# Patient Record
Sex: MALE | Race: WHITE | NOT HISPANIC OR LATINO | Employment: FULL TIME | URBAN - METROPOLITAN AREA
[De-identification: names, ages, dates, MRNs, and addresses within clinical notes are randomized per-mention and may not be internally consistent; named-entity substitution may affect disease eponyms.]

---

## 2019-02-20 ENCOUNTER — OFFICE VISIT - HEALTHEAST (OUTPATIENT)
Dept: BEHAVIORAL HEALTH | Facility: CLINIC | Age: 54
End: 2019-02-20

## 2019-02-20 DIAGNOSIS — F41.1 GAD (GENERALIZED ANXIETY DISORDER): ICD-10-CM

## 2019-02-20 DIAGNOSIS — F10.982 ALCOHOL-INDUCED INSOMNIA (H): ICD-10-CM

## 2019-02-20 DIAGNOSIS — F32.2 CURRENT SEVERE EPISODE OF MAJOR DEPRESSIVE DISORDER WITHOUT PSYCHOTIC FEATURES, UNSPECIFIED WHETHER RECURRENT (H): ICD-10-CM

## 2019-02-20 DIAGNOSIS — F10.20 ALCOHOL USE DISORDER, SEVERE, IN CONTROLLED ENVIRONMENT (H): ICD-10-CM

## 2019-02-20 DIAGNOSIS — G25.0 ESSENTIAL TREMOR: ICD-10-CM

## 2019-02-20 LAB
AMPHETAMINES UR QL SCN: NORMAL
BARBITURATES UR QL: NORMAL
BENZODIAZ UR QL: NORMAL
CANNABINOIDS UR QL SCN: NORMAL
COCAINE UR QL: NORMAL
CREAT UR-MCNC: 228.5 MG/DL
METHADONE UR QL SCN: NORMAL
OPIATES UR QL SCN: NORMAL
OXYCODONE UR QL: NORMAL
PCP UR QL SCN: NORMAL

## 2019-02-20 ASSESSMENT — MIFFLIN-ST. JEOR: SCORE: 1643.65

## 2019-02-24 LAB
ETHYL GLUCURONIDE UR CFM-MCNC: <100 NG/ML
ETHYL SULFATE UR CFM-MCNC: <100 NG/ML

## 2019-03-06 ENCOUNTER — OFFICE VISIT - HEALTHEAST (OUTPATIENT)
Dept: BEHAVIORAL HEALTH | Facility: CLINIC | Age: 54
End: 2019-03-06

## 2019-03-06 DIAGNOSIS — F10.20 ALCOHOL USE DISORDER, SEVERE, IN CONTROLLED ENVIRONMENT (H): ICD-10-CM

## 2019-03-06 DIAGNOSIS — F32.2 CURRENT SEVERE EPISODE OF MAJOR DEPRESSIVE DISORDER WITHOUT PSYCHOTIC FEATURES, UNSPECIFIED WHETHER RECURRENT (H): ICD-10-CM

## 2019-03-06 DIAGNOSIS — F41.1 GAD (GENERALIZED ANXIETY DISORDER): ICD-10-CM

## 2019-03-06 LAB
AMPHETAMINES UR QL SCN: NORMAL
BARBITURATES UR QL: NORMAL
BENZODIAZ UR QL: NORMAL
CANNABINOIDS UR QL SCN: NORMAL
COCAINE UR QL: NORMAL
CREAT UR-MCNC: 255.7 MG/DL
METHADONE UR QL SCN: NORMAL
OPIATES UR QL SCN: NORMAL
OXYCODONE UR QL: NORMAL
PCP UR QL SCN: NORMAL

## 2019-03-06 ASSESSMENT — MIFFLIN-ST. JEOR: SCORE: 1661.8

## 2019-03-10 LAB
ETHYL GLUCURONIDE UR CFM-MCNC: <100 NG/ML
ETHYL SULFATE UR CFM-MCNC: <100 NG/ML

## 2019-04-04 ENCOUNTER — AMBULATORY - HEALTHEAST (OUTPATIENT)
Dept: LAB | Facility: CLINIC | Age: 54
End: 2019-04-04

## 2019-04-04 ENCOUNTER — AMBULATORY - HEALTHEAST (OUTPATIENT)
Dept: BEHAVIORAL HEALTH | Facility: CLINIC | Age: 54
End: 2019-04-04

## 2019-04-04 ENCOUNTER — OFFICE VISIT - HEALTHEAST (OUTPATIENT)
Dept: BEHAVIORAL HEALTH | Facility: CLINIC | Age: 54
End: 2019-04-04

## 2019-04-04 DIAGNOSIS — D53.9 MACROCYTIC ANEMIA: ICD-10-CM

## 2019-04-04 DIAGNOSIS — F10.20 ALCOHOL USE DISORDER, SEVERE, DEPENDENCE (H): ICD-10-CM

## 2019-04-04 DIAGNOSIS — R74.01 TRANSAMINITIS: ICD-10-CM

## 2019-04-04 DIAGNOSIS — R53.82 CHRONIC FATIGUE: ICD-10-CM

## 2019-04-04 DIAGNOSIS — D53.9 ANEMIA, MACROCYTIC: ICD-10-CM

## 2019-04-04 DIAGNOSIS — R53.82 CHRONIC FATIGUE, UNSPECIFIED: ICD-10-CM

## 2019-04-04 DIAGNOSIS — F32.2 CURRENT SEVERE EPISODE OF MAJOR DEPRESSIVE DISORDER WITHOUT PSYCHOTIC FEATURES, UNSPECIFIED WHETHER RECURRENT (H): ICD-10-CM

## 2019-04-04 DIAGNOSIS — F41.1 GAD (GENERALIZED ANXIETY DISORDER): ICD-10-CM

## 2019-04-04 LAB
25(OH)D3 SERPL-MCNC: 12.4 NG/ML (ref 30–80)
ALBUMIN SERPL-MCNC: 4 G/DL (ref 3.5–5)
ALP SERPL-CCNC: 82 U/L (ref 45–120)
ALT SERPL W P-5'-P-CCNC: 18 U/L (ref 0–45)
AMPHETAMINES UR QL SCN: NORMAL
ANION GAP SERPL CALCULATED.3IONS-SCNC: 8 MMOL/L (ref 5–18)
AST SERPL W P-5'-P-CCNC: 26 U/L (ref 0–40)
BARBITURATES UR QL: NORMAL
BENZODIAZ UR QL: NORMAL
BILIRUB SERPL-MCNC: 0.4 MG/DL (ref 0–1)
BUN SERPL-MCNC: 11 MG/DL (ref 8–22)
CALCIUM SERPL-MCNC: 9.8 MG/DL (ref 8.5–10.5)
CANNABINOIDS UR QL SCN: NORMAL
CHLORIDE BLD-SCNC: 106 MMOL/L (ref 98–107)
CO2 SERPL-SCNC: 28 MMOL/L (ref 22–31)
COCAINE UR QL: NORMAL
CREAT SERPL-MCNC: 0.95 MG/DL (ref 0.7–1.3)
CREAT UR-MCNC: 131.9 MG/DL
ERYTHROCYTE [DISTWIDTH] IN BLOOD BY AUTOMATED COUNT: 12.4 % (ref 11–14.5)
GFR SERPL CREATININE-BSD FRML MDRD: >60 ML/MIN/1.73M2
GLUCOSE BLD-MCNC: 88 MG/DL (ref 70–125)
HCT VFR BLD AUTO: 43.4 % (ref 40–54)
HGB BLD-MCNC: 14.7 G/DL (ref 14–18)
MCH RBC QN AUTO: 31.5 PG (ref 27–34)
MCHC RBC AUTO-ENTMCNC: 33.9 G/DL (ref 32–36)
MCV RBC AUTO: 93 FL (ref 80–100)
METHADONE UR QL SCN: NORMAL
OPIATES UR QL SCN: NORMAL
OXYCODONE UR QL: NORMAL
PCP UR QL SCN: NORMAL
PLATELET # BLD AUTO: 218 THOU/UL (ref 140–440)
PMV BLD AUTO: 8.9 FL (ref 8.5–12.5)
POTASSIUM BLD-SCNC: 4.7 MMOL/L (ref 3.5–5)
PROT SERPL-MCNC: 7.2 G/DL (ref 6–8)
RBC # BLD AUTO: 4.66 MILL/UL (ref 4.4–6.2)
SODIUM SERPL-SCNC: 142 MMOL/L (ref 136–145)
VIT B12 SERPL-MCNC: 382 PG/ML (ref 213–816)
WBC: 6.8 THOU/UL (ref 4–11)

## 2019-04-04 ASSESSMENT — MIFFLIN-ST. JEOR: SCORE: 1711.69

## 2019-04-06 LAB
ETHYL GLUCURONIDE UR CFM-MCNC: <100 NG/ML
ETHYL SULFATE UR CFM-MCNC: <100 NG/ML

## 2019-04-17 ENCOUNTER — OFFICE VISIT - HEALTHEAST (OUTPATIENT)
Dept: BEHAVIORAL HEALTH | Facility: CLINIC | Age: 54
End: 2019-04-17

## 2019-04-17 DIAGNOSIS — F41.1 GAD (GENERALIZED ANXIETY DISORDER): ICD-10-CM

## 2019-04-17 DIAGNOSIS — E53.8 VITAMIN B 12 DEFICIENCY: ICD-10-CM

## 2019-04-17 DIAGNOSIS — E55.9 VITAMIN D DEFICIENCY: ICD-10-CM

## 2019-04-17 DIAGNOSIS — F10.20 ALCOHOL USE DISORDER, SEVERE, DEPENDENCE (H): ICD-10-CM

## 2019-04-17 LAB
AMPHETAMINES UR QL SCN: NORMAL
BARBITURATES UR QL: NORMAL
BENZODIAZ UR QL: NORMAL
CANNABINOIDS UR QL SCN: NORMAL
COCAINE UR QL: NORMAL
CREAT UR-MCNC: 48.8 MG/DL
METHADONE UR QL SCN: NORMAL
OPIATES UR QL SCN: NORMAL
OXYCODONE UR QL: NORMAL
PCP UR QL SCN: NORMAL

## 2019-04-17 ASSESSMENT — MIFFLIN-ST. JEOR: SCORE: 1716.23

## 2019-04-20 LAB
ETHYL GLUCURONIDE UR CFM-MCNC: NORMAL NG/ML
ETHYL SULFATE UR CFM-MCNC: 5510 NG/ML

## 2019-04-23 ENCOUNTER — COMMUNICATION - HEALTHEAST (OUTPATIENT)
Dept: BEHAVIORAL HEALTH | Facility: CLINIC | Age: 54
End: 2019-04-23

## 2019-04-24 ENCOUNTER — COMMUNICATION - HEALTHEAST (OUTPATIENT)
Dept: BEHAVIORAL HEALTH | Facility: CLINIC | Age: 54
End: 2019-04-24

## 2019-05-16 ENCOUNTER — OFFICE VISIT - HEALTHEAST (OUTPATIENT)
Dept: BEHAVIORAL HEALTH | Facility: CLINIC | Age: 54
End: 2019-05-16

## 2019-05-16 DIAGNOSIS — R53.82 CHRONIC FATIGUE: ICD-10-CM

## 2019-05-16 DIAGNOSIS — F10.20 ALCOHOL USE DISORDER, SEVERE, DEPENDENCE (H): ICD-10-CM

## 2019-05-16 DIAGNOSIS — G25.0 ESSENTIAL TREMOR: ICD-10-CM

## 2019-05-16 DIAGNOSIS — F41.1 GAD (GENERALIZED ANXIETY DISORDER): ICD-10-CM

## 2019-05-16 DIAGNOSIS — E53.8 VITAMIN B 12 DEFICIENCY: ICD-10-CM

## 2019-05-16 DIAGNOSIS — F10.94 ALCOHOL USE WITH ALCOHOL-INDUCED MOOD DISORDER (H): ICD-10-CM

## 2019-05-16 LAB
AMPHETAMINES UR QL SCN: NORMAL
BARBITURATES UR QL: NORMAL
BENZODIAZ UR QL: NORMAL
CANNABINOIDS UR QL SCN: NORMAL
COCAINE UR QL: NORMAL
CREAT UR-MCNC: 262.8 MG/DL
METHADONE UR QL SCN: NORMAL
OPIATES UR QL SCN: NORMAL
OXYCODONE UR QL: NORMAL
PCP UR QL SCN: NORMAL

## 2019-05-16 ASSESSMENT — MIFFLIN-ST. JEOR: SCORE: 1720.76

## 2019-05-21 LAB
ETHYL GLUCURONIDE UR CFM-MCNC: NORMAL NG/ML
ETHYL SULFATE UR CFM-MCNC: NORMAL NG/ML

## 2019-05-23 ENCOUNTER — AMBULATORY - HEALTHEAST (OUTPATIENT)
Dept: BEHAVIORAL HEALTH | Facility: CLINIC | Age: 54
End: 2019-05-23

## 2019-05-23 DIAGNOSIS — F10.94 ALCOHOL USE WITH ALCOHOL-INDUCED MOOD DISORDER (H): ICD-10-CM

## 2019-05-23 DIAGNOSIS — F10.20 ALCOHOL USE DISORDER, SEVERE, DEPENDENCE (H): ICD-10-CM

## 2019-05-23 DIAGNOSIS — F41.1 GAD (GENERALIZED ANXIETY DISORDER): ICD-10-CM

## 2019-05-23 DIAGNOSIS — E53.8 VITAMIN B 12 DEFICIENCY: ICD-10-CM

## 2019-05-23 DIAGNOSIS — E55.9 VITAMIN D DEFICIENCY: ICD-10-CM

## 2019-05-23 LAB
AMPHETAMINES UR QL SCN: NORMAL
BARBITURATES UR QL: NORMAL
BENZODIAZ UR QL: NORMAL
CANNABINOIDS UR QL SCN: NORMAL
COCAINE UR QL: NORMAL
CREAT UR-MCNC: 138.7 MG/DL
METHADONE UR QL SCN: NORMAL
OPIATES UR QL SCN: NORMAL
OXYCODONE UR QL: NORMAL
PCP UR QL SCN: NORMAL

## 2019-05-23 ASSESSMENT — MIFFLIN-ST. JEOR: SCORE: 1729.84

## 2019-05-25 LAB
ETHYL GLUCURONIDE UR CFM-MCNC: 307 NG/ML
ETHYL SULFATE UR CFM-MCNC: 121 NG/ML

## 2019-06-19 ENCOUNTER — AMBULATORY - HEALTHEAST (OUTPATIENT)
Dept: BEHAVIORAL HEALTH | Facility: CLINIC | Age: 54
End: 2019-06-19

## 2019-06-19 DIAGNOSIS — E53.8 VITAMIN B 12 DEFICIENCY: ICD-10-CM

## 2019-06-20 ENCOUNTER — AMBULATORY - HEALTHEAST (OUTPATIENT)
Dept: BEHAVIORAL HEALTH | Facility: CLINIC | Age: 54
End: 2019-06-20

## 2019-06-20 DIAGNOSIS — F32.2 CURRENT SEVERE EPISODE OF MAJOR DEPRESSIVE DISORDER WITHOUT PSYCHOTIC FEATURES, UNSPECIFIED WHETHER RECURRENT (H): ICD-10-CM

## 2019-06-20 DIAGNOSIS — E55.9 VITAMIN D DEFICIENCY: ICD-10-CM

## 2019-06-20 DIAGNOSIS — F41.1 GAD (GENERALIZED ANXIETY DISORDER): ICD-10-CM

## 2019-06-20 DIAGNOSIS — R74.01 TRANSAMINITIS: ICD-10-CM

## 2019-06-20 DIAGNOSIS — F10.20 ALCOHOL USE DISORDER, SEVERE, DEPENDENCE (H): ICD-10-CM

## 2019-06-20 DIAGNOSIS — F10.94 ALCOHOL USE WITH ALCOHOL-INDUCED MOOD DISORDER (H): ICD-10-CM

## 2019-06-20 DIAGNOSIS — F10.230 ALCOHOL DEPENDENCE WITH UNCOMPLICATED WITHDRAWAL (H): ICD-10-CM

## 2019-06-20 DIAGNOSIS — R19.7 DIARRHEA, UNSPECIFIED TYPE: ICD-10-CM

## 2019-06-20 LAB
AMPHETAMINES UR QL SCN: NORMAL
BARBITURATES UR QL: NORMAL
BENZODIAZ UR QL: NORMAL
CANNABINOIDS UR QL SCN: NORMAL
COCAINE UR QL: NORMAL
CREAT UR-MCNC: 353.9 MG/DL
METHADONE UR QL SCN: NORMAL
OPIATES UR QL SCN: NORMAL
OXYCODONE UR QL: NORMAL
PCP UR QL SCN: NORMAL

## 2019-06-20 ASSESSMENT — MIFFLIN-ST. JEOR: SCORE: 1716.23

## 2019-06-23 LAB
ETHYL GLUCURONIDE UR CFM-MCNC: 756 NG/ML
ETHYL SULFATE UR CFM-MCNC: 111 NG/ML

## 2019-07-03 ENCOUNTER — OFFICE VISIT - HEALTHEAST (OUTPATIENT)
Dept: BEHAVIORAL HEALTH | Facility: CLINIC | Age: 54
End: 2019-07-03

## 2019-07-03 DIAGNOSIS — F41.1 GAD (GENERALIZED ANXIETY DISORDER): ICD-10-CM

## 2019-07-03 DIAGNOSIS — E55.9 VITAMIN D DEFICIENCY: ICD-10-CM

## 2019-07-03 DIAGNOSIS — F10.20 ALCOHOL USE DISORDER, SEVERE, DEPENDENCE (H): ICD-10-CM

## 2019-07-03 DIAGNOSIS — F10.94 ALCOHOL USE WITH ALCOHOL-INDUCED MOOD DISORDER (H): ICD-10-CM

## 2019-07-03 DIAGNOSIS — E53.8 VITAMIN B 12 DEFICIENCY: ICD-10-CM

## 2019-07-03 LAB
AMPHETAMINES UR QL SCN: NORMAL
BARBITURATES UR QL: NORMAL
BENZODIAZ UR QL: NORMAL
CANNABINOIDS UR QL SCN: NORMAL
COCAINE UR QL: NORMAL
CREAT UR-MCNC: 224.2 MG/DL
METHADONE UR QL SCN: NORMAL
OPIATES UR QL SCN: NORMAL
OXYCODONE UR QL: NORMAL
PCP UR QL SCN: NORMAL

## 2019-07-03 ASSESSMENT — MIFFLIN-ST. JEOR: SCORE: 1684.48

## 2019-07-06 LAB
ETHYL GLUCURONIDE UR CFM-MCNC: <100 NG/ML
ETHYL SULFATE UR CFM-MCNC: <100 NG/ML

## 2019-08-01 ENCOUNTER — COMMUNICATION - HEALTHEAST (OUTPATIENT)
Dept: BEHAVIORAL HEALTH | Facility: CLINIC | Age: 54
End: 2019-08-01

## 2019-08-04 ENCOUNTER — SURGERY - HEALTHEAST (OUTPATIENT)
Dept: CARDIOLOGY | Facility: CLINIC | Age: 54
End: 2019-08-04

## 2019-08-04 ASSESSMENT — MIFFLIN-ST. JEOR: SCORE: 1652.72

## 2019-08-05 ASSESSMENT — MIFFLIN-ST. JEOR: SCORE: 1707.16

## 2019-08-06 ASSESSMENT — MIFFLIN-ST. JEOR: SCORE: 1728.02

## 2019-08-07 ASSESSMENT — MIFFLIN-ST. JEOR: SCORE: 1707.61

## 2019-08-08 ASSESSMENT — MIFFLIN-ST. JEOR: SCORE: 1688.56

## 2019-08-09 ASSESSMENT — MIFFLIN-ST. JEOR: SCORE: 1675.43

## 2019-08-10 ASSESSMENT — MIFFLIN-ST. JEOR: SCORE: 1664.97

## 2019-08-11 ASSESSMENT — MIFFLIN-ST. JEOR: SCORE: 1667.69

## 2019-08-12 ASSESSMENT — MIFFLIN-ST. JEOR: SCORE: 1668.15

## 2019-08-15 ENCOUNTER — COMMUNICATION - HEALTHEAST (OUTPATIENT)
Dept: ADMINISTRATIVE | Facility: HOSPITAL | Age: 54
End: 2019-08-15

## 2019-08-15 ENCOUNTER — COMMUNICATION - HEALTHEAST (OUTPATIENT)
Dept: ONCOLOGY | Facility: CLINIC | Age: 54
End: 2019-08-15

## 2019-09-04 ENCOUNTER — OFFICE VISIT - HEALTHEAST (OUTPATIENT)
Dept: ONCOLOGY | Facility: CLINIC | Age: 54
End: 2019-09-04

## 2019-09-04 DIAGNOSIS — Z86.718 PERSONAL HISTORY OF DVT (DEEP VEIN THROMBOSIS): ICD-10-CM

## 2019-09-04 DIAGNOSIS — I26.02 ACUTE SADDLE PULMONARY EMBOLISM WITH ACUTE COR PULMONALE (H): ICD-10-CM

## 2019-09-04 ASSESSMENT — MIFFLIN-ST. JEOR: SCORE: 1661.34

## 2019-10-11 ENCOUNTER — COMMUNICATION - HEALTHEAST (OUTPATIENT)
Dept: BEHAVIORAL HEALTH | Facility: CLINIC | Age: 54
End: 2019-10-11

## 2019-10-17 ENCOUNTER — OFFICE VISIT - HEALTHEAST (OUTPATIENT)
Dept: ADDICTION MEDICINE | Facility: CLINIC | Age: 54
End: 2019-10-17

## 2019-10-17 DIAGNOSIS — F10.20 ALCOHOL USE DISORDER, SEVERE, DEPENDENCE (H): ICD-10-CM

## 2019-10-22 ENCOUNTER — COMMUNICATION - HEALTHEAST (OUTPATIENT)
Dept: ADDICTION MEDICINE | Facility: CLINIC | Age: 54
End: 2019-10-22

## 2019-11-29 ENCOUNTER — OFFICE VISIT (OUTPATIENT)
Dept: FAMILY MEDICINE | Facility: CLINIC | Age: 54
End: 2019-11-29
Payer: COMMERCIAL

## 2019-11-29 VITALS
DIASTOLIC BLOOD PRESSURE: 76 MMHG | TEMPERATURE: 98.2 F | SYSTOLIC BLOOD PRESSURE: 120 MMHG | OXYGEN SATURATION: 98 % | HEART RATE: 63 BPM | HEIGHT: 70 IN | RESPIRATION RATE: 16 BRPM | WEIGHT: 187.5 LBS | BODY MASS INDEX: 26.84 KG/M2

## 2019-11-29 DIAGNOSIS — K02.9 DENTAL CARIES: ICD-10-CM

## 2019-11-29 DIAGNOSIS — R53.83 FATIGUE, UNSPECIFIED TYPE: ICD-10-CM

## 2019-11-29 DIAGNOSIS — Z13.1 SCREENING FOR DIABETES MELLITUS: ICD-10-CM

## 2019-11-29 DIAGNOSIS — Z12.11 SPECIAL SCREENING FOR MALIGNANT NEOPLASMS, COLON: ICD-10-CM

## 2019-11-29 DIAGNOSIS — Z87.898 HISTORY OF CHEST PAIN: ICD-10-CM

## 2019-11-29 DIAGNOSIS — Z79.01 CHRONIC ANTICOAGULATION: ICD-10-CM

## 2019-11-29 DIAGNOSIS — R63.5 WEIGHT GAIN: ICD-10-CM

## 2019-11-29 DIAGNOSIS — Z86.59 HISTORY OF SUICIDAL IDEATION: ICD-10-CM

## 2019-11-29 DIAGNOSIS — F41.1 GAD (GENERALIZED ANXIETY DISORDER): ICD-10-CM

## 2019-11-29 DIAGNOSIS — Z76.89 ESTABLISHING CARE WITH NEW DOCTOR, ENCOUNTER FOR: ICD-10-CM

## 2019-11-29 DIAGNOSIS — Z13.6 ENCOUNTER FOR LIPID SCREENING FOR CARDIOVASCULAR DISEASE: ICD-10-CM

## 2019-11-29 DIAGNOSIS — Z11.4 SCREENING FOR HUMAN IMMUNODEFICIENCY VIRUS WITHOUT PRESENCE OF RISK FACTORS: ICD-10-CM

## 2019-11-29 DIAGNOSIS — H93.13 TINNITUS, BILATERAL: ICD-10-CM

## 2019-11-29 DIAGNOSIS — R41.3 MEMORY CHANGES: ICD-10-CM

## 2019-11-29 DIAGNOSIS — Z13.220 ENCOUNTER FOR LIPID SCREENING FOR CARDIOVASCULAR DISEASE: ICD-10-CM

## 2019-11-29 DIAGNOSIS — R00.2 PALPITATIONS: ICD-10-CM

## 2019-11-29 DIAGNOSIS — Z11.59 ENCOUNTER FOR HEPATITIS C SCREENING TEST FOR LOW RISK PATIENT: ICD-10-CM

## 2019-11-29 DIAGNOSIS — R10.13 ABDOMINAL PAIN, EPIGASTRIC: ICD-10-CM

## 2019-11-29 DIAGNOSIS — J30.2 SEASONAL ALLERGIC RHINITIS, UNSPECIFIED TRIGGER: ICD-10-CM

## 2019-11-29 DIAGNOSIS — R19.5 POSITIVE FIT (FECAL IMMUNOCHEMICAL TEST): ICD-10-CM

## 2019-11-29 DIAGNOSIS — F32.2 CURRENT SEVERE EPISODE OF MAJOR DEPRESSIVE DISORDER WITHOUT PSYCHOTIC FEATURES, UNSPECIFIED WHETHER RECURRENT (H): Primary | ICD-10-CM

## 2019-11-29 DIAGNOSIS — Z86.711 HISTORY OF PULMONARY EMBOLISM: ICD-10-CM

## 2019-11-29 DIAGNOSIS — Z13.0 SCREENING, ANEMIA, DEFICIENCY, IRON: ICD-10-CM

## 2019-11-29 DIAGNOSIS — F10.21 MODERATE ALCOHOL DEPENDENCE IN EARLY REMISSION (H): ICD-10-CM

## 2019-11-29 DIAGNOSIS — R42 DIZZINESS: ICD-10-CM

## 2019-11-29 DIAGNOSIS — Z23 NEED FOR DIPHTHERIA-TETANUS-PERTUSSIS (TDAP) VACCINE: ICD-10-CM

## 2019-11-29 DIAGNOSIS — Z86.718 PERSONAL HISTORY OF DVT (DEEP VEIN THROMBOSIS): ICD-10-CM

## 2019-11-29 DIAGNOSIS — N52.9 ERECTILE DYSFUNCTION, UNSPECIFIED ERECTILE DYSFUNCTION TYPE: ICD-10-CM

## 2019-11-29 LAB
ALBUMIN SERPL-MCNC: 3.9 G/DL (ref 3.4–5)
ALP SERPL-CCNC: 87 U/L (ref 40–150)
ALT SERPL W P-5'-P-CCNC: 33 U/L (ref 0–70)
ANION GAP SERPL CALCULATED.3IONS-SCNC: 3 MMOL/L (ref 3–14)
AST SERPL W P-5'-P-CCNC: 21 U/L (ref 0–45)
BASOPHILS # BLD AUTO: 0 10E9/L (ref 0–0.2)
BASOPHILS NFR BLD AUTO: 0.5 %
BILIRUB SERPL-MCNC: 0.4 MG/DL (ref 0.2–1.3)
BUN SERPL-MCNC: 14 MG/DL (ref 7–30)
CALCIUM SERPL-MCNC: 9 MG/DL (ref 8.5–10.1)
CHLORIDE SERPL-SCNC: 106 MMOL/L (ref 94–109)
CHOLEST SERPL-MCNC: 261 MG/DL
CO2 SERPL-SCNC: 28 MMOL/L (ref 20–32)
CREAT SERPL-MCNC: 0.98 MG/DL (ref 0.66–1.25)
DIFFERENTIAL METHOD BLD: NORMAL
EOSINOPHIL # BLD AUTO: 0.2 10E9/L (ref 0–0.7)
EOSINOPHIL NFR BLD AUTO: 2.5 %
ERYTHROCYTE [DISTWIDTH] IN BLOOD BY AUTOMATED COUNT: 12.7 % (ref 10–15)
FOLATE SERPL-MCNC: 14.6 NG/ML
GFR SERPL CREATININE-BSD FRML MDRD: 87 ML/MIN/{1.73_M2}
GLUCOSE SERPL-MCNC: 87 MG/DL (ref 70–99)
HBA1C MFR BLD: 4.9 % (ref 0–5.6)
HCT VFR BLD AUTO: 48.9 % (ref 40–53)
HDLC SERPL-MCNC: 57 MG/DL
HGB BLD-MCNC: 16.3 G/DL (ref 13.3–17.7)
LDLC SERPL CALC-MCNC: 163 MG/DL
LYMPHOCYTES # BLD AUTO: 2 10E9/L (ref 0.8–5.3)
LYMPHOCYTES NFR BLD AUTO: 25.1 %
MCH RBC QN AUTO: 31.7 PG (ref 26.5–33)
MCHC RBC AUTO-ENTMCNC: 33.3 G/DL (ref 31.5–36.5)
MCV RBC AUTO: 95 FL (ref 78–100)
MONOCYTES # BLD AUTO: 0.5 10E9/L (ref 0–1.3)
MONOCYTES NFR BLD AUTO: 6.2 %
NEUTROPHILS # BLD AUTO: 5.3 10E9/L (ref 1.6–8.3)
NEUTROPHILS NFR BLD AUTO: 65.7 %
NONHDLC SERPL-MCNC: 204 MG/DL
PLATELET # BLD AUTO: 195 10E9/L (ref 150–450)
POTASSIUM SERPL-SCNC: 4.2 MMOL/L (ref 3.4–5.3)
PROT SERPL-MCNC: 7.5 G/DL (ref 6.8–8.8)
RBC # BLD AUTO: 5.15 10E12/L (ref 4.4–5.9)
SODIUM SERPL-SCNC: 137 MMOL/L (ref 133–144)
TRIGL SERPL-MCNC: 205 MG/DL
TSH SERPL DL<=0.005 MIU/L-ACNC: 0.96 MU/L (ref 0.4–4)
VIT B12 SERPL-MCNC: 334 PG/ML (ref 193–986)
WBC # BLD AUTO: 8 10E9/L (ref 4–11)

## 2019-11-29 PROCEDURE — 90715 TDAP VACCINE 7 YRS/> IM: CPT | Performed by: FAMILY MEDICINE

## 2019-11-29 PROCEDURE — 82607 VITAMIN B-12: CPT | Performed by: FAMILY MEDICINE

## 2019-11-29 PROCEDURE — 84425 ASSAY OF VITAMIN B-1: CPT | Mod: 90 | Performed by: FAMILY MEDICINE

## 2019-11-29 PROCEDURE — 99205 OFFICE O/P NEW HI 60 MIN: CPT | Mod: 25 | Performed by: FAMILY MEDICINE

## 2019-11-29 PROCEDURE — 36415 COLL VENOUS BLD VENIPUNCTURE: CPT | Performed by: FAMILY MEDICINE

## 2019-11-29 PROCEDURE — 99000 SPECIMEN HANDLING OFFICE-LAB: CPT | Performed by: FAMILY MEDICINE

## 2019-11-29 PROCEDURE — 83036 HEMOGLOBIN GLYCOSYLATED A1C: CPT | Performed by: FAMILY MEDICINE

## 2019-11-29 PROCEDURE — 90471 IMMUNIZATION ADMIN: CPT | Performed by: FAMILY MEDICINE

## 2019-11-29 PROCEDURE — 87389 HIV-1 AG W/HIV-1&-2 AB AG IA: CPT | Performed by: FAMILY MEDICINE

## 2019-11-29 PROCEDURE — 80061 LIPID PANEL: CPT | Performed by: FAMILY MEDICINE

## 2019-11-29 PROCEDURE — 96127 BRIEF EMOTIONAL/BEHAV ASSMT: CPT | Performed by: FAMILY MEDICINE

## 2019-11-29 PROCEDURE — 80050 GENERAL HEALTH PANEL: CPT | Performed by: FAMILY MEDICINE

## 2019-11-29 PROCEDURE — 82746 ASSAY OF FOLIC ACID SERUM: CPT | Performed by: FAMILY MEDICINE

## 2019-11-29 PROCEDURE — 86803 HEPATITIS C AB TEST: CPT | Performed by: FAMILY MEDICINE

## 2019-11-29 RX ORDER — PROPRANOLOL HYDROCHLORIDE 20 MG/1
20 TABLET ORAL PRN
COMMUNITY
Start: 2019-11-21 | End: 2021-08-17

## 2019-11-29 RX ORDER — MULTIPLE VITAMINS W/ MINERALS TAB 9MG-400MCG
1 TAB ORAL DAILY
COMMUNITY
End: 2021-08-18

## 2019-11-29 RX ORDER — BUPROPION HYDROCHLORIDE 300 MG/1
300 TABLET ORAL EVERY MORNING
COMMUNITY
Start: 2019-11-22 | End: 2021-08-18

## 2019-11-29 RX ORDER — GABAPENTIN 300 MG/1
300 CAPSULE ORAL
COMMUNITY
Start: 2019-11-21 | End: 2020-07-07

## 2019-11-29 ASSESSMENT — ANXIETY QUESTIONNAIRES
2. NOT BEING ABLE TO STOP OR CONTROL WORRYING: NEARLY EVERY DAY
GAD7 TOTAL SCORE: 15
6. BECOMING EASILY ANNOYED OR IRRITABLE: SEVERAL DAYS
7. FEELING AFRAID AS IF SOMETHING AWFUL MIGHT HAPPEN: MORE THAN HALF THE DAYS
1. FEELING NERVOUS, ANXIOUS, OR ON EDGE: NEARLY EVERY DAY
IF YOU CHECKED OFF ANY PROBLEMS ON THIS QUESTIONNAIRE, HOW DIFFICULT HAVE THESE PROBLEMS MADE IT FOR YOU TO DO YOUR WORK, TAKE CARE OF THINGS AT HOME, OR GET ALONG WITH OTHER PEOPLE: VERY DIFFICULT
3. WORRYING TOO MUCH ABOUT DIFFERENT THINGS: NEARLY EVERY DAY
5. BEING SO RESTLESS THAT IT IS HARD TO SIT STILL: SEVERAL DAYS

## 2019-11-29 ASSESSMENT — MIFFLIN-ST. JEOR: SCORE: 1696.74

## 2019-11-29 ASSESSMENT — PATIENT HEALTH QUESTIONNAIRE - PHQ9
5. POOR APPETITE OR OVEREATING: MORE THAN HALF THE DAYS
SUM OF ALL RESPONSES TO PHQ QUESTIONS 1-9: 14

## 2019-11-29 NOTE — LETTER
My Depression Action Plan  Name: Nikunj Sihna   Date of Birth 1965  Date: 12/1/2019    My doctor: Adriana Solis   My clinic: [address] [address] [phone]          GREEN    ZONE   Good Control    What it looks like:     Things are going generally well. You have normal up s and down s. You may even feel depressed from time to time, but bad moods usually last less than a day.   What you need to do:  1. Continue to care for yourself (see self care plan)  2. Check your depression survival kit and update it as needed  3. Follow your physician s recommendations including any medication.  4. Do not stop taking medication unless you consult with your physician first.           YELLOW         ZONE Getting Worse    What it looks like:     Depression is starting to interfere with your life.     It may be hard to get out of bed; you may be starting to isolate yourself from others.    Symptoms of depression are starting to last most all day and this has happened for several days.     You may have suicidal thoughts but they are not constant.   What you need to do:     1. Call your care team, your response to treatment will improve if you keep your care team informed of your progress. Yellow periods are signs an adjustment may need to be made.     2. Continue your self-care, even if you have to fake it!    3. Talk to someone in your support network    4. Open up your depression survival kit           RED    ZONE Medical Alert - Get Help    What it looks like:     Depression is seriously interfering with your life.     You may experience these or other symptoms: You can t get out of bed most days, can t work or engage in other necessary activities, you have trouble taking care of basic hygiene, or basic responsibilities, thoughts of suicide or death that will not go away, self-injurious behavior.     What you need to do:  1. Call your care team and  request a same-day appointment. If they are not available (weekends or after hours) call your local crisis line, emergency room or 911.            Depression Self Care Plan / Survival Kit    Self-Care for Depression  Here s the deal. Your body and mind are really not as separate as most people think.  What you do and think affects how you feel and how you feel influences what you do and think. This means if you do things that people who feel good do, it will help you feel better.  Sometimes this is all it takes.  There is also a place for medication and therapy depending on how severe your depression is, so be sure to consult with your medical provider and/ or Behavioral Health Consultant if your symptoms are worsening or not improving.     In order to better manage my stress, I will:    Exercise  Get some form of exercise, every day. This will help reduce pain and release endorphins, the  feel good  chemicals in your brain. This is almost as good as taking antidepressants!  This is not the same as joining a gym and then never going! (they count on that by the way ) It can be as simple as just going for a walk or doing some gardening, anything that will get you moving.      Hygiene   Maintain good hygiene (Get out of bed in the morning, Make your bed, Brush your teeth, Take a shower, and Get dressed like you were going to work, even if you are unemployed).  If your clothes don't fit try to get ones that do.    Diet  I will strive to eat foods that are good for me, drink plenty of water, and avoid excessive sugar, caffeine, alcohol, and other mood-altering substances.  Some foods that are helpful in depression are: complex carbohydrates, B vitamins, flaxseed, fish or fish oil, fresh fruits and vegetables.    Psychotherapy  I agree to participate in Individual Therapy (if recommended).    Medication  If prescribed medications, I agree to take them.  Missing doses can result in serious side effects.  I understand that  drinking alcohol, or other illicit drug use, may cause potential side effects.  I will not stop my medication abruptly without first discussing it with my provider.    Staying Connected With Others  I will stay in touch with my friends, family members, and my primary care provider/team.    Use your imagination  Be creative.  We all have a creative side; it doesn t matter if it s oil painting, sand castles, or mud pies! This will also kick up the endorphins.    Witness Beauty  (AKA stop and smell the roses) Take a look outside, even in mid-winter. Notice colors, textures. Watch the squirrels and birds.     Service to others  Be of service to others.  There is always someone else in need.  By helping others we can  get out of ourselves  and remember the really important things.  This also provides opportunities for practicing all the other parts of the program.    Humor  Laugh and be silly!  Adjust your TV habits for less news and crime-drama and more comedy.    Control your stress  Try breathing deep, massage therapy, biofeedback, and meditation. Find time to relax each day.     My support system    Clinic Contact:  Phone number:    Contact 1:  Phone number:    Contact 2:  Phone number:    Faith/:  Phone number:    Therapist:  Phone number:    Local crisis center:    Phone number:    Other community support:  Phone number:

## 2019-11-29 NOTE — NURSING NOTE
Screening Questionnaire for Adult Immunization     Are you sick today?   No    Do you have allergies to medications, food or any vaccine?   No    Have you ever had a serious reaction after receiving a vaccination?   No    Do you have a long-term health problem with heart disease, lung disease,  asthma, kidney disease, diabetes, anemia, metabolic or blood disease?   No    Do you have cancer, leukemia, AIDS, or any immune system problem?   No    Do you take cortisone, prednisone, other steroids, or anticancer drugs, or  have you had any x-ray (radiation) treatments?   No    Have you had a seizure, brain, or other nervous system problem?   No    During the past year, have you received a transfusion of blood or blood       products, or been given a medicine called immune (gamma) globulin?   No    For women: Are you pregnant or is there a chance you could become         pregnant during the next month?   No    Have you received any vaccinations in the past 4 weeks?   No     Immunization questionnaire answers were all negative.      MNVFC doesn't apply on this patient    Per orders of Dr Solis, injection of TDAP given by Rachel Saul CMA. Patient instructed to remain in clinic for 20 minutes afterwards, and to report any adverse reaction to me immediately.    Prior to injection verified patient identity using patient's name and date of birth.         Screening performed by Rachel Saul CMA

## 2019-11-29 NOTE — PATIENT INSTRUCTIONS
Labs today   See psyche and psyhcology as planned  tdap today   colonoscopy in near future  See hematology regarding prior PE and xarelto  Consider GI referral fo scope in future once can come off xarelto few days   Will monitor weight   See you back in few weeks  can try Pepcid for abdominal ain  Go to the ER if worse

## 2019-11-29 NOTE — LETTER
December 2, 2019      Nikunj Sinha  [address]   [facility] [address]        Dear ,    We are writing to inform you of your test results.    Results within acceptable limits.  -HIV test is normal..     Resulted Orders   CBC with platelets differential   Result Value Ref Range    WBC 8.0 4.0 - 11.0 10e9/L    RBC Count 5.15 4.4 - 5.9 10e12/L    Hemoglobin 16.3 13.3 - 17.7 g/dL    Hematocrit 48.9 40.0 - 53.0 %    MCV 95 78 - 100 fl    MCH 31.7 26.5 - 33.0 pg    MCHC 33.3 31.5 - 36.5 g/dL    RDW 12.7 10.0 - 15.0 %    Platelet Count 195 150 - 450 10e9/L    % Neutrophils 65.7 %    % Lymphocytes 25.1 %    % Monocytes 6.2 %    % Eosinophils 2.5 %    % Basophils 0.5 %    Absolute Neutrophil 5.3 1.6 - 8.3 10e9/L    Absolute Lymphocytes 2.0 0.8 - 5.3 10e9/L    Absolute Monocytes 0.5 0.0 - 1.3 10e9/L    Absolute Eosinophils 0.2 0.0 - 0.7 10e9/L    Absolute Basophils 0.0 0.0 - 0.2 10e9/L    Diff Method Automated Method    Comprehensive metabolic panel   Result Value Ref Range    Sodium 137 133 - 144 mmol/L    Potassium 4.2 3.4 - 5.3 mmol/L    Chloride 106 94 - 109 mmol/L    Carbon Dioxide 28 20 - 32 mmol/L    Anion Gap 3 3 - 14 mmol/L    Glucose 87 70 - 99 mg/dL    Urea Nitrogen 14 7 - 30 mg/dL    Creatinine 0.98 0.66 - 1.25 mg/dL    GFR Estimate 87 >60 mL/min/[1.73_m2]      Comment:      Non  GFR Calc  Starting 12/18/2018, serum creatinine based estimated GFR (eGFR) will be   calculated using the Chronic Kidney Disease Epidemiology Collaboration   (CKD-EPI) equation.      GFR Estimate If Black >90 >60 mL/min/[1.73_m2]      Comment:       GFR Calc  Starting 12/18/2018, serum creatinine based estimated GFR (eGFR) will be   calculated using the Chronic Kidney Disease Epidemiology Collaboration   (CKD-EPI) equation.      Calcium 9.0 8.5 - 10.1 mg/dL    Bilirubin Total 0.4 0.2 - 1.3 mg/dL    Albumin 3.9 3.4 - 5.0 g/dL    Protein Total 7.5 6.8 - 8.8 g/dL    Alkaline Phosphatase  87 40 - 150 U/L    ALT 33 0 - 70 U/L    AST 21 0 - 45 U/L   Hemoglobin A1c   Result Value Ref Range    Hemoglobin A1C 4.9 0 - 5.6 %      Comment:      Normal <5.7% Prediabetes 5.7-6.4%  Diabetes 6.5% or higher - adopted from ADA   consensus guidelines.     Lipid panel reflex to direct LDL Non-fasting   Result Value Ref Range    Cholesterol 261 (H) <200 mg/dL      Comment:      Desirable:       <200 mg/dl    Triglycerides 205 (H) <150 mg/dL      Comment:      Borderline high:  150-199 mg/dl  High:             200-499 mg/dl  Very high:       >499 mg/dl      HDL Cholesterol 57 >39 mg/dL    LDL Cholesterol Calculated 163 (H) <100 mg/dL      Comment:      Above desirable:  100-129 mg/dl  Borderline High:  130-159 mg/dL  High:             160-189 mg/dL  Very high:       >189 mg/dl      Non HDL Cholesterol 204 (H) <130 mg/dL      Comment:      Above Desirable:  130-159 mg/dl  Borderline high:  160-189 mg/dl  High:             190-219 mg/dl  Very high:       >219 mg/dl     TSH with free T4 reflex   Result Value Ref Range    TSH 0.96 0.40 - 4.00 mU/L   HIV Antigen Antibody Combo   Result Value Ref Range    HIV Antigen Antibody Combo Nonreactive NR^Nonreactive          Comment:      HIV-1 p24 Ag & HIV-1/HIV-2 Ab Not Detected   Vitamin B12   Result Value Ref Range    Vitamin B12 334 193 - 986 pg/mL   Folate   Result Value Ref Range    Folate 14.6 >5.4 ng/mL   Vitamin B1 whole blood   Result Value Ref Range    Vitamin B1 Whole Blood Level 170 70 - 180 nmol/L      Comment:      (Note)  INTERPRETIVE INFORMATION: Vitamin B1, Whole Blood  This assay measures the concentration of thiamine   diphosphate (TDP), the primary active form of vitamin B1.   Approximately 90 percent of vitamin B1 present in whole   blood is TDP. Thiamine and thiamine monophosphate, which   comprise the remaining 10 percent, are not measured.  Test developed and characteristics determined by [employer] [employer]. See Compliance Statement B:  [email or web address]  Performed by [facility],  [address] [facility] [address] [phone]  [email or web address], Memo Hilton MD, Lab. Director         If you have any questions or concerns, please call the clinic at the number listed above.       Sincerely,        Adriana Solis MD/nr

## 2019-11-29 NOTE — LETTER
December 3, 2019      Nikunj Sinha  [address]   [facility] [address]        Dear ,    We are writing to inform you of your test results.    Results within acceptable limits.     Normal folate, b12,   -LDL(bad) cholesterol level is elevated, and your triglycerides are elevated which can increase your heart disease risk.  A diet high in fat and simple carbohydrates, genetics and being overweight can contribute to this. ADVISE: exercising 150 minutes of aerobic exercise per week (30 minutes for 5 days per week or 50 minutes for 3 days per week are options), eating a low saturated fat/low carbohydrate diet, and omega-3 fatty acids (fish oil) 3280-5655 mg daily are helpful to improve this. In 3 months, you should recheck your fasting cholesterol panel by scheduling a lab-only appointment.   -Liver and gallbladder tests are normal (ALT,AST, Alk phos, bilirubin), kidney function is normal (Cr, GFR), sodium is normal, potassium is normal, calcium is normal, glucose is normal.   -TSH (thyroid stimulating hormone) level is normal which indicates normal thyroid function..     Resulted Orders   CBC with platelets differential   Result Value Ref Range    WBC 8.0 4.0 - 11.0 10e9/L    RBC Count 5.15 4.4 - 5.9 10e12/L    Hemoglobin 16.3 13.3 - 17.7 g/dL    Hematocrit 48.9 40.0 - 53.0 %    MCV 95 78 - 100 fl    MCH 31.7 26.5 - 33.0 pg    MCHC 33.3 31.5 - 36.5 g/dL    RDW 12.7 10.0 - 15.0 %    Platelet Count 195 150 - 450 10e9/L    % Neutrophils 65.7 %    % Lymphocytes 25.1 %    % Monocytes 6.2 %    % Eosinophils 2.5 %    % Basophils 0.5 %    Absolute Neutrophil 5.3 1.6 - 8.3 10e9/L    Absolute Lymphocytes 2.0 0.8 - 5.3 10e9/L    Absolute Monocytes 0.5 0.0 - 1.3 10e9/L    Absolute Eosinophils 0.2 0.0 - 0.7 10e9/L    Absolute Basophils 0.0 0.0 - 0.2 10e9/L    Diff Method Automated Method    Comprehensive metabolic panel   Result Value Ref Range    Sodium 137 133 - 144 mmol/L    Potassium 4.2 3.4 - 5.3 mmol/L     Chloride 106 94 - 109 mmol/L    Carbon Dioxide 28 20 - 32 mmol/L    Anion Gap 3 3 - 14 mmol/L    Glucose 87 70 - 99 mg/dL    Urea Nitrogen 14 7 - 30 mg/dL    Creatinine 0.98 0.66 - 1.25 mg/dL    GFR Estimate 87 >60 mL/min/[1.73_m2]      Comment:      Non  GFR Calc  Starting 12/18/2018, serum creatinine based estimated GFR (eGFR) will be   calculated using the Chronic Kidney Disease Epidemiology Collaboration   (CKD-EPI) equation.      GFR Estimate If Black >90 >60 mL/min/[1.73_m2]      Comment:       GFR Calc  Starting 12/18/2018, serum creatinine based estimated GFR (eGFR) will be   calculated using the Chronic Kidney Disease Epidemiology Collaboration   (CKD-EPI) equation.      Calcium 9.0 8.5 - 10.1 mg/dL    Bilirubin Total 0.4 0.2 - 1.3 mg/dL    Albumin 3.9 3.4 - 5.0 g/dL    Protein Total 7.5 6.8 - 8.8 g/dL    Alkaline Phosphatase 87 40 - 150 U/L    ALT 33 0 - 70 U/L    AST 21 0 - 45 U/L   Hemoglobin A1c   Result Value Ref Range    Hemoglobin A1C 4.9 0 - 5.6 %      Comment:      Normal <5.7% Prediabetes 5.7-6.4%  Diabetes 6.5% or higher - adopted from ADA   consensus guidelines.     Lipid panel reflex to direct LDL Non-fasting   Result Value Ref Range    Cholesterol 261 (H) <200 mg/dL      Comment:      Desirable:       <200 mg/dl    Triglycerides 205 (H) <150 mg/dL      Comment:      Borderline high:  150-199 mg/dl  High:             200-499 mg/dl  Very high:       >499 mg/dl      HDL Cholesterol 57 >39 mg/dL    LDL Cholesterol Calculated 163 (H) <100 mg/dL      Comment:      Above desirable:  100-129 mg/dl  Borderline High:  130-159 mg/dL  High:             160-189 mg/dL  Very high:       >189 mg/dl      Non HDL Cholesterol 204 (H) <130 mg/dL      Comment:      Above Desirable:  130-159 mg/dl  Borderline high:  160-189 mg/dl  High:             190-219 mg/dl  Very high:       >219 mg/dl     TSH with free T4 reflex   Result Value Ref Range    TSH 0.96 0.40 - 4.00 mU/L   HIV  Antigen Antibody Combo   Result Value Ref Range    HIV Antigen Antibody Combo Nonreactive NR^Nonreactive          Comment:      HIV-1 p24 Ag & HIV-1/HIV-2 Ab Not Detected   Hepatitis C Screen Reflex to HCV RNA Quant and Genotype   Result Value Ref Range    Hepatitis C Antibody Nonreactive NR^Nonreactive      Comment:      Assay performance characteristics have not been established for newborns,   infants, and children     Vitamin B12   Result Value Ref Range    Vitamin B12 334 193 - 986 pg/mL   Folate   Result Value Ref Range    Folate 14.6 >5.4 ng/mL   Vitamin B1 whole blood   Result Value Ref Range    Vitamin B1 Whole Blood Level 170 70 - 180 nmol/L      Comment:      (Note)  INTERPRETIVE INFORMATION: Vitamin B1, Whole Blood  This assay measures the concentration of thiamine   diphosphate (TDP), the primary active form of vitamin B1.   Approximately 90 percent of vitamin B1 present in whole   blood is TDP. Thiamine and thiamine monophosphate, which   comprise the remaining 10 percent, are not measured.  Test developed and characteristics determined by [facility]. See Compliance Statement B: [employer]  Performed by [facility],  [address] [place] [id number] [phone]  [email or web address], Memo Hilton MD, Lab. Director         If you have any questions or concerns, please call the clinic at the number listed above.       Sincerely,        Adriana Solis MD/nr

## 2019-11-29 NOTE — PROGRESS NOTES
Subjective     Nikunj Sinha is a 54 year old male who presents to clinic today for the following health issues:    HPI   Depression and Anxiety Follow-Up    How are you doing with your depression since your last visit? Doing so/so    How are you doing with your anxiety since your last visit?  Worsened not well    Are you having other symptoms that might be associated with depression or anxiety? No    Have you had a significant life event? OTHER: many     Do you have any concerns with your use of alcohol or other drugs? No     Pt has been feeling very fatigued and dizzy lately, concerned that this may be a side effect of medication  PHQ-9 ([employer]) 2019   1.  Little interest or pleasure in doing things 2   2.  Feeling down, depressed, or hopeless 2   3.  Trouble falling or staying asleep, or sleeping too much 1   4.  Feeling tired or having little energy 3   5.  Poor appetite or overeating 1   6.  Feeling bad about yourself 2   7.  Trouble concentrating 2   8.  Moving slowly or restless 0   9.  Suicidal or self-harm thoughts 1   PHQ-9 Total Score 14   Difficulty at work, home, or with people Very difficult   ZAIDA-7   [employer], 2002; Used with Permission) 2019   1. Feeling nervous, anxious, or on edge 3   2. Not being able to stop or control worrying 3   3. Worrying too much about different things 3   4. Trouble relaxing 2   5. Being so restless that it is hard to sit still 1   6. Becoming easily annoyed or irritable 1   7. Feeling afraid, as if something awful might happen 2   ZAIDA-7 Total Score 15   If you checked any problems, how difficult have they made it for you to do your work, take care of things at home, or get along with other people? Very difficult     Social History     Tobacco Use     Smoking status: Former Smoker     Last attempt to quit: 1982     Years since quittin.9     Smokeless tobacco: Never Used   Substance Use Topics     Alcohol use: Not Currently     Comment: sober second  time oct 14 , did detox adn rehab, in a sober house nov 2019     Drug use: Never     PHQ 11/29/2019   PHQ-9 Total Score 14   Q9: Thoughts of better off dead/self-harm past 2 weeks Several days     ZAIDA-7 SCORE 11/29/2019   Total Score 15     Last PHQ-9 11/29/2019   1.  Little interest or pleasure in doing things 2   2.  Feeling down, depressed, or hopeless 2   3.  Trouble falling or staying asleep, or sleeping too much 1   4.  Feeling tired or having little energy 3   5.  Poor appetite or overeating 1   6.  Feeling bad about yourself 2   7.  Trouble concentrating 2   8.  Moving slowly or restless 0   Q9: Thoughts of better off dead/self-harm past 2 weeks 1   PHQ-9 Total Score 14   Difficulty at work, home, or with people Very difficult     In the past two weeks have you had thoughts of suicide or self-harm?  Yes  In the past two weeks have you thought of a plan or intent to harm yourself? No.  Do you have concerns about your personal safety or the safety of others?   No  Suicide Assessment Five-step Evaluation and Treatment (SAFE-T)      How many servings of fruits and vegetables do you eat daily?  0-1    On average, how many sweetened beverages do you drink each day (Examples: soda, juice, sweet tea, etc.  Do NOT count diet or artificially sweetened beverages)?   3-5    How many days per week do you miss taking your medication? 0    Unemployed, from [place] originally where was getting his care at the local clinic there, new to me & this clinic, limited records on care everywhere only viewable after he got to the room, with limited apt time.   Hx of ACS, NSTEMI with elevated troponin, no CAD on angiogram,  but found to have a acute saddle embolism with cor pulmonale & hx of DVT,  in 8/2019 on lifelong anticoagulation now on jacob 20 mg daily, ever since under care of hematology, hx of moderate alcohol dependence in early remission with hx of withdrawal & mood disorder, previously on campral ( discontinued  "secondary to diarrhea), & naltrexone( stopped as ineffective), ZAIDA, MDD, suicidal ideation, on Wellbutrin Xl 150 mg daily, lexapro 20 mg daily, gabapentin 300 mg bedtime & a multivitamin, also on propranolol, previously on Abilify 5 mg, citalopram 10 mg, hydroxyzine, mirtazapine & trazodone, , dental caries, recent dental abscess S/p abx to see the dentist soon, hx of essential tremor on propranolol 20 mg tid, allergic to ragweed's, no records from [facility] but reviewed care everywhere [facility] records,     Seen at [facility] ER 18 with suicidal ideation. \" after a friend called  worried about pt.S suicidal ideation. SW met with Pt who appeared Flat and guarded. When SW asked Pt what brought him in he indicated it was a long story. Pt reports that his mother, father and sibling all  within a 10 year time period of various medical issues. 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 Pt reports that he used credit cards to pay for daily expenses. Pt reports that he now has extreme credit card debt. Pt lost his job recently due to his car having mechanical issues and he was missing work. Today Pt was served a 24 hour notice by the [employer] department to leave his apartment. Pt is hopeless and feels that he is \"in too deep\" and nothing will get better. Pt did not appear able to contract for safety. When SW asked what Pt would do if he went home, he shrugged and said \"I don't know\", SW indicated that she was worried Pt would go home and hurt himself because he felt so hopeless Pt shrugged again but did not reply. Pt has no support in the community and is isolated. The friend that called  lives in [place] and Pt has never met her in person but they have talked on the phone for 20 years. SW indicated that she must have been worried enough to figure out how to call the police in [place]. Pt shrugged and said \"I guess\". Pt does not feel an admission " "will change anything, \"I just don't see what being admitted will do or how it can help\". SW and MD discussed their concern over Pt.Safety and his inability to contract for safety along with his state of hopelessness with Pt. He only nodded but agreed to be admitted voluntary in the hopes that the SW on the unit may have some idea's on how to help him. BAL/Breathalyzer completed (date/results): yes Pt was .219 when the police picked him up several hours ago  He does not see a therapist or psychiatrist, however, and although his primary care physician does prescribe him citalopram he has not recently spoken with him or does not sound as if he is disclosed how severe his symptoms are. He does admit to self-medicating with alcohol. When he has stopped drinking in the past he will get rather shaky and a bit nauseous. Otherwise he has not had hallucinations or seizures in the past related to that. When he spoke with his friend today he told him that he thought he should prepared to say goodbye to him, but when I asked why his friend would be concerned with that if this was a daily thought process for him he stated that he does not talk about it every day. He is rather hopeless and he is not sure why he has not done anything yet to harm himself.\" did admit to possibly hanging himself. Initially he was started on the alcohol withdrawal protocol and monitored for a safe detoxification. Was drinking up to a liter per day for the past few weeks prior to admission. Admitted on a voluntary basis. Agreeable to stay voluntarily to coordinate cares medication management. He was admitted to psychiatric unit for safety, stabilization and medication management. Eventually gained insight into the disease process and addiction. Was able to cooperate and participate in programming and educate on the medication as well. Started on campral, Abilify, gabapentin, mirtazapine & trazodone.  hydroxyzine, & continued on citalopram. A rule 25 was " "completed. He spoke of some of his historical challenges including taking care of his brother with ALS and his mother who was dying of metastatic cancer and his ailing father. During this time he developed a drinking habit. Additional stressors include being evicted from his apartment. He reports that he was drinking 1.75 L of Vodka per day. He denies suicidal ideations and last felt suicidal about 6 days ago. He stated, Suicide use to be plan A but I no longer feel that way. He has hope for the future. He looks forward to discharge to [facility]. He feels much less hopeless\"    Seen in ER at [facility] on 6/12/19 \" for increased depression, hopelessness, and alcohol abuse. Pt was kicked out of his sober living today after coming home from a therapy appt smelling like alcohol. Pt reports that he has been having a good week and had a really good therapy appt today because he drank before hand and was able to disclose information to his therapist he did not have the courage to do before. Pt returned to his sober house and was confronted about his alcohol abuse, they called the police and sent him here. Pt has been seen in this ED previously for depression and suicidal ideation. Pt is currently denying any Suicidal ideation or plan but also is stating that he has \"fucked up his life beyond repair\" and that \"he doesn't know if he wants to live\". Pt has no family or any support, recently lost his housing, and is struggling to deal with all the loss he has endured in his life. 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 Pt has many regrets in his life and struggling to cope with all the loss. Pt would benefit from a crisis residence\".     Admitted [facility] on 8/2019: \"hx of depression, anxiety, alcohol abuse disorder (sober x 2 months, He is a recovering alcoholic. He currently resides in sober house since June 2019) who presented with shortness of breath and 2 " "days of chest pain and near syncope. He presented to the emergency room on 8/4/2019 after he had a syncopal episode in front of [facility]. He did not recall particular chest pain or shortness of breath. He noted swelling of his leg in various spot but he denies any persistent pain or swelling or redness. No injury. No acute illness prior to that. However he donated plasma 10 times in 5 weeks prior to that. Upon initial evaluation, d-dimer was elevated at 6.7. PTT was normal at 28, INR is 1.09. He underwent coronary angiogram and it did not show any obstructive coronary artery disease.Due to an elevated troponin he underwent coronary angiogram which demonstrated no significant CAD. Due to an elevated d-dimer underwent CT scan of the chest which showed extensive acute bilateral pulmonary embolism with saddle embolus.  Doppler legs showed Nonocclusive right femoral DVT from upper to mid thigh. Left calf DVT within one of the peroneal veins in both of the posterior tibial veins from upper to low calf. Echocardiogram revealed lower limit of normal left ventricular systolic function of 50% with septal motion compatible with right ventricular overload.  He had cor pulmonale and was started on heparin infusion. No thrombolytics were given due hemodynamic and respiratory stability. After several days of monitoring on IV heparin Mr. Sinha continued to improve and he was transitioned to Xarelto for ongoing anticoagulation therapy. He tolerated Xarelto well. No major bleeding except intermittent gum bleeding. Denies family history of venous thromboembolism. He had a brother who passed away from [facility]. He is single. Does not have children. Was advised to continue with indefinite anticoagulation. Mr. Sinha did donate plasma 2x/week for the last 5 weeks and I wonder if this led to a transient hypercoagulable state. Mr. Sinha was advised not to donate plasma moving forward. An outpatient referral to hematology has been made\".    " "  Seen by hematology 9/2019 [facility]. She reviewed the pathophysiology of venous thromboembolic exam and risks of recurrence. He is wondering about excessive plasma donation trigger that event and I think it is possible. At this point, regardless of the etiology, he is indicated for long-term anticoagulation due to initial presentation of life-threatening episode. No prior history of venous thromboembolism or family history of thromboembolism. I would not recommend family/hereditary thrombophilia work-up. I also discussed that we do not need to repeat CT scan of the chest to know the resolution of blood clots, however will obtain CTA if he has signs and symptoms concerning for venous thromboembolism. I discussed about strict abstinence from alcohol with anticoagulants. I recommended to obtain kidney, liver function monitoring at least once a year.   I discussed about age-appropriate cancer screening. He admitted that he had positive stool test (sounds like FOBT) a couple years ago. Unclear if it was related to his alcohol use. He was recommended to proceed with colonoscopy, but has not completed yet. I recommended him to complete colonoscopy however, we will need to wait at least 3-6 months because of this recent pulmonary emboli event and I would not recommend interruption of anticoagulation at this point. He voiced understanding. I noted that he has a gastroenterology referral. Recommended him to have a primary care provider as His previous primary care provider is retiring/retired. Until he finds a primary care provider, I will continue to manage his anticoagulation. Follow-up with me in about 6 months with labs including hemogram, CMP.\"    In [facility] on 10/17/2019 for suicidal ideation & alcohol abuse: \"Nikunj Sinha is a 54 y.O. male being seen by Crisis Social Work regarding increased depressive symptoms, anxiety, and suicidal ideation. The patient presented to the outpatient clinic today " "for a Rule 25 assessment. He endorsed numerous acute depressive symptoms. The patient states that he was on 2700 for a period of time and found the program to be very helpful. He was then discharged to a sober house with a referral for day programming at [facility]. He has continued to relapse on alcohol and has been unsuccessful outside of a structured program. He was previously working a good job as a Scoring Director at [employer] \"but I drank myself out of that job.\" He has now been evicted from his apartment and finds himself homeless. The patient rates his depression as 8 out of 10 and anxiety as 9 out of 10. He is now feeling hopeless with thoughts of suicide and a plan to hang himself. He states that he hasn't taken any steps towards acting on the thoughts but that \"it's something in my mind.\" The patient states that he feels somewhat isolated. He has one friend in [place], but no supportive family that are still alive. The patient's Rule 25 was completed today and this writer verified with Isabella Joe that he is not on her list. She states that once she receives the Rule 25 from the clinic and he is psychiatrically treated he can be considered for 2700. The patient has remained calm, cooperative and pleasant. Patient was sent to the ER from the outpatient addiction clinic. He was here today for a Rule 25 in the outpatient addiction clinic at [facility]. He was sent by his clinician Alexandr after completing a Rule 25 assessment. The patient endorsed numerous depressive symptoms and states that he is having suicidal ideation. The patient does not identify a specific plan but states that he has several and hasn't decided which to act on. The patient's last drink was yesterday at noon and is now observed as having some withdrawal symptoms. Alexandr states that the patient is engaged with outpatient CD treatment at [facility] but he has been unable to maintain any sobriety outside of a structured facility. Roughly one " month ago he stopped taking all of his psych medications. Alexandr recommends that the patient go to inpatient mental health for his suicidal ideation, and if he is not acute enough for that placement that we pursue 2700.  Admitted on a voluntary basis. Agreeable to stay voluntarily to coordinate cares and medication management. Disposition recommended on today's date prior to intake for MI CD treatment. Recommendation by hospitalist to proceed with dental cares. Further efforts to address community concerns prior to proceeding with MI CD treatment. Patient seen by hospitalist during course of hospitalization. Started on antibiotic. Recommendation for evaluation by hospitalist in the community, given lack of provider at the facility. Patient requesting to be seen by dentist prior to MI CD treatment.  Patient placed on a CIWA protocol. Did not demonstrate signs or symptoms of complicated alcohol withdrawal.  Psychiatric medication management performed in detail. Medication management performed to target signs and symptoms of principal diagnosis. Further medication management performed to target comorbid illnesses. Medication management included:    Lexapro: Continued restart of PTA medication for depression and anxiety.    Wellbutrin XL:  Continued restart of of PTA medication for combination therapy.    Gabapentin:  Scheduled medication management for augmentation. Continued restart of PTA medication as needed for anxiety.    Propranolol: Continue restart of PTA medication as needed for anxiety.    Naltrexone: Continued trial for alcohol use disorder; LFT's normalizing. Repeat LFT's scheduled.    Campral: Discontinued PTA medication secondary to GI side effects.  Medication adherent. Tolerating medication management. Progressed with mood and anxiety. Denies psychosis. Denies suicidal and homicidal ideation. Did an appropriate crisis and relapse plan. Future oriented. Denies gun access. Indicated motivation to proceed  "with MI CD treatment as coordinated by  with intake date on Friday, November 1, 2019.   followed in regards to collecting and reviewing collateral information, coordination of cares and discharge planning. Including, MI CD evaluation completed with recommendation for placement to unit 2700. Due to dental issues, patient discharged to medical respite bed and secured on today's date to bridge placement. Discharged to Medical Respite at noon and Inpatient CD treatment on unit 2700 on 11/01 at noon. Further recommendation to refer to psychiatrist in the community after discharge from unit 2700. Cares coordinated with case management.  Treated for dental abscess with PCN.\"    [place]  shows received gabapentin 100 mg #270 on 1/4/19 at [facility], then 300 mg # 90  On 2/11/19 in [facility], 100 mg # 60 on 3/6/19 in [facility], then # 180 on 6/20/19, 300 mg # 60 on 10/30/19 & # 30 of 300 mg on 11/21/19    Currently  Here to establish care as doesnt have a PCP.   Severe MDD/ZAIDA./ hx of passive suicidal thoughts: discharged nov 22nd 7 days ago, currently in sober housing, has leonard gustavo with psyche [facility] on 12/5. To also see psychology 12/11 but plans to reschedule that at upcoming psyche apt next week as it is in the morning & he needs to go to out patient Rx in the am. Has enough meds till sees psyche. Has passive suicidal thoughts. No active plan. Contracts to safety & forwarding thinking about apt's etc. Is taking all his meds.     Hx of alcohol abuse, dependence etc, Currently sober, last drink was oct 14th, notes was on naltrexone then stopped as no longer helped. Was on campral before but had to discontinue due to diarrhea. Has done [facility] twice once beginning of the year and one last week    Hx of PE, DVT in august. Life threatening event. Stabilized, suspected from possible plasma donation. Advised not to do that any more. Put on lifetime anticoagulation with " jacob. Advised no need to repeat ct chest or do hypercoagulable work up as given needs life long anticoagulation. Seen by hematology in sept.advised to hold off colonoscopy for prior positive fit till 6 months after event.  Hx of chest pain and palpitations but not currently, His of a fib in [place] had pace makers. Wants to defer cardiology for now. Notes had high Chol test in the past but never on meds. Has been taking his jacob. Does not have 6 months follow up with hematology set up yet for feb 2020.    Has had some dizziness & fatigue since restarted on his meds. Taking propranolol tid. No near syncope or syncope. No bleeding or bruising from anywhere. Agrees to labs.     Has had some weight gain thinks from meds. No leg swelling or shortness of breath.     Had a right jaw Dental abscess ( Ct neck showed dental abscess at tooth # 19) , Rx  with 10 days of clind, swelling is now gone , to see the dentist on Monday dec 2nd that is in 3 days time, notes has hx of bad teeth. bad teeth     Hx of Memory difficulties, better with stopping drinking, on a Multivitamin.     Epigastric abdominal pain, better now not drinking     Hx of positive FIT in past while at [place]. Told to get a colonoscopy but not done. Seen by hematology recently in sept & told to wait to do colonoscopy given recent PE at least 6 months after event which would be fen/ march 2020 before getting a scope.     Has ED thinks also from alcohol use.    Hx of B/L Tinnitus, possible decreased hearing. Declines ENT/ audiology referral for now.     Has hx of Hay fever worse in spring & fall not currently.     Agreeable to Tdap today & labs including HIV & Hep C screen.     Currently has had no fever or chills, no headache, no double or blurry vision, no facial pain, earache, sore throat, runny nose, post nasal drip, no trouble hearing, smelling, tasting or swallowing, no cough, no chest pain, trouble breathing or palpitations ( has had in the  past), No abdominal pain ( resolved epigastric pain), heart burn, reflux, nausea or vomiting or diarrhea or constipation, no blood in stools or black stools, no weight loss or night sweats. No dysuria, hematuria, frequency, urgency, hesitancy, incontinence, No pelvic complaints. No leg swelling or joint pain. No rash.    Patient Active Problem List   Diagnosis     Current severe episode of major depressive disorder without psychotic features, unspecified whether recurrent (H)     Moderate alcohol dependence in early remission (H)     ZAIDA (generalized anxiety disorder)     History of suicidal ideation     Fatigue, unspecified type     Dizziness     Weight gain     Tinnitus, bilateral     Memory changes     Dental caries     Seasonal allergic rhinitis, unspecified trigger     History of pulmonary embolism     History of chest pain     Palpitations     Abdominal pain, epigastric     Positive FIT (fecal immunochemical test)     Erectile dysfunction, unspecified erectile dysfunction type     Essential tremor     Chronic anticoagulation     Personal history of DVT (deep vein thrombosis)     Past Surgical History:   Procedure Laterality Date     TONSILLECTOMY         Social History     Tobacco Use     Smoking status: Former Smoker     Last attempt to quit: 1982     Years since quittin.9     Smokeless tobacco: Never Used   Substance Use Topics     Alcohol use: Not Currently     Comment: sober second time oct 14 , did detox adn rehab, in a sober house 2019     Family History   Problem Relation Age of Onset     Arthritis Mother      Breast Cancer Mother      Depression Mother      Allergies Mother      Migraines Mother      Alcoholism Father      Lymphoma Father         non hodgkins     Other - See Comments Brother         genetic disorder,  of some slow form of ALS         Current Outpatient Medications   Medication Sig Dispense Refill     buPROPion (WELLBUTRIN XL) 150 MG 24 hr tablet Take 150 mg by  "mouth       escitalopram (LEXAPRO) 20 MG tablet Take 20 mg by mouth       gabapentin (NEURONTIN) 300 MG capsule Take 300 mg by mouth       multivitamin w/minerals (THERA-VIT-M) tablet Take 1 tablet by mouth daily       propranolol (INDERAL) 20 MG tablet Take 20 mg by mouth 3 times daily        rivaroxaban ANTICOAGULANT (XARELTO) 20 MG TABS tablet Take 20 mg by mouth       Allergies   Allergen Reactions     Ragweeds Other (See Comments)     congestion     Recent Labs   Lab Test 11/29/19  1202   A1C 4.9   *   HDL 57   TRIG 205*   ALT 33   CR 0.98   GFRESTIMATED 87   GFRESTBLACK >90   POTASSIUM 4.2   TSH 0.96      BP Readings from Last 3 Encounters:   11/29/19 120/76    Wt Readings from Last 3 Encounters:   11/29/19 85 kg (187 lb 8 oz)                    Reviewed and updated as needed this visit by Provider  Tobacco  Allergies  Meds  Problems  Soc Hx        Review of Systems   ROS COMP: Constitutional, HEENT, cardiovascular, pulmonary, GI, , musculoskeletal, neuro, skin, endocrine and psych systems are negative, except as otherwise noted.      Objective    /76 (BP Location: Right arm, Patient Position: Chair, Cuff Size: Adult Regular)   Pulse 63   Temp 98.2  F (36.8  C) (Oral)   Resp 16   Ht 1.778 m (5' 10\")   Wt 85 kg (187 lb 8 oz)   SpO2 98%   BMI 26.90 kg/m    Body mass index is 26.9 kg/m .  Physical Exam   GENERAL: healthy, alert and no distress  EYES: Eyes grossly normal to inspection, PERRL and conjunctivae and sclerae normal  HENT: ear canals and TM's normal, nose and mouth without ulcers or lesions  NECK: no adenopathy, no asymmetry, masses, or scars and thyroid normal to palpation  RESP: lungs clear to auscultation - no rales, rhonchi or wheezes  CV: regular rate and rhythm, normal S1 S2, no S3 or S4, no murmur, click or rub, no peripheral edema and peripheral pulses strong  ABDOMEN: soft, non tender, no hepatosplenomegaly, no masses and bowel sounds normal  MS: no gross " musculoskeletal defects noted, no edema  SKIN: no suspicious lesions or rashes  NEURO: Normal strength and tone, mentation intact and speech normal  PSYCH: mentation appears normal, affect normal/bright, fatigued, judgement and insight intact and appearance well groomed  Appearance:  awake, alert and adequately groomed  Attitude:  cooperative  Eye Contact:  good  Mood: sad  Affect: congruent  Speech:  clear, coherent  Psychomotor Behavior:  no evidence of tardive dyskinesia, dystonia, or tics  Thought Process:  linear  Associations:  no loose associations  Thought Content:  passive suicidal thoughts but no evidence of suicidal ideation or homicidal ideation and no evidence of psychotic thought  Insight:  full  Judgment:  fair  Oriented to:  time, person, and place  yes  Attention Span and Concentration:  fair  Recent and Remote Memory:  fair  Language: Able to read and write  Fund of Knowledge: appropriate  Muscle Strength and Tone: normal  Gait and Station: Normal    Diagnostic Test Results:  Labs reviewed in Epic  No results found for this or any previous visit (from the past 24 hour(s)).        Assessment & Plan       ICD-10-CM    1. Current severe episode of major depressive disorder without psychotic features, unspecified whether recurrent (H) F32.2 TSH with free T4 reflex   2. Moderate alcohol dependence in early remission (H) F10.21 DEPRESSION ACTION PLAN (DAP)   3. ZAIDA (generalized anxiety disorder) F41.1 TSH with free T4 reflex     DEPRESSION ACTION PLAN (DAP)   4. History of suicidal ideation Z86.59 DEPRESSION ACTION PLAN (DAP)   5. History of pulmonary embolism Z86.711 Oncology/Hematology Adult Referral   6. Personal history of DVT (deep vein thrombosis) Z86.718    7. Chronic anticoagulation Z79.01    8. History of chest pain Z87.898    9. Palpitations R00.2    10. Dizziness R42    11. Fatigue, unspecified type R53.83    12. Weight gain R63.5    13. Dental caries K02.9    14. Memory changes R41.3 Vitamin  B12     Folate     Vitamin B1 whole blood   15. Abdominal pain, epigastric R10.13    16. Positive FIT (fecal immunochemical test) R19.5 GASTROENTEROLOGY ADULT REF CONSULT ONLY   17. Erectile dysfunction, unspecified erectile dysfunction type N52.9    18. Tinnitus, bilateral H93.13    19. Seasonal allergic rhinitis, unspecified trigger J30.2    20. Establishing care with new doctor, encounter for Z76.89    21. Need for diphtheria-tetanus-pertussis (Tdap) vaccine Z23 TDAP VACCINE     VACCINE ADMINISTRATION, INITIAL   22. Special screening for malignant neoplasms, colon Z12.11 Fecal colorectal cancer screen FIT   23. Screening for human immunodeficiency virus without presence of risk factors Z11.4 HIV Antigen Antibody Combo   24. Encounter for hepatitis C screening test for low risk patient Z11.59 Hepatitis C Screen Reflex to HCV RNA Quant and Genotype   25. Screening, anemia, deficiency, iron Z13.0 CBC with platelets differential   26. Screening for diabetes mellitus Z13.1 Comprehensive metabolic panel     Hemoglobin A1c   27. Encounter for lipid screening for cardiovascular disease Z13.220 Lipid panel reflex to direct LDL Non-fasting    Z13.6      Hx of severe MDD, ZAIDA, hx of passive suicidal thoughts, early remission from alcohol dependence just completed rule 25 , detox & rehab & in sober living currently. Poor social support but currently forward thinking  & has apt's for the dentist on Monday & psyche end of next week & has been taking meds & contracts to safety. See psyche and psychology as planned. Depression action plan.  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 No hypercoag work up recommended given lifelong anticoagulation indicated & seen by hematology. Note from sept 2019 reviewed.   Reports fatigue, weight gain ,dizziness since on new meds for mental health.  Currently no chest pain or palpitations though  "notes has had these in the past. Coronary angio neg in august. Fh of a fib but no Afib seen to date on ekgs he's had when in the er. Exam benign & vitally stable. Unclear etiology/diagnosis with uncertain prognosis requiring further workup below. Will do Labs today . Does not appear to be in heart failure.   Dental abscess has improved & to see the dentist.   Memory changes, abdominal pain, tinnitus, ED could be related to alcohol abuse & notes symptoms improved since quit drinking. Can try Pepcid OTC for abdominal ain. Can refer to ENT/ audiology in the future for tinnitus. Seasonal allergic rhinitis currently not an issue. Will monitor weight   Colonoscopy in near future. Consider GI referral for a scope in future once can come off jacob few days   See hematology regarding prior PE and Xarelto. Referral placed. Due for apt by feb 2020.   Will sign an KISHOR to get records from [place] where previously doctored,   Tdap given  today   See back in few weeks & Go to the ER if worse   Consider a preventive physical & shingrex in the future.     BMI:   Estimated body mass index is 26.9 kg/m  as calculated from the following:    Height as of this encounter: 1.778 m (5' 10\").    Weight as of this encounter: 85 kg (187 lb 8 oz).   Weight management plan: Discussed healthy diet and exercise guidelines  Work on weight loss  Regular exercise  See Patient Instructions    Return in about 3 weeks (around 12/20/2019) for Routine Visit for chronic issues with PCP.    Total time spent was 50 minutes, and more than 50% of face to face time was spent in counseling and/or coordination of care regarding principles of care, medication management, and appropriate interventional options.      Adriana Solis MD  [facility]– [place][note 2]        "

## 2019-11-29 NOTE — RESULT ENCOUNTER NOTE
Results within acceptable limits.  -Normal red blood cell (hgb) levels, normal white blood cell count and normal platelet levels.  -A1C (diabetic test) is normal and indicates that your blood sugar has been in a normal range the last 3 months..

## 2019-11-29 NOTE — LETTER
December 2, 2019      Nikunj Sinha  [address]   [facility] [address]        Dear ,    We are writing to inform you of your test results.    Results within acceptable limits.     Resulted Orders   CBC with platelets differential   Result Value Ref Range    WBC 8.0 4.0 - 11.0 10e9/L    RBC Count 5.15 4.4 - 5.9 10e12/L    Hemoglobin 16.3 13.3 - 17.7 g/dL    Hematocrit 48.9 40.0 - 53.0 %    MCV 95 78 - 100 fl    MCH 31.7 26.5 - 33.0 pg    MCHC 33.3 31.5 - 36.5 g/dL    RDW 12.7 10.0 - 15.0 %    Platelet Count 195 150 - 450 10e9/L    % Neutrophils 65.7 %    % Lymphocytes 25.1 %    % Monocytes 6.2 %    % Eosinophils 2.5 %    % Basophils 0.5 %    Absolute Neutrophil 5.3 1.6 - 8.3 10e9/L    Absolute Lymphocytes 2.0 0.8 - 5.3 10e9/L    Absolute Monocytes 0.5 0.0 - 1.3 10e9/L    Absolute Eosinophils 0.2 0.0 - 0.7 10e9/L    Absolute Basophils 0.0 0.0 - 0.2 10e9/L    Diff Method Automated Method    Comprehensive metabolic panel   Result Value Ref Range    Sodium 137 133 - 144 mmol/L    Potassium 4.2 3.4 - 5.3 mmol/L    Chloride 106 94 - 109 mmol/L    Carbon Dioxide 28 20 - 32 mmol/L    Anion Gap 3 3 - 14 mmol/L    Glucose 87 70 - 99 mg/dL    Urea Nitrogen 14 7 - 30 mg/dL    Creatinine 0.98 0.66 - 1.25 mg/dL    GFR Estimate 87 >60 mL/min/[1.73_m2]      Comment:      Non  GFR Calc  Starting 12/18/2018, serum creatinine based estimated GFR (eGFR) will be   calculated using the Chronic Kidney Disease Epidemiology Collaboration   (CKD-EPI) equation.      GFR Estimate If Black >90 >60 mL/min/[1.73_m2]      Comment:       GFR Calc  Starting 12/18/2018, serum creatinine based estimated GFR (eGFR) will be   calculated using the Chronic Kidney Disease Epidemiology Collaboration   (CKD-EPI) equation.      Calcium 9.0 8.5 - 10.1 mg/dL    Bilirubin Total 0.4 0.2 - 1.3 mg/dL    Albumin 3.9 3.4 - 5.0 g/dL    Protein Total 7.5 6.8 - 8.8 g/dL    Alkaline Phosphatase 87 40 - 150 U/L    ALT  33 0 - 70 U/L    AST 21 0 - 45 U/L   Hemoglobin A1c   Result Value Ref Range    Hemoglobin A1C 4.9 0 - 5.6 %      Comment:      Normal <5.7% Prediabetes 5.7-6.4%  Diabetes 6.5% or higher - adopted from ADA   consensus guidelines.     Lipid panel reflex to direct LDL Non-fasting   Result Value Ref Range    Cholesterol 261 (H) <200 mg/dL      Comment:      Desirable:       <200 mg/dl    Triglycerides 205 (H) <150 mg/dL      Comment:      Borderline high:  150-199 mg/dl  High:             200-499 mg/dl  Very high:       >499 mg/dl      HDL Cholesterol 57 >39 mg/dL    LDL Cholesterol Calculated 163 (H) <100 mg/dL      Comment:      Above desirable:  100-129 mg/dl  Borderline High:  130-159 mg/dL  High:             160-189 mg/dL  Very high:       >189 mg/dl      Non HDL Cholesterol 204 (H) <130 mg/dL      Comment:      Above Desirable:  130-159 mg/dl  Borderline high:  160-189 mg/dl  High:             190-219 mg/dl  Very high:       >219 mg/dl     TSH with free T4 reflex   Result Value Ref Range    TSH 0.96 0.40 - 4.00 mU/L   HIV Antigen Antibody Combo   Result Value Ref Range    HIV Antigen Antibody Combo Nonreactive NR^Nonreactive          Comment:      HIV-1 p24 Ag & HIV-1/HIV-2 Ab Not Detected   Hepatitis C Screen Reflex to HCV RNA Quant and Genotype   Result Value Ref Range    Hepatitis C Antibody Nonreactive NR^Nonreactive      Comment:      Assay performance characteristics have not been established for newborns,   infants, and children     Vitamin B12   Result Value Ref Range    Vitamin B12 334 193 - 986 pg/mL   Folate   Result Value Ref Range    Folate 14.6 >5.4 ng/mL   Vitamin B1 whole blood   Result Value Ref Range    Vitamin B1 Whole Blood Level 170 70 - 180 nmol/L      Comment:      (Note)  INTERPRETIVE INFORMATION: Vitamin B1, Whole Blood  This assay measures the concentration of thiamine   diphosphate (TDP), the primary active form of vitamin B1.   Approximately 90 percent of vitamin B1 present in whole    blood is TDP. Thiamine and thiamine monophosphate, which   comprise the remaining 10 percent, are not measured.  Test developed and characteristics determined by [facility]. See Compliance Statement B: [email or web address]  Performed by [facility],  [address] [place] [address] [phone]  [email or web address], Memo Hilton MD, Lab. Director       If you have any questions or concerns, please call the clinic at the number listed above.     Sincerely,      Adriana Solis MD/nr

## 2019-11-29 NOTE — LETTER
December 3, 2019      Nikunj Sinha  [address]   [facility] [address]        Dear ,    We are writing to inform you of your test results.    Results within acceptable limits.  -Normal red blood cell (hgb) levels, normal white blood cell count and normal platelet levels.   -A1C (diabetic test) is normal and indicates that your blood sugar has been in a normal range the last 3 months..     Resulted Orders   CBC with platelets differential   Result Value Ref Range    WBC 8.0 4.0 - 11.0 10e9/L    RBC Count 5.15 4.4 - 5.9 10e12/L    Hemoglobin 16.3 13.3 - 17.7 g/dL    Hematocrit 48.9 40.0 - 53.0 %    MCV 95 78 - 100 fl    MCH 31.7 26.5 - 33.0 pg    MCHC 33.3 31.5 - 36.5 g/dL    RDW 12.7 10.0 - 15.0 %    Platelet Count 195 150 - 450 10e9/L    % Neutrophils 65.7 %    % Lymphocytes 25.1 %    % Monocytes 6.2 %    % Eosinophils 2.5 %    % Basophils 0.5 %    Absolute Neutrophil 5.3 1.6 - 8.3 10e9/L    Absolute Lymphocytes 2.0 0.8 - 5.3 10e9/L    Absolute Monocytes 0.5 0.0 - 1.3 10e9/L    Absolute Eosinophils 0.2 0.0 - 0.7 10e9/L    Absolute Basophils 0.0 0.0 - 0.2 10e9/L    Diff Method Automated Method    Comprehensive metabolic panel   Result Value Ref Range    Sodium 137 133 - 144 mmol/L    Potassium 4.2 3.4 - 5.3 mmol/L    Chloride 106 94 - 109 mmol/L    Carbon Dioxide 28 20 - 32 mmol/L    Anion Gap 3 3 - 14 mmol/L    Glucose 87 70 - 99 mg/dL    Urea Nitrogen 14 7 - 30 mg/dL    Creatinine 0.98 0.66 - 1.25 mg/dL    GFR Estimate 87 >60 mL/min/[1.73_m2]      Comment:      Non  GFR Calc  Starting 12/18/2018, serum creatinine based estimated GFR (eGFR) will be   calculated using the Chronic Kidney Disease Epidemiology Collaboration   (CKD-EPI) equation.      GFR Estimate If Black >90 >60 mL/min/[1.73_m2]      Comment:       GFR Calc  Starting 12/18/2018, serum creatinine based estimated GFR (eGFR) will be   calculated using the Chronic Kidney Disease Epidemiology  Collaboration   (CKD-EPI) equation.      Calcium 9.0 8.5 - 10.1 mg/dL    Bilirubin Total 0.4 0.2 - 1.3 mg/dL    Albumin 3.9 3.4 - 5.0 g/dL    Protein Total 7.5 6.8 - 8.8 g/dL    Alkaline Phosphatase 87 40 - 150 U/L    ALT 33 0 - 70 U/L    AST 21 0 - 45 U/L   Hemoglobin A1c   Result Value Ref Range    Hemoglobin A1C 4.9 0 - 5.6 %      Comment:      Normal <5.7% Prediabetes 5.7-6.4%  Diabetes 6.5% or higher - adopted from ADA   consensus guidelines.     Lipid panel reflex to direct LDL Non-fasting   Result Value Ref Range    Cholesterol 261 (H) <200 mg/dL      Comment:      Desirable:       <200 mg/dl    Triglycerides 205 (H) <150 mg/dL      Comment:      Borderline high:  150-199 mg/dl  High:             200-499 mg/dl  Very high:       >499 mg/dl      HDL Cholesterol 57 >39 mg/dL    LDL Cholesterol Calculated 163 (H) <100 mg/dL      Comment:      Above desirable:  100-129 mg/dl  Borderline High:  130-159 mg/dL  High:             160-189 mg/dL  Very high:       >189 mg/dl      Non HDL Cholesterol 204 (H) <130 mg/dL      Comment:      Above Desirable:  130-159 mg/dl  Borderline high:  160-189 mg/dl  High:             190-219 mg/dl  Very high:       >219 mg/dl     TSH with free T4 reflex   Result Value Ref Range    TSH 0.96 0.40 - 4.00 mU/L   HIV Antigen Antibody Combo   Result Value Ref Range    HIV Antigen Antibody Combo Nonreactive NR^Nonreactive          Comment:      HIV-1 p24 Ag & HIV-1/HIV-2 Ab Not Detected   Hepatitis C Screen Reflex to HCV RNA Quant and Genotype   Result Value Ref Range    Hepatitis C Antibody Nonreactive NR^Nonreactive      Comment:      Assay performance characteristics have not been established for newborns,   infants, and children     Vitamin B12   Result Value Ref Range    Vitamin B12 334 193 - 986 pg/mL   Folate   Result Value Ref Range    Folate 14.6 >5.4 ng/mL   Vitamin B1 whole blood   Result Value Ref Range    Vitamin B1 Whole Blood Level 170 70 - 180 nmol/L      Comment:       (Note)  INTERPRETIVE INFORMATION: Vitamin B1, Whole Blood  This assay measures the concentration of thiamine   diphosphate (TDP), the primary active form of vitamin B1.   Approximately 90 percent of vitamin B1 present in whole   blood is TDP. Thiamine and thiamine monophosphate, which   comprise the remaining 10 percent, are not measured.  Test developed and characteristics determined by [facility]. See Compliance Statement B: [employer]  Performed by [facility],  [address] [place] [id number] [phone]  [email or web address], Memo Hilton MD, Lab. Director       If you have any questions or concerns, please call the clinic at the number listed above.     Sincerely,  Adriana Solis MD/nr

## 2019-11-30 ASSESSMENT — ANXIETY QUESTIONNAIRES: GAD7 TOTAL SCORE: 15

## 2019-11-30 NOTE — RESULT ENCOUNTER NOTE
Results within acceptable limits.    Normal folate, b12,   -LDL(bad) cholesterol level is elevated, and your triglycerides are elevated which can increase your heart disease risk.  A diet high in fat and simple carbohydrates, genetics and being overweight can contribute to this. ADVISE: exercising 150 minutes of aerobic exercise per week (30 minutes for 5 days per week or 50 minutes for 3 days per week are options), eating a low saturated fat/low carbohydrate diet, and omega-3 fatty acids (fish oil) 7062-8406 mg daily are helpful to improve this. In 3 months, you should recheck your fasting cholesterol panel by scheduling a lab-only appointment.  -Liver and gallbladder tests are normal (ALT,AST, Alk phos, bilirubin), kidney function is normal (Cr, GFR), sodium is normal, potassium is normal, calcium is normal, glucose is normal.  -TSH (thyroid stimulating hormone) level is normal which indicates normal thyroid function..

## 2019-12-01 PROBLEM — Z86.718 PERSONAL HISTORY OF DVT (DEEP VEIN THROMBOSIS): Status: ACTIVE | Noted: 2019-12-01

## 2019-12-01 PROBLEM — G25.0 ESSENTIAL TREMOR: Status: ACTIVE | Noted: 2019-12-01

## 2019-12-01 PROBLEM — Z79.01 CHRONIC ANTICOAGULATION: Status: ACTIVE | Noted: 2019-12-01

## 2019-12-02 ENCOUNTER — TELEPHONE (OUTPATIENT)
Dept: FAMILY MEDICINE | Facility: CLINIC | Age: 54
End: 2019-12-02

## 2019-12-02 DIAGNOSIS — Z79.01 CHRONIC ANTICOAGULATION: Primary | ICD-10-CM

## 2019-12-02 LAB
HCV AB SERPL QL IA: NONREACTIVE
HIV 1+2 AB+HIV1 P24 AG SERPL QL IA: NONREACTIVE
VIT B1 BLD-MCNC: 170 NMOL/L (ref 70–180)

## 2019-12-02 NOTE — TELEPHONE ENCOUNTER
Dr. Solis-Please review.  Writer does not find INR from 11/29/19.  Would you like triage to see if lab can add on to sample from 11/29/19?    Once INR level completed, are you able to provide letter?    Thank you!  LYNN Francisco, BSN, RN

## 2019-12-02 NOTE — TELEPHONE ENCOUNTER
Per Dr. Solis, patient's hematologist is:  Paddy Reynolds MD with [facility]    [address] [place] [address]    [phone] (Fax)      Writer called Leatha with [facility], reviewed Dr. Solis's message and contact information for Dr. Reynolds.    Leatha verbalized understanding and stated she would call Dr. Reynolds's office tomorrow.    TAHIR XieN, RN

## 2019-12-02 NOTE — TELEPHONE ENCOUNTER
Reason for Call:  Other call back    Detailed comments: george with [facility] states they need inr number and letter for clearance to do dental extraction . Please advise.  Fax number [phone]    Phone Number Patient can be reached at: Other phone number:  [phone]    Best Time: asap    Can we leave a detailed message on this number? YES    Call taken on 12/2/2019 at 3:08 PM by Solange Ryan

## 2019-12-02 NOTE — TELEPHONE ENCOUNTER
he's not on warfarin  An inr is not indicated or helpful  he's on xarelto  And has a hematologist   Advise they contact her ( at [facility])  Name in care every where to decide about dental procedure given recent life threatening Pe in aug 2019

## 2019-12-05 ENCOUNTER — TRANSFERRED RECORDS (OUTPATIENT)
Dept: HEALTH INFORMATION MANAGEMENT | Facility: CLINIC | Age: 54
End: 2019-12-05

## 2019-12-09 LAB
PHQ9 SCORE: 16
PHQ9 SCORE: 16

## 2019-12-16 DIAGNOSIS — Z12.11 SPECIAL SCREENING FOR MALIGNANT NEOPLASMS, COLON: ICD-10-CM

## 2019-12-16 PROCEDURE — 82274 ASSAY TEST FOR BLOOD FECAL: CPT | Performed by: FAMILY MEDICINE

## 2019-12-20 ENCOUNTER — OFFICE VISIT (OUTPATIENT)
Dept: FAMILY MEDICINE | Facility: CLINIC | Age: 54
End: 2019-12-20
Payer: COMMERCIAL

## 2019-12-20 VITALS
HEART RATE: 65 BPM | SYSTOLIC BLOOD PRESSURE: 116 MMHG | TEMPERATURE: 96.6 F | WEIGHT: 189.75 LBS | RESPIRATION RATE: 15 BRPM | OXYGEN SATURATION: 96 % | BODY MASS INDEX: 27.23 KG/M2 | DIASTOLIC BLOOD PRESSURE: 88 MMHG

## 2019-12-20 DIAGNOSIS — R53.83 FATIGUE, UNSPECIFIED TYPE: ICD-10-CM

## 2019-12-20 DIAGNOSIS — F41.1 GAD (GENERALIZED ANXIETY DISORDER): ICD-10-CM

## 2019-12-20 DIAGNOSIS — F10.21 MODERATE ALCOHOL DEPENDENCE IN EARLY REMISSION (H): ICD-10-CM

## 2019-12-20 DIAGNOSIS — H93.13 TINNITUS, BILATERAL: ICD-10-CM

## 2019-12-20 DIAGNOSIS — R45.851 SUICIDAL IDEATION: ICD-10-CM

## 2019-12-20 DIAGNOSIS — Z79.01 CHRONIC ANTICOAGULATION: ICD-10-CM

## 2019-12-20 DIAGNOSIS — R41.3 MEMORY CHANGES: ICD-10-CM

## 2019-12-20 DIAGNOSIS — E78.00 ELEVATED LDL CHOLESTEROL LEVEL: ICD-10-CM

## 2019-12-20 DIAGNOSIS — F32.2 CURRENT SEVERE EPISODE OF MAJOR DEPRESSIVE DISORDER WITHOUT PSYCHOTIC FEATURES, UNSPECIFIED WHETHER RECURRENT (H): Primary | ICD-10-CM

## 2019-12-20 DIAGNOSIS — Z86.711 HISTORY OF PULMONARY EMBOLISM: ICD-10-CM

## 2019-12-20 DIAGNOSIS — R42 DIZZINESS: ICD-10-CM

## 2019-12-20 DIAGNOSIS — R63.5 WEIGHT GAIN: ICD-10-CM

## 2019-12-20 DIAGNOSIS — R10.13 ABDOMINAL PAIN, EPIGASTRIC: ICD-10-CM

## 2019-12-20 DIAGNOSIS — R19.5 POSITIVE FIT (FECAL IMMUNOCHEMICAL TEST): ICD-10-CM

## 2019-12-20 DIAGNOSIS — R00.2 PALPITATIONS: ICD-10-CM

## 2019-12-20 DIAGNOSIS — Z86.718 PERSONAL HISTORY OF DVT (DEEP VEIN THROMBOSIS): ICD-10-CM

## 2019-12-20 DIAGNOSIS — G25.0 ESSENTIAL TREMOR: ICD-10-CM

## 2019-12-20 DIAGNOSIS — K02.9 DENTAL CARIES: ICD-10-CM

## 2019-12-20 PROCEDURE — 96127 BRIEF EMOTIONAL/BEHAV ASSMT: CPT | Mod: 59 | Performed by: FAMILY MEDICINE

## 2019-12-20 PROCEDURE — 99214 OFFICE O/P EST MOD 30 MIN: CPT | Performed by: FAMILY MEDICINE

## 2019-12-20 ASSESSMENT — ANXIETY QUESTIONNAIRES
GAD7 TOTAL SCORE: 16
5. BEING SO RESTLESS THAT IT IS HARD TO SIT STILL: MORE THAN HALF THE DAYS
7. FEELING AFRAID AS IF SOMETHING AWFUL MIGHT HAPPEN: MORE THAN HALF THE DAYS
1. FEELING NERVOUS, ANXIOUS, OR ON EDGE: NEARLY EVERY DAY
2. NOT BEING ABLE TO STOP OR CONTROL WORRYING: NEARLY EVERY DAY
3. WORRYING TOO MUCH ABOUT DIFFERENT THINGS: NEARLY EVERY DAY
6. BECOMING EASILY ANNOYED OR IRRITABLE: SEVERAL DAYS
IF YOU CHECKED OFF ANY PROBLEMS ON THIS QUESTIONNAIRE, HOW DIFFICULT HAVE THESE PROBLEMS MADE IT FOR YOU TO DO YOUR WORK, TAKE CARE OF THINGS AT HOME, OR GET ALONG WITH OTHER PEOPLE: VERY DIFFICULT

## 2019-12-20 NOTE — LETTER
My Depression Action Plan  Name: Nikunj Sinha   Date of Birth 1965  Date: 12/20/2019    My doctor: Adriana Solis   My clinic: [address] [address] [phone]          GREEN    ZONE   Good Control    What it looks like:     Things are going generally well. You have normal up s and down s. You may even feel depressed from time to time, but bad moods usually last less than a day.   What you need to do:  1. Continue to care for yourself (see self care plan)  2. Check your depression survival kit and update it as needed  3. Follow your physician s recommendations including any medication.  4. Do not stop taking medication unless you consult with your physician first.           YELLOW         ZONE Getting Worse    What it looks like:     Depression is starting to interfere with your life.     It may be hard to get out of bed; you may be starting to isolate yourself from others.    Symptoms of depression are starting to last most all day and this has happened for several days.     You may have suicidal thoughts but they are not constant.   What you need to do:     1. Call your care team, your response to treatment will improve if you keep your care team informed of your progress. Yellow periods are signs an adjustment may need to be made.     2. Continue your self-care, even if you have to fake it!    3. Talk to someone in your support network    4. Open up your depression survival kit           RED    ZONE Medical Alert - Get Help    What it looks like:     Depression is seriously interfering with your life.     You may experience these or other symptoms: You can t get out of bed most days, can t work or engage in other necessary activities, you have trouble taking care of basic hygiene, or basic responsibilities, thoughts of suicide or death that will not go away, self-injurious behavior.     What you need to do:  1. Call your care team and  request a same-day appointment. If they are not available (weekends or after hours) call your local crisis line, emergency room or 911.            Depression Self Care Plan / Survival Kit    Self-Care for Depression  Here s the deal. Your body and mind are really not as separate as most people think.  What you do and think affects how you feel and how you feel influences what you do and think. This means if you do things that people who feel good do, it will help you feel better.  Sometimes this is all it takes.  There is also a place for medication and therapy depending on how severe your depression is, so be sure to consult with your medical provider and/ or Behavioral Health Consultant if your symptoms are worsening or not improving.     In order to better manage my stress, I will:    Exercise  Get some form of exercise, every day. This will help reduce pain and release endorphins, the  feel good  chemicals in your brain. This is almost as good as taking antidepressants!  This is not the same as joining a gym and then never going! (they count on that by the way ) It can be as simple as just going for a walk or doing some gardening, anything that will get you moving.      Hygiene   Maintain good hygiene (Get out of bed in the morning, Make your bed, Brush your teeth, Take a shower, and Get dressed like you were going to work, even if you are unemployed).  If your clothes don't fit try to get ones that do.    Diet  I will strive to eat foods that are good for me, drink plenty of water, and avoid excessive sugar, caffeine, alcohol, and other mood-altering substances.  Some foods that are helpful in depression are: complex carbohydrates, B vitamins, flaxseed, fish or fish oil, fresh fruits and vegetables.    Psychotherapy  I agree to participate in Individual Therapy (if recommended).    Medication  If prescribed medications, I agree to take them.  Missing doses can result in serious side effects.  I understand that  drinking alcohol, or other illicit drug use, may cause potential side effects.  I will not stop my medication abruptly without first discussing it with my provider.    Staying Connected With Others  I will stay in touch with my friends, family members, and my primary care provider/team.    Use your imagination  Be creative.  We all have a creative side; it doesn t matter if it s oil painting, sand castles, or mud pies! This will also kick up the endorphins.    Witness Beauty  (AKA stop and smell the roses) Take a look outside, even in mid-winter. Notice colors, textures. Watch the squirrels and birds.     Service to others  Be of service to others.  There is always someone else in need.  By helping others we can  get out of ourselves  and remember the really important things.  This also provides opportunities for practicing all the other parts of the program.    Humor  Laugh and be silly!  Adjust your TV habits for less news and crime-drama and more comedy.    Control your stress  Try breathing deep, massage therapy, biofeedback, and meditation. Find time to relax each day.     My support system    Clinic Contact:  Phone number:    Contact 1:  Phone number:    Contact 2:  Phone number:    [employer]/:  Phone number:    Therapist:  Phone number:    Local crisis center:    Phone number:    Other community support:  Phone number:

## 2019-12-20 NOTE — PATIENT INSTRUCTIONS
Continue care with your psychiatrist and counselor.  Medical opinion form filled. Get one form psyche to be sure when could return to work a shard for me to say   Go to the ER if worse.  Consider shingles vaccine.  Follow-up with the hematologist on 1/21/2020 for follow-up of your pulmonary embolism prior blood clot and chronic anticoagulation.  Temporary refill on xarelto given   Arrange a colonoscopy sometime in March next year when sget clearance from the hematologist and guidance on how to hold the medication xarelto prior to the scope with minimal risks to recurrent blood clot and also see if you need bridging therapy at that time.  LDL is elevated recheck fasting cholesterol in 3 months.  Weight stable to prior we will continue to monitor.  Regular exercise and eating healthy will help you with a better weight.  As you go longer away from the alcohol your symptoms of abdominal discomfort, fatigue, dizziness should also improve.  If not we can refer you to neurology to complete the work-up.  Follow-up in 3 months or earlier as needed.

## 2019-12-20 NOTE — PROGRESS NOTES
Subjective     Nikunj Sinha is a 54 year old male who presents to clinic today for the following health issues:    HPI   Depression and Anxiety Follow-Up    How are you doing with your depression since your last visit? Improved -little     How are you doing with your anxiety since your last visit?  Improved -little    Are you having other symptoms that might be associated with depression or anxiety? No    Have you had a significant life event? Health Concerns and OTHER: going through treatment -sobering      Do you have any concerns with your use of alcohol or other drugs? No     CURRENTLY   Depression a bit better,under care of psyche, apt with counselor for therapy had to be rescheduled. Has passive suicidal thought , no active thoughts or plan. Contracts to safety. Is future oriented. To see psyche again next week.   Alcohol dependence in early remission. Outpatient Rx is going well, has a Counsellor at treatment. To get a new sponsor  Fatigue better   Dizziness still there, described as feeling mild foggy, hard to focus, occasionally stands up and has to sit back down, due to blood rushing out of head. He stopped the Gabapentin 300 mg at bedtime as it was making him more dizzy and tired and feels anxiety is no worse off it  Weight stable. No significant weight gain. On a Multivitamin but ran out & to get mor krystle his own     Propranolol 20 mg in am   B/l tinnitus chronic , unchanged, declines need to see ENT or audiology.  Memory changes stable to improved.   Dental caries: Tooth pulled 3 days ago, no more antibiotics needed, still feels a bit raw. Xarelto held 3 days for dental work but now back on it.    Prior DVT / pe on Xarelto, to see hematology in jan. Will discuss need for colonoscopy and holding Xarelto, if safe to do so and bridging with them. Given prior positive FIT few yrs ago. No rectal bleed recently.   Epigastric pain, stomach upset secondary to using Advil and tylenol post dental extraction. Using  antacids. Not tried Pepcid yet.   Occasional palpitation, no associated chest pain. Occasional shortness of breath none currently. Stable to prior.   Essential tremor, he feels has improved. Worse when anxious   Elevated LDL  The 10-year ASCVD risk score (Raquel BARRETT Jr., et al., 2013) is: 5.3%    Values used to calculate the score:      Age: 54 years      Sex: Male      Is Non- : No      Diabetic: No      Tobacco smoker: No      Systolic Blood Pressure: 116 mmHg      Is BP treated: No      HDL Cholesterol: 57 mg/dL      Total Cholesterol: 261 mg/dL    BACKGROUND  Unemployed, from [place] originally where was getting his care at the local clinic there, hx of alcohol dependence in early remission, ZAIDA, MDD, hx of SI, on Wellbutrin, lexapro, gabapentin & MV, under care of psychiatry at [facility], memory changes, epigastric pain, palpitations, weight gain, hx of chest pain, fatigue, dizziness, ED, improved with time away from alcohol, essential tremor on propranolol, B/l tinnitus, seasonal allergic rhinitis, allergic to ragweed, dental caries / abscess, S/p extraction nov 2019, 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 Xarelto under care of hematology, prior tonsillectomy,   Hx of ACS, NSTEMI with elevated troponin, no CAD on angiogram,  but found to have a acute saddle embolism with cor pulmonale & hx of DVT,  in 8/2019 on lifelong anticoagulation now on ajcob 20 mg daily, ever since under care of hematology, hx of moderate alcohol dependence in early remission with hx of withdrawal & mood disorder, previously on campral ( discontinued secondary to diarrhea), & naltrexone( stopped as ineffective), ZAIDA, MDD, suicidal ideation, on Wellbutrin Xl 150 mg daily, lexapro 20 mg daily, gabapentin 300 mg bedtime & a multivitamin, also on propranolol, previously on Abilify 5 mg, citalopram 10 mg, hydroxyzine, mirtazapine & trazodone, dental caries, recent dental abscess S/p abx, hx of essential  "tremor on propranolol 20 mg tid, allergic to ragweed's, no records from [facility] but reviewed care everywhere [facility] records,   Seen at [facility] ER 12/26/18 with suicidal ideation. Stabilized , treated ( see notes for details & discharged to [facility]).  Seen in ER at [facility] on 6/12/19 \" for increased depression, hopelessness, and alcohol abuse. Pt was kicked out of his sober smelling like alcohol.   Admitted [facility] on 8/2019: with shortness of breath and 2 days of chest pain and near syncope. He presented to the emergency room on 8/4/2019 after he had a syncopal episode in front of [facility]. He did not recall particular chest pain or shortness of breath. He noted swelling of his leg in various spots but he denied any persistent pain or swelling or redness. No injury. No acute illness prior to that. However he had donated plasma 10 times in 5 weeks prior to that. Upon initial evaluation, d-dimer was elevated at 6.7. PTT was normal at 28, INR is 1.09. He underwent coronary angiogram and it did not show any obstructive coronary artery disease. Due to an elevated troponin he underwent coronary angiogram which demonstrated no significant CAD. Due to an elevated d-dimer underwent CT scan of the chest which showed extensive acute bilateral pulmonary embolism with saddle embolus.  Doppler legs showed Nonocclusive right femoral DVT from upper to mid thigh. Left calf DVT within one of the peroneal veins in both of the posterior tibial veins from upper to low calf. Echocardiogram revealed lower limit of normal left ventricular systolic function of 50% with septal motion compatible with right ventricular overload.  He had cor pulmonale and was started on heparin infusion. No thrombolytics were given due hemodynamic and respiratory stability. After several days of monitoring on IV heparin Mr. Sinha continued to improve and he was transitioned to Xarelto for ongoing anticoagulation therapy. He tolerated Xarelto " well. Had no major bleeding except intermittent gum bleeding. Denied family history of venous thromboembolism. He had a brother who passed away from [facility]. He was single. Did not have children. Was advised to continue with indefinite anticoagulation. Hematology thought maybe plasma donation had led to a transient hypercoagulable state. Mr. Sinha was advised not to donate plasma moving forward. An outpatient referral to hematology had been made.  Seen by hematology 9/2019 at [facility]. She reviewed the pathophysiology of venous thromboembolic exam and risks of recurrence. Was advised of possible etiology, he was indicated for long-term anticoagulation due to initial presentation of a life-threatening episode. No prior history of venous thromboembolism or family history of thromboembolism. Was not recommend family/hereditary thrombophilia work-upa repeat CT scan of the chest to know the resolution of blood clots was not needed & only recommended to obtain a CTA if he had signs and symptoms concerning for venous thromboembolism. Was advised strict abstinence from alcohol with anticoagulants & to obtain kidney, liver function monitoring at least once a year.   He was age-appropriate cancer screening. He admitted that he had positive stool test (sounds like FOBT) a couple years prior. It was unclear if it was related to his alcohol use. He was recommended to proceed with colonoscopy, but to wait at least 3-6 months because of this recent pulmonary emboli event and ot recommended interruption of anticoagulation prior to that. He voiced understanding. His previous primary care provider retired.    He was engaged with outpatient CD treatment at [facility] but he had been unable to maintain any sobriety outside of a structured facility. Roughly one month prior to next er visit he stopped taking all of his psych medications.   Was in the ER at [facility] on 10/17/2019 for suicidal ideation & alcohol abuse: had increased  "depressive symptoms, anxiety, and suicidal ideation. He presented to the outpatient clinic for a Rule 25 assessment. He endorsed numerous acute depressive symptoms. He relapsed on alcohol and had been unsuccessful outside of a structured program. He was previously working a good job as a Scoring Director at [employer] \"but I drank myself out of that job.\" He had been evicted from his apartment and found himself homeless. He rated his depression as 8 out of 10 and anxiety as 9 out of 10. He was feeling hopeless with thoughts of suicide and a plan to hang himself. He stated that he hadn't taken any steps towards acting on the thoughts but that \"it's something in my mind.\" He felt somewhat isolated. He had one friend in [place], but no supportive family that were still alive.   He was admitted on a voluntary basis. Agreeable to stay voluntarily to coordinate cares and medication management. Was seen by the hospitalist during course of hospitalization. Started on antibiotics for a dental abscess. He was placed on a CIWA protocol & did not demonstrate signs or symptoms of complicated alcohol withdrawal. Psychiatric medication management was performed in detail to target signs and symptoms of principal diagnosis & comorbid illnesses. Medication management included Lexapro was restarted for depression and anxiety as well as Wellbutrin XL:  & Gabapentin as needed for anxiety. Propranolol as needed for anxiety. Given Naltrexone as a trial for alcohol use disorder; LFT's normalizing & Campral was discontinued secondary to GI side effects. Progressed with mood and anxiety. Denied psychosis. Denied suicidal and homicidal ideation. Did an appropriate crisis and relapse plan. Was future oriented. Denied gun access. Indicated motivation to proceed with MI CD treatment as coordinated by  with intake date on Friday, November 1, 2019.   followed in regards to collecting and reviewing collateral " information, coordination of cares and discharge planning. Including, MI CD evaluation completed with recommendation for placement to unit 2700. Due to dental issues, he was discharged to medical respite bed to bridge placement. He was treated for dental abscess with PCN.  Seen 11/29/19 for the first time by this writer. See that note for details. Noted Hx of severe MDD, ZAIDA, hx of passive suicidal thoughts, early remission from alcohol dependence just completed rule 25 , detox & rehab & in sober living. had just moved from [facility] to [place]. Had poor social support but was forward thinking  & had apt's for the dentist on & psyche & noted had been taking meds & contracted to safety. 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 No hypercoag work up recommended given lifelong anticoagulation indicated & seen by hematology. Note from sept 2019 reviewed.   Reported fatigue, weight gain, dizziness since on new meds for mental health. Denied chest pain or palpitations though noted has had these in the past. Exam was benign & vitally stable.Did not appear to be in heart failure. Noted his   memory changes, abdominal pain, tinnitus, ED could be related to alcohol abuse & noted symptoms had improved since quit drinking. Advised to try Pepcid OTC for abdominal ain. Declined referral to ENT/ audiology. Seasonal allergic rhinitis was not an issue. Was to consider a GI referral for a scope in future once could come off Xarelto a few days. Noted due for follow up with hematology by feb 2020. Was to sign an KISHOR to get records from [place] where previously doctored. Was given the Tdap. Was negative for Hep C, HIV, had a normal folic acid, vit b12, CMP, TSH, cbc, HBA1C & LDL was elevated.   Seen by [facility] psychiatry on 12/5/19 for Zaida, depression and alcohol abuse in remission and noted was taking propranolol 1 a day instead  of three times and continue don same meds till reevaluated in 4 weeks.   [place]  shows received gabapentin 100 mg #270 on 19 at [place], then 300 mg # 90  [place] [facility] # 60 on [facility]/6/19 [facility], then # 180 on 19, 300 mg # 60 on 10/30/19 & # 30 of 300 mg on 19    Social History     Tobacco Use     Smoking status: Former Smoker     Last attempt to quit: 1982     Years since quittin.9     Smokeless tobacco: Never Used   Substance Use Topics     Alcohol use: Not Currently     Comment: sober second time oct 14 , did detox adn rehab, in a sober house 2019     Drug use: Never     PHQ 2019   PHQ-9 Total Score 14 14   Q9: Thoughts of better off dead/self-harm past 2 weeks Several days Several days   F/U: Thoughts of suicide or self-harm - Yes   F/U: Self harm-plan - No   F/U: Self-harm action - No   F/U: Safety concerns - No     ZAIDA-7 SCORE 2019   Total Score 15 16     Last PHQ-9 2019   1.  Little interest or pleasure in doing things 1   2.  Feeling down, depressed, or hopeless 2   3.  Trouble falling or staying asleep, or sleeping too much 3   4.  Feeling tired or having little energy 3   5.  Poor appetite or overeating 0   6.  Feeling bad about yourself 2   7.  Trouble concentrating 2   8.  Moving slowly or restless 0   Q9: Thoughts of better off dead/self-harm past 2 weeks 1   PHQ-9 Total Score 14   Difficulty at work, home, or with people Very difficult   In the past two weeks have you had thoughts of suicide or self harm? Yes   Do you have concerns about your personal safety or the safety of others? No   In the past 2 weeks have you thought about a plan or had intention to harm yourself? No   In the past 2 weeks have you acted on these thoughts in any way? No     In the past two weeks have you had thoughts of suicide or self-harm?  Yes  In the past two weeks have you thought of a plan or intent to harm yourself? No.  Do you have  concerns about your personal safety or the safety of others?   No  Suicide Assessment Five-step Evaluation and Treatment (SAFE-T)    How many servings of fruits and vegetables do you eat daily?  0-1    On average, how many sweetened beverages do you drink each day (Examples: soda, juice, sweet tea, etc.  Do NOT count diet or artificially sweetened beverages)?   4    How many days per week do you miss taking your medication? 0    Patient Active Problem List   Diagnosis     Current severe episode of major depressive disorder without psychotic features, unspecified whether recurrent (H)     Moderate alcohol dependence in early remission (H)     ZAIDA (generalized anxiety disorder)     History of suicidal ideation     Fatigue, unspecified type     Dizziness     Weight gain     Tinnitus, bilateral     Memory changes     Dental caries     Seasonal allergic rhinitis, unspecified trigger     History of pulmonary embolism     History of chest pain     Palpitations     Abdominal pain, epigastric     Positive FIT (fecal immunochemical test)     Erectile dysfunction, unspecified erectile dysfunction type     Essential tremor     Chronic anticoagulation     Personal history of DVT (deep vein thrombosis)     Elevated LDL cholesterol level     Past Surgical History:   Procedure Laterality Date     TONSILLECTOMY         Social History     Tobacco Use     Smoking status: Former Smoker     Last attempt to quit: 1982     Years since quittin.9     Smokeless tobacco: Never Used   Substance Use Topics     Alcohol use: Not Currently     Comment: sober second time oct 14 , did detox adn rehab, in a sober house 2019     Family History   Problem Relation Age of Onset     Arthritis Mother      Breast Cancer Mother      Depression Mother      Allergies Mother      Migraines Mother      Alcoholism Father      Lymphoma Father         non hodgkins     Other - See Comments Brother         genetic disorder,  of some slow form of  ALS         Current Outpatient Medications   Medication Sig Dispense Refill     buPROPion (WELLBUTRIN XL) 150 MG 24 hr tablet Take 150 mg by mouth       escitalopram (LEXAPRO) 20 MG tablet Take 20 mg by mouth       gabapentin (NEURONTIN) 300 MG capsule Take 300 mg by mouth       multivitamin w/minerals (THERA-VIT-M) tablet Take 1 tablet by mouth daily       propranolol (INDERAL) 20 MG tablet Take 20 mg by mouth 3 times daily        rivaroxaban ANTICOAGULANT (XARELTO) 20 MG TABS tablet Take 1 tablet (20 mg) by mouth daily (with dinner) 30 tablet 0     Allergies   Allergen Reactions     Ragweeds Other (See Comments)     congestion     Recent Labs   Lab Test 11/29/19  1202   A1C 4.9   *   HDL 57   TRIG 205*   ALT 33   CR 0.98   GFRESTIMATED 87   GFRESTBLACK >90   POTASSIUM 4.2   TSH 0.96      BP Readings from Last 3 Encounters:   12/20/19 116/88   11/29/19 120/76    Wt Readings from Last 3 Encounters:   12/20/19 86.1 kg (189 lb 12 oz)   11/29/19 85 kg (187 lb 8 oz)                  Reviewed and updated as needed this visit by Provider       Review of Systems   ROS COMP: Constitutional, HEENT, cardiovascular, pulmonary, GI, , musculoskeletal, neuro, skin, endocrine and psych systems are negative, except as otherwise noted.      Objective    /88 (BP Location: Left arm, Patient Position: Chair, Cuff Size: Adult Regular)   Pulse 65   Temp 96.6  F (35.9  C) (Tympanic)   Resp 15   Wt 86.1 kg (189 lb 12 oz)   SpO2 96%   BMI 27.23 kg/m    Body mass index is 27.23 kg/m .  Physical Exam   GENERAL: healthy, alert, no distress and over weight  EYES: Eyes grossly normal to inspection, PERRL and conjunctivae and sclerae normal  HENT: ear canals and TM's normal, nose and mouth without ulcers or lesions  NECK: no adenopathy, no asymmetry, masses, or scars and thyroid normal to palpation  RESP: lungs clear to auscultation - no rales, rhonchi or wheezes  CV: regular rate and rhythm, normal S1 S2, no S3 or S4, no  murmur, click or rub, no peripheral edema and peripheral pulses strong  ABDOMEN: soft, non tender, no hepatosplenomegaly, no masses and bowel sounds normal  MS: no gross musculoskeletal defects noted, no edema  SKIN: no suspicious lesions or rashes  NEURO: Normal strength and tone, mentation intact and speech normal  PSYCH: mentation appears normal, affect normal/bright, anxious, fatigued, judgement and insight intact and appearance well groomed    Diagnostic Test Results:  Labs reviewed in Epic  No results found for this or any previous visit (from the past 24 hour(s)).        Assessment & Plan       ICD-10-CM    1. Current severe episode of major depressive disorder without psychotic features, unspecified whether recurrent (H) F32.2    2. Suicidal ideation R45.851    3. Moderate alcohol dependence in early remission (H) F10.21    4. ZAIDA (generalized anxiety disorder) F41.1    5. Fatigue, unspecified type R53.83    6. Dizziness R42    7. Weight gain R63.5    8. Tinnitus, bilateral H93.13    9. Memory changes R41.3    10. Dental caries K02.9    11. History of pulmonary embolism Z86.711 rivaroxaban ANTICOAGULANT (XARELTO) 20 MG TABS tablet   12. Personal history of DVT (deep vein thrombosis) Z86.718 rivaroxaban ANTICOAGULANT (XARELTO) 20 MG TABS tablet   13. Chronic anticoagulation Z79.01    14. Positive FIT (fecal immunochemical test) R19.5 GASTROENTEROLOGY ADULT REF CONSULT ONLY   15. Abdominal pain, epigastric R10.13 GASTROENTEROLOGY ADULT REF CONSULT ONLY   16. Palpitations R00.2    17. Essential tremor G25.0    18. Elevated LDL cholesterol level E78.00      Doing reasonably ok. Remains sober, in outpatient Rx. Passive suicidal thought, contracts to safety. Future oriented.   Continue care with his psychiatrist and counselor.  Medical opinion form filled. Advised he also get one from his psyche to be sure when could return to work as hard for me to say   Advised to go to the ER if worse.  Consider the shingles  vaccine.  Follow-up with the hematologist on 1/21/2020 for follow-up of pulmonary embolism, prior DVT and chronic anticoagulation.  Temporary refill on Xarelto given   To arrange a colonoscopy sometime in March next year when gets clearance from the hematologist and guidance on how to hold the medication Xarelto prior to the scope with minimal risks to recurrent blood clot and also see if would need bridging therapy at that time.  LDL is elevated recheck fasting cholesterol in 3 months. To work on diet, exercise and weight loss and eating healthier.   Weight stable to prior we will continue to monitor.  As he goes longer away from the alcohol his symptoms of abdominal discomfort, fatigue, dizziness should also improve.  To try Prilosec briefly / Pepcid for abdominal symptoms. May improve once off Advil for recent tooth extraction/ infection.   If not we can refer him  to neurology to complete the work-up.  Follow-up in 3 months or earlier as needed.    Work on weight loss  Regular exercise  See Patient Instructions    Return in about 3 months (around 3/20/2020) for Routine Visit for chronic issues with PCP.    Adriana Solis MD  [facility] [place]

## 2019-12-21 ASSESSMENT — ANXIETY QUESTIONNAIRES: GAD7 TOTAL SCORE: 16

## 2019-12-22 LAB — HEMOCCULT STL QL IA: NEGATIVE

## 2019-12-23 NOTE — RESULT ENCOUNTER NOTE
Results within acceptable limits.  -FIT test (screening test for colon cancer) was normal. ADVISE: rechecking this test in 1 year..

## 2020-01-07 LAB — PHQ9 SCORE: 13

## 2020-01-09 ENCOUNTER — TRANSFERRED RECORDS (OUTPATIENT)
Dept: HEALTH INFORMATION MANAGEMENT | Facility: CLINIC | Age: 55
End: 2020-01-09

## 2020-01-15 ENCOUNTER — TELEPHONE (OUTPATIENT)
Dept: BEHAVIORAL HEALTH | Facility: CLINIC | Age: 55
End: 2020-01-15

## 2020-01-16 ENCOUNTER — TELEPHONE (OUTPATIENT)
Dept: HEMATOLOGY | Facility: CLINIC | Age: 55
End: 2020-01-16

## 2020-01-17 NOTE — TELEPHONE ENCOUNTER
[facility]  No data recorded    Social Work Care Navigator Note    Patient: Nikunj Sinha  Date: January 17, 2020  Preferred Name: Nikunj    Previous PHQ-9:   PHQ-9 SCORE 11/29/2019 12/20/2019   PHQ-9 Total Score 14 14     Previous ZAIDA-7:   ZAIDA-7 SCORE 11/29/2019 12/20/2019   Total Score 15 16     LUIS LEVEL:  No flowsheet data found.    Preferred Contact:  No data recorded    Type of Contact Today: Phone call (not reached/unavailable)      Data: (subjective / Objective):  Attempted to reach patient, but was unsuccessful.  Plan to attempt again.  CARLY Vasquez [facility]        Next 5 appointments (look out 90 days)    Mar 20, 2020  9:20 AM CDT  Office Visit with Adriana Solis MD  [facility] ([facility]) [address] [place] [id number] [phone]

## 2020-01-23 ENCOUNTER — OFFICE VISIT (OUTPATIENT)
Dept: HEMATOLOGY | Facility: CLINIC | Age: 55
End: 2020-01-23
Attending: INTERNAL MEDICINE
Payer: COMMERCIAL

## 2020-01-23 VITALS
SYSTOLIC BLOOD PRESSURE: 123 MMHG | HEIGHT: 70 IN | HEART RATE: 70 BPM | DIASTOLIC BLOOD PRESSURE: 80 MMHG | RESPIRATION RATE: 12 BRPM | BODY MASS INDEX: 27.92 KG/M2 | WEIGHT: 195 LBS | TEMPERATURE: 98.2 F | OXYGEN SATURATION: 100 %

## 2020-01-23 DIAGNOSIS — Z86.711 HISTORY OF PULMONARY EMBOLISM: Primary | ICD-10-CM

## 2020-01-23 DIAGNOSIS — Z86.718 PERSONAL HISTORY OF DVT (DEEP VEIN THROMBOSIS): ICD-10-CM

## 2020-01-23 PROCEDURE — G0463 HOSPITAL OUTPT CLINIC VISIT: HCPCS

## 2020-01-23 PROCEDURE — 99204 OFFICE O/P NEW MOD 45 MIN: CPT | Performed by: INTERNAL MEDICINE

## 2020-01-23 ASSESSMENT — MIFFLIN-ST. JEOR: SCORE: 1730.76

## 2020-01-23 ASSESSMENT — PAIN SCALES - GENERAL: PAINLEVEL: NO PAIN (0)

## 2020-01-23 NOTE — PROGRESS NOTES
[facility]  [address] [place] [address]  Phone: [phone], Fax: [phone]    Outpatient Visit Note:    Patient: Nikunj Sinha  MRN: [id number]  : 1965  MARTA: 2020    Reason for Consultation:  Nikunj Sinha is self-referred for evaluation and treatment of pulmonary embolism and bilateral DVT.    History of Present Illness:  Nikunj Sinha is a 54 year old man with a history of alcohol use disorder and anxiety who was recently diagnosed with unprovoked pulmonary embolism and bilateral DVTs.  He reports that in 2019, he was walking from the [employer] parking lot into the store when he developed worsening chest pain and then a syncopal event.  He then drove home but the chest pain recurred the next day and he presented to a local emergency department.  Evaluation there at [facility] in [facility] demonstrated the presence of a high burden of pulmonary embolism and bilateral DVTs.  He was hospitalized about 8 days primarily on heparin infusion and monitored carefully for any decompensation.  He did very well and was discharged home on rivaroxaban.    Since discharge he has been doing well.  He notes no bleeding symptoms on the medication.  The medication is not overly costly to him.  He notes that his chest pain is completely resolved is not no syncopal events.  He notes no shortness of breath or leg swelling.  He notes that he never had any leg pain redness or swelling with his initial diagnoses but ultrasounds were performed given the presence of pulmonary embolism.    Past Medical History:  Past Medical History:   Diagnosis Date     Abdominal pain, epigastric 2019     Chronic anticoagulation 2019     Current severe episode of major depressive disorder without psychotic features, unspecified whether recurrent (H) 2019     Dental caries 2019     Dizziness 2019     Erectile dysfunction, unspecified  erectile dysfunction type 11/29/2019     Essential tremor 12/1/2019     Fatigue, unspecified type 11/29/2019     ZAIDA (generalized anxiety disorder) 11/29/2019     History of chest pain 11/29/2019     History of pulmonary embolism 11/29/2019     History of suicidal ideation 11/29/2019     Memory changes 11/29/2019     Moderate alcohol dependence in early remission (H) 11/29/2019     Palpitations 11/29/2019     Positive FIT (fecal immunochemical test) 11/29/2019     Seasonal allergic rhinitis, unspecified trigger 11/29/2019     Tinnitus, bilateral 11/29/2019     Weight gain 11/29/2019       Past Surgical History:  Past Surgical History:   Procedure Laterality Date     TONSILLECTOMY  1984       Medications:  Current Outpatient Medications   Medication Sig     buPROPion (WELLBUTRIN XL) 150 MG 24 hr tablet Take 150 mg by mouth     escitalopram (LEXAPRO) 20 MG tablet Take 20 mg by mouth     multivitamin w/minerals (THERA-VIT-M) tablet Take 1 tablet by mouth daily     propranolol (INDERAL) 20 MG tablet Take 20 mg by mouth 3 times daily      rivaroxaban ANTICOAGULANT (XARELTO) 20 MG TABS tablet Take 1 tablet (20 mg) by mouth daily (with dinner)     rivaroxaban ANTICOAGULANT (XARELTO) 20 MG TABS tablet Take 1 tablet (20 mg) by mouth daily (with dinner)     gabapentin (NEURONTIN) 300 MG capsule Take 300 mg by mouth     No current facility-administered medications for this visit.        Allergies:  Allergies   Allergen Reactions     Ragweeds Other (See Comments)     congestion       ROS:  Denies any bleeding issues. No gum bleeding, No nose bleed. Denies any hematuria or blood in stools. Denies any ecchymosis. Denies any lower extremities swelling or pain. Denies any fever, no chest pain. Denies any shortness of breath.     Social History:  Denies any tobacco use. No significant alcohol use. Denies any illicit drug use. He is currently unemployed.    Family History:  He is not aware of any history of DVT or pulmonary embolism  in the family    Objectives:  Pleasant 54 year old male in no acute distress.  Vitals: B/P: 123/80, T: 98.2, P: 70, R: 12, Wt: 195 lbs 0 oz Body mass index is 27.98 kg/m .   Exam:   Gen: Appears well, no distress  HEENT: No scleral icterus or hemorrahge, no wet purpura, no lymphadenopathy  CV: regular, no murmurs  Ext: no edema, redness or varicosities    Labs:  Nov 2019 creatinine was normal at 0.99    Imaging:  Doppler legs showed Nonocclusive right femoral DVT from upper to mid thigh. Left calf DVT within one of the peroneal veins in both of the posterior tibial veins from upper to low calf.    Assessment:  In summary, Nikunj Sinha is a 54 year old man with history of unprovoked pulmonary embolism and proximal right sided DVT and concurrent distal DVT.  Overall explained that I think he is at very high risk for recurrence based on the known evidence around unprovoked venous thrombosis that the long-term risk for recurrence is at least 50% with men having a higher risk than women.  I recommended he remain on rivaroxaban for treatment until March 2020 and then remain on therapy indefinitely for secondary prophylaxis.  He is in agreement with this strategy and will plan to follow-up with us in a year.    Plan:  1. Majority of today's visit was spent counseling the patient regarding the natural history of VTE disease, treatment strategies for VTE and risks of bleeding and clotting with anticoagulants.  2. I counseled about the risks and benefits of DOAC use including that these medicines are often easier for patients to take than warfarin because of the lack of monitoring required and were found to be equally effective in both the treatment and prevention of recurrence of blood clots.  The primary risk of taking the medicines is an increased risk of bleeding.  I quoted the typical bleeding rates found from the interventional trial comparing the DOACs and warfarin of around .5 to 1% per year.  I explained that an  unexpected but consistent finding in the trials was a lower rate of intracranial hemorrhage than with warfarin.  I also explained that age and concurrent ASA use are the best known risk factors for development of ICH.  Finally, I also discussed the reversal agent (which is limited in availability due to high cost) for these medicines and while theoretically this is a disadvantage compared with warfarin, we rarely see a need for reversal, possibly because the effect of the medicine wears off over the course of the day.  3. I counseled about how to take the DOAC medicine and the kinetics of the medicine including that the blood in thin starting around 1-2 hours after taking the medicine and wears off over the course of the day.  I also specifically discussed the need to take rivaroxaban with food for adequate absorption  4. Continue rivaroxaban 20mg daily until March 2020, then continue rivaroxaban 20mg daily for secondary prophylaxis given his high risk for recurrence.  5. Follow up ultrasound in March as a new baseline in case of future concerns for recurrence we will have a clear picture of chronic clot burden.  6. Ideally would wait to interrupt anticoagulation for procedures (anticipate dental and colonoscopy this year) until after March 1 but risk is sufficiency low now that I think it would be acceptable to interrupt now.  Hold 1 day before, of and after the procedure for minor procedures and Hold 2 days before, day of and 2 days after the procedure for major procedures.  7. RTC 1 year with PA      The patient is given our center's contact information and is instructed to call if he should have any further questions or concerns.  Otherwise, we will plan on seeing him back in 1 year's time.      Mariama Zhou, nurse clinician is present throughout the entire clinic visit with the patient today.  Patient understands and agrees with the above plan and recommendation.    Total Time Spent:  I spent a total of 50  minutes face-to-face with Nikunj Sinha during today's office visit.  Over 50% of this time was spent counseling the patient and/or coordinating care regarding unprovoked VTE.        Gregorio Pires MD   of Medicine  [employer]

## 2020-01-23 NOTE — PATIENT INSTRUCTIONS
[facility]  [facility]  [facility] [address], [place] [address]  Main: [phone], Fax: [phone]    It was a pleasure seeing you today.  Thank you for allowing us to continue being involved in your care.  YOU are the reason we are here, and truly hope we provided you with the excellent service you deserve.  Please let us know if there is anything else we can do for you, so that we can be sure you are leaving completely satisfied with your care experience.     For procedure/surgery, ideally we would hold off on elective procedures until after being on the blood thinner for 6 months.  For procedures please do not take the drug the day prior to your procedure and restart it 12-24 hours after with surgeon approval. Call our cnter and we can give specific details.  If your dentist would like to discuss your care with us, please have them call [phone] and ask for a nurse.    Please call us with any bleeding issues that you may have.    We would like you to repeat your imaging after February.   [facility] imaging is located in the East building (the smaller revolving door) across the street from our clinic ([address] [place] [address], [id number]).  Imaging check-in is located on the back side of the [facility] area.     Let us know if you develop problems with swelling in your leg. We might suggest compression stockings.    Call the [facility]  at [phone].     -If surgeries or procedures are planned (for holding instructions).     -If off anticoagulation, please call during high risk times (long-distance travel, broken bones or trauma, immobilization, surgery, pregnancy, or taking estrogen).     -Any new symptoms of DVT (deep vein thrombosis) or PE (pulmonary embolism)    -pain     -swelling     -redness    -warmth    -shortness of breath    -chest pain    -coughing up blood    We would like a provider on our team to  see you at least annually for optimal care and to allow us to continue to prescribe for you.  Bassam Reynolds PA-C and Amanda Fontana PA-C are our physician assistants that are specialized in bleeding and clotting disorders. In order to see new and urgent consults, we may ask you schedule your follow up appointments with one of them.    Return to clinic in  January 2021.  Please schedule return appointment with Bassam Reynolds PA-C or Amanda Fontana PA-C.    Your nurse clinician is Mariama Zhou RN at [phone].   If she is unavailable and you have immediate concerns, please call [phone] and ask for a nurse.     Mariama Zhou RN - Nurse Clinician - [facility] - [phone]                            [place] [phone]    What is rivaroxaban (Xarelto  )? XARELTO  is a prescription medicine used to treat deep vein thrombosis (DVT) and pulmonary embolism, and to help reduce the risk of these conditions reoccurring.  It is also used to prevent clotting after knee & hip replacement.  It is a direct Xa inhibitor (it stops one of the clotting proteins). Your diet (or Vitamin K intake) does not affect this medication. This drug was FDA approved to treat DVT in 2012, but has been used in [place] since 2008. Xarelto was FDA approved for atrial fibrillation in 2011.    How is Xarelto   usually dosed?  For treatment for a new clot, Xarelto  is dosed 15 mg twice daily with food for 21 days.  After that you take 20 mg once daily.  The dosing to prevent clots in your veins after surgery, long flights, or other high risk times: 10mg once daily.  How do I know if I am taking the right dose? Everyone takes the same dose, no matter their weight.  It was not studied in obese patients.  Currently there is no lab test to check the Xarelto  blood level.  However, such testing is being developed and will likely be available in the future.  What are the possible side  effects of Xarelto  ?  The most common side effect of Xarelto  is bleeding (i.e. bleeding from gums, nosebleeds, heavier than normal menstrual bleeding, blood in urine or stool). This risk of bleeding is similar to other oral blood thinners.  Get emergency medical help if you have any of these signs of allergic reaction: hives; difficulty breathing; swelling of your face, lips, tongue, throat; or if you should experience any significant bleeding.   Other side effects include: headaches, feeling dizzy or weak  What if I have surgery or a procedure scheduled? Please call your doctor about holding this medication prior to surgeries, injections into your joints or spine, or other invasive procedures (i.e. endoscopy/colonoscopy if biopsy or polyp removal needed).  Please notify your dentist that you are taking a blood thinner, although certain procedures may not require holding your Xarelto .  Should I be worried that the reversal agent is not readily available?  The reversal agent for Xarelto , Adexxa , is not readily available. However, Xarelto  wears off fairly quickly. If emergent surgery is required or life-threatening bleeding should occur, blood products and medications that promote clotting may be given. Please get examined for potential bleeding after any trauma or head injury.  What should I tell my doctor before starting Xarelto ? Please tell your doctor about past bleeding problems, liver or kidney problems, if you are pregnant or breastfeeding, or taking the following medications: Ketoconazole, Itraconazole, Ritonavir, Lopinavir, Indinavir, Carbamazepine, Phenytoin, Rifampin, or [place] s wort.

## 2020-01-23 NOTE — NURSING NOTE
Nikunj Sinha is here for a new consult visit and is  being seen for history of unprovoked PE on August 4, 2019.  He is doing well on Xarelto without bleeding.  While hospitalized US showed bilateral DVT, but he had no symptoms in his legs    Vital signs were taken and assessed.  Allergies and medications were reviewed and updated by [facility] staff.    I introduced myself and my role and provided him with a contact card containing our information.    Educated patient about the signs, symptoms of and risk factors for venous thrombosis (VTE) and provided an educational  book sasha, which reviews these facts. This includes the following web links  for further information:  www.stoptheclot.org, www.clotconnect.org.    The follow up plan was developed and we reviewed this plan point by point and all questions answered.  The patient verbalized understanding of  and agreement with plan.  The AVS instructions were then printed and given to the patient.     I called scheduling with the patient to arrange for follow ultrasound studies.    I  thanked him for coming and encouraged him to call with any concerns or questions.    Mariama Zhou RN - Nurse Clinician - [facility] - [phone]

## 2020-01-24 ENCOUNTER — COMMUNICATION - HEALTHEAST (OUTPATIENT)
Dept: ADMINISTRATIVE | Facility: HOSPITAL | Age: 55
End: 2020-01-24

## 2020-01-29 ENCOUNTER — TELEPHONE (OUTPATIENT)
Dept: BEHAVIORAL HEALTH | Facility: CLINIC | Age: 55
End: 2020-01-29

## 2020-01-29 NOTE — TELEPHONE ENCOUNTER
[facility]  [facility]: Integrated Primary Care    Social Work Care Navigator Note    Patient: Nikunj Sinha  Date: January 29, 2020  Preferred Name: Nikunj    Previous PHQ-9:   PHQ-9 SCORE 11/29/2019 12/20/2019   PHQ-9 Total Score 14 14     Previous ZAIDA-7:   ZAIDA-7 SCORE 11/29/2019 12/20/2019   Total Score 15 16     LUIS LEVEL:  No flowsheet data found.    Preferred Contact:  No data recorded    Type of Contact Today: Phone call (not reached/unavailable)      Data: (subjective / Objective): 2nd attempt to offer [facility] services. Referral from [facility] .  Attempted to reach patient, but was unsuccessful.  Plan to attempt again.  CARLY Vasquez [facility]        Next 5 appointments (look out 90 days)    Mar 20, 2020  9:20 AM CDT  Office Visit with Adriana Solis MD  [facility] ([facility]) [address] [place] [id number] [phone]

## 2020-02-06 ENCOUNTER — HOSPITAL ENCOUNTER (OUTPATIENT)
Dept: ULTRASOUND IMAGING | Facility: CLINIC | Age: 55
Discharge: HOME | End: 2020-02-06
Attending: INTERNAL MEDICINE | Admitting: INTERNAL MEDICINE
Payer: COMMERCIAL

## 2020-02-06 DIAGNOSIS — Z86.718 PERSONAL HISTORY OF DVT (DEEP VEIN THROMBOSIS): ICD-10-CM

## 2020-02-06 PROCEDURE — 93970 EXTREMITY STUDY: CPT

## 2020-02-13 ENCOUNTER — COMMUNICATION - HEALTHEAST (OUTPATIENT)
Dept: ADMINISTRATIVE | Facility: HOSPITAL | Age: 55
End: 2020-02-13

## 2020-03-03 ENCOUNTER — TRANSFERRED RECORDS (OUTPATIENT)
Dept: HEALTH INFORMATION MANAGEMENT | Facility: CLINIC | Age: 55
End: 2020-03-03

## 2020-05-05 ENCOUNTER — TRANSFERRED RECORDS (OUTPATIENT)
Dept: HEALTH INFORMATION MANAGEMENT | Facility: CLINIC | Age: 55
End: 2020-05-05

## 2020-06-02 ENCOUNTER — TRANSFERRED RECORDS (OUTPATIENT)
Dept: HEALTH INFORMATION MANAGEMENT | Facility: CLINIC | Age: 55
End: 2020-06-02

## 2020-06-04 ENCOUNTER — HOSPITAL ENCOUNTER (EMERGENCY)
Facility: CLINIC | Age: 55
Discharge: HOME | End: 2020-06-04
Attending: EMERGENCY MEDICINE | Admitting: EMERGENCY MEDICINE
Payer: COMMERCIAL

## 2020-06-04 VITALS
WEIGHT: 193.2 LBS | DIASTOLIC BLOOD PRESSURE: 85 MMHG | BODY MASS INDEX: 27.72 KG/M2 | RESPIRATION RATE: 16 BRPM | SYSTOLIC BLOOD PRESSURE: 118 MMHG | HEART RATE: 87 BPM | TEMPERATURE: 98 F | OXYGEN SATURATION: 97 %

## 2020-06-04 DIAGNOSIS — K04.7 DENTAL ABSCESS: ICD-10-CM

## 2020-06-04 PROCEDURE — 99282 EMERGENCY DEPT VISIT SF MDM: CPT | Performed by: EMERGENCY MEDICINE

## 2020-06-04 PROCEDURE — 99283 EMERGENCY DEPT VISIT LOW MDM: CPT | Mod: Z6 | Performed by: EMERGENCY MEDICINE

## 2020-06-04 RX ORDER — AMOXICILLIN 500 MG/1
500 CAPSULE ORAL 3 TIMES DAILY
Qty: 21 CAPSULE | Refills: 0 | Status: SHIPPED | OUTPATIENT
Start: 2020-06-04 | End: 2020-07-07

## 2020-06-04 ASSESSMENT — ENCOUNTER SYMPTOMS
FEVER: 0
SHORTNESS OF BREATH: 0
FACIAL SWELLING: 1

## 2020-06-04 NOTE — DISCHARGE INSTRUCTIONS
Take amoxicillin as directed.  It may be beneficial to take probiotics when on antibiotics.  Probiotics are available over-the-counter.  Follow-up with your dentist in a week.  Critical access hospital [phone]  Many of these clinics offer a sliding fee option for patients that qualify, and see patients on a walk-in or same day basis. Please call each clinic directly. As services, hours, fees and policies vary greatly.    [place]:  [facility]     [phone]  [facility] ([facility]) [phone]  [facility]  [phone]  [facility]      [phone]   [facility]    [phone]  [facility]  [phone]  [facility] [phone]  [facility]     [phone]  [facility]   [phone]  [facility] (cash only)   [phone]  [facility]    [phone] (adults)          [phone] (children)    [place]:  [facility]     [phone]; [phone]  [facility]     [phone]  [facility]     [phone]  [facility] (free, limited)    [phone]    Multiple Locations:  [facility]       [phone]

## 2020-06-04 NOTE — ED PROVIDER NOTES
[facility] EMERGENCY DEPARTMENT ([facility])     June 4, 2020    History     Chief Complaint   Patient presents with     Dental Pain     The history is provided by the patient and medical records.     Nikunj Sinha is a 55 year old male with a history of DVT/PE (on Xarelto) and dental caries who presents to the Emergency Department with dental pain.  He states he has bad dentition and was scheduled for root canal on his left lower tooth in mid March.  However, due to COVID-19, this appointment was canceled.  Several subsequent appointments were also made and canceled by the dentist.  Patient reports this tooth cracked about a week ago and since then he has been having tooth pain on the left side of his mouth. Patient also reports swelling around the area beginning a few days ago. Patient denies fever. He states he tried to get into his dentist, but they are not currently taking patients.    PAST MEDICAL HISTORY:   Past Medical History:   Diagnosis Date     Abdominal pain, epigastric 11/29/2019     Chronic anticoagulation 12/1/2019     Current severe episode of major depressive disorder without psychotic features, unspecified whether recurrent (H) 11/29/2019     Dental caries 11/29/2019     Dizziness 11/29/2019     Erectile dysfunction, unspecified erectile dysfunction type 11/29/2019     Essential tremor 12/1/2019     Fatigue, unspecified type 11/29/2019     ZAIDA (generalized anxiety disorder) 11/29/2019     History of chest pain 11/29/2019     History of pulmonary embolism 11/29/2019     History of suicidal ideation 11/29/2019     Memory changes 11/29/2019     Moderate alcohol dependence in early remission (H) 11/29/2019     Palpitations 11/29/2019     Positive FIT (fecal immunochemical test) 11/29/2019     Seasonal allergic rhinitis, unspecified trigger 11/29/2019     Tinnitus, bilateral 11/29/2019     Weight gain 11/29/2019       PAST SURGICAL HISTORY:   Past Surgical History:   Procedure Laterality Date      TONSILLECTOMY         Past medical history, past surgical history, medications, and allergies were reviewed with the patient. Additional pertinent items: None    FAMILY HISTORY:   Family History   Problem Relation Age of Onset     Arthritis Mother      Breast Cancer Mother      Depression Mother      Allergies Mother      Migraines Mother      Alcoholism Father      Lymphoma Father         non hodgkins     Other - See Comments Brother         genetic disorder,  of some slow form of ALS       SOCIAL HISTORY:   Social History     Tobacco Use     Smoking status: Former Smoker     Last attempt to quit: 1982     Years since quittin.4     Smokeless tobacco: Never Used   Substance Use Topics     Alcohol use: Not Currently     Comment: sober second time oct 14 , did detox adn rehab, in a sober house 2019     Social history was reviewed with the patient. Additional pertinent items: None      Discharge Medication List as of 2020 11:19 AM      START taking these medications    Details   amoxicillin (AMOXIL) 500 MG capsule Take 1 capsule (500 mg) by mouth 3 times daily for 7 days, Disp-21 capsule,R-0, Local Print         CONTINUE these medications which have NOT CHANGED    Details   buPROPion (WELLBUTRIN XL) 150 MG 24 hr tablet Take 150 mg by mouth, Historical      escitalopram (LEXAPRO) 20 MG tablet Take 20 mg by mouth, Historical      gabapentin (NEURONTIN) 300 MG capsule Take 300 mg by mouth, Historical      multivitamin w/minerals (THERA-VIT-M) tablet Take 1 tablet by mouth daily, Historical      propranolol (INDERAL) 20 MG tablet Take 20 mg by mouth 3 times daily , Historical      !! rivaroxaban ANTICOAGULANT (XARELTO) 20 MG TABS tablet Take 1 tablet (20 mg) by mouth daily (with dinner), Disp-30 tablet, R-11, E-Prescribe      !! rivaroxaban ANTICOAGULANT (XARELTO) 20 MG TABS tablet Take 1 tablet (20 mg) by mouth daily (with dinner), Disp-30 tablet, R-0, E-PrescribeFURTHER REFILLS BY  HEMATOLOGIST has apt with Dr Mili Carlson 1/21/20       !! - Potential duplicate medications found. Please discuss with provider.             Allergies   Allergen Reactions     Ragweeds Other (See Comments)     congestion        Review of Systems   Constitutional: Negative for fever.   HENT: Positive for dental problem and facial swelling.    Respiratory: Negative for shortness of breath.    Cardiovascular: Negative for chest pain.   All other systems reviewed and are negative.        Physical Exam   BP: (!) 133/91  Pulse: 104  Temp: 99  F (37.2  C)  Resp: 18  Weight: 87.6 kg (193 lb 3.2 oz)  SpO2: 98 %  /85   Pulse 87   Temp 98  F (36.7  C) (Oral)   Resp 16   Wt 87.6 kg (193 lb 3.2 oz)   SpO2 97%   BMI 27.72 kg/m       Physical Exam  Vitals signs and nursing note reviewed.   Constitutional:       Appearance: Normal appearance.   HENT:      Head: Normocephalic and atraumatic.      Mouth/Throat:     Neck:      Musculoskeletal: Normal range of motion.   Cardiovascular:      Rate and Rhythm: Normal rate.   Pulmonary:      Effort: Pulmonary effort is normal.   Lymphadenopathy:      Cervical: No cervical adenopathy.   Skin:     General: Skin is warm and dry.   Neurological:      Mental Status: He is alert and oriented to person, place, and time.   Psychiatric:         Mood and Affect: Mood normal.       HEENT:    swelling to left mandible--see photograph below            Tooth #21 with large filling and crack swelling and pain  Neck:  No adenopathy,  supple  ED Course   9:06 AM  The patient was seen and examined by Sharla Desouza MD in Room ED09.      Procedures                           No results found for this or any previous visit (from the past 24 hour(s)).  Medications - No data to display          Assessments & Plan (with Medical Decision Making)       I have reviewed the nursing notes.  Emergency Department course:  The patient was seen and examined at 0905 am.. I consulted Dental, who arrived to the ED  to evaluate the patient at about 11 am.  The Dental resident performed an incision and drainage of the abscessed tooth with good results.     Dental recommends discharging the patient with a 7-day prescription of amoxicillin and follow-up with his regular dentist.    The patient is a 55-year-old male who presents with dental pain and abscess of tooth #20.  An incision and drainage was performed by the dental resident while the patient was in the ED.  The patient will likely need a root canal.  I prescribed 7 days of amoxicillin for the patient.  He should follow-up with his dentist next week.  He was also given the clinic number to the [place] [facility] for potential follow up on discharge.  He can take Tylenol or ibuprofen as needed for pain.  He should return for concerns of infection or other concerns.    I counseled the patient in my usual and standard fashion for dental abscess and reasons to return to the Emergency Department.   I have reviewed the findings, diagnosis, plan and need for follow up with the patient.    Discharge Medication List as of 6/4/2020 11:19 AM      START taking these medications    Details   amoxicillin (AMOXIL) 500 MG capsule Take 1 capsule (500 mg) by mouth 3 times daily for 7 days, Disp-21 capsule,R-0, Local Print             Final diagnoses:   Dental abscess     IMary Jo , am serving as a trained medical scribe to document services personally performed by Sharla Desouza MD, based on the provider's statements to me.      Sharla SIMS MD, was physically present and have reviewed and verified the accuracy of this note documented by Mary Jo Darden. This note was created in part by the use of Dragon voice recognition dictation system. Inadvertent grammatical errors and typographical errors may still exist.  Sharla Desouza MD      6/4/2020   [facility] EMERGENCY DEPARTMENT     Sharla Desouza MD  06/04/20 [id number]

## 2020-06-04 NOTE — ED AVS SNAPSHOT
[facility], [place], Emergency Department  [address]  [place] [address]  Phone:  [phone]  Fax:  [phone]                                    Nikunj Sinha   MRN: [id number]    Department:  [facility], Emergency Department   Date of Visit:  6/4/2020           After Visit Summary Signature Page    I have received my discharge instructions, and my questions have been answered. I have discussed any challenges I see with this plan with the nurse or doctor.    ..........................................................................................................................................  Patient/Patient Representative Signature      ..........................................................................................................................................  Patient Representative Print Name and Relationship to Patient    ..................................................               ................................................  Date                                   Time    ..........................................................................................................................................  Reviewed by Signature/Title    ...................................................              ..............................................  Date                                               Time          22EPIC Rev 08/18

## 2020-06-04 NOTE — ED TRIAGE NOTES
Pt states that he has been having tooth pain on the left side of his mouth. It began about a week ago.

## 2020-06-05 ENCOUNTER — DOCUMENTATION ONLY (OUTPATIENT)
Dept: DENTISTRY | Facility: CLINIC | Age: 55
End: 2020-06-05

## 2020-06-05 NOTE — PROGRESS NOTES
Dental Service Consultation        Nikunj Sinha MRN# [id number]   YOB: 1965 Age: 55 year old   Date of Admission: (Not on file)     Reason for consult: I was asked to evaluate this patient for a left mandibular dental abscess.           Assessment and Plan:   Assessment: Patient has a vestibular and buccal space abscess of the left mandible most likely associated with tooth #20. This abscess appears amenable to incision and drainage with post-procedure antibiotics. See clinical photograph in the ED encounter note.     Plan: Incision and drainage under local anesthesia with post-procedure amoxicillin prescription.     Procedure: Procedure was reviewed with the patient as well as the risks/benefits. Patient agreed to the incision and drainage procedure. Patient was anesthetized with 1.7 mL of 2% lidocaine w/ 1:100,000 epi given via MARYAM and long buccal nerve bloc, 1.7 mL of 3% carbocaine given via MARYAM nerve block, and 1.7 mL of 4% articaine w/ 1:100,000 epi given via long buccal and local infiltration. Incision was made with a #15 blade at the height of convexity in the buccal vestibule in the area of #19. A closed hemostat was used to express the purulence and blood. Swelling was reduced and patient was able to be dismissed without complications. [facility] for f/u questions.              Chief Complaint:   Swelling of the left mandible    History is obtained from the patient         History of Present Illness:   This patient is a 55 year old male who presents to [facility] ED with left facial swelling.               Past Medical History:     Past Medical History:   Diagnosis Date     Abdominal pain, epigastric 11/29/2019     Chronic anticoagulation 12/1/2019     Current severe episode of major depressive disorder without psychotic features, unspecified whether recurrent (H) 11/29/2019     Dental caries 11/29/2019     Dizziness 11/29/2019     Erectile  dysfunction, unspecified erectile dysfunction type 2019     Essential tremor 2019     Fatigue, unspecified type 2019     ZAIDA (generalized anxiety disorder) 2019     History of chest pain 2019     History of pulmonary embolism 2019     History of suicidal ideation 2019     Memory changes 2019     Moderate alcohol dependence in early remission (H) 2019     Palpitations 2019     Positive FIT (fecal immunochemical test) 2019     Seasonal allergic rhinitis, unspecified trigger 2019     Tinnitus, bilateral 2019     Weight gain 2019             Past Surgical History:     Past Surgical History:   Procedure Laterality Date     TONSILLECTOMY                 Social History:     Social History     Tobacco Use     Smoking status: Former Smoker     Last attempt to quit: 1982     Years since quittin.4     Smokeless tobacco: Never Used   Substance Use Topics     Alcohol use: Not Currently     Comment: sober second time oct 14 , did detox adn rehab, in a sober house 2019             Family History:     Family History   Problem Relation Age of Onset     Arthritis Mother      Breast Cancer Mother      Depression Mother      Allergies Mother      Migraines Mother      Alcoholism Father      Lymphoma Father         non hodgkins     Other - See Comments Brother         genetic disorder,  of some slow form of ALS             Immunizations:     Immunization History   Administered Date(s) Administered     Influenza Quad, Recombinant, p-free (RIV4) 10/20/2019     TDAP Vaccine (Adacel) 2019     Td (Adult), Adsorbed 2003             Allergies:     Allergies   Allergen Reactions     Ragweeds Other (See Comments)     congestion             Medications:     Current Outpatient Medications Ordered in Epic   Medication     amoxicillin (AMOXIL) 500 MG capsule     buPROPion (WELLBUTRIN XL) 150 MG 24 hr tablet     escitalopram (LEXAPRO)  20 MG tablet     gabapentin (NEURONTIN) 300 MG capsule     multivitamin w/minerals (THERA-VIT-M) tablet     propranolol (INDERAL) 20 MG tablet     rivaroxaban ANTICOAGULANT (XARELTO) 20 MG TABS tablet     rivaroxaban ANTICOAGULANT (XARELTO) 20 MG TABS tablet     No current Epic-ordered facility-administered medications on file.              Physical Exam:   Vitals and prescriptions were reviewed    Head and neck exam: Swelling present in the left mandibular vestibule between #18 and #21. Patient states that his premolar is bad and needs a root canal. Swelling has infiltrated the buccal space. Inferior border of the mandible palpable.          Data:   Radiographic interpretation: No radiograph taken    The patient was discussed with: Dr. Bri Rosenthal DDS  [id number]  Pager: [phone]

## 2020-07-06 ENCOUNTER — TRANSFERRED RECORDS (OUTPATIENT)
Dept: HEALTH INFORMATION MANAGEMENT | Facility: CLINIC | Age: 55
End: 2020-07-06

## 2020-07-07 ENCOUNTER — VIRTUAL VISIT (OUTPATIENT)
Dept: FAMILY MEDICINE | Facility: CLINIC | Age: 55
End: 2020-07-07
Payer: COMMERCIAL

## 2020-07-07 ENCOUNTER — E-VISIT (OUTPATIENT)
Dept: FAMILY MEDICINE | Facility: CLINIC | Age: 55
End: 2020-07-07

## 2020-07-07 DIAGNOSIS — R19.5 POSITIVE FIT (FECAL IMMUNOCHEMICAL TEST): ICD-10-CM

## 2020-07-07 DIAGNOSIS — Z71.89 ADVANCED CARE PLANNING/COUNSELING DISCUSSION: ICD-10-CM

## 2020-07-07 DIAGNOSIS — K02.9 DENTAL CARIES: ICD-10-CM

## 2020-07-07 DIAGNOSIS — R10.13 ABDOMINAL PAIN, EPIGASTRIC: ICD-10-CM

## 2020-07-07 DIAGNOSIS — F32.2 CURRENT SEVERE EPISODE OF MAJOR DEPRESSIVE DISORDER WITHOUT PSYCHOTIC FEATURES, UNSPECIFIED WHETHER RECURRENT (H): Primary | ICD-10-CM

## 2020-07-07 DIAGNOSIS — R42 DIZZINESS: ICD-10-CM

## 2020-07-07 DIAGNOSIS — Z86.718 PERSONAL HISTORY OF DVT (DEEP VEIN THROMBOSIS): ICD-10-CM

## 2020-07-07 DIAGNOSIS — L98.9 SKIN LESION: ICD-10-CM

## 2020-07-07 DIAGNOSIS — Z53.9 ERRONEOUS ENCOUNTER--DISREGARD: Primary | ICD-10-CM

## 2020-07-07 DIAGNOSIS — Z86.711 HISTORY OF PULMONARY EMBOLISM: ICD-10-CM

## 2020-07-07 DIAGNOSIS — F10.21 MODERATE ALCOHOL DEPENDENCE IN EARLY REMISSION (H): ICD-10-CM

## 2020-07-07 DIAGNOSIS — Z79.01 CHRONIC ANTICOAGULATION: ICD-10-CM

## 2020-07-07 PROBLEM — Z87.898 HISTORY OF CHEST PAIN: Status: RESOLVED | Noted: 2019-11-29 | Resolved: 2020-07-07

## 2020-07-07 PROBLEM — G25.0 ESSENTIAL TREMOR: Status: RESOLVED | Noted: 2019-12-01 | Resolved: 2020-07-07

## 2020-07-07 PROBLEM — R41.3 MEMORY CHANGES: Status: RESOLVED | Noted: 2019-11-29 | Resolved: 2020-07-07

## 2020-07-07 PROBLEM — R63.5 WEIGHT GAIN: Status: RESOLVED | Noted: 2019-11-29 | Resolved: 2020-07-07

## 2020-07-07 PROBLEM — R00.2 PALPITATIONS: Status: RESOLVED | Noted: 2019-11-29 | Resolved: 2020-07-07

## 2020-07-07 PROCEDURE — 96127 BRIEF EMOTIONAL/BEHAV ASSMT: CPT | Performed by: FAMILY MEDICINE

## 2020-07-07 PROCEDURE — 99214 OFFICE O/P EST MOD 30 MIN: CPT | Mod: 95 | Performed by: FAMILY MEDICINE

## 2020-07-07 ASSESSMENT — ANXIETY QUESTIONNAIRES
7. FEELING AFRAID AS IF SOMETHING AWFUL MIGHT HAPPEN: SEVERAL DAYS
5. BEING SO RESTLESS THAT IT IS HARD TO SIT STILL: SEVERAL DAYS
5. BEING SO RESTLESS THAT IT IS HARD TO SIT STILL: SEVERAL DAYS
IF YOU CHECKED OFF ANY PROBLEMS ON THIS QUESTIONNAIRE, HOW DIFFICULT HAVE THESE PROBLEMS MADE IT FOR YOU TO DO YOUR WORK, TAKE CARE OF THINGS AT HOME, OR GET ALONG WITH OTHER PEOPLE: SOMEWHAT DIFFICULT
1. FEELING NERVOUS, ANXIOUS, OR ON EDGE: MORE THAN HALF THE DAYS
7. FEELING AFRAID AS IF SOMETHING AWFUL MIGHT HAPPEN: SEVERAL DAYS
6. BECOMING EASILY ANNOYED OR IRRITABLE: SEVERAL DAYS
3. WORRYING TOO MUCH ABOUT DIFFERENT THINGS: MORE THAN HALF THE DAYS
3. WORRYING TOO MUCH ABOUT DIFFERENT THINGS: MORE THAN HALF THE DAYS
GAD7 TOTAL SCORE: 11
4. TROUBLE RELAXING: MORE THAN HALF THE DAYS
GAD7 TOTAL SCORE: 11
7. FEELING AFRAID AS IF SOMETHING AWFUL MIGHT HAPPEN: SEVERAL DAYS
2. NOT BEING ABLE TO STOP OR CONTROL WORRYING: MORE THAN HALF THE DAYS
1. FEELING NERVOUS, ANXIOUS, OR ON EDGE: MORE THAN HALF THE DAYS
6. BECOMING EASILY ANNOYED OR IRRITABLE: SEVERAL DAYS
GAD7 TOTAL SCORE: 11
2. NOT BEING ABLE TO STOP OR CONTROL WORRYING: MORE THAN HALF THE DAYS
GAD7 TOTAL SCORE: 11

## 2020-07-07 ASSESSMENT — PATIENT HEALTH QUESTIONNAIRE - PHQ9
SUM OF ALL RESPONSES TO PHQ QUESTIONS 1-9: 13
SUM OF ALL RESPONSES TO PHQ QUESTIONS 1-9: 13
5. POOR APPETITE OR OVEREATING: MORE THAN HALF THE DAYS
SUM OF ALL RESPONSES TO PHQ QUESTIONS 1-9: 13
10. IF YOU CHECKED OFF ANY PROBLEMS, HOW DIFFICULT HAVE THESE PROBLEMS MADE IT FOR YOU TO DO YOUR WORK, TAKE CARE OF THINGS AT HOME, OR GET ALONG WITH OTHER PEOPLE: SOMEWHAT DIFFICULT

## 2020-07-07 NOTE — PROGRESS NOTES
"Nikunj Sinha is a 55 year old male who is being evaluated via a billable video visit.      The patient has been notified of following:     \"This video visit will be conducted via a call between you and your physician/provider. We have found that certain health care needs can be provided without the need for an in-person physical exam.  This service lets us provide the care you need with a video conversation.  If a prescription is necessary we can send it directly to your pharmacy.  If lab work is needed we can place an order for that and you can then stop by our lab to have the test done at a later time.    Video visits are billed at different rates depending on your insurance coverage.  Please reach out to your insurance provider with any questions.    If during the course of the call the physician/provider feels a video visit is not appropriate, you will not be charged for this service.\"    VIDEO NOT WORKING AND CHANGED TO TELEPHONE    Subjective     Nikunj Sinha is a 55 year old male who presents today via video visit for the following health issues:    HPI  Depression and Anxiety Follow-Up    How are you doing with your depression since your last visit? Improved    How are you doing with your anxiety since your last visit?  Improved    Are you having other symptoms that might be associated with depression or anxiety? No    Have you had a significant life event? No - other covid19    Do you have any concerns with your use of alcohol or other drugs? No    Social History     Tobacco Use     Smoking status: Former Smoker     Last attempt to quit: 1982     Years since quittin.5     Smokeless tobacco: Never Used   Substance Use Topics     Alcohol use: Not Currently     Comment: sober second time oct 14 , did detox adn rehab, in a sober house 2019     Drug use: Never     PHQ 2019   PHQ-9 Total Score 14 13 13   Q9: Thoughts of better off dead/self-harm past 2 weeks Several days " Several days Several days   F/U: Thoughts of suicide or self-harm Yes - No   F/U: Self harm-plan No - -   F/U: Self-harm action No - -   F/U: Safety concerns No - No     ZAIDA-7 SCORE 12/20/2019 7/7/2020 7/7/2020   Total Score - - 11 (moderate anxiety)   Total Score 16 11 11     Last PHQ-9 7/7/2020   1.  Little interest or pleasure in doing things 1   2.  Feeling down, depressed, or hopeless 1   3.  Trouble falling or staying asleep, or sleeping too much 2   4.  Feeling tired or having little energy 2   5.  Poor appetite or overeating 2   6.  Feeling bad about yourself 2   7.  Trouble concentrating 2   8.  Moving slowly or restless 0   Q9: Thoughts of better off dead/self-harm past 2 weeks 1   PHQ-9 Total Score 13   Difficulty at work, home, or with people -   In the past two weeks have you had thoughts of suicide or self harm? No   Do you have concerns about your personal safety or the safety of others? No   In the past 2 weeks have you thought about a plan or had intention to harm yourself? -   In the past 2 weeks have you acted on these thoughts in any way? -     ZAIDA-7  7/7/2020   1. Feeling nervous, anxious, or on edge 2   2. Not being able to stop or control worrying 2   3. Worrying too much about different things 2   4. Trouble relaxing 2   5. Being so restless that it is hard to sit still 1   6. Becoming easily annoyed or irritable 1   7. Feeling afraid, as if something awful might happen 1   ZAIDA-7 Total Score 11   If you checked any problems, how difficult have they made it for you to do your work, take care of things at home, or get along with other people? -     In the past two weeks have you had thoughts of suicide or self-harm?  No.    Do you have concerns about your personal safety or the safety of others?   No    Suicide Assessment Five-step Evaluation and Treatment (SAFE-T)    How many servings of fruits and vegetables do you eat daily?  2-3    On average, how many sweetened beverages do you drink each  day (Examples: soda, juice, sweet tea, etc.  Do NOT count diet or artificially sweetened beverages)?   0    How many days per week do you exercise enough to make your heart beat faster? 3 or less    How many minutes a day do you exercise enough to make your heart beat faster? 60 or more    How many days per week do you miss taking your medication? 0    Video Start Time: VIDEO not working    BACKGROUND  Former smoker, history of alcohol abuse and dependence currently in remission, ZAIDA, MDD, history of passive suicidal ideation, prior active thoughts, on Wellbutrin 300, Lexapro 20, no longer on gabapentin, propranolol as needed and trazodone as needed, history of memory issues now improved off the alcohol, history of bilateral tinnitus, seasonal allergic rhinitis, resolved essential tremor once he stopped the alcohol, resolved epigastric pain once he stopped the alcohol, resolved palpitations, history of dental caries needing dentures, weight gain due to sedentary state, positive test, 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 with Xarelto managed by hematology, resolved chest pain, occasional fatigue, history of ADD, elevated LDL, occasional dizziness, on multivitamin and prior tonsillectomy, allergic to ragweed's,    Unemployed, from [place] originally where was getting his care at the local clinic there, hx of alcohol dependence in early remission, ZAIDA, MDD, hx of SI, on Wellbutrin, lexapro, gabapentin & MV, under care of psychiatry at [facility], memory changes, epigastric pain, palpitations, weight gain, hx of chest pain, fatigue, dizziness, ED, improved with time away from alcohol, essential tremor on propranolol, B/l tinnitus, seasonal allergic rhinitis, allergic to ragweed, dental caries / abscess, S/p extraction nov 2019, 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 Xarelto under care of hematology, prior tonsillectomy,   Hx of ACS, NSTEMI with elevated troponin, no CAD on  "angiogram,  but found to have a acute saddle embolism with cor pulmonale & hx of DVT,  in 8/2019 on lifelong anticoagulation now on jacob 20 mg daily, ever since under care of hematology, hx of moderate alcohol dependence in early remission with hx of withdrawal & mood disorder, previously on campral ( discontinued secondary to diarrhea), & naltrexone( stopped as ineffective), ZAIDA, MDD, suicidal ideation, on Wellbutrin Xl 150 mg daily, lexapro 20 mg daily, gabapentin 300 mg bedtime & a multivitamin, also on propranolol, previously on Abilify 5 mg, citalopram 10 mg, hydroxyzine, mirtazapine & trazodone, dental caries, recent dental abscess S/p abx, hx of essential tremor on propranolol 20 mg tid, allergic to ragweed's, no records from [facility] but reviewed care everywhere [facility] records,   Seen at [facility] ER 12/26/18 with suicidal ideation. Stabilized , treated ( see notes for details & discharged to [address]).  Seen in ER at [facility] on 6/12/19 \" for increased depression, hopelessness, and alcohol abuse. Pt was kicked out of his sober smelling like alcohol.   Admitted [facility] on 8/2019: with shortness of breath and 2 days of chest pain and near syncope. He presented to the emergency room on 8/4/2019 after [facility]. He did not recall particular chest pain or shortness of breath. He noted swelling of his leg in various spots but he denied any persistent pain or swelling or redness. No injury. No acute illness prior to that. However he had donated plasma 10 times in 5 weeks prior to that. Upon initial evaluation, d-dimer was elevated at 6.7. PTT was normal at 28, INR is 1.09. He underwent coronary angiogram and it did not show any obstructive coronary artery disease. Due to an elevated troponin he underwent coronary angiogram which demonstrated no significant CAD. Due to an elevated d-dimer underwent CT scan of the chest which showed extensive acute bilateral " pulmonary embolism with saddle embolus.  Doppler legs showed Nonocclusive right femoral DVT from upper to mid thigh. Left calf DVT within one of the peroneal veins in both of the posterior tibial veins from upper to low calf. Echocardiogram revealed lower limit of normal left ventricular systolic function of 50% with septal motion compatible with right ventricular overload.  He had cor pulmonale and was started on heparin infusion. No thrombolytics were given due hemodynamic and respiratory stability. After several days of monitoring on IV heparin Mr. Sinha continued to improve and he was transitioned to Xarelto for ongoing anticoagulation therapy. He tolerated Xarelto well. Had no major bleeding except intermittent gum bleeding. Denied family history of venous thromboembolism. He had a brother who passed away from [facility]. He was single. Did not have children. Was advised to continue with indefinite anticoagulation. Hematology thought maybe plasma donation had led to a transient hypercoagulable state. Mr. Sinha was advised not to donate plasma moving forward. An outpatient referral to hematology had been made.  Seen by hematology 9/2019 at [facility]. She reviewed the pathophysiology of venous thromboembolic exam and risks of recurrence. Was advised of possible etiology, he was indicated for long-term anticoagulation due to initial presentation of a life-threatening episode. No prior history of venous thromboembolism or family history of thromboembolism. Was not recommend family/hereditary thrombophilia work-upa repeat CT scan of the chest to know the resolution of blood clots was not needed & only recommended to obtain a CTA if he had signs and symptoms concerning for venous thromboembolism. Was advised strict abstinence from alcohol with anticoagulants & to obtain kidney, liver function monitoring at least once a year.   He was age-appropriate cancer screening. He admitted that he had positive stool test  "(sounds like FOBT) a couple years prior. It was unclear if it was related to his alcohol use. He was recommended to proceed with colonoscopy, but to wait at least 3-6 months because of this recent pulmonary emboli event and ot recommended interruption of anticoagulation prior to that. He voiced understanding. His previous primary care provider retired.    He was engaged with outpatient CD treatment at [facility] but he had been unable to maintain any sobriety outside of a structured facility. Roughly one month prior to next er visit he stopped taking all of his psych medications.   Was in the ER at [facility] on 10/17/2019 for suicidal ideation & alcohol abuse: had increased depressive symptoms, anxiety, and suicidal ideation. He presented to the outpatient clinic for a Rule 25 assessment. He endorsed numerous acute depressive symptoms. He relapsed on alcohol and had been unsuccessful outside of a structured program. He was previously working a good job as a Scoring Director at [employer] \"but I drank myself out of that job.\" He had been evicted from his apartment and found himself homeless. He rated his depression as 8 out of 10 and anxiety as 9 out of 10. He was feeling hopeless with thoughts of suicide and a plan to hang himself. He stated that he hadn't taken any steps towards acting on the thoughts but that \"it's something in my mind.\" He felt somewhat isolated. He had one friend in [place], but no supportive family that were still alive.   He was admitted on a voluntary basis. Agreeable to stay voluntarily to coordinate cares and medication management. Was seen by the hospitalist during course of hospitalization. Started on antibiotics for a dental abscess. He was placed on a CIWA protocol & did not demonstrate signs or symptoms of complicated alcohol withdrawal. Psychiatric medication management was performed in detail to target signs and symptoms of principal diagnosis & comorbid illnesses. Medication management " included Lexapro was restarted for depression and anxiety as well as Wellbutrin XL:  & Gabapentin as needed for anxiety. Propranolol as needed for anxiety. Given Naltrexone as a trial for alcohol use disorder; LFT's normalizing & Campral was discontinued secondary to GI side effects. Progressed with mood and anxiety. Denied psychosis. Denied suicidal and homicidal ideation. Did an appropriate crisis and relapse plan. Was future oriented. Denied gun access. Indicated motivation to proceed with MI CD treatment as coordinated by  with intake date on Friday, November 1, 2019.   followed in regards to collecting and reviewing collateral information, coordination of cares and discharge planning. Including, MI CD evaluation completed with recommendation for placement to unit 2700. Due to dental issues, he was discharged to medical respite bed to bridge placement. He was treated for dental abscess with PCN.  Seen 11/29/19 for the first time by this writer. See that note for details. Noted Hx of severe MDD, ZAIDA, hx of passive suicidal thoughts, early remission from alcohol dependence just completed rule 25 , detox & rehab & in sober living. had just moved from [facility] to [place]. Had poor social support but was forward thinking  & had apt's for the dentist on & psyche & noted had been taking meds & contracted to safety. 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 No hypercoag work up recommended given lifelong anticoagulation indicated & seen by hematology. Note from sept 2019 reviewed.   Reported fatigue, weight gain, dizziness since on new meds for mental health. Denied chest pain or palpitations though noted has had these in the past. Exam was benign & vitally stable.Did not appear to be in heart failure. Noted his   memory changes, abdominal pain, tinnitus, ED could be related to alcohol  abuse & noted symptoms had improved since quit drinking. Advised to try Pepcid OTC for abdominal ain. Declined referral to ENT/ audiology. Seasonal allergic rhinitis was not an issue. Was to consider a GI referral for a scope in future once could come off Xarelto a few days. Noted due for follow up with hematology by feb 2020. Was to sign an KISHOR to get records from [facility] where previously doctored. Was given the Tdap. Was negative for Hep C, HIV, had a normal folic acid, vit b12, CMP, TSH, cbc, HBA1C & LDL was elevated.   Seen by [facility] psychiatry on 12/5/19 for Francesca, depression and alcohol abuse in remission and noted was taking propranolol 1 a day instead of three times and continue don same meds till reevaluated in 4 weeks.     Seen 12/20/2019 noted remained sober and symptoms were improved.  Seen by psychiatry at [facility] 1/9/2020 when noted had stopped gabapentin and was continued on Lexapro, Wellbutrin along with using propanolol rarely as needed.  Did see the hematologist 1/23 and told to complete therapeutic course of Xarelto and then continue indefinitely prophylactically due to unprovoked prior DVT and PE.  Advised that it was okay to get dental procedures and colonoscopy after March 1 and he would be low risk to hold the medication 1 to 2 days before and after depending on the procedure risk.  And to follow-up with him in 1 year.  Seen by psychiatry 5/2020 by telephone encounter due to pandemic and continued on Lexapro and propranolol and Wellbutrin but Lexapro increased to 30 mg.  Was doing therapy.    Seen by psych 6/2 and bupropion increased to 300 and continued on rest of meds.  Was in the ER 6/04 dental pain and abscess of tooth #20.  Was drained and put on amoxicillin and advised to see the dentist for root canal.  He called on 6/9 and medication was changed to Augmentin and he did see the dentist on 6/11 but tooth had broken off by then and root canal not indicated as it was too far gone and  instead his tooth was pulled undergoing to make dentures as he had quite a few teeth pulled.  Most recently saw psychiatry on 7/2 and continued on all his meds and trazodone added and noted propanolol makes him tired and he only takes half tablet prior to an AA meeting.  They may consider buspirone in the future.    [place]  shows received gabapentin 100 mg #270 on 1/4/19 at [facility], then 300 mg # 90  On 2/11/19 in [facility], 100 mg # 60 on 3/6/19 in [facility], then # 180 on 6/20/19, 300 mg # 60 on 10/30/19 & # 30 of 300 mg on 11/21/19.  Tylenol 3 12 tablets on 2/11/2021 and tramadol 50 mg #12 on 2/25/2020.    CURRENTLY    Couple spots on skin: one under arm and one on back and another on hip. Has had for years. One on back bigger can feel it rough and looked at in mirror.    MDD/ anxiety under care psychiatry.  Has passive suicidal thoughts no active plan.  Will be seeing his therapist in person tomorrow.  Has had med changes since December and they have improved his mood.  Does feel some fatigue in ED and mildly forgetful he thinks from the medication but much improved overall since he has been sober from alcohol.  Seen by psychiatrist 7/6 given trazodone not started yet.    Meet with therapist in person tomorrow. Coping ok. Doing better on meds, not perfect. Not drinking. Still doing AA. covid related changes. 9 months and 4 days. Still living in sober living [facility] building.     Dental caries, was to get root canal, tooth broke , abscess and infection, completed, pulled tooth out as too far gone to do root canal. A lot of teeth removed to get partial dentures made    Positive fit: no bleeding or bruising anywhere. No weight loss that Is unintentional . Will iiat to do colonoscopy    BMI up to 195 lbs. Doing a lot of walking.     Tinnitus still there. Wants ears checked out, wax build up     Dizziness comes and goes, feels side effects. No syncope, foggy. Declines neurology for now.     Abdominal pain resolved now  off alcohol.     Memory, palpitations better. Mildly forgetful.   Chest pain resolved  Fatigue now and then, thinks from meds  Essential tremor, from alcohol  ED still there, from alcohol in past and medication    Patient Active Problem List   Diagnosis     Current severe episode of major depressive disorder without psychotic features, unspecified whether recurrent (H)     Moderate alcohol dependence in early remission (H)     ZAIDA (generalized anxiety disorder)     History of suicidal ideation     Fatigue, unspecified type     Dizziness     Tinnitus, bilateral     Dental caries     Seasonal allergic rhinitis, unspecified trigger     History of pulmonary embolism     Positive FIT (fecal immunochemical test)     Erectile dysfunction, unspecified erectile dysfunction type     Chronic anticoagulation     Personal history of DVT (deep vein thrombosis)     Elevated LDL cholesterol level     Past Surgical History:   Procedure Laterality Date     TONSILLECTOMY         Social History     Tobacco Use     Smoking status: Former Smoker     Last attempt to quit: 1982     Years since quittin.5     Smokeless tobacco: Never Used   Substance Use Topics     Alcohol use: Not Currently     Comment: sober second time oct 14 , did detox adn rehab, in a sober house 2019     Family History   Problem Relation Age of Onset     Arthritis Mother      Breast Cancer Mother      Depression Mother      Allergies Mother      Migraines Mother      Alcoholism Father      Lymphoma Father         non hodgkins     Other - See Comments Brother         genetic disorder,  of some slow form of ALS         Current Outpatient Medications   Medication Sig Dispense Refill     buPROPion (WELLBUTRIN XL) 300 MG 24 hr tablet Take 300 mg by mouth every morning        ESCITALOPRAM OXALATE PO Take 30 mg by mouth Takes 20 mg with a ten mg,       multivitamin w/minerals (THERA-VIT-M) tablet Take 1 tablet by mouth daily       propranolol (INDERAL) 20  "MG tablet Take 20 mg by mouth as needed (anxiety)        rivaroxaban ANTICOAGULANT (XARELTO) 20 MG TABS tablet Take 1 tablet (20 mg) by mouth daily (with dinner) 30 tablet 11     TRAZODONE HCL PO Take by mouth At Bedtime 25 to 50 mg at bedtime prn       Allergies   Allergen Reactions     Ragweeds Other (See Comments)     congestion     Recent Labs   Lab Test 11/29/19  1202   A1C 4.9   *   HDL 57   TRIG 205*   ALT 33   CR 0.98   GFRESTIMATED 87   GFRESTBLACK >90   POTASSIUM 4.2   TSH 0.96      BP Readings from Last 3 Encounters:   06/04/20 118/85   01/23/20 123/80   12/20/19 116/88    Wt Readings from Last 3 Encounters:   06/04/20 87.6 kg (193 lb 3.2 oz)   01/23/20 88.5 kg (195 lb)   12/20/19 86.1 kg (189 lb 12 oz)                    Reviewed and updated as needed this visit by Provider  Tobacco  Allergies  Meds  Problems  Med Hx  Surg Hx  Fam Hx  Soc Hx          Review of Systems   Constitutional, HEENT, cardiovascular, pulmonary, GI, , musculoskeletal, neuro, skin, endocrine and psych systems are negative, except as otherwise noted.      Objective    Vitals - Patient Reported  Weight (Patient Reported): 88.5 kg (195 lb)  Height (Patient Reported): 177.8 cm (5' 10\")  BMI (Based on Pt Reported Ht/Wt): 27.98        Physical Exam     GENERAL: Healthy, alert and no distress  RESP: No audible wheeze, cough, & speaking in full sentences  PSYCH: Mentation appears normal, affect normal/bright, judgement and insight intact, normal speech.       Diagnostic Test Results:  Labs reviewed in Epic  No results found for this or any previous visit (from the past 24 hour(s)).        Assessment & Plan       ICD-10-CM    1. Current severe episode of major depressive disorder without psychotic features, unspecified whether recurrent (H)  F32.2    2. Dental caries  K02.9    3. Moderate alcohol dependence in early remission (H)  F10.21    4. Positive FIT (fecal immunochemical test)  R19.5    5. Personal history of DVT " (deep vein thrombosis)  Z86.718    6. History of pulmonary embolism  Z86.711    7. Dizziness  R42    8. Chronic anticoagulation  Z79.01    9. Abdominal pain, epigastric  R10.13    10. Advanced care planning/counseling discussion  Z71.89    11. Skin lesion  L98.9      Doing reasonably well.  Has passive suicidal thoughts but no active plan.  Under care of a psychiatrist and therapist.  He will be seeing his therapist in person tomorrow for the first time since the pandemic began.  He has still been going to AA and conversing with his psychiatrist via telephone.  Notes from the psychiatrist reviewed.  He is concerned about some skin changes, ear wax and tinnitus.  His abdominal pain and palpitations have resolved since he stopped the alcohol he has been sober now 9 months.  He continues to have ED and some fatigue and occasional lightheadedness which he believes is from the medications he takes.  Very rarely will be forgetful but is not significant to be of any concern to him currently.  Continue care with his psychiatrist and counselor and AA.  Continue on meds managed by his psychiatrist,to  look at med list & clarify with us if there  Is any discrepancies.   Continue care with his dentist.  He is planning to get dentures  Continue care with his hematologist fr navarro of unprovoked DVT & PE.  Due for appointment with them in the new year.  Advised to work on advanced directives.  Due to prior positive stool test recommend a colonoscopy per hematology they said he could go ahead with that we would just have to hold your blood thinner a day before and a day after the scope or depending on what they find & do like if remove a polyp etc. he currently has no symptoms and would like to wait to do the colonoscopy once the pandemic situation is better.  We will check his skin concerns at an upcoming appointment.  We will check for wax at appointment and if no ear wax refer to ENT.  Decreasing salt in diet will help  "tinnitus.  Tinnitus could be a result of prior Advil/ alcohol use/ hearing loss  Fatigue could be related to medications as well as lightheadedness and dizziness but will do labs at appointment and if no answer seen can consider referral to sleep specialist for undiagnosed sleep apnea and/or referral to neurology and you can discuss medications further with his psychiatrist.  Continue to work on diet and weight loss with exercise.  If still having dizziness I can refer him to neurology.  Feels it is not too serious right now is just rare episodes of occasional fogginess and he feels that is related to his medications.  I cancelled the evisit he made accidentally today.     BMI:   Estimated body mass index is 27.72 kg/m  as calculated from the following:    Height as of 1/23/20: 1.778 m (5' 10\").    Weight as of 6/4/20: 87.6 kg (193 lb 3.2 oz).   Weight management plan: Discussed healthy diet and exercise guidelines    Work on weight loss  Regular exercise  See Patient Instructions    Return in about 2 weeks (around 7/21/2020) for Physical Exam with PCP.    Adriana Solis MD  [facility]      Video-Visit Details    Type of service:  Video Visit    Video End Time:Unable to do video on patient side    Originating Location (pt. Location): Home    Distant Location (provider location):  [facility]     Platform used for Video Visit: Unable to complete video visit    Return in about 2 weeks (around 7/21/2020) for Physical Exam with PCP.       Adriana Solis MD          "

## 2020-07-07 NOTE — PATIENT INSTRUCTIONS
Continue care with your psychiatrist and counselor and AA.  Continue on meds managed by your psychiatrist, please look at med list & clarify with us if there  Is any discrepancies.   Continue care with your dentist.  Continue care with your hematologist.  Work on advanced directives.  Due to prior positive stool test recommend a colonoscopy per hematology they said you could go ahead with that we would just have to hold your blood thinner a day before and a day after the scope or depending on what they find & do like if remove a poly etc   We will check your skin concerns at upcoming appointment.  We will check for wax at appointment and if no ear wax refer to ENT.  Decreasing salt in diet will help tinnitus.  Tinnitus could be a result of prior Advil use.  Fatigue could be related to medications as well as lightheadedness and dizziness but will do labs at that appointment and if no answer seen can consider referral to sleep specialist for undiagnosed sleep apnea and/or referral to neurology and you can discuss medications further with your psychiatrist.  Continue to work on diet and weight loss with exercise.  If still having dizziness I can refer you to neurology.  I cancelled the evisit you made accidentally today.

## 2020-07-08 ASSESSMENT — ANXIETY QUESTIONNAIRES
GAD7 TOTAL SCORE: 11
GAD7 TOTAL SCORE: 11

## 2020-07-08 ASSESSMENT — PATIENT HEALTH QUESTIONNAIRE - PHQ9: SUM OF ALL RESPONSES TO PHQ QUESTIONS 1-9: 13

## 2020-07-11 ASSESSMENT — ENCOUNTER SYMPTOMS
PARESTHESIAS: 0
NAUSEA: 0
NERVOUS/ANXIOUS: 1
HEARTBURN: 0
DYSURIA: 0
PALPITATIONS: 0
DIZZINESS: 1
JOINT SWELLING: 1
DIARRHEA: 0
CHILLS: 0
WEAKNESS: 1
HEADACHES: 1
COUGH: 0
FEVER: 0
HEMATURIA: 0
FREQUENCY: 0
SHORTNESS OF BREATH: 0
EYE PAIN: 0
ABDOMINAL PAIN: 0
CONSTIPATION: 0
HEMATOCHEZIA: 0
MYALGIAS: 1
SORE THROAT: 0
ARTHRALGIAS: 1

## 2020-07-16 ENCOUNTER — OFFICE VISIT (OUTPATIENT)
Dept: FAMILY MEDICINE | Facility: CLINIC | Age: 55
End: 2020-07-16
Payer: COMMERCIAL

## 2020-07-16 VITALS
HEIGHT: 70 IN | HEART RATE: 83 BPM | SYSTOLIC BLOOD PRESSURE: 138 MMHG | BODY MASS INDEX: 27.35 KG/M2 | DIASTOLIC BLOOD PRESSURE: 81 MMHG | TEMPERATURE: 98.4 F | OXYGEN SATURATION: 96 % | WEIGHT: 191 LBS | RESPIRATION RATE: 13 BRPM

## 2020-07-16 DIAGNOSIS — Z00.00 ROUTINE HISTORY AND PHYSICAL EXAMINATION OF ADULT: Primary | ICD-10-CM

## 2020-07-16 DIAGNOSIS — E78.00 ELEVATED LDL CHOLESTEROL LEVEL: ICD-10-CM

## 2020-07-16 DIAGNOSIS — L82.1 SEBORRHEIC KERATOSES: ICD-10-CM

## 2020-07-16 DIAGNOSIS — R19.5 POSITIVE FIT (FECAL IMMUNOCHEMICAL TEST): ICD-10-CM

## 2020-07-16 DIAGNOSIS — R53.83 FATIGUE, UNSPECIFIED TYPE: ICD-10-CM

## 2020-07-16 DIAGNOSIS — Z86.711 HISTORY OF PULMONARY EMBOLISM: ICD-10-CM

## 2020-07-16 DIAGNOSIS — Z86.718 PERSONAL HISTORY OF DVT (DEEP VEIN THROMBOSIS): ICD-10-CM

## 2020-07-16 DIAGNOSIS — K02.9 DENTAL CARIES: ICD-10-CM

## 2020-07-16 DIAGNOSIS — Z23 NEED FOR SHINGLES VACCINE: ICD-10-CM

## 2020-07-16 DIAGNOSIS — F10.21 MODERATE ALCOHOL DEPENDENCE IN EARLY REMISSION (H): ICD-10-CM

## 2020-07-16 DIAGNOSIS — R42 DIZZINESS: ICD-10-CM

## 2020-07-16 DIAGNOSIS — F32.2 CURRENT SEVERE EPISODE OF MAJOR DEPRESSIVE DISORDER WITHOUT PSYCHOTIC FEATURES, UNSPECIFIED WHETHER RECURRENT (H): ICD-10-CM

## 2020-07-16 DIAGNOSIS — H93.13 TINNITUS, BILATERAL: ICD-10-CM

## 2020-07-16 DIAGNOSIS — F41.1 GAD (GENERALIZED ANXIETY DISORDER): ICD-10-CM

## 2020-07-16 DIAGNOSIS — Z79.01 CHRONIC ANTICOAGULATION: ICD-10-CM

## 2020-07-16 LAB
BASOPHILS # BLD AUTO: 0 10E9/L (ref 0–0.2)
BASOPHILS NFR BLD AUTO: 0.4 %
DIFFERENTIAL METHOD BLD: NORMAL
EOSINOPHIL # BLD AUTO: 0.3 10E9/L (ref 0–0.7)
EOSINOPHIL NFR BLD AUTO: 3.8 %
ERYTHROCYTE [DISTWIDTH] IN BLOOD BY AUTOMATED COUNT: 12.3 % (ref 10–15)
HBA1C MFR BLD: 4.8 % (ref 0–5.6)
HCT VFR BLD AUTO: 43.3 % (ref 40–53)
HGB BLD-MCNC: 14.9 G/DL (ref 13.3–17.7)
LYMPHOCYTES # BLD AUTO: 1.8 10E9/L (ref 0.8–5.3)
LYMPHOCYTES NFR BLD AUTO: 22.3 %
MCH RBC QN AUTO: 32 PG (ref 26.5–33)
MCHC RBC AUTO-ENTMCNC: 34.4 G/DL (ref 31.5–36.5)
MCV RBC AUTO: 93 FL (ref 78–100)
MONOCYTES # BLD AUTO: 0.6 10E9/L (ref 0–1.3)
MONOCYTES NFR BLD AUTO: 8 %
NEUTROPHILS # BLD AUTO: 5.2 10E9/L (ref 1.6–8.3)
NEUTROPHILS NFR BLD AUTO: 65.5 %
PLATELET # BLD AUTO: 236 10E9/L (ref 150–450)
RBC # BLD AUTO: 4.65 10E12/L (ref 4.4–5.9)
VIT B12 SERPL-MCNC: 526 PG/ML (ref 193–986)
WBC # BLD AUTO: 7.9 10E9/L (ref 4–11)

## 2020-07-16 PROCEDURE — 82728 ASSAY OF FERRITIN: CPT | Performed by: FAMILY MEDICINE

## 2020-07-16 PROCEDURE — 83540 ASSAY OF IRON: CPT | Performed by: FAMILY MEDICINE

## 2020-07-16 PROCEDURE — 83550 IRON BINDING TEST: CPT | Performed by: FAMILY MEDICINE

## 2020-07-16 PROCEDURE — 82306 VITAMIN D 25 HYDROXY: CPT | Performed by: FAMILY MEDICINE

## 2020-07-16 PROCEDURE — 82043 UR ALBUMIN QUANTITATIVE: CPT | Performed by: FAMILY MEDICINE

## 2020-07-16 PROCEDURE — 83036 HEMOGLOBIN GLYCOSYLATED A1C: CPT | Performed by: FAMILY MEDICINE

## 2020-07-16 PROCEDURE — 36415 COLL VENOUS BLD VENIPUNCTURE: CPT | Performed by: FAMILY MEDICINE

## 2020-07-16 PROCEDURE — 99213 OFFICE O/P EST LOW 20 MIN: CPT | Mod: 25 | Performed by: FAMILY MEDICINE

## 2020-07-16 PROCEDURE — 80050 GENERAL HEALTH PANEL: CPT | Performed by: FAMILY MEDICINE

## 2020-07-16 PROCEDURE — 80061 LIPID PANEL: CPT | Performed by: FAMILY MEDICINE

## 2020-07-16 PROCEDURE — 99396 PREV VISIT EST AGE 40-64: CPT | Performed by: FAMILY MEDICINE

## 2020-07-16 PROCEDURE — 82607 VITAMIN B-12: CPT | Performed by: FAMILY MEDICINE

## 2020-07-16 RX ORDER — TRAMADOL HYDROCHLORIDE 50 MG/1
50 TABLET ORAL EVERY 6 HOURS PRN
COMMUNITY
Start: 2020-07-15 | End: 2020-07-22

## 2020-07-16 ASSESSMENT — ENCOUNTER SYMPTOMS
COUGH: 0
SHORTNESS OF BREATH: 0
MYALGIAS: 1
HEARTBURN: 0
FEVER: 0
CONSTIPATION: 0
NERVOUS/ANXIOUS: 1
NAUSEA: 0
FREQUENCY: 0
PALPITATIONS: 0
HEMATURIA: 0
HEADACHES: 1
EYE PAIN: 0
ABDOMINAL PAIN: 0
SORE THROAT: 0
HEMATOCHEZIA: 0
ARTHRALGIAS: 1
JOINT SWELLING: 1
PARESTHESIAS: 0
WEAKNESS: 1
DIZZINESS: 1
DYSURIA: 0
DIARRHEA: 0
CHILLS: 0

## 2020-07-16 ASSESSMENT — MIFFLIN-ST. JEOR: SCORE: 1707.62

## 2020-07-16 NOTE — PATIENT INSTRUCTIONS
Seen for a physical.  Self testicular exams monthly  Skin check mole right inner arm, left mid back and right iliac area all benign looking sebhoresic keratosis  Can refer to derm if remains concerned or changes   Ears check: minimal wax, not a cause of tinnitus. See ENT for tinnitus  Due for colonosocpy , can arrange later this year  Will need to hold blood thinner day before and after proedure or depending on procedure without need for bridging  Follow up hematologist regarding hx of PE and dvt and blood thinners again in feb 2021  labs today  If normal and remains concerned about fatigue recommend you see slepep to be evaluate for sleep apnea  If dizziness persists or gets worse can refer to cardio/ neuro  Stay well hydrated  Consider shingrex vaccine. shingles vaccines (series of 2 shots 2 to 6 months apart that can cause couple days of flu like symptoms but is expensive so check with insurance and get at pharmacy where cheaper).  Continue care with counsellor and psychiatrist  Continue care with dentist       Preventive Health Recommendations  Male Ages 50 - 64    Yearly exam:             See your health care provider every year in order to  o   Review health changes.   o   Discuss preventive care.    o   Review your medicines if your doctor has prescribed any.     Have a cholesterol test every 5 years, or more frequently if you are at risk for high cholesterol/heart disease.     Have a diabetes test (fasting glucose) every three years. If you are at risk for diabetes, you should have this test more often.     Have a colonoscopy at age 50, or have a yearly FIT test (stool test). These exams will check for colon cancer.      Talk with your health care provider about whether or not a prostate cancer screening test (PSA) is right for you.    You should be tested each year for STDs (sexually transmitted diseases), if you re at risk.     Shots: Get a flu shot each year. Get a tetanus shot every 10 years.      Nutrition:    Eat at least 5 servings of fruits and vegetables daily.     Eat whole-grain bread, whole-wheat pasta and brown rice instead of white grains and rice.     Get adequate Calcium and Vitamin D.     Lifestyle    Exercise for at least 150 minutes a week (30 minutes a day, 5 days a week). This will help you control your weight and prevent disease.     Limit alcohol to one drink per day.     No smoking.     Wear sunscreen to prevent skin cancer.     See your dentist every six months for an exam and cleaning.     See your eye doctor every 1 to 2 years.

## 2020-07-16 NOTE — PROGRESS NOTES
SUBJECTIVE:   CC: Nikunj Sinha is an 55 year old male who presents for preventative health visit.     Healthy Habits:     Getting at least 3 servings of Calcium per day:  NO    Bi-annual eye exam:  Yes    Dental care twice a year:  Yes    Sleep apnea or symptoms of sleep apnea:  None    Diet:  Regular (no restrictions), Carbohydrate counting and Breakfast skipped    Frequency of exercise:  2-3 days/week    Duration of exercise:  15-30 minutes    Taking medications regularly:  Yes    Medication side effects:  Muscle aches, Lightheadedness and Other    PHQ-2 Total Score: 2    Additional concerns today:  Yes    Skin check and ear wax check    Medical assistant advised patient about addressing additional health concerns that could be billed, as it is not considered a part of a preventive physical. Patient verbalized understanding.    Adriana Solis MD on 7/16/2020 at 1:08 PM  BACKGROUND  Former smoker, history of alcohol abuse and dependence currently in remission, ZAIDA, MDD, history of passive suicidal ideation, prior active thoughts, on Wellbutrin 300, Lexapro 20, no longer on gabapentin, propranolol as needed and trazodone as needed, history of memory issues now improved off the alcohol, history of bilateral tinnitus, seasonal allergic rhinitis, resolved essential tremor once he stopped the alcohol, resolved epigastric pain once he stopped the alcohol, resolved palpitations, history of dental caries needing dentures, weight gain due to sedentary state, positive test, 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 with Xarelto managed by hematology, resolved chest pain, occasional fatigue, history of ADD, elevated LDL, occasional dizziness, on multivitamin and prior tonsillectomy, allergic to ragweed's,     Unemployed, from [place] originally where was getting his care at the local clinic there, hx of alcohol dependence in early remission, ZAIDA, MDD, hx of SI, on Wellbutrin, Lexapro, gabapentin  "& MV, under care of psychiatry at [facility], memory changes, epigastric pain, palpitations, weight gain, hx of chest pain, fatigue, dizziness, ED, improved with time away from alcohol, essential tremor on propranolol, B/l tinnitus, seasonal allergic rhinitis, allergic to ragweed, dental caries / abscess, S/p extraction nov 2019, 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 Xarelto under care of hematology, prior tonsillectomy,   Hx of ACS, NSTEMI with elevated troponin, no CAD on angiogram,  but found to have a acute saddle embolism with cor pulmonale & hx of DVT,  in 8/2019 on lifelong anticoagulation now on jacob 20 mg daily, ever since under care of hematology, hx of moderate alcohol dependence in early remission with hx of withdrawal & mood disorder, previously on campral ( discontinued secondary to diarrhea), & naltrexone( stopped as ineffective), ZAIDA, MDD, suicidal ideation, on Wellbutrin Xl 150 mg daily, Lexapro 20 mg daily, gabapentin 300 mg bedtime & a multivitamin, also on propranolol, previously on Abilify 5 mg, citalopram 10 mg, hydroxyzine, mirtazapine & trazodone, dental caries, recent dental abscess S/p abx, hx of essential tremor on propranolol 20 mg tid, allergic to ragweed's, no records from [facility] but reviewed care [facility] records,   Seen at [facility] ER 12/26/18 with suicidal ideation. Stabilized , treated ( see notes for details & discharged to [facility]).  Seen in ER at [facility] on 6/12/19 \" for increased depression, hopelessness, and alcohol abuse. Pt was kicked out of his sober smelling like alcohol.   Admitted [facility] on 8/2019: with shortness of breath and 2 days of chest pain and near syncope. He presented to the emergency room on 8/4/2019 after he had a syncopal episode in front of [employer]. He did not recall particular chest pain or shortness of breath. He noted swelling of his leg in various spots but he denied any persistent pain or swelling or redness. No " injury. No acute illness prior to that. However he had donated plasma 10 times in 5 weeks prior to that. Upon initial evaluation, d-dimer was elevated at 6.7. PTT was normal at 28, INR is 1.09. He underwent coronary angiogram and it did not show any obstructive coronary artery disease. Due to an elevated troponin he underwent coronary angiogram which demonstrated no significant CAD. Due to an elevated d-dimer underwent CT scan of the chest which showed extensive acute bilateral pulmonary embolism with saddle embolus.  Doppler legs showed Nonocclusive right femoral DVT from upper to mid thigh. Left calf DVT within one of the peroneal veins in both of the posterior tibial veins from upper to low calf. Echocardiogram revealed lower limit of normal left ventricular systolic function of 50% with septal motion compatible with right ventricular overload.  He had cor pulmonale and was started on heparin infusion. No thrombolytics were given due hemodynamic and respiratory stability. After several days of monitoring on IV heparin Mr. Sinha continued to improve and he was transitioned to Xarelto for ongoing anticoagulation therapy. He tolerated Xarelto well. Had no major bleeding except intermittent gum bleeding. Denied family history of venous thromboembolism. He had a brother who passed away from [facility] [place]. He was single. Did not have children. Was advised to continue with indefinite anticoagulation. Hematology thought maybe plasma donation had led to a transient hypercoagulable state. Mr. Sinha was advised not to donate plasma moving forward. An outpatient referral to hematology had been made.  Seen by hematology 9/2019 at [facility]. She reviewed the pathophysiology of venous thromboembolic exam and risks of recurrence. Was advised of possible etiology, he was indicated for long-term anticoagulation due to initial presentation of a life-threatening episode. No prior history of venous thromboembolism or family  "history of thromboembolism. Was not recommend family/hereditary thrombophilia work-upa repeat CT scan of the chest to know the resolution of blood clots was not needed & only recommended to obtain a CTA if he had signs and symptoms concerning for venous thromboembolism. Was advised strict abstinence from alcohol with anticoagulants & to obtain kidney, liver function monitoring at least once a year.   He was age-appropriate cancer screening. He admitted that he had positive stool test (sounds like FOBT) a couple years prior. It was unclear if it was related to his alcohol use. He was recommended to proceed with colonoscopy, but to wait at least 3-6 months because of this recent pulmonary emboli event and ot recommended interruption of anticoagulation prior to that. He voiced understanding. His previous primary care provider retired.    He was engaged with outpatient CD treatment at [facility] but he had been unable to maintain any sobriety outside of a structured facility. Roughly one month prior to next er visit he stopped taking all of his psych medications.   Was in the ER at [facility] on 10/17/2019 for suicidal ideation & alcohol abuse: had increased depressive symptoms, anxiety, and suicidal ideation. He presented to the outpatient clinic for a Rule 25 assessment. He endorsed numerous acute depressive symptoms. He relapsed on alcohol and had been unsuccessful outside of a structured program. He was previously working a good job as a Scoring Director at [employer] \"but I drank myself out of that job.\" He had been evicted from his apartment and found himself homeless. He rated his depression as 8 out of 10 and anxiety as 9 out of 10. He was feeling hopeless with thoughts of suicide and a plan to hang himself. He stated that he hadn't taken any steps towards acting on the thoughts but that \"it's something in my mind.\" He felt somewhat isolated. He had one friend in [place], but no supportive family that were still alive. "   He was admitted on a voluntary basis. Agreeable to stay voluntarily to coordinate cares and medication management. Was seen by the hospitalist during course of hospitalization. Started on antibiotics for a dental abscess. He was placed on a CIWA protocol & did not demonstrate signs or symptoms of complicated alcohol withdrawal. Psychiatric medication management was performed in detail to target signs and symptoms of principal diagnosis & comorbid illnesses. Medication management included Lexapro was restarted for depression and anxiety as well as Wellbutrin XL:  & Gabapentin as needed for anxiety. Propranolol as needed for anxiety. Given Naltrexone as a trial for alcohol use disorder; LFT's normalizing & Campral was discontinued secondary to GI side effects. Progressed with mood and anxiety. Denied psychosis. Denied suicidal and homicidal ideation. Did an appropriate crisis and relapse plan. Was future oriented. Denied gun access. Indicated motivation to proceed with MI CD treatment as coordinated by  with intake date on Friday, November 1, 2019.   followed in regards to collecting and reviewing collateral information, coordination of cares and discharge planning. Including, MI CD evaluation completed with recommendation for placement to unit 2700. Due to dental issues, he was discharged to medical respite bed to bridge placement. He was treated for dental abscess with PCN.  Seen 11/29/19 for the first time by this writer. See that note for details. Noted Hx of severe MDD, ZAIDA, hx of passive suicidal thoughts, early remission from alcohol dependence just completed rule 25 , detox & rehab & in sober living. had just moved from [facility] to [place]. Had poor social support but was forward thinking  & had apt's for the dentist on & psyche & noted had been taking meds & contracted to safety. Was on chronic anticoagulating with Xarelto for hx of acute saddle PE & DVT in aug 2019  suspected after donating plasma for the 5 weeks prior. No hypercoag work up recommended given lifelong anticoagulation indicated & seen by hematology. Note from sept 2019 reviewed.   Reported fatigue, weight gain, dizziness since on new meds for mental health. Denied chest pain or palpitations though noted has had these in the past. Exam was benign & vitally stable.Did not appear to be in heart failure. Noted his memory changes, abdominal pain, tinnitus, ED could be related to alcohol abuse & noted symptoms had improved since quit drinking. Advised to try Pepcid OTC for abdominal ain. Declined referral to ENT/ audiology. Seasonal allergic rhinitis was not an issue. Was to consider a GI referral for a scope in future once could come off Xarelto a few days. Noted due for follow up with hematology by feb 2020. Was to sign an KISHOR to get records from [place] where previously doctored. Was given the Tdap. Was negative for Hep C, HIV, had a normal folic acid, vit b12, CMP, TSH, cbc, HBA1C & LDL was elevated.   Seen by [facility] psychiatry on 12/5/19 for Francesca, depression and alcohol abuse in remission and noted was taking propranolol 1 a day instead of three times and continue don same meds till reevaluated in 4 weeks.      Seen 12/20/2019 noted remained sober and symptoms were improved.  Seen by psychiatry at [facility] 1/9/2020 when noted had stopped gabapentin and was continued on Lexapro, Wellbutrin along with using propanolol rarely as needed.  Did see the hematologist 1/23 and told to complete therapeutic course of Xarelto and then continue indefinitely prophylactically due to unprovoked prior DVT and PE.  Advised that it was okay to get dental procedures and colonoscopy after March 1 and he would be low risk to hold the medication 1 to 2 days before and after depending on the procedure risk.  And to follow-up with him in 1 year.  Seen by psychiatry 5/2020 by telephone encounter due to pandemic and continued on Lexapro  and propranolol and Wellbutrin but Lexapro increased to 30 mg.  Was doing therapy.     Seen by psych 6/2 and bupropion increased to 300 and continued on rest of meds.  Was in the ER 6/04 dental pain and abscess of tooth #20.  Was drained and put on amoxicillin and advised to see the dentist for root canal.  He called on 6/9 and medication was changed to Augmentin and he did see the dentist on 6/11 but tooth had broken off by then and root canal not indicated as it was too far gone and instead his tooth was pulled undergoing to make dentures as he had quite a few teeth pulled.  Most recently saw psychiatry on 7/2 and continued on all his meds and trazodone added and noted propanolol makes him tired and he only takes half tablet prior to an AA meeting.  They may consider buspirone in the future.    Celso yañez 7/7/20 noted was doing reasonably well.  Had passive suicidal thoughts but no active plan.  Under care of a psychiatrist and therapist.  He was to see his therapist in person the next day in person for the first time since the pandemic began.  He had still been going to AA and conversing with his psychiatrist via telephone.  Notes from the psychiatrist reviewed.  He was concerned about some skin changes, ear wax and tinnitus.  His abdominal pain and palpitations had resolved since he stopped the alcohol noted he had been sober now 9 months.  He continued to have ED and some fatigue and occasional lightheadedness which he believed was side effects from the medications he takes.  Was very rarely forgetful not as  significant to be of any concern to him currently. He was planning to get dentures. Was to   continue care with his hematologist fr navarro of unprovoked DVT & PE.  Due for appointment with them in the new year. Advised to work on advanced directives. Due to a prior positive stool test was recommended a colonoscopy & per hematology they said he could go ahead with that we would just have to hold his blood thinner  a day before and a day after the scope or depending on what they find & do like if remove a polyp etc. He currently had no symptoms and wanted  to wait to do the colonoscopy once the pandemic situation was  better. Advised  to decrease salt in diet to help tinnitus. Tinnitus could be a result of prior Advil/ alcohol use/ hearing loss  Fatigue thought  related to medications as well as lightheadedness and dizziness but recommended labs at appointment and if no answer could consider referral to sleep specialist for undiagnosed sleep apnea and/or referral to neurology and to  discuss medications further with his psychiatrist. Was to continue to work on diet and weight loss with exercise. Declined   Neurology referral.  [place] it was not too serious right now, just rare episodes of occasional fogginess and he feels that was related to his medications.     [place]  shows received gabapentin 100 mg #270 on 1/4/19 at [facility], then 300 mg # 90  On 2/11/19 in [facility], 100 mg # 60 on 3/6/19 in [facility], then # 180 on 6/20/19, 300 mg # 60 on 10/30/19 & # 30 of 300 mg on 11/21/19.  Tylenol 3 12 tablets on 2/11/2021 and tramadol 50 mg #12 on 2/25/2020. Tramadol in July by dentist # 12.    CURRENTLY  Here today for preventive physical.  Reports a more right upper arm inner surface that has not changed but is dark and brown in the month of one in his left upper back near his armpit feels a bit large  And a third one on his right hip area that is about also brown in color that he would like checked.  He has had chronic tinnitus bilaterally would like his ears checked for wax.  Denies any trouble hearing.  Discussed the shingles vaccine wants to defer for now due to cost.  Prior positive fit test no blood in stools or black stools and test done in December 2019.  Is on chronic anticoagulation with history of PE and DVT managed by hematology seen by hematology in February and advised could proceed with colonoscopy after March.   Things got delayed due to the pandemic and he has been struggling with his dental issues would like to defer doing the colonoscopy for a few months out.  Chronic anticoagulation on Xarelto for prior history of DVT and PE under care of hematology for new symptom yearly review.ultrasound done of his legs in February were negative for DVT.  Has had no significant bruising or bleeding.  Elevated LDL working on diet and exercise.  Fatigue chronic denies any snoring but lives alone.  Declines need for sleep eval we will see what labs show he believes a lot of his symptoms are related to his medications.  Alcohol dependence in remission remains sober more than 1 months.  MDD ZAIDA with chronic passive suicidal thoughts no active plan of the care psychiatry and counselor he will see his counselor next week and a psychiatrist in 2 months.  Dental caries continues to have tooth problems had another tooth pulled on the left lower jaw yesterday and started on Augmentin twice a day and given tramadol for a week he understands risk of addiction.  He will be seeing the dentist again in 2 weeks.  Dizziness comes and goes nothing significant has had no blackouts or falls.  He thinks it is due to his medication.  Is not limiting his activity.  He is mostly sedentary which may be contributing.  He declines to see neurology for this at this point in time or the cardiologist.  Has chronic intermittent headache and joint pains nothing currently.    Today's PHQ-2 Score:   PHQ-2 ( 1999 Pf[employer]) 7/11/2020   Q1: Little interest or pleasure in doing things 1   Q2: Feeling down, depressed or hopeless 1   PHQ-2 Score 2   Q1: Little interest or pleasure in doing things Several days   Q2: Feeling down, depressed or hopeless Several days   PHQ-2 Score 2       Abuse: Current or Past(Physical, Sexual or Emotional)- No  Do you feel safe in your environment? Yes        Social History     Tobacco Use     Smoking status: Former Smoker     Last attempt to  quit: 1982     Years since quittin.5     Smokeless tobacco: Never Used   Substance Use Topics     Alcohol use: Not Currently     Comment: sober second time oct 14 , did detox adn rehab, in a sober house 2019     If you drink alcohol do you typically have >3 drinks per day or >7 drinks per week? No    Alcohol Use 2020   Prescreen: >3 drinks/day or >7 drinks/week? -   Prescreen: >3 drinks/day or >7 drinks/week? No       Last PSA: No results found for: PSA    Reviewed orders with patient. Reviewed health maintenance and updated orders accordingly - Yes  Lab work is in process  Labs reviewed in EPIC  BP Readings from Last 3 Encounters:   20 138/81   20 118/85   20 123/80    Wt Readings from Last 3 Encounters:   20 86.6 kg (191 lb)   20 87.6 kg (193 lb 3.2 oz)   20 88.5 kg (195 lb)                  Patient Active Problem List   Diagnosis     Current severe episode of major depressive disorder without psychotic features, unspecified whether recurrent (H)     Moderate alcohol dependence in early remission (H)     ZAIDA (generalized anxiety disorder)     History of suicidal ideation     Fatigue, unspecified type     Dizziness     Tinnitus, bilateral     Dental caries     Seasonal allergic rhinitis, unspecified trigger     History of pulmonary embolism     Positive FIT (fecal immunochemical test)     Erectile dysfunction, unspecified erectile dysfunction type     Chronic anticoagulation     Personal history of DVT (deep vein thrombosis)     Elevated LDL cholesterol level     Seborrheic keratoses     Past Surgical History:   Procedure Laterality Date     TONSILLECTOMY  1984       Social History     Tobacco Use     Smoking status: Former Smoker     Last attempt to quit: 1982     Years since quittin.5     Smokeless tobacco: Never Used   Substance Use Topics     Alcohol use: Not Currently     Comment: sober second time oct 14 , did detox adn rehab, in a sober house nov       Family History   Problem Relation Age of Onset     Arthritis Mother      Breast Cancer Mother      Depression Mother      Allergies Mother      Migraines Mother      Alcoholism Father      Lymphoma Father         non hodgkins     Other - See Comments Brother         genetic disorder,  of some slow form of ALS         Current Outpatient Medications   Medication Sig Dispense Refill     amoxicillin-clavulanate (AUGMENTIN) 875-125 MG tablet Take 1 tablet by mouth 2 times daily       buPROPion (WELLBUTRIN XL) 300 MG 24 hr tablet Take 300 mg by mouth every morning        ESCITALOPRAM OXALATE PO Take 30 mg by mouth Takes 20 mg with a ten mg,       multivitamin w/minerals (THERA-VIT-M) tablet Take 1 tablet by mouth daily       propranolol (INDERAL) 20 MG tablet Take 20 mg by mouth as needed (anxiety)        rivaroxaban ANTICOAGULANT (XARELTO) 20 MG TABS tablet Take 1 tablet (20 mg) by mouth daily (with dinner) 30 tablet 11     traMADol (ULTRAM) 50 MG tablet Take 50 mg by mouth every 6 hours as needed for severe pain       TRAZODONE HCL PO Take by mouth At Bedtime 25 to 50 mg at bedtime prn       Allergies   Allergen Reactions     Ragweeds Other (See Comments)     congestion     Recent Labs   Lab Test 20  1331 19  1202   A1C 4.8 4.9   LDL  --  163*   HDL  --  57   TRIG  --  205*   ALT  --  33   CR  --  0.98   GFRESTIMATED  --  87   GFRESTBLACK  --  >90   POTASSIUM  --  4.2   TSH  --  0.96        Reviewed and updated as needed this visit by clinical staff  Tobacco  Allergies  Meds  Problems  Med Hx  Surg Hx  Fam Hx  Soc Hx          Reviewed and updated as needed this visit by Provider  Tobacco  Allergies  Meds  Problems  Med Hx  Surg Hx  Fam Hx  Soc Hx         Past Medical History:   Diagnosis Date     Abdominal pain, epigastric 2019     Chronic anticoagulation 2019     Current severe episode of major depressive disorder without psychotic features, unspecified whether  "recurrent (H) 11/29/2019     Dental caries 11/29/2019     Dizziness 11/29/2019     Erectile dysfunction, unspecified erectile dysfunction type 11/29/2019     Essential tremor 12/1/2019     Fatigue, unspecified type 11/29/2019     ZAIDA (generalized anxiety disorder) 11/29/2019     History of chest pain 11/29/2019     History of pulmonary embolism 11/29/2019     History of suicidal ideation 11/29/2019     Memory changes 11/29/2019     Moderate alcohol dependence in early remission (H) 11/29/2019     Palpitations 11/29/2019     Positive FIT (fecal immunochemical test) 11/29/2019     Seasonal allergic rhinitis, unspecified trigger 11/29/2019     Tinnitus, bilateral 11/29/2019     Weight gain 11/29/2019     Weight gain 11/29/2019      Past Surgical History:   Procedure Laterality Date     TONSILLECTOMY  1984     OB History   No obstetric history on file.       Review of Systems   Constitutional: Negative for chills and fever.   HENT: Negative for congestion, ear pain, hearing loss and sore throat.    Eyes: Negative for pain and visual disturbance.   Respiratory: Negative for cough and shortness of breath.    Cardiovascular: Negative for chest pain, palpitations and peripheral edema.   Gastrointestinal: Negative for abdominal pain, constipation, diarrhea, heartburn, hematochezia and nausea.   Genitourinary: Positive for impotence. Negative for discharge, dysuria, frequency, genital sores, hematuria and urgency.   Musculoskeletal: Positive for arthralgias, joint swelling and myalgias.   Skin: Negative for rash.   Neurological: Positive for dizziness, weakness and headaches. Negative for paresthesias.   Psychiatric/Behavioral: Negative for mood changes. The patient is nervous/anxious.      OBJECTIVE:   /81 (BP Location: Left arm, Patient Position: Chair, Cuff Size: Adult Regular)   Pulse 83   Temp 98.4  F (36.9  C) (Oral)   Resp 13   Ht 1.778 m (5' 10\")   Wt 86.6 kg (191 lb)   SpO2 96%   BMI 27.41 kg/m  "     Physical Exam  GENERAL: healthy, alert, no distress and fatigued  EYES: Eyes grossly normal to inspection, PERRL and conjunctivae and sclerae normal  HENT: normal cephalic/atraumatic, both ears: normal: no effusions, no erythema, normal landmarks and some cerumen around meatus, nose and mouth without ulcers or lesions, oropharynx clear and oral mucous membranes moist left jaw swelling related to recent tooth extraction mildly tender no warmth noted  NECK: no adenopathy, no asymmetry, masses, or scars and thyroid normal to palpation  RESP: lungs clear to auscultation - no rales, rhonchi or wheezes  CV: regular rate and rhythm, normal S1 S2, no S3 or S4, no murmur, click or rub, no peripheral edema and peripheral pulses strong  ABDOMEN: soft, non tender, no hepatosplenomegaly, no masses and bowel sounds normal   (male): declined  MS: no gross musculoskeletal defects noted, no edema  SKIN: no suspicious lesions or rashes.  Has 1/2 cm stuck on hyperkeratotic brown papule right medial inner arm that is suggestive of a seborrheic keratosis, a similar mole on the left upper back posterior axillary line that is about 2 cm. And a right iliac crest half centimeter seborrheic keratosis  NEURO: Normal strength and tone, mentation intact and speech normal  PSYCH: mentation appears normal, affect normal/bright    Diagnostic Test Results:  Labs reviewed in Epic  Results for orders placed or performed in visit on 07/16/20 (from the past 24 hour(s))   Hemoglobin A1c   Result Value Ref Range    Hemoglobin A1C 4.8 0 - 5.6 %   CBC with platelets differential   Result Value Ref Range    WBC 7.9 4.0 - 11.0 10e9/L    RBC Count 4.65 4.4 - 5.9 10e12/L    Hemoglobin 14.9 13.3 - 17.7 g/dL    Hematocrit 43.3 40.0 - 53.0 %    MCV 93 78 - 100 fl    MCH 32.0 26.5 - 33.0 pg    MCHC 34.4 31.5 - 36.5 g/dL    RDW 12.3 10.0 - 15.0 %    Platelet Count 236 150 - 450 10e9/L    % Neutrophils 65.5 %    % Lymphocytes 22.3 %    % Monocytes 8.0 %    %  Eosinophils 3.8 %    % Basophils 0.4 %    Absolute Neutrophil 5.2 1.6 - 8.3 10e9/L    Absolute Lymphocytes 1.8 0.8 - 5.3 10e9/L    Absolute Monocytes 0.6 0.0 - 1.3 10e9/L    Absolute Eosinophils 0.3 0.0 - 0.7 10e9/L    Absolute Basophils 0.0 0.0 - 0.2 10e9/L    Diff Method Automated Method        ASSESSMENT/PLAN:       ICD-10-CM    1. Routine history and physical examination of adult  Z00.00    2. Seborrheic keratoses  L82.1    3. Tinnitus, bilateral  H93.13 CBC with platelets differential     Comprehensive metabolic panel     OTOLARYNGOLOGY REFERRAL   4. Need for shingles vaccine  Z23    5. Positive FIT (fecal immunochemical test)  R19.5 CBC with platelets differential     GASTROENTEROLOGY ADULT REF PROCEDURE ONLY   6. Chronic anticoagulation  Z79.01 CBC with platelets differential     GASTROENTEROLOGY ADULT REF PROCEDURE ONLY   7. Personal history of DVT (deep vein thrombosis)  Z86.718 CBC with platelets differential   8. History of pulmonary embolism  Z86.711 CBC with platelets differential   9. Elevated LDL cholesterol level  E78.00 Comprehensive metabolic panel     Lipid panel reflex to direct LDL Fasting   10. Dizziness  R42    11. Fatigue, unspecified type  R53.83 Hemoglobin A1c     CBC with platelets differential     Comprehensive metabolic panel     Vitamin B12     Ferritin     Iron and iron binding capacity     Vitamin D Deficiency   12. Moderate alcohol dependence in early remission (H)  F10.21 Albumin Random Urine Quantitative with Creat Ratio     Hemoglobin A1c     CBC with platelets differential     Comprehensive metabolic panel   13. Current severe episode of major depressive disorder without psychotic features, unspecified whether recurrent (H)  F32.2 TSH with free T4 reflex   14. ZAIDA (generalized anxiety disorder)  F41.1 TSH with free T4 reflex   15. Dental caries  K02.9      Seen for a physical.  Self testicular exams monthly  Skin check mole right inner arm, left mid back and right iliac area all  benign looking sebhoresic keratosis  Can refer to derm if remains concerned or changes   Ears check: minimal wax, likely not a cause of tinnitus. See ENT for tinnitus  Due for colonoscopy , can arrange later this year  Will need to hold blood thinner day before and after procedure or depending on procedure without need for bridging  Follow up hematologist regarding hx of PE and DVT and blood thinners again in feb 2021  labs today  If normal and remains concerned about fatigue recommend you see sleep to be evaluate for sleep apnea. Currently declines sleep eval with ill see what labs show.   LDL elevated, ASCVD 7.7% . Diet , exercise, weight loss recommended. recheck in 6 months.remains sober from alcohol  If dizziness persists or gets worse can refer to cardio/ neuro. Currently  declines due to cost and managing ok. Thinks from his psyche meds. Stay well hydrated  Consider shingrex vaccine. shingles vaccines (series of 2 shots 2 to 6 months apart that can cause couple days of flu like symptoms but is expensive so check with insurance and get at pharmacy where cheaper).  MDD/ Francesca/ chronic passive suicidal thoughts no active plan, under care of psyche and Counsellor. Continue care with Counsellor and psychiatrist.  Has appointment with a psychiatrist in 2 months and his counselor next week.  Continue care with dentist .  Has left jaw swelling from recent tooth extraction and infection started on Augmentin twice a day and tramadol as needed yesterday by the dentist.  Understands risk of antibiotics and chronic narcotics.  Plans to see the dentist again in 2 weeks.      COUNSELING:   Reviewed preventive health counseling, as reflected in patient instructions       Regular exercise       Healthy diet/nutrition       Vision screening       Hearing screening       Immunizations    Declined: Zoster due to Cost         Alcohol Use       Colon cancer screening       The 10-year ASCVD risk score (Raquel NENA Jr., et al., 2013) is:  "7.7%    Values used to calculate the score:      Age: 55 years      Sex: Male      Is Non- : No      Diabetic: No      Tobacco smoker: No      Systolic Blood Pressure: 138 mmHg      Is BP treated: No      HDL Cholesterol: 57 mg/dL      Total Cholesterol: 261 mg/dL       Advance Care Planning    Estimated body mass index is 27.41 kg/m  as calculated from the following:    Height as of this encounter: 1.778 m (5' 10\").    Weight as of this encounter: 86.6 kg (191 lb).     Weight management plan: Discussed healthy diet and exercise guidelines     reports that he quit smoking about 38 years ago. He has never used smokeless tobacco.      Counseling Resources:  ATP IV Guidelines  Pooled Cohorts Equation Calculator  FRAX Risk Assessment  ICSI Preventive Guidelines  Dietary Guidelines for Americans, 2010  USDA's MyPlate  ASA Prophylaxis  Lung CA Screening    Adriana Solis MD  [facility]      "

## 2020-07-16 NOTE — RESULT ENCOUNTER NOTE
Padmini Mr. Sinha,  Some of your results came back and are within acceptable limits. -Normal red blood cell (hgb) levels, normal white blood cell count and normal platelet levels.  -A1C (diabetic test) is normal and indicates that your blood sugar has been in a normal range the last 3 months.. If you have any further concerns please do not hesitate to contact us by message, phone or making an appointment.  Have a good day   Dr Will KIM

## 2020-07-16 NOTE — PROGRESS NOTES
"  3  SUBJECTIVE:   CC: Nikunj Sinha is an 55 year old male who presents for preventive health visit.     Healthy Habits:    Do you get at least three servings of calcium containing foods daily (dairy, green leafy vegetables, etc.)? { :186020::\"yes\"}    Amount of exercise or daily activities, outside of work: { :610154}    Problems taking medications regularly { :616641::\"No\"}    Medication side effects: { :381657::\"No\"}    Have you had an eye exam in the past two years? { :167599}    Do you see a dentist twice per year? { :458453}    Do you have sleep apnea, excessive snoring or daytime drowsiness?{ :441303}  {Outside tests to abstract? :425558}    {additional problems to add (Optional):314436}    Today's PHQ-2 Score:   PHQ-2 (  [employer]) 2020   Q1: Little interest or pleasure in doing things 1 1   Q2: Feeling down, depressed or hopeless 1 1   PHQ-2 Score 2 2   Q1: Little interest or pleasure in doing things Several days -   Q2: Feeling down, depressed or hopeless Several days -   PHQ-2 Score 2 -     {PHQ-2 LOOK IN ASSESSMENTS (Optional) :517639}  Abuse: Current or Past(Physical, Sexual or Emotional)- {YES/NO/NA:188343}  Do you feel safe in your environment? {YES/NO/NA:201034}        Social History     Tobacco Use     Smoking status: Former Smoker     Last attempt to quit: 1982     Years since quittin.5     Smokeless tobacco: Never Used   Substance Use Topics     Alcohol use: Not Currently     Comment: sober second time oct 14 , did detox adn rehab, in a sober house 2019     If you drink alcohol do you typically have >3 drinks per day or >7 drinks per week? {ETOH :154427}                      Last PSA: No results found for: PSA    Reviewed orders with patient. Reviewed health maintenance and updated orders accordingly - {Yes/No:564381::\"Yes\"}  {Chronicprobdata (Optional):907155}    Reviewed and updated as needed this visit by clinical staff  Tobacco  Allergies  Meds  Med Hx  Surg Hx " " Fam Hx  Soc Hx        Reviewed and updated as needed this visit by Provider        {HISTORY OPTIONS (Optional):957017}    ROS:  { :173212::\"CONSTITUTIONAL: NEGATIVE for fever, chills, change in weight\",\"INTEGUMENTARY/SKIN: NEGATIVE for worrisome rashes, moles or lesions\",\"EYES: NEGATIVE for vision changes or irritation\",\"ENT: NEGATIVE for ear, mouth and throat problems\",\"RESP: NEGATIVE for significant cough or SOB\",\"CV: NEGATIVE for chest pain, palpitations or peripheral edema\",\"GI: NEGATIVE for nausea, abdominal pain, heartburn, or change in bowel habits\",\" male: negative for dysuria, hematuria, decreased urinary stream, erectile dysfunction, urethral discharge\",\"MUSCULOSKELETAL: NEGATIVE for significant arthralgias or myalgia\",\"NEURO: NEGATIVE for weakness, dizziness or paresthesias\",\"PSYCHIATRIC: NEGATIVE for changes in mood or affect\"}    OBJECTIVE:   /81 (BP Location: Left arm, Patient Position: Chair, Cuff Size: Adult Regular)   Pulse 83   Temp 98.4  F (36.9  C) (Oral)   Resp 13   Ht 1.778 m (5' 10\")   Wt 86.6 kg (191 lb)   SpO2 96%   BMI 27.41 kg/m    EXAM:  {Exam Choices:079265}    {Diagnostic Test Results (Optional):154917::\"Diagnostic Test Results:\",\"Labs reviewed in Epic\"}    ASSESSMENT/PLAN:   {Diag Picklist:931088}    COUNSELING:  {MALE COUNSELING MESSAGES:174550::\"Reviewed preventive health counseling, as reflected in patient instructions\"}    Estimated body mass index is 27.41 kg/m  as calculated from the following:    Height as of this encounter: 1.778 m (5' 10\").    Weight as of this encounter: 86.6 kg (191 lb).    {Weight Management Plan (ACO) Complete if BMI is abnormal-  Ages 18-64  BMI >24.9.  Age 65+ with BMI <23 or >30 (Optional):616204}     reports that he quit smoking about 38 years ago. He has never used smokeless tobacco.  {Tobacco Cessation -- Complete if patient is a smoker (Optional):646989}    Counseling Resources:  ATP IV Guidelines  Pooled Cohorts Equation " Calculator  FRAX Risk Assessment  ICSI Preventive Guidelines  Dietary Guidelines for Americans, 2010  USDA's MyPlate  ASA Prophylaxis  Lung CA Screening    Adriana Solis MD  [facility]

## 2020-07-17 LAB
ALBUMIN SERPL-MCNC: 3.9 G/DL (ref 3.4–5)
ALP SERPL-CCNC: 82 U/L (ref 40–150)
ALT SERPL W P-5'-P-CCNC: 28 U/L (ref 0–70)
ANION GAP SERPL CALCULATED.3IONS-SCNC: 9 MMOL/L (ref 3–14)
AST SERPL W P-5'-P-CCNC: 20 U/L (ref 0–45)
BILIRUB SERPL-MCNC: 0.4 MG/DL (ref 0.2–1.3)
BUN SERPL-MCNC: 14 MG/DL (ref 7–30)
CALCIUM SERPL-MCNC: 9.2 MG/DL (ref 8.5–10.1)
CHLORIDE SERPL-SCNC: 108 MMOL/L (ref 94–109)
CHOLEST SERPL-MCNC: 182 MG/DL
CO2 SERPL-SCNC: 22 MMOL/L (ref 20–32)
CREAT SERPL-MCNC: 1.06 MG/DL (ref 0.66–1.25)
DEPRECATED CALCIDIOL+CALCIFEROL SERPL-MC: 27 UG/L (ref 20–75)
FERRITIN SERPL-MCNC: 73 NG/ML (ref 26–388)
GFR SERPL CREATININE-BSD FRML MDRD: 78 ML/MIN/{1.73_M2}
GLUCOSE SERPL-MCNC: 87 MG/DL (ref 70–99)
HDLC SERPL-MCNC: 44 MG/DL
IRON SATN MFR SERPL: 21 % (ref 15–46)
IRON SERPL-MCNC: 62 UG/DL (ref 35–180)
LDLC SERPL CALC-MCNC: 118 MG/DL
NONHDLC SERPL-MCNC: 138 MG/DL
POTASSIUM SERPL-SCNC: 4 MMOL/L (ref 3.4–5.3)
PROT SERPL-MCNC: 8.2 G/DL (ref 6.8–8.8)
SODIUM SERPL-SCNC: 139 MMOL/L (ref 133–144)
TIBC SERPL-MCNC: 292 UG/DL (ref 240–430)
TRIGL SERPL-MCNC: 100 MG/DL
TSH SERPL DL<=0.005 MIU/L-ACNC: 0.71 MU/L (ref 0.4–4)

## 2020-07-17 NOTE — RESULT ENCOUNTER NOTE
Hello Mr. Sinha,  Your results came back with a normal vit b 12. If you have any further concerns please do not hesitate to contact us by message, phone or making an appointment.  Have a good day   Dr Will KIM

## 2020-07-17 NOTE — RESULT ENCOUNTER NOTE
Hello Mr. Sinha,  Your results came back and are within acceptable limits. -Vitamin D level is normal and getting 2000 IU daily in your diet or supplements is recommended. . If you have any further concerns please do not hesitate to contact us by message, phone or making an appointment.  Have a good day   Dr Will KIM

## 2020-07-18 LAB
CREAT UR-MCNC: 195 MG/DL
MICROALBUMIN UR-MCNC: 16 MG/L
MICROALBUMIN/CREAT UR: 8.1 MG/G CR (ref 0–17)

## 2020-07-18 NOTE — RESULT ENCOUNTER NOTE
Hello Mr. Sinha,  Your results came back and are within acceptable limits. -Microalbumin (urine protein) test is normal.  ADVISE: rechecking this annually.. If you have any further concerns please do not hesitate to contact us by message, phone or making an appointment.  Have a good day   Dr Will KIM

## 2020-07-22 ENCOUNTER — TELEPHONE (OUTPATIENT)
Dept: BEHAVIORAL HEALTH | Facility: CLINIC | Age: 55
End: 2020-07-22

## 2020-07-22 NOTE — TELEPHONE ENCOUNTER
[facility]  [facility]: Integrated Primary Care        Social Work Care Navigator Note      Patient: Nikunj Sinha  Date: July 22, 2020  Preferred Name: Nikunj    Previous PHQ-9:   PHQ-9 SCORE 12/20/2019 7/7/2020 7/7/2020   PHQ-9 Total Score MyChart - - 13 (Moderate depression)   PHQ-9 Total Score 14 13 13     Previous ZAIDA-7:   ZAIDA-7 SCORE 12/20/2019 7/7/2020 7/7/2020   Total Score - - 11 (moderate anxiety)   Total Score 16 11 11     LUIS LEVEL:  No flowsheet data found.    Preferred Contact:  No data recorded    Type of Contact Today: Phone call (not reached/unavailable)      Data: (subjective / Objective): [facility] Schedule Scrubbing. LM inviting Nikunj to call back to learn more about our program.    Attempted to reach patient, but was unsuccessful.  Plan to attempt again.  CARLY Vasquez [facility]

## 2020-07-24 ENCOUNTER — TELEPHONE (OUTPATIENT)
Dept: OTOLARYNGOLOGY | Facility: CLINIC | Age: 55
End: 2020-07-24

## 2020-07-24 ENCOUNTER — TELEPHONE (OUTPATIENT)
Dept: FAMILY MEDICINE | Facility: CLINIC | Age: 55
End: 2020-07-24

## 2020-07-24 NOTE — TELEPHONE ENCOUNTER
[facility] Call Center    Phone Message    May a detailed message be left on voicemail: yes     Reason for Call: Appointment Intake    Referring Provider Name: Dr. Will Wright  Diagnosis and/or Symptoms: Tinnitus    Per guidelines should be scheduled by clinic    Action Taken: Message routed to:  [facility] ENT    Travel Screening: Not Applicable

## 2020-08-25 ENCOUNTER — TELEPHONE (OUTPATIENT)
Dept: GASTROENTEROLOGY | Facility: CLINIC | Age: 55
End: 2020-08-25

## 2020-08-25 DIAGNOSIS — Z11.59 ENCOUNTER FOR SCREENING FOR OTHER VIRAL DISEASES: Primary | ICD-10-CM

## 2020-08-25 NOTE — TELEPHONE ENCOUNTER
Patient is scheduled for Colonoscopy with Dr. Jorgensen    Spoke with: patient    Date of Procedure: 11/05/2020    Location: [facility]    Sedation Type Concious    Informed patient they will need an adult  yes wants to know if medical ride is ok through insurance     Informed Patient of COVID Test Requirement yes     Preferred Pharmacy for Pre Prescription [facility] in [place]     Confirmed Nurse will call to complete assessment yes     Additional comments: none     Wounds

## 2020-08-26 ENCOUNTER — HOSPITAL ENCOUNTER (OUTPATIENT)
Facility: AMBULATORY SURGERY CENTER | Age: 55
End: 2020-08-26
Attending: INTERNAL MEDICINE

## 2020-09-08 ENCOUNTER — TRANSFERRED RECORDS (OUTPATIENT)
Dept: HEALTH INFORMATION MANAGEMENT | Facility: CLINIC | Age: 55
End: 2020-09-08

## 2020-09-10 ENCOUNTER — TELEPHONE (OUTPATIENT)
Dept: GASTROENTEROLOGY | Facility: CLINIC | Age: 55
End: 2020-09-10

## 2020-09-10 NOTE — TELEPHONE ENCOUNTER
LM for patient to call back to reschedule procedure as we had to cx due to jimenez schedule conflict on 11/5.    Procedure:  Lower Endoscopy     Lower Endoscopy Type:  Colonoscopy     Purpose of Colonoscopy Procedure:  Diagnostic     Colonoscopy reason for referral:  FIT Positive     Colonoscopy Sedation:  Conscious/Moderate      Positive FIT (fecal immunochemical test) [R19.5]

## 2020-09-16 DIAGNOSIS — Z11.59 ENCOUNTER FOR SCREENING FOR OTHER VIRAL DISEASES: Primary | ICD-10-CM

## 2020-10-05 ENCOUNTER — TRANSFERRED RECORDS (OUTPATIENT)
Dept: HEALTH INFORMATION MANAGEMENT | Facility: CLINIC | Age: 55
End: 2020-10-05

## 2020-11-02 DIAGNOSIS — Z11.59 ENCOUNTER FOR SCREENING FOR OTHER VIRAL DISEASES: ICD-10-CM

## 2020-11-02 PROCEDURE — U0003 INFECTIOUS AGENT DETECTION BY NUCLEIC ACID (DNA OR RNA); SEVERE ACUTE RESPIRATORY SYNDROME CORONAVIRUS 2 (SARS-COV-2) (CORONAVIRUS DISEASE [COVID-19]), AMPLIFIED PROBE TECHNIQUE, MAKING USE OF HIGH THROUGHPUT TECHNOLOGIES AS DESCRIBED BY CMS-2020-01-R: HCPCS | Performed by: INTERNAL MEDICINE

## 2020-11-03 LAB
SARS-COV-2 RNA SPEC QL NAA+PROBE: NOT DETECTED
SPECIMEN SOURCE: NORMAL

## 2020-11-09 DIAGNOSIS — Z11.59 ENCOUNTER FOR SCREENING FOR OTHER VIRAL DISEASES: ICD-10-CM

## 2020-11-09 PROCEDURE — U0003 INFECTIOUS AGENT DETECTION BY NUCLEIC ACID (DNA OR RNA); SEVERE ACUTE RESPIRATORY SYNDROME CORONAVIRUS 2 (SARS-COV-2) (CORONAVIRUS DISEASE [COVID-19]), AMPLIFIED PROBE TECHNIQUE, MAKING USE OF HIGH THROUGHPUT TECHNOLOGIES AS DESCRIBED BY CMS-2020-01-R: HCPCS | Performed by: INTERNAL MEDICINE

## 2020-11-10 ENCOUNTER — TELEPHONE (OUTPATIENT)
Dept: GASTROENTEROLOGY | Facility: CLINIC | Age: 55
End: 2020-11-10

## 2020-11-10 LAB
SARS-COV-2 RNA SPEC QL NAA+PROBE: NOT DETECTED
SPECIMEN SOURCE: NORMAL

## 2020-11-10 NOTE — TELEPHONE ENCOUNTER
FYI: Patient scheduled for a colonoscopy. He states his provider said he could stop Xeralto for 24 hours prior to exam

## 2020-11-12 ENCOUNTER — HOSPITAL ENCOUNTER (OUTPATIENT)
Facility: AMBULATORY SURGERY CENTER | Age: 55
Discharge: HOME | End: 2020-11-12
Attending: INTERNAL MEDICINE | Admitting: INTERNAL MEDICINE
Payer: COMMERCIAL

## 2020-11-12 VITALS
RESPIRATION RATE: 16 BRPM | TEMPERATURE: 98.4 F | OXYGEN SATURATION: 95 % | DIASTOLIC BLOOD PRESSURE: 75 MMHG | WEIGHT: 177 LBS | HEART RATE: 74 BPM | BODY MASS INDEX: 25.34 KG/M2 | HEIGHT: 70 IN | SYSTOLIC BLOOD PRESSURE: 117 MMHG

## 2020-11-12 LAB — COLONOSCOPY: NORMAL

## 2020-11-12 PROCEDURE — 45378 DIAGNOSTIC COLONOSCOPY: CPT | Performed by: INTERNAL MEDICINE

## 2020-11-12 PROCEDURE — 45378 DIAGNOSTIC COLONOSCOPY: CPT

## 2020-11-12 RX ORDER — ONDANSETRON 2 MG/ML
4 INJECTION INTRAMUSCULAR; INTRAVENOUS
Status: DISCONTINUED | OUTPATIENT
Start: 2020-11-12 | End: 2020-11-13 | Stop reason: HOSPADM

## 2020-11-12 RX ORDER — LIDOCAINE 40 MG/G
CREAM TOPICAL
Status: DISCONTINUED | OUTPATIENT
Start: 2020-11-12 | End: 2020-11-13 | Stop reason: HOSPADM

## 2020-11-12 RX ORDER — FENTANYL CITRATE 50 UG/ML
INJECTION, SOLUTION INTRAMUSCULAR; INTRAVENOUS PRN
Status: DISCONTINUED | OUTPATIENT
Start: 2020-11-12 | End: 2020-11-12 | Stop reason: HOSPADM

## 2020-11-12 RX ORDER — SIMETHICONE
LIQUID (ML) MISCELLANEOUS PRN
Status: DISCONTINUED | OUTPATIENT
Start: 2020-11-12 | End: 2020-11-12 | Stop reason: HOSPADM

## 2020-11-12 ASSESSMENT — MIFFLIN-ST. JEOR: SCORE: 1644.12

## 2020-11-12 NOTE — DISCHARGE INSTRUCTIONS
Discharge Instructions after Colonoscopy      Activity and Diet  You were given medicine for pain. You may be dizzy or sleepy.  For 24 hours:    Do not drive or use heavy equipment.    Do not make important decisions.    Do not drink any alcohol.  You may return to your normal diet and medicines.    Discomfort    Air was placed in your colon during the exam in order to see it. Walking helps to pass the air.    You may take Tylenol (acetaminophen) for pain unless your doctor has told you not to.  Do not take aspirin or ibuprofen (Advil, Motrin, or other anti-inflammatory  drugs) for _____ days.    Follow-up  ____ We took small tissue samples or polyps to study. Your doctor will call you with the results  within two weeks.    When to call:    Call right away if you have:    Unusual pain in belly or chest pain not relieved with passing air.    More than 1 to 2 Tablespoons of bleeding from your rectum.    Fever above 100.6  F (37.5  C).    If you have severe pain, bleeding, or shortness of breath, go to an emergency room.    If you have questions, call:  Monday to Friday, 7 a.m. to 4:30 p.m.  Endoscopy: [phone] (We may have to call you back)    After hours  Hospital: [phone] (Ask for the GI fellow on call)

## 2021-01-13 LAB — PHQ9 SCORE: 14

## 2021-02-17 DIAGNOSIS — Z86.711 HISTORY OF PULMONARY EMBOLISM: ICD-10-CM

## 2021-02-24 ENCOUNTER — TRANSFERRED RECORDS (OUTPATIENT)
Dept: HEALTH INFORMATION MANAGEMENT | Facility: CLINIC | Age: 56
End: 2021-02-24

## 2021-03-04 ENCOUNTER — OFFICE VISIT (OUTPATIENT)
Dept: HEMATOLOGY | Facility: CLINIC | Age: 56
End: 2021-03-04
Attending: INTERNAL MEDICINE
Payer: COMMERCIAL

## 2021-03-04 VITALS
RESPIRATION RATE: 14 BRPM | SYSTOLIC BLOOD PRESSURE: 130 MMHG | BODY MASS INDEX: 27.96 KG/M2 | DIASTOLIC BLOOD PRESSURE: 83 MMHG | HEART RATE: 75 BPM | HEIGHT: 70 IN | OXYGEN SATURATION: 97 % | TEMPERATURE: 98.1 F | WEIGHT: 195.3 LBS

## 2021-03-04 DIAGNOSIS — Z86.711 HISTORY OF PULMONARY EMBOLISM: ICD-10-CM

## 2021-03-04 PROCEDURE — 99214 OFFICE O/P EST MOD 30 MIN: CPT | Performed by: INTERNAL MEDICINE

## 2021-03-04 PROCEDURE — G0463 HOSPITAL OUTPT CLINIC VISIT: HCPCS | Mod: GT

## 2021-03-04 ASSESSMENT — MIFFLIN-ST. JEOR: SCORE: 1714.18

## 2021-03-04 ASSESSMENT — PAIN SCALES - GENERAL: PAINLEVEL: NO PAIN (0)

## 2021-03-08 NOTE — PROGRESS NOTES
[facility]  [address] [place] [id number]  Phone: [phone], Fax: [phone]      Outpatient Visit Note:    Patient: Nikunj Sinha  MRN: [id number]  : 1965  MARTA: 2021    Nikunj Sinha is a 56 year old man with a history of unprovoked pulmonary embolism who returns for routine follow up.      Assessment:  In summary, Nikunj Sinha is a 56 year old man with unprovoked bilateral DVT and pulmonary embolism at high risk for recurrence doing well on secondary prophylaxis.  I'll make no changes to treatment today and plan to see him back in 1 year.    Recommendations:  1. I counseled the patient about my assessment of his high risk for recurrence.  Based on this he decided to remain on indefinite prophylaxis with rivaroxaban  2. I counseled about the risks and benefits of DOAC use including that these medicines are often easier for patients to take than warfarin because of the lack of monitoring required and were found to be equally effective in both the treatment and prevention of recurrence of blood clots.  The primary risk of taking the medicines is an increased risk of bleeding.  I quoted the typical bleeding rates found from the interventional trial comparing the DOACs and warfarin of around .5 to 1% per year.  I explained that an unexpected but consistent finding in the trials was a lower rate of intracranial hemorrhage than with warfarin.  I also explained that age and concurrent ASA use are the best known risk factors for development of ICH.    3. I counseled about how to take the DOAC medicine and the kinetics of the medicine including that the blood in thin starting around 1-2 hours after taking the medicine and wears off over the course of the day.  I also specifically discussed the need to take rivaroxaban with food for adequate absorption  4. Continue rivaroxaban 20mg daily for secondary prophylaxis given his high risk for recurrence.   Renewed his Rx  5. He will call with any procedures to have an interruption plan in place  6. RTC 1 year with PA and plan for creatinine every 2-3 years.    25 minutes spent on the date of the encounter doing chart review, review of test results, interpretation of tests, patient visit and documentation       Gregorio Pires MD   of Medicine, Division of Hematology, Oncology and Transplantation  [employer] [facility] [employer]     --------------------------------------------------------  Forward History:  August 2019, he was walking from the [employer] parking lot into the store when he developed worsening chest pain and then a syncopal event.  He then drove home but the chest pain recurred the next day and he presented to a local emergency department.  Evaluation there at [facility] in [place] and demonstrated the presence of a high burden of pulmonary embolism and bilateral DVTs.  He was hospitalized about 8 days primarily on heparin infusion and monitored carefully for any decompensation.  He did very well and was discharged home on rivaroxaban.  Feb 2020 ultrasound showed no chronic DVT    History: Nikunj Sinha is a 56 year old man with history of alcohol abuse and unprovoked VTE on indefinite rivaroxaban.  He is doing well today with no real complaints.  No bleeding issues.  Leg pain is minimal.  Wears compression stockings for comfort.  No chest pain or dyspnea on exertion.      Objective:  Vitals: B/P: 130/83, T: 98.1, P: 75, R: 14, Wt: 195 lbs 4.8 oz  Exam:   Gen: Appears well, no distress  Ext: no edema    Labs:  Creatinine last year was normal    Imaging:  Follow up ultrasound in Feb 2020 showed no chronic clot

## 2021-03-25 ENCOUNTER — E-VISIT (OUTPATIENT)
Dept: FAMILY MEDICINE | Facility: CLINIC | Age: 56
End: 2021-03-25
Payer: COMMERCIAL

## 2021-03-25 DIAGNOSIS — Z20.822 SUSPECTED COVID-19 VIRUS INFECTION: Primary | ICD-10-CM

## 2021-03-25 DIAGNOSIS — Z20.822 SUSPECTED COVID-19 VIRUS INFECTION: ICD-10-CM

## 2021-03-25 PROCEDURE — U0003 INFECTIOUS AGENT DETECTION BY NUCLEIC ACID (DNA OR RNA); SEVERE ACUTE RESPIRATORY SYNDROME CORONAVIRUS 2 (SARS-COV-2) (CORONAVIRUS DISEASE [COVID-19]), AMPLIFIED PROBE TECHNIQUE, MAKING USE OF HIGH THROUGHPUT TECHNOLOGIES AS DESCRIBED BY CMS-2020-01-R: HCPCS | Performed by: FAMILY MEDICINE

## 2021-03-25 PROCEDURE — 99421 OL DIG E/M SVC 5-10 MIN: CPT | Performed by: FAMILY MEDICINE

## 2021-03-25 PROCEDURE — U0005 INFEC AGEN DETEC AMPLI PROBE: HCPCS | Performed by: FAMILY MEDICINE

## 2021-03-25 NOTE — PATIENT INSTRUCTIONS
Dear Nikunj Sinha,    Your symptoms show that you may have coronavirus (COVID-19). This illness can cause fever, cough and trouble breathing. Many people get a mild case and get better on their own. Some people can get very sick.    Will I be tested for COVID-19?  We would like to test you for Covid-19 virus. I have placed orders for this test.     To schedule: go to your [employer] home page and scroll down to the section that says  You have an appointment that needs to be scheduled  and click the large green button that says  Schedule Now  and follow the steps to find the next available openings.    If you are unable to complete these [employer] scheduling steps, please call [phone] to schedule your testing.     Return to work/school/ guidance:  Please let your workplace manager and staffing office know when your quarantine ends     We can t give you an exact date as it depends on the above. You can calculate this on your own or work with your manager/staffing office to calculate this. (For example if you were exposed on 10/4, you would have to quarantine for 14 full days. That would be through 10/18. You could return on 10/19.)      If you receive a positive COVID-19 test result, follow the guidance of the those who are giving you the results. Usually the return to work is 10 (or in some cases 20 days from symptom onset.) If you work at [facility], you must also be cleared by Employee Occupational Health and Safety to return to work.        If you receive a negative COVID-19 test result and did not have a high risk exposure to someone with a known positive COVID-19 test, you can return to work once you're free of fever for 24 hours without fever-reducing medication and your symptoms are improving or resolved.      If you receive a negative COVID-19 test and If you had a high risk exposure to someone who has tested positive for COVID-19 then you can return to work 14 days after your last contact  with the positive individual    Note: If you have ongoing exposure to the covid positive person, this quarantine period may be more than 14 days. (For example, if you are continued to be exposed to your child who tested positive and cannot isolate from them, then the quarantine of 7-14 days can't start until your child is no longer contagious. This is typically 10 days from onset of the child's symptoms. So the total duration may be 17-24 days in this case.)    Sign up for [employer].   We know it's scary to hear that you might have COVID-19. We want to track your symptoms to make sure you're okay over the next 2 weeks. Please look for an email from [employer]--this is a free, online program that we'll use to keep in touch. To sign up, follow the link in the email you will receive. Learn more at [email or web address]    How can I take care of myself?    Get lots of rest. Drink extra fluids (unless a doctor has told you not to)    Take Tylenol (acetaminophen) or ibuprofen for fever or pain. If you have liver or kidney problems, ask your family doctor if it's okay to take Tylenol o ibuprofen    If you have other health problems (like cancer, heart failure, an organ transplant or severe kidney disease): Call your specialty clinic if you don't feel better in the next 2 days.    Know when to call 911. Emergency warning signs include:  o Trouble breathing or shortness of breath  o Pain or pressure in the chest that doesn't go away  o Feeling confused like you haven't felt before, or not being able to wake up  o Bluish-colored lips or face    Where can I get more information?  [facility] - About COVID-19:   [email or web address]    CDC - What to Do If You're Sick:   www.cdc.gov/coronavirus/2019-ncov/about/steps-when-sick.html

## 2021-03-26 LAB
LABORATORY COMMENT REPORT: NORMAL
SARS-COV-2 RNA RESP QL NAA+PROBE: NEGATIVE
SARS-COV-2 RNA RESP QL NAA+PROBE: NORMAL
SPECIMEN SOURCE: NORMAL
SPECIMEN SOURCE: NORMAL

## 2021-03-27 NOTE — RESULT ENCOUNTER NOTE
Hello Mr. Sinha,  Your results came back with negative covid test. continue to monitor & stay quarantined for 10 days from onset of symptoms till better & go to the er if short of breath & make an apt virtually if not better & If you have any further concerns please do not hesitate to contact us by message, phone or making an appointment.  Have a good day   Dr Will KIM

## 2021-04-12 ENCOUNTER — TRANSFERRED RECORDS (OUTPATIENT)
Dept: HEALTH INFORMATION MANAGEMENT | Facility: CLINIC | Age: 56
End: 2021-04-12

## 2021-05-10 ENCOUNTER — TRANSFERRED RECORDS (OUTPATIENT)
Dept: HEALTH INFORMATION MANAGEMENT | Facility: CLINIC | Age: 56
End: 2021-05-10

## 2021-05-27 NOTE — PROGRESS NOTES
Correct pharmacy verified with patient and confirmed in snapshot? [x] yes []no    Charge captured ? [x] yes  [] no    Medications Phoned  to Pharmacy [] yes [x]no  Name of Pharmacist:  List Medications, including dose, quantity and instructions      Medication Prescriptions given to patient   [] yes  [x] no   List the name of the drug the prescription was written for.       Medications ordered this visit were e-scribed.  Verified by order class [x] yes  [] no  Lexapro and Vitamin D  OTC vit B complex    Medication changes or discontinuations were communicated to patient's pharmacy: [] yes  [x] no    UA collected [x] yes  [] no    [place] Prescription Monitoring Program Reviewed? [x] yes  [] no    Referrals were made to:  none  Future appointment was made: [x] yes  [] no  4/11/19  Dictation completed at time of chart check: [x] yes  [] no    I have checked the documentation for today s encounters and the above information has been reviewed and completed.

## 2021-05-27 NOTE — PATIENT INSTRUCTIONS - HE
Outpatient rehabilitation therapy to continue as arranged for chemical dependency counselor.  Abstain from all illicit substances and alcohol.  Attend AA/NA support groups at least 3  times weekly.  Follow through with recommendations.  If suicidal or homicidal ideations occur call 911 or go to the nearest emergency room.

## 2021-05-28 NOTE — TELEPHONE ENCOUNTER
Called patient back per Dr. Obando and gave him the directive to have his B-12 injection at his PMD office.

## 2021-05-28 NOTE — TELEPHONE ENCOUNTER
Patient called in to set up B12 injection in clinic but is not able to come in the afternoon at all due to OP obligations during injection clinic times. Needs further clarification from manager Please advise [phone]

## 2021-05-28 NOTE — PROGRESS NOTES
Correct pharmacy verified with patient and confirmed in snapshot? [x] yes []no    Charge captured ? [x] yes  [] no    Medications Phoned  to Pharmacy [] yes [x]no  Name of Pharmacist:  List Medications, including dose, quantity and instructions      Medication Prescriptions given to patient   [] yes  [x] no   List the name of the drug the prescription was written for.       Medications ordered this visit were e-scribed.  Verified by order class [x] yes  [] no  Campral, Lexapro, Inderal  Gabapentin - no print class - pt provider pt has enough supply  B-12 injection ordered as a clinic-administered, will get the injection on 5/23/19  Medication changes or discontinuations were communicated to patient's pharmacy: [x] yes  [] no  Called [facility] pharmacy and updated of the Lexapro and Inderal dose increase  UA collected [x] yes  [] no    [place] Prescription Monitoring Program Reviewed? [x] yes  [] no    Referrals were made to:  none  Future appointment was made: [x] yes  [] no  5/23/19  Dictation completed at time of chart check: [x] yes  [] no    I have checked the documentation for today s encounters and the above information has been reviewed and completed.

## 2021-05-28 NOTE — PROGRESS NOTES
Correct pharmacy verified with patient and confirmed in snapshot? [x] yes []no    Charge captured ? [x] yes  [] no    Medications Phoned  to Pharmacy [] yes [x]no  Name of Pharmacist:  List Medications, including dose, quantity and instructions      Medication Prescriptions given to patient   [] yes  [x] no   List the name of the drug the prescription was written for.       Medications ordered this visit were e-scribed.  Verified by order class [x] yes  [] no campral 333mg, vit b-12 1000mcg, vit d2 50,000 unit.    Medication changes or discontinuations were communicated to patient's pharmacy: [] yes  [x] no    UA collected [x] yes  [] no    [place] Prescription Monitoring Program Reviewed? [x] yes  [] no    Referrals were made to:  no    Future appointment was made: [x] yes  [] no     Dictation completed at time of chart check: [x] yes  [] no    I have checked the documentation for today s encounters and the above information has been reviewed and completed.

## 2021-05-28 NOTE — TELEPHONE ENCOUNTER
Patient picked up his injection himself at pharmacy it was not ordered as clinical administered, please advise patient is asking if he should just give himself this injection as the pharmacy provided him a syringe. Advised him to wait for directive , patient agreed

## 2021-05-29 NOTE — PROGRESS NOTES
Here today for vitamin b12 injection   Last injection given on 5/16/19.   Site: right deltoid.    Status of last injection site:no issues    Medication Given: Vit b12   Site: left deltoid  Tolerated: well  Reaction:none    Next injection appt on: n/a  Next Appt with provider: 7/3/19

## 2021-05-29 NOTE — PROGRESS NOTES
Correct pharmacy verified with patient and confirmed in snapshot? [x] yes []no    Charge captured ? [x] yes  [] no    Medications Phoned  to Pharmacy [] yes [x]no  Name of Pharmacist:  List Medications, including dose, quantity and instructions      Medication Prescriptions given to patient   [] yes  [x] no   List the name of the drug the prescription was written for.       Medications ordered this visit were e-scribed.  Verified by order class [x] yes  [] no   Vitamin D2 50,000 iu's   Medication changes or discontinuations were communicated to patient's pharmacy: [] yes  [x] no    UA collected [x] yes  [] no    [place] Prescription Monitoring [place] Reviewed? [x] yes  [] no    Referrals were made to:  None    Future appointment was made: [x] yes  [] no   6/20/2019  Dictation completed at time of chart check: [x] yes  [] no    I have checked the documentation for today s encounters and the above information has been reviewed and completed.

## 2021-05-29 NOTE — PROGRESS NOTES
Correct pharmacy verified with patient and confirmed in snapshot? [x] yes []no    Charge captured ? [x] yes  [] no    Medications Phoned  to Pharmacy [] yes [x]no  Name of Pharmacist:  List Medications, including dose, quantity and instructions      Medication Prescriptions given to patient   [] yes  [x] no   List the name of the drug the prescription was written for.       Medications ordered this visit were e-scribed.  Verified by order class [x] yes  [] no campral 333mg, wellbutrin 150mg, vit D2, gabapentin 100mg.    Medication changes or discontinuations were communicated to patient's pharmacy: [] yes  [x] no    UA collected [x] yes  [] no    [place] Prescription Monitoring [place] Reviewed? [x] yes  [] no    Referrals were made to:  no    Future appointment was made: [x] yes  [] no    Dictation completed at time of chart check: [x] yes  [] no    I have checked the documentation for today s encounters and the above information has been reviewed and completed.

## 2021-05-30 NOTE — PROGRESS NOTES
Correct pharmacy verified with patient and confirmed in snapshot? [x] yes []no    Charge captured ? [x] yes  [] no    Medications Phoned  to Pharmacy [] yes [x]no  Name of Pharmacist:  List Medications, including dose, quantity and instructions      Medication Prescriptions given to patient   [] yes  [x] no   List the name of the drug the prescription was written for.       Medications ordered this visit were e-scribed.  Verified by order class [x] yes  [] no    Medication changes or discontinuations were communicated to patient's pharmacy: [] yes  [x] no    UA collected [x] yes  [] no    [place] Prescription Monitoring [place] Reviewed? [x] yes  [] no    Referrals were made to:   none    Future appointment was made: [x] yes  [] no    Dictation completed at time of chart check: [] yes  [x] no    I have checked the documentation for today s encounters and the above information has been reviewed and completed.

## 2021-05-31 NOTE — TELEPHONE ENCOUNTER
WQ order received for Hematology Consult, referring physician Paddy Reynolds MD. Spoke with the patient to set up appointment. Same scheduled for Wednesday, September 4, 2019 at 2:00 pm with Dr. Read, with a nurse apt at 1:45 pm. Packet sent via email to pt with informational letter, MD bio card, [facility] intake form, medication and allergy list, and appointment letter. Work up for acute saddle PE /acute cor pulmonale/unprovoked severe PE on anticoagulation has been done at [facility].   Medical records will collect any outside records and ensure all records are available at the time of consultation.    I explained that the pt. would be coming to a Cancer Center and that the doctors are Oncologists as well as Hematologists. Explained the appointment process of arriving early and bringing his Health History and medication allergy list along to his appointment.   My phone number was given to the pt. for any question or concerns - [phone].

## 2021-06-01 NOTE — PROGRESS NOTES
[facility] Hematology and Oncology Consult Note    Patient: Nikunj Sinha  MRN: [id number]  Date of Service: 09/04/2019      Reason for Visit    Chief Complaint   Patient presents with     Benign Hematology       Assessment/Plan    ECOG Performance   ECOG Performance Status: 1  Distress Assessment  Distress Assessment Score: 6(due to visit today)    #.  Acute bilateral extensive pulmonary emboli with saddle embolus, bilateral lower extremity DVT in August 2019, presented with syncopal episode.    #.  Recovering alcoholic     I reviewed the pathophysiology of venous thromboembolic exam and risks of recurrence. He is wondering about excessive plasma donation trigger that event and I think it is possible.  At this point, regardless of the  etiology,  he is indicated for long-term anticoagulation due to initial presentation of life-threatening episode.  No prior history of venous thromboembolism or family history of thromboembolism.  I would not recommend family/hereditary thrombophilia work-up.    I also discussed that we do not need to repeat CT scan of the chest to know the resolution of blood clots, however will obtain CTA if he has signs and symptoms  concerning for venous thromboembolism.    I discussed about strict abstinence from alcohol with anticoagulants.  I recommended to obtain kidney, liver function monitoring at least once a year.     I discussed about age-appropriate cancer screening. He admitted that he had positive stool test (sounds like FOBT) a couple years ago.  Unclear it was related to his alcohol use.  He was recommended to proceed with colonoscopy, but has not completed yet.  I recommended him to complete colonoscopy however, we will need to wait at least 3-6 months because of this recent pulmonary emboli event and I would not recommend interruption of anticoagulation at this point.  He voiced understanding.  I noted that he has a gastroenterology referral.  Recommended him to have a primary  care provider as   His previous primary care provider is retiring/retired.  Until he finds a primary care provider, I will continue to manage his anticoagulation.   Follow-up with me in about 6 months with labs including hemogram, CMP.    Problem List    1. Acute saddle pulmonary embolism with acute cor pulmonale (H)     2. Personal history of DVT (deep vein thrombosis)       ______________________________________________________________________________    History    Mr. Nikunj Sinha is a very pleasant 54-year-old gentleman who is here  by himself.  He presented to the emergency room on 8/4/2019 after he had a syncopal episode in front of [employer].  He did not recall particular chest pain or shortness of breath.  He noted swelling of his leg in various spot but he denies any persistent pain or swelling or redness.  No injury.  No acute illness prior to that.  However he donated plasma 10 times in 5 weeks prior to that.  Upon initial evaluation, d-dimer was elevated at 6.7.  PTT was normal at 28, INR is 1.09.He underwent coronary angiogram and it did not show any obstructive coronary artery disease.  CTAof the chest showed extensive bilateral pulmonary emboli with saddle embolus.  Bilateral lower extremity ultrasound showed nonocclusive right femoral DVT from upper to mid thigh, left calf DVT within 1 of the peroneal veins in both posterior tibial vein from the upper to lower calf.  Echocardiogram showed lower limit of normal left ventricular systolic function of 50% with right ventricular overload.  Thrombolytic was not given.  He was eventually started IV heparin and then transition to Xarelto.  He tolerated Xarelto well.  No major bleeding except intermittent gum bleeding.    He is a recovering alcoholic.  He currently resides in sober house since June 2019.  Denies  Chronic liver disease or kidney disease.  No major medical problems.      Currently unemployed.  Never smoker.  He used to drink alcohol for over 30  years.  Denies family history of venous thromboembolism.  He had a brother who passed away from [facility].  He is single.  Does not have children.    Past History    Past Medical History:   Diagnosis Date     Alcoholism (H)      Depression     only current med citalopram, manages through pmd     Pulmonary embolism (H)     Family History   Problem Relation Age of Onset     Suicidality Paternal Grandfather       Past Surgical History:   Procedure Laterality Date     CV CORONARY ANGIOGRAM N/A 8/4/2019    Procedure: Coronary Angiogram;  Surgeon: Ambrose Mayfield MD;  Location: [facility] Cath Lab;  Service: Cardiology     CV LEFT HEART CATHETERIZATION WO LEFT VETRICULOGRAM Left 8/4/2019    Procedure: Left Heart Catheterization Without Left Ventriculogram;  Surgeon: Ambrose Mayfield MD;  Location: [facility] Cath Lab;  Service: Cardiology     TONSILLECTOMY      Social History     Socioeconomic History     Marital status: Single     Spouse name: Not on file     Number of children: Not on file     Years of education: Not on file     Highest education level: Not on file   Occupational History     Not on file   Social Needs     Financial resource strain: Not on file     Food insecurity:     Worry: Not on file     Inability: Not on file     Transportation needs:     Medical: Not on file     Non-medical: Not on file   Tobacco Use     Smoking status: Never Smoker     Smokeless tobacco: Never Used   Substance and Sexual Activity     Alcohol use: Not Currently     Frequency: Never     Comment: sober from alcohol since 1/16/19     Drug use: No     Sexual activity: Not Currently     Partners: Female     Birth control/protection: Condom   Lifestyle     Physical activity:     Days per week: Not on file     Minutes per session: Not on file     Stress: Not on file   Relationships     Social connections:     Talks on phone: Not on file     Gets together: Not on file     Attends Mu-ism service: Not on file     Active member of club or  organization: Not on file     Attends meetings of clubs or organizations: Not on file     Relationship status: Not on file     Intimate partner violence:     Fear of current or ex partner: Not on file     Emotionally abused: Not on file     Physically abused: Not on file     Forced sexual activity: Not on file   Other Topics Concern     Not on file   Social History Narrative     Not on file        Allergies    Allergies   Allergen Reactions     Ragweed Pollen Other (See Comments)     congestion       Review of Systems    General  General (WDL): Exceptions to WDL  Fatigue: Yes - Recent (Less than 3 months)  Fever: None  Generalized Muscle Weakness: Yes - Recent (Less than 3 months)  Weight Loss: Yes - Recent (Greater than 3 months)  ENT  ENT (WDL): Exceptions to WDL  Vertigo (Dizziness): Yes, Recent (Less than 3 months)  Changes in vision: Yes - Recent (Less than 3 months)  Glasses or Contacts: Yes - Chronic (Greater than 3 months)  Hearing loss: None  Hearing Aids: None  Tinnitus: Yes - Recent (Less than 3 months)  Pain/Pressure in ears: None  Sinus Congestion/Drainage: None  Hoarseness: Yes - Recent (Less than 3 months)  Sore Throat: Yes - Recent (Less than 3 months)  Dental Problems: Yes - Chronic (Greater than 3 months)  Dentures: Yes - Chronic (Greater than 3 months)(removable upper and lower dentures)  Respiratory  Respiratory (WDL): Exceptions to WDL  Dyspnea: None  hemoptysis: None  Is patient on O2?: None  Cough: Yes - Recent (Less than 3 months)  Non-Cardiac Chest Pain: None  Cardiovascular  Cardiovascular (WDL): Exceptions to WDL  Palpitations: Yes - Recent (Less than 3 months)  Edema Limbs: None  Irregular Heart Beat: Yes - Recent (Less than 3 months)  Chest Pain: Yes - Recent (Less than 3 months)  Lightheadedness: Yes - Recent (Less than 3 months)  Endocrine  Endocrine (WDL): Exceptions to WDL  Heat Intolerance: Yes - Recent (Less than 3 months)  Excessive Thirst: Yes - Recent (Less than 3 months)  Cold  Intolerance: None  Excessive Urination: None  Hotflashes: None  Gastrointestinal  Gastrointestinal (WDL): Exceptions to WDL  Difficulty Swallowing: Yes - Recent (Less than 3 months)  Heartburn: Yes - Recent (Less than 3 months)  Constipation: None  Yellowish skin and/or eyes: Yes - Recent (Less than 3 months)  Blood from rectum: None  Nausea and Vomiting: None  Abdominal Pain: Yes - Chronic (Greater than 3 months)  Diarrhea: Yes - Recent (Less than 3 months)  Have had black or tan stools?: None  Hemorrhoids: None  Poor Appetite: None  Musculoskeletal  Musculoskeletal (WDL): Exceptions to WDL  Range of Motion Limitation: None  Joint pain: Yes - Recent (Less than 3 months)  Back Pain: Yes - Recent (Less than 3 months)  Activity Assistance: None  Difficulty to lie flat for more than 30 minutes: None  Pain interfering with walking: None  Muscle pain or stiffness: Yes - Recent (Less than 3 months)  Recent fall: None  Assistive device: None  Neurological  Neurological (WDL): Exceptions to WDL  History of LOC?: None  Headaches: Yes - Recent (Less than 3 months)  Difficulty walking: Yes - Recent (Less than 3 months)  Difficulty with speech: None  Difficulty with memory: Yes - Recent (Less than 3 months)  Vertigo (Dizziness): Yes, Recent (Less than 3 months)  Dominant Hand: Right  Seizures: None  Difficulty with Balance: Yes - Recent (Less than 3 months)  Numbness and/or tingling: Yes - Recent (Less than 3 months)  Psychological/Emotional  Psychological/Emotional (WDL): Exceptions to WDL  Depression: Yes - Recent (Less than 3 months)  Insomnia: None  Panic attacks: Yes - Recent (Less than 3 months)  Anxiety: Yes - Chronic (Greater than 3 months)  Daytime sleepiness: Yes - Recent (Less than 3 months)  Hallucinations: None  Psychosocial needs or not coping well: None  Hematological/Lymphatic  Hematological/Lymphatic (WDL): Exceptions to WDL  Bleeding gums: Yes - Recent (Less than 3 months)  Bruising: Yes - Recent (Less than 3  "months)  Epistaxis: Yes - Recent (Less than 3 months)  Swollen glands: Yes - Recent (Less than 3 months)  Dermatological  Dermatologic (WDL): Exceptions to WDL  Open wounds: Yes - Recent (Less than 3 months)  Rash : Yes - Recent (Less than 3 months)  Hair Loss: None  Healing Incision: None  Changes in moles: None  Significant sunburn: None  Genitourinary/Reproductive  Genitourinary/Reproductive (WDL): Exceptions to WDL  Urinary Frequency: None  Urinary Incontinence: None  Painful urination: None  Urination more than 2 times a night: Yes - Recent (Less than 3 months)  Urinary Urgency: None  Difficulty Initiating Urine Stream: Yes - Recent (Less than 3 months)  Blood in urine: Yes - Recent (Less than 3 months)  Sensation of incomplete emptying of bladder: Yes - Recent (Less than 3 months)  Reproductive (Females only)     Pain  Currently in Pain: Yes  Pain Score (Initial OR Reassessment): No/Denies Pain  Pain Frequency: Intermittent  Location: Chest  Pain Characteristics : Discomfort  Pain Intervention(s): Home medication;Rest  Response to Interventions: improved    Physical Exam    Recent Vitals 9/4/2019   Height 5' 10\"   Weight 181 lbs 14 oz   BSA (m2) 2.02 m2   /79   Pulse 93   Temp 98.3   Temp src 1   SpO2 96   Some recent data might be hidden     General: alert, awake, not in acute distress  HEENT: Head: Normal, normocephalic, atraumatic.  Eye: Normal external eye, conjunctiva, lids cornea, BRANDI.  Ears:  Non-tender.  Nose: Normal external nose, mucus membranes and septum.  Pharynx: Dental Hygiene adequate. Normal buccal mucosa. Normal pharynx.  Neck / Thyroid: Supple, no masses, nodes, nodules or enlargement.  Lymphatics: No abnormally enlarged lymph nodes.  Chest: Normal chest wall and respirations. Clear to auscultation.  Heart: S1 S2 RRR, no murmur.   Abdomen: abdomen is soft without significant tenderness, masses, organomegaly or guarding  Extremities: normal strength, tone, and muscle mass  Skin: " normal. no rash or abnormalities  CNS: non focal.    Lab Results    No results found for this or any previous visit (from the past 168 hour(s)).    Imaging Results    No results found.      Signed by: Cinthia Read MD

## 2021-06-02 VITALS — BODY MASS INDEX: 25.48 KG/M2 | WEIGHT: 178 LBS | HEIGHT: 70 IN

## 2021-06-02 VITALS — BODY MASS INDEX: 26.05 KG/M2 | WEIGHT: 182 LBS | HEIGHT: 70 IN

## 2021-06-02 VITALS — WEIGHT: 193 LBS | BODY MASS INDEX: 27.63 KG/M2 | HEIGHT: 70 IN

## 2021-06-02 NOTE — PROGRESS NOTES
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   Partner / Spouse    Parent(s)/Aunt(s)/Uncle(s)/Grandparents    Sibling(s)/Cousin(s)    Child(tex)    Other relative(s)    Friend(s)/neighbor(s)    Child(tex) s father(s)/mother(s)    Support group member(s) Always supportive   Community of maira members    /counselor/therapist/healer Always supportive   Other (specify)      8A. What is your current living situation?     Homeless    8B. What is your long term plan for where you will be living?    Housing.    8C. Tell me about your living environment/neighborhood? Ask enough follow up questions to determine safety, criminal activity, availability of alcohol and drugs, supportive or antagonistic to the person making changes.      Varies. Depends on where he is staying.    9. Criminal justice history: Gather current/recent history and any significant history related to substance use--Arrests? Convictions? Circumstances? Alcohol or drug involvement? Sentences? Still on probation or parole? Expectations of the court? Current court order? Any sex offenses - lifetime? What level? (DSM)    No current legals. Decades ago-2 DWIs.    10. What obstacles exist to participating in treatment? (Time off work, childcare, funding, transportation, pending [facility] time, living  situation)    None    Dimension VI Ratings   Recovery environment - The placing authority must use the criteria in Dimension VI to determine a client s recovery environment.   RISK DESCRIPTIONS - Severity ratin  Client has (A) Chronically antagonistic significant other, living environment, family, peer gorup or long-term criminal justice involvement that is harmful to recovery or treatment progress, or (B) Client has an actively antagonistic significant other, family, work, or living environment with immediate threat to the client's safety and well-being.      REASONS SEVERITY WAS ASSIGNED - (What support does the person have for making changes? What structure/stability does the person have in his or her daily life that willincrease the likelihood that changes can be sustained? What problems exist in the person s environment that will jeopardize getting/staying clean and sober?)     Patient is unemployed. Patient is homeless, living out of his car. Patient is not engaged in structured daily activities. Patient has limited sober support network. Patient has limited positive support system. Patient denies current legals. Patient has prior legals.       Client Choice/Exceptions     Would you like services specific to language, age, gender, culture, Episcopalian preference, race, ethnicity, sexual orientation or disability?  no    If yes, specify:      What particular treatment choices and options would you like to have?  Inpatient    Do you have a preference for a particular treatment program?  [facility]      .    DSM-V Criteria for Substance Abuse  Instructions  Determine whether the client currently meets the criteria for a Substance Use Disorder using the diagnostic criteria in the DSM-V, pp. 481-589. Current means during the most recent 12 months outside a facility that controls access to substances.    Category of substance Severity ICD-10 Code/DSM V Code   [x]  Alcohol Use Disorder [] Mild  [] Moderate  [x]  Severe [] (F10.10) (305.00)  [] (F10.20) (303.90)  [x] (F10.20) (303.90)   []  Cannabis Use Disorder [] Mild  [] Moderate  [] Severe [] (F12.10) (305.20)  [] (F12.20) (304.30)  [] (F12.20) (304.30)   [] Hallucinogen Use Disorder [] Mild  [] Moderate  [] Severe [] (F16.10) (305.30)  [] (F16.20) (304.50)  [] (F16.20) (304.50)   []  Inhalant Use Disorder [] Mild  [] Moderate  [] Severe [] (F18.10) (305.90)  [] (F18.20) (304.60)  [] (F18.20) (304.60)   []  Opioid Use Disorder [] Mild  [] Moderate  [] Severe [] (F11.10) (305.50)  [] (F11.20) (304.00)  [] (F11.20) (304.00)   []  Sedative, Hypnotic, or Anxiolytic Use Disorder [] Mild  [] Moderate  [] Severe [] (F13.10) (305.40)   [] (F13.20) (304.10)  [] (F13.20) (304.10)   []  Stimulant Related Disorders [] Mild  [] Moderate  [] Severe [] (F15.10) (305.70) Amphetamine type substance  [] (F14.10) (305.60) Cocaine  [] (F15.10) (305.70) Other or unspecified stimulant  [] (F15.20) (304.40) Amphetamine type substance  [] (F14.20) (304.20) Cocaine  [] (F15.20) (304.40) Other or unspecified stimulant  [] (F15.20) (304.40) Amphetamine type substance  [] (F14.20) (304.20) Cocaine  [] (F15.20) (304.40) Other or unspecified stimulant   []  Tobacco use Disorder [] Mild  [] Moderate  [] Severe [] (Z72.0) (305.1)  [] (F17.200) (305.1)  [] (F17.200) (305.1)   []  Other (or unknown) Substance Use Disorder [] Mild  [] Moderate  [] Severe [] (F19.10) (305.90)  [] (F19.20) (304.90)  [] (F19.20) (304.90)       Suggested Level of Care Necessary for Recovery  [x]  Inpatient  []  Extended Care [x]  Residential []  Outpatient  []  None            Collateral Contact Summary   Number of contacts made:  2  Contact with referring person:  NA     If court related records were reviewed, summarize here:  NA     [x]   Information from collateral contacts supported/largely agreed with information from the client and associated risk ratings.   []   Information from collateral contacts was significantly  different from information from the client and lead to different risk ratings.      Summarize new information here:      Rule 25 Assessment Summary and Plan   's Recommendation    Based on the information gathered in this assessment and from collateral information, the client meets DSM-V criteria for Alcohol Use Disorder, Severe, (F10.20) (303.90)    -Inpatient/ Residential treatment program.  -Detoxification/medical monitoring services for alcohol withdrawal.  -Follow recommendations of treatment program.  -Abstain from use of mood altering substances not prescribed, including alcohol.  -Consider establishing additional mental health care services.  -Remain law abiding.      This assessment can be updated with additional information within a 6 month period.      Collateral Contacts      Name    Chela Relationship    Counselor-[facility] Phone Number    [phone] Releases    yes       Information Provided:      Discharged him yesterday. He told me he has been drinking since 9/30. He has been coming to treatment. Said he has been drinking just enough to help with the anxiety. Did an etg as staff and patients reported an odor of alcohol and it is still at the lab. Last Monday he missed group. He went back to the sober house intoxicated and was discharged. His friends were worried about him so they drove him to a hotel for the night.  His friend in [place] has been talking to him daily about his suicidal ideation. She will have more information that he may of not shared.    Collateral Contacts     Name    Chayito Arroyo   Relationship    Friend Phone Number    [phone] Releases    yes       Information Provided:      He is drinking again. He does have a long history of drinking. This is his last chance with me. I am not his girlfriend, just a long time friend. I have helped him out over the years. He has had a lot of people die on him. This is his second relapse since he was at [facility]. This is  the 2nd sober house he has been kicked out of because of drinking. He doesn't like authority over his life. Like his . I think it was when his car got damaged, he was thinking he has even less. He can't afford to fix it. It only takes one little thing to go wrong for him to relapse. He needs to become more social, not isolate so much. He prefers woman counselors to men, I think it is because he finds them to be more sympathetic and don't call him on his BS.    Collateral Contacts     Name    Epic Chart Review   Relationship    NA Phone Number    NA Releases    NA       Information Provided:      Epic chart reviewed.    A problematic pattern of alcohol/drug use leading to clinically significant impairment or distress, as manifested by at least two of the following, occurring within a 12-month period:      Specify if: In early remission:  After full criteria for alcohol/drug use disorder were previously met, none of the criteria for alcohol/drug use disorder have been met for at least 3 months but for less than 12 months (with the exception that Criterion A4,  Craving or a strong desire or urge to use alcohol/drug  may be met).     In sustained remission:   After full criteria for alcohol use disorder were previously met, none of the criteria for alcohol/drug use disorder have been met at any time during a period of 12 months or longer (with the exception that Criterion A4,  Craving or strong desire or urge to use alcohol/drug  may be met).   Specify if:   This additional specifier is used if the individual is in an environment where access to alcohol is restricted.    Mild: Presence of 2-3 symptoms  Moderate: Presence of 4-5 symptoms  Severe: Presence of 6 or more symptoms      Staff Name and Title: Alexandr Palmer  Date:  10/17/2019  Time:  10:13 AM

## 2021-06-03 VITALS
OXYGEN SATURATION: 96 % | SYSTOLIC BLOOD PRESSURE: 125 MMHG | DIASTOLIC BLOOD PRESSURE: 79 MMHG | BODY MASS INDEX: 26.04 KG/M2 | HEIGHT: 70 IN | TEMPERATURE: 98.3 F | WEIGHT: 181.9 LBS | HEART RATE: 93 BPM

## 2021-06-03 VITALS — BODY MASS INDEX: 27.92 KG/M2 | WEIGHT: 195 LBS | HEIGHT: 70 IN

## 2021-06-03 VITALS — HEIGHT: 70 IN | WEIGHT: 183.4 LBS | BODY MASS INDEX: 26.26 KG/M2

## 2021-06-03 VITALS — WEIGHT: 194 LBS | BODY MASS INDEX: 27.77 KG/M2 | HEIGHT: 70 IN

## 2021-06-03 VITALS — WEIGHT: 197 LBS | BODY MASS INDEX: 28.2 KG/M2 | HEIGHT: 70 IN

## 2021-06-03 VITALS — HEIGHT: 70 IN | BODY MASS INDEX: 26.77 KG/M2 | WEIGHT: 187 LBS

## 2021-06-19 NOTE — LETTER
Letter by Cinthia Read MD at      Author: Cinthia Read MD Service: -- Author Type: --    Filed:  Encounter Date: 8/15/2019 Status: (Other)       Dear Nikunj Sinha    Thank you for choosing [facility] for your care.  We are committed to providing you with the highest quality and compassionate healthcare services.  The following information pertains to your first appointment with our clinic.    Date/Time of appointment:  Wednesday, September 4, 2019 at 1:45 pm.      Note: Please arrive 15 minutes prior to your appointment time.  This allows time to complete forms, possible labs and nursing assessment.    Name of your Physician:  Cinthia Read MD    What to bring to your appointment:    Completed Patient History/Initial Nursing Assessment and Medication/Allergy List (these forms were sent to you).    Any paperwork or films from your physician that we have asked you to bring.    Your current insurance card(s).    Parking:    Please refer to the map included to direct you to [facility].    You can park in any visitor/patient parking area you choose. There is no charge for parking at [facility].     Enter the hospital at the front/main entrance.      Please check in with our  representatives who will escort you to the clinic located in [address] of the [facility].    We hope these instructions are helpful to you.  If you have any questions or concerns, please call us at [phone].  It is our pleasure to assist you.    Warm Regards,      Luz Salmon    [phone]

## 2021-06-20 NOTE — LETTER
Letter by Cinthia Read MD at      Author: Cinthia Read MD Service: -- Author Type: --    Filed:  Encounter Date: 2/13/2020 Status: (Other)                   Office Hours: Monday - Friday 8:00 - 4:30PM    Nikunj Sinha  [address] [place] [address]           February 13, 2020      Dear Nikunj:    It looks as though you are past due to follow up with Dr. Read. If you would like to schedule an appointment please call [phone]         Sincerely,        [facility]

## 2021-06-20 NOTE — LETTER
Letter by Cinthia Read MD at      Author: Cinthia Read MD Service: -- Author Type: --    Filed:  Encounter Date: 1/24/2020 Status: (Other)                   Office Hours: Monday - Friday 8:00 - 4:30PM    Nikunj Sinha  [address] [place] [address]           January 24, 2020      Dear Nikunj:    It looks as though you are due to see Dr. Read in March. If you would like to schedule this please call [phone]         Sincerely,        [facility]

## 2021-06-24 NOTE — PROGRESS NOTES
Correct pharmacy verified with patient and confirmed in snapshot? [x] yes []no    Charge captured ? [x] yes  [] no    Medications Phoned  to Pharmacy [] yes [x]no  Name of Pharmacist:  List Medications, including dose, quantity and instructions      Medication Prescriptions given to patient   [] yes  [x] no   List the name of the drug the prescription was written for.       Medications ordered this visit were e-scribed.  Verified by order class [x] yes  [] no lexapro 10mg, gabapentin 100/300mg, vistaril 25mg, inderal 10mg    Medication changes or discontinuations were communicated to patient's pharmacy: [x] yes  [] no   buspar 5mg, celexa 20mg, remeron 15mg discontinued  UA collected [x] yes  [] no    [place] Prescription Monitoring Program Reviewed? [x] yes  [] no    Referrals were made to:  None today    Future appointment was made: [x] yes  [] no    Dictation completed at time of chart check: [x] yes  [] no    I have checked the documentation for today s encounters and the above information has been reviewed and completed.

## 2021-06-24 NOTE — PATIENT INSTRUCTIONS - HE
Abstain from all illicit substances and alcohol.  Attend AA/NA support groups at least 3  times weekly.  Follow through with recommendations.  If suicidal or homicidal ideations occur call 911 or go to the nearest emergency room.

## 2021-06-24 NOTE — PROGRESS NOTES
Correct pharmacy verified with patient and confirmed in snapshot? [x] yes []no    Charge captured ? [x] yes  [] no    Medications Phoned  to Pharmacy [] yes [x]no  Name of Pharmacist:  List Medications, including dose, quantity and instructions      Medication Prescriptions given to patient   [] yes  [x] no   List the name of the drug the prescription was written for.       Medications ordered this visit were e-scribed.  Verified by order class [x] yes  [] no buspar 5mg, celexa 20mg    Medication changes or discontinuations were communicated to patient's pharmacy: [] yes  [x] no    UA collected [x] yes  [] no    [place] Prescription Monitoring [place] Reviewed? [x] yes  [] no    Referrals were made to:   none    Future appointment was made: [x] yes  [] no    Dictation completed at time of chart check: [x] yes  [] no    I have checked the documentation for today s encounters and the above information has been reviewed and completed.

## 2021-06-24 NOTE — PATIENT INSTRUCTIONS - HE
Outpatient rehabilitation therapy to continue as arranged for chemical dependency counselor.  Abstain from all illicit substances and alcohol.  Attend AA/NA support groups at least 3  times weekly.  Follow through with discharge recommendations.  If suicidal or homicidal ideations occur call 911 or go to the nearest emergency room.

## 2021-06-27 NOTE — PROGRESS NOTES
Progress Notes by Itzel Crowe MD at 4/4/2019 10:15 AM     Author: Itzel Crowe MD Service: -- Author Type: Physician    Filed: 4/4/2019  1:51 PM Encounter Date: 4/4/2019 Status: Signed    : Itzel Crowe MD (Physician)       Addiction Medicine  Outpatient Visit  With Dr. Obando    4/4/2019    Nikunj Sinha, 1965.    Subjective   The patient is a 54 year old male who presents for severe alcohol use disorder and last saw Dr. Obando on 3/6/19.  Patient medication dose and name:   Gabapentin, Lexapro, and Inderal   Interval history: Patient denies any cravings for alcohol and denies relapses.  Previously he had tried both naltrexone and Campral which have resulted in side effects.  He is unwilling to start any of the above medications again and feels that he has a good plan and with intensive therapy , controlled environment and support he will be successful in remaining sober.  He continues to experience significant symptoms of anxiety.   During his last visit we have made medication changes with discontinuation of buspirone and Celexa due to lack of effect and side effect of fatigue and introduction of Lexapro 10 mg daily.  Patient states that he wakes up in the morning and after taking off his medications he feels extremely tired.  In the morning he takes Lexapro 10 mg, gabapentin 200 mg and as needed Vistaril or Inderal which he is not taking frequently.  He is convinced that Lexapro is causing his fatigue.  He is also questioning whether the fatigue may be due to some medical reasons.  He had a tick bite about 10-15 years ago and describes the typical bull's-eye rash at the site of the bite.  He had followed up with his primary care provider about this and a Western blot test was done and was negative for Lyme disease virus.  He continues to be very motivated to remain sober but the symptoms of ongoing anxiety and fatigue are bothersome to  him.  Denies any thoughts of harming self or others but reports that his anxiety and depression is only minimally if at all improved  At this time, patient would like to : discuss his med regime   PHQ-9 score past 16  today 16  ZAIDA-7 score past 17 today 10   KISHOR: none       Last UDS performed on 3/6/19 and was: negative  Last ETG performed on 3/6/19 and was: < 100     UA collected today      Have you maintained sobriety from all substances?  If not what is your problem substance and frequency?  Reports being sober since January      What does patient currently fills his/her day with? (working, volunteering, TV)  Going to treatment and AA meetings in the evening 3-7/week     Are you having any cravings?  From 0 to 5 (zero being none) grade your craving. What is the craving for?  ETOH 2/5       Are you going to groups, if so where and how often? What group?  yes     Side effects:  Dry mouth: a little bit Constipation: no  Pt reports feeling tired, takes Lexapro at night but is does not help; hasn't been taking morning Neurontin and Melatonin; uses Vistaril prn ONLY       Do you follow with a primary care doctor? When was your last visit?   Haven't for sometime      Patient reports main support person is:  Friend from [place]     Is patient currently experiencing depression or thoughts of self-harm?   Chronic passive SI without plan     Is patient having trouble with functioning because of worrying about things?  Some trouble with motivation especially in the morning because he is so tired     Are any of the people in your life expressing concern for you?    People in group     Is there anything you would like to ask your doctor about?   oversedation         Social History:  Patient lives in a sober house with 9 other people    Patient has no obstacles to maintain sobriety.    Allergies:  Ragweed pollen      Medications:  Current Outpatient Medications   Medication Sig Dispense Refill   ? escitalopram oxalate (LEXAPRO)  10 MG tablet Take 1 tablet (10 mg total) by mouth daily. 30 tablet 2   ? gabapentin (NEURONTIN) 100 MG capsule Take 200 mg by mouth every morning. 60 capsule 1   ? gabapentin (NEURONTIN) 300 MG capsule Take 1 capsule (300 mg total) by mouth at bedtime. 90 capsule 0   ? hydrOXYzine pamoate (VISTARIL) 25 MG capsule Take 1 capsule (25 mg total) by mouth 3 (three) times a day as needed for anxiety (sleep). 90 capsule 2   ? melatonin 3 mg Tab tablet Take 1 tablet (3 mg total) by mouth at bedtime. 30 tablet 0   ? propranolol (INDERAL) 10 MG tablet Take 1 tablet (10 mg total) by mouth 3 (three) times a day. 90 tablet 2     No current facility-administered medications for this visit.          Review of Systems:  Patient denies chest pain or shortness of breath. Patient is not having fever but tired      Physical Examination:  There were no vitals taken for this visit.  Physical Exam  There were no signs suggesting medication toxicity or impairment due to drug or alcohol use. Patient (is/is not) overtly manipulative. Gait intact.      Mental Status Examination     Grooming, Dress & Hygeine  Yes No  Comments    Normal x   appropriate hygiene and grooming.    Attitude    nervous, tense    Cooperative x       Mood    anxious    Euthymic x       Affect    flat    Full range x       Normal intensity x       Reactive x       Eye Contact    appropriate    Normal amount x       Speech    goal oriented, no loosening supple associations    Normal volume, rate and amount x       Verbal Fluency        Normal phonemic x       Normal semantic X       Fund of Knowledge    fair    Average X       Thought Content    denies thoughts of harming self or others    Normal X       Thought Processes    linear and logical    Normal X       Perceptions    no signs of psychosis    Normal X       Insight & Judgment    fair    Recognition of illness X   fair    Readiness to change X   action stage of recovery    Taking steps to manage illness X    resides in a controlled environment and attends intensive outpatient treatment and AA/NA meetings       [place] Prescription Monitoring Program:  Reviewed, no worrisome activity was noted.      Summary of Diagnostic Studies:  Urine toxicology pending at the time this note was completed.  Recent Results (from the past 48 hour(s))   HM2(CBC w/o Differential)   Result Value Ref Range    WBC 6.8 4.0 - 11.0 thou/uL    RBC 4.66 4.40 - 6.20 mill/uL    Hemoglobin 14.7 14.0 - 18.0 g/dL    Hematocrit 43.4 40.0 - 54.0 %    MCV 93 80 - 100 fL    MCH 31.5 27.0 - 34.0 pg    MCHC 33.9 32.0 - 36.0 g/dL    RDW 12.4 11.0 - 14.5 %    Platelets 218 140 - 440 thou/uL    MPV 8.9 8.5 - 12.5 fL   Comprehensive metabolic panel   Result Value Ref Range    Sodium 142 136 - 145 mmol/L    Potassium 4.7 3.5 - 5.0 mmol/L    Chloride 106 98 - 107 mmol/L    CO2 28 22 - 31 mmol/L    Anion Gap, Calculation 8 5 - 18 mmol/L    Glucose 88 70 - 125 mg/dL    BUN 11 8 - 22 mg/dL    Creatinine 0.95 0.70 - 1.30 mg/dL    GFR MDRD Af Amer >60 >60 mL/min/1.73m2    GFR MDRD Non Af Amer >60 >60 mL/min/1.73m2    Bilirubin, Total 0.4 0.0 - 1.0 mg/dL    Calcium 9.8 8.5 - 10.5 mg/dL    Protein, Total 7.2 6.0 - 8.0 g/dL    Albumin 4.0 3.5 - 5.0 g/dL    Alkaline Phosphatase 82 45 - 120 U/L    AST 26 0 - 40 U/L    ALT 18 0 - 45 U/L   Vitamin D, Total (25-Hydroxy)   Result Value Ref Range    Vitamin D, Total (25-Hydroxy) 12.4 (L) 30.0 - 80.0 ng/mL       Assessment    54-year-old with severe alcohol use disorder in early remission residing in sober housing and attending outpatient MI CD treatment and generalized anxiety disorder and depressive disorder versus substance-induced mood disorder.  Main concern continues to be significant anxiety and failure of the medications to help with it.  Also concern of fatigue and drowsiness, which is chronic and possibly related to psychotropic medications    Diagnoses/Plan:    1.  Severe alcohol use disorder.  Patient is in a  controlled and sober environment.  Despite  not taking FDA approved medications for treatment of alcohol use disorder and relapse prevention patient denies any cravings and appears highly motivated to stay sober.  Continue current plan of therapy  2.  Generalized anxiety disorder and depressive disorder, recurrent moderate.  No current symptoms of suicidal or homicidal ideations . Continues to experience significant anxiety.  -Due to side effects of drowsiness and fatigue will decrease Lexapro from 10 to 5 mg daily.  Will discontinue gabapentin in the morning and advised patient to take gabapentin 300 mg only in the evening.  Vistaril and propranolol will still be available on as-needed basis.  Continue with psychotherapy.  -If the decrease of the Lexapro is not sufficient to control anxiety will have to offer different antidepressant.  Trial of Effexor XR could be considered if continues to be symptomatic.  3.  Rule out medical causes of  Fatigue.  TSH done on December 27 was 0.47  -Hemogram is rechecked today due to prior microcytic anemia.  Patient's hemoglobin is within normal limits and his CMV has normalized with sobriety.  -Electrolytes and liver enzymes are rechecked due to prior history of liver transaminitis.  They are all within   normal limits  -Vitamin B12 and vitamin D level are pending.  Patient is prescribed B complex and vitamin D 2000 units with a multivitamin to take for health care maintenance.  Follow-up within 1 week-to discuss blood test results and monitor mood.

## 2021-07-15 ENCOUNTER — TRANSFERRED RECORDS (OUTPATIENT)
Dept: HEALTH INFORMATION MANAGEMENT | Facility: CLINIC | Age: 56
End: 2021-07-15

## 2021-08-13 ENCOUNTER — IMMUNIZATION (OUTPATIENT)
Dept: NURSING | Facility: CLINIC | Age: 56
End: 2021-08-13
Payer: COMMERCIAL

## 2021-08-13 PROCEDURE — 0001A PR COVID VAC PFIZER DIL RECON 30 MCG/0.3 ML IM: CPT

## 2021-08-13 PROCEDURE — 91300 PR COVID VAC PFIZER DIL RECON 30 MCG/0.3 ML IM: CPT

## 2021-08-16 ASSESSMENT — ENCOUNTER SYMPTOMS
CONSTIPATION: 0
ABDOMINAL PAIN: 0
EYE PAIN: 0
NERVOUS/ANXIOUS: 1
FEVER: 0
HEADACHES: 0
DYSURIA: 0
COUGH: 0
FREQUENCY: 0
ARTHRALGIAS: 1
SORE THROAT: 0
MYALGIAS: 0
SHORTNESS OF BREATH: 0
HEARTBURN: 0
PALPITATIONS: 0
DIARRHEA: 0
PARESTHESIAS: 0
HEMATURIA: 0
HEMATOCHEZIA: 0
WEAKNESS: 1
JOINT SWELLING: 0
NAUSEA: 0
DIZZINESS: 1
CHILLS: 0

## 2021-08-17 PROBLEM — R19.5 POSITIVE FIT (FECAL IMMUNOCHEMICAL TEST): Status: ACTIVE | Noted: 2019-11-29

## 2021-08-17 PROBLEM — R19.5 POSITIVE FIT (FECAL IMMUNOCHEMICAL TEST): Status: RESOLVED | Noted: 2019-11-29 | Resolved: 2021-08-17

## 2021-08-18 ENCOUNTER — OFFICE VISIT (OUTPATIENT)
Dept: FAMILY MEDICINE | Facility: CLINIC | Age: 56
End: 2021-08-18
Payer: COMMERCIAL

## 2021-08-18 VITALS
HEART RATE: 66 BPM | WEIGHT: 186 LBS | OXYGEN SATURATION: 97 % | SYSTOLIC BLOOD PRESSURE: 122 MMHG | TEMPERATURE: 97 F | RESPIRATION RATE: 16 BRPM | DIASTOLIC BLOOD PRESSURE: 79 MMHG | HEIGHT: 70 IN | BODY MASS INDEX: 26.63 KG/M2

## 2021-08-18 DIAGNOSIS — L82.1 SEBORRHEIC KERATOSES: ICD-10-CM

## 2021-08-18 DIAGNOSIS — Z79.01 CHRONIC ANTICOAGULATION: ICD-10-CM

## 2021-08-18 DIAGNOSIS — R42 DIZZINESS: ICD-10-CM

## 2021-08-18 DIAGNOSIS — Z86.718 PERSONAL HISTORY OF DVT (DEEP VEIN THROMBOSIS): ICD-10-CM

## 2021-08-18 DIAGNOSIS — N52.9 ERECTILE DYSFUNCTION, UNSPECIFIED ERECTILE DYSFUNCTION TYPE: ICD-10-CM

## 2021-08-18 DIAGNOSIS — Z00.00 HEALTH CARE MAINTENANCE: ICD-10-CM

## 2021-08-18 DIAGNOSIS — F32.2 CURRENT SEVERE EPISODE OF MAJOR DEPRESSIVE DISORDER WITHOUT PSYCHOTIC FEATURES, UNSPECIFIED WHETHER RECURRENT (H): ICD-10-CM

## 2021-08-18 DIAGNOSIS — Z23 NEED FOR 23-POLYVALENT PNEUMOCOCCAL POLYSACCHARIDE VACCINE: ICD-10-CM

## 2021-08-18 DIAGNOSIS — F41.1 GAD (GENERALIZED ANXIETY DISORDER): ICD-10-CM

## 2021-08-18 DIAGNOSIS — R53.83 FATIGUE, UNSPECIFIED TYPE: Primary | ICD-10-CM

## 2021-08-18 DIAGNOSIS — E78.00 ELEVATED LDL CHOLESTEROL LEVEL: ICD-10-CM

## 2021-08-18 DIAGNOSIS — H93.13 TINNITUS, BILATERAL: ICD-10-CM

## 2021-08-18 DIAGNOSIS — Z86.711 HISTORY OF PULMONARY EMBOLISM: ICD-10-CM

## 2021-08-18 DIAGNOSIS — J30.2 SEASONAL ALLERGIC RHINITIS, UNSPECIFIED TRIGGER: ICD-10-CM

## 2021-08-18 DIAGNOSIS — Z23 NEED FOR SHINGLES VACCINE: ICD-10-CM

## 2021-08-18 DIAGNOSIS — R93.1 DECREASED CARDIAC EJECTION FRACTION: ICD-10-CM

## 2021-08-18 DIAGNOSIS — F10.21 MODERATE ALCOHOL DEPENDENCE IN EARLY REMISSION (H): ICD-10-CM

## 2021-08-18 DIAGNOSIS — K02.9 DENTAL CARIES: ICD-10-CM

## 2021-08-18 LAB
BASOPHILS # BLD AUTO: 0 10E3/UL (ref 0–0.2)
BASOPHILS NFR BLD AUTO: 1 %
EOSINOPHIL # BLD AUTO: 0.2 10E3/UL (ref 0–0.7)
EOSINOPHIL NFR BLD AUTO: 3 %
ERYTHROCYTE [DISTWIDTH] IN BLOOD BY AUTOMATED COUNT: 12.9 % (ref 10–15)
FOLATE SERPL-MCNC: 14.9 NG/ML
HCT VFR BLD AUTO: 45 % (ref 40–53)
HGB BLD-MCNC: 15.1 G/DL (ref 13.3–17.7)
IMM GRANULOCYTES # BLD: 0 10E3/UL
IMM GRANULOCYTES NFR BLD: 0 %
LYMPHOCYTES # BLD AUTO: 1.6 10E3/UL (ref 0.8–5.3)
LYMPHOCYTES NFR BLD AUTO: 27 %
MCH RBC QN AUTO: 30.3 PG (ref 26.5–33)
MCHC RBC AUTO-ENTMCNC: 33.6 G/DL (ref 31.5–36.5)
MCV RBC AUTO: 90 FL (ref 78–100)
MONOCYTES # BLD AUTO: 0.4 10E3/UL (ref 0–1.3)
MONOCYTES NFR BLD AUTO: 7 %
NEUTROPHILS # BLD AUTO: 3.7 10E3/UL (ref 1.6–8.3)
NEUTROPHILS NFR BLD AUTO: 63 %
PLATELET # BLD AUTO: 194 10E3/UL (ref 150–450)
RBC # BLD AUTO: 4.98 10E6/UL (ref 4.4–5.9)
VIT B12 SERPL-MCNC: 625 PG/ML (ref 193–986)
WBC # BLD AUTO: 5.8 10E3/UL (ref 4–11)

## 2021-08-18 PROCEDURE — 82607 VITAMIN B-12: CPT | Performed by: FAMILY MEDICINE

## 2021-08-18 PROCEDURE — 80050 GENERAL HEALTH PANEL: CPT | Performed by: FAMILY MEDICINE

## 2021-08-18 PROCEDURE — 93000 ELECTROCARDIOGRAM COMPLETE: CPT | Performed by: FAMILY MEDICINE

## 2021-08-18 PROCEDURE — 96127 BRIEF EMOTIONAL/BEHAV ASSMT: CPT | Performed by: FAMILY MEDICINE

## 2021-08-18 PROCEDURE — 82728 ASSAY OF FERRITIN: CPT | Performed by: FAMILY MEDICINE

## 2021-08-18 PROCEDURE — 82746 ASSAY OF FOLIC ACID SERUM: CPT | Performed by: FAMILY MEDICINE

## 2021-08-18 PROCEDURE — G0103 PSA SCREENING: HCPCS | Performed by: FAMILY MEDICINE

## 2021-08-18 PROCEDURE — 80061 LIPID PANEL: CPT | Performed by: FAMILY MEDICINE

## 2021-08-18 PROCEDURE — 36415 COLL VENOUS BLD VENIPUNCTURE: CPT | Performed by: FAMILY MEDICINE

## 2021-08-18 PROCEDURE — 83550 IRON BINDING TEST: CPT | Performed by: FAMILY MEDICINE

## 2021-08-18 PROCEDURE — 99215 OFFICE O/P EST HI 40 MIN: CPT | Performed by: FAMILY MEDICINE

## 2021-08-18 RX ORDER — BUPROPION HYDROCHLORIDE 150 MG/1
150 TABLET ORAL EVERY MORNING
COMMUNITY
End: 2022-10-04

## 2021-08-18 ASSESSMENT — ENCOUNTER SYMPTOMS
CONSTIPATION: 0
CHILLS: 0
DYSURIA: 0
NAUSEA: 0
DIARRHEA: 0
WEAKNESS: 1
PALPITATIONS: 0
HEADACHES: 0
SORE THROAT: 0
ABDOMINAL PAIN: 0
FEVER: 0
ARTHRALGIAS: 1
SHORTNESS OF BREATH: 0
MYALGIAS: 0
HEARTBURN: 0
HEMATURIA: 0
PARESTHESIAS: 0
EYE PAIN: 0
DIZZINESS: 1
HEMATOCHEZIA: 0
COUGH: 0
FREQUENCY: 0
JOINT SWELLING: 0
NERVOUS/ANXIOUS: 1

## 2021-08-18 ASSESSMENT — PATIENT HEALTH QUESTIONNAIRE - PHQ9
SUM OF ALL RESPONSES TO PHQ QUESTIONS 1-9: 11
10. IF YOU CHECKED OFF ANY PROBLEMS, HOW DIFFICULT HAVE THESE PROBLEMS MADE IT FOR YOU TO DO YOUR WORK, TAKE CARE OF THINGS AT HOME, OR GET ALONG WITH OTHER PEOPLE: SOMEWHAT DIFFICULT
SUM OF ALL RESPONSES TO PHQ QUESTIONS 1-9: 11

## 2021-08-18 ASSESSMENT — MIFFLIN-ST. JEOR: SCORE: 1683.91

## 2021-08-18 ASSESSMENT — COLUMBIA-SUICIDE SEVERITY RATING SCALE - C-SSRS
6. HAVE YOU EVER DONE ANYTHING, STARTED TO DO ANYTHING, OR PREPARED TO DO ANYTHING TO END YOUR LIFE?: NO
2. IN THE PAST MONTH, HAVE YOU ACTUALLY HAD ANY THOUGHTS OF KILLING YOURSELF?: NO
1. WITHIN THE PAST MONTH, HAVE YOU WISHED YOU WERE DEAD OR WISHED YOU COULD GO TO SLEEP AND NOT WAKE UP?: NO

## 2021-08-18 NOTE — RESULT ENCOUNTER NOTE
Hello Mr. Sinha,  Your results came back and your BP and pulse did not change significantly with posture change.    EKG showed evidence of having had right sided heart / pulmonary artery effects form prior pulmonary embolism, If you have any further concerns please do not hesitate to contact us by message, phone or making an appointment.  Have a good day   Dr Will KIM

## 2021-08-18 NOTE — PATIENT INSTRUCTIONS
Self testicular check regularly   Labs today and will make further recommendations once reviewed  Health care maintenance reviewed  Recommend pneumonia vaccine  Recommend shingles vaccine # 2  Can do in 6 week safter done with covid   Complete covid vaccine on 9/3  Flu shot yearly  Continue care with counselling and psyche  Go to the ER if suicidal thoughts increase or get worse  Continue care with hematology and chronic anticoagulation  Fatigue and dizziness could be multifactorial from meds, mood, and prior PE  Recommend labs today, echo to assess cardiac function since last checked I 2019 when ad effects of large Pulmonary embolism  No evidence of coronary artery disease  EKG today   Recommend sleep eval to rule out undiagnosed sleep apnea  Recommend cardiology and neuro to complete work up   Check orthostatics today   Continue care with the dentist  See ENT for chronic tinnitus  Follow up in 3 months from today and a preventive physical

## 2021-08-18 NOTE — PROGRESS NOTES
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 Lexapro and propranolol and Wellbutrin but Lexapro increased to 30 mg.  Was doing therapy. Seen by psych 6/2/20 and bupropion increased to 300 and continued on rest of meds.  Was in the ER 6/04/20 dental pain and abscess of tooth #20.  Was drained and put on amoxicillin and advised to see the dentist for root canal.  He called on 6/9 and medication was changed to Augmentin and he did see the dentist on 6/11/20 but tooth had broken off by then and root canal not indicated as it was too far gone and instead his tooth was pulled  to make dentures as he had quite a few teeth pulled. Seen psychiatry on 7/2/20 and continued on all his meds and trazodone added and noted propanolol made him tired and he only took half tablet prior to an AA meeting.      TE done 7/7/20 noted was doing reasonably well.  Had passive suicidal thoughts but no active plan.  Under care of a psychiatrist and therapist.  He was to see his therapist in person the next day in person for the first time since the pandemic began.  He had still been going to AA and conversing with his psychiatrist via telephone.  Notes from the psychiatrist reviewed.  He was concerned about some skin changes, ear wax and tinnitus.  His abdominal pain and palpitations had resolved since he stopped the alcohol noted he had been sober then 9 months.  He  continued to have ED and some fatigue and occasional lightheadedness which he believed was side effects from the medications he took. Was very rarely forgetful not as significant to be of any concern to him. He was planning to get dentures. Was to continue care with his hematologist for hx of unprovoked DVT & PE.  Due for appointment with them in the new year. Advised to work on advanced directives. Due to a prior positive stool test was recommended a colonoscopy & per hematology they said he could go ahead with that we would just have to hold his blood thinner a day before and a day after the scope or depending on what they find & do. He had no symptoms and wanted  to wait to do the colonoscopy once the pandemic situation was  better. Advised  to decrease salt in diet to help tinnitus. Tinnitus thought a result of prior Advil/ alcohol use/ hearing loss  Fatigue as well as lightheadedness and dizziness  thought related to medications but recommended labs at appointment and if no answer could consider referral to sleep specialist for undiagnosed sleep apnea and/or referral to neurology/cardiology and to  discuss medications further with his psychiatrist. Was to continue to work on diet and weight loss with exercise. Declined Neurology referral.  [place] it was not too serious right at the time, having just rare episodes of occasional fogginess and he felt that was related to his medications.    Seen 7/16/2020 for a physical & chronic issues. Advised self testicular exams monthly. Skin check of moles right inner arm, left mid back and right iliac area all looked like benign sebhoresic keratosis. Offered referral to derm if remained concerned or they changed. Ear check: showed minimal wax, likely not a cause of tinnitus. Referred to see ENT for tinnitus. Was noted due for a colonoscopy. was to follow up with his hematologist regarding hx of unprovoked PE and DVT and blood thinners. Labs done and advised If normal and  remained concerned about fatigue recommended to see sleep to be evaluate for sleep apnea. LDL elevated, ASCVD 7.7% . Diet , exercise, weight loss recommended. Was to recheck in 6 months. Noted remained sober from alcohol. Advised if dizziness persisted or got worse can to see cardio/ neuro. Opted then to defer due to cost and felt was managing ok. Thought was from his psyche meds. Was advised to stay well hydrated. Consider the shingrex vaccine. With regards to his MDD/ Francesca/ chronic passive suicidal thoughts, had no active plan, was under care of amisha and & his Counsellor & had upcoming apt's with them. Was to continue care with his dentist. Had left jaw swelling from recent tooth extraction and infection & had been started on Augmentin twice a day and tramadol as needed by his dentist.  Understood risk of antibiotics and chronic narcotics.      Seen by [facility] on 9/8/20 & 10/2020 & continued on Wellbutrin 300 mg, propranolol prn, & trazodone 25 to 50 mg bedtime prn. Had a colonoscopy in nov 2020 which was normal. Seen by hematology on 3/11/21 for unprovoked B/l DVT & PE and continued on lifelong anticoagulation with xarelto. 3/2021 did an e visit for covid symptoms but covid was negative.   Seen by psyc he 4/2021 & continue don Lexapro 30, trazodone 50, Wellbutrin decreased to 150 & started pregabalin 50 bod and hydroxyzine prn & propranolol held. Seen again by amisha on 5/2021 & hydroxyzine discontinued as caused drowsiness. Seen by amisha on 7/2021 and pregabalin and propranolol discontinued.     [place]  shows received gabapentin 100 mg #270 on 1/4/19 at [place], then 300 mg # 90  On 2/11/19 in [facility], 100 mg # 60 on 3/6/19 in [facility], then # 180 on 6/20/19, 300 mg # 60 on 10/30/19 & # 30 of 300 mg on 11/21/19.  Tylenol 3 12 tablets on 2/11/2021 and tramadol 50 mg #12 on 2/25/2020. Tramadol in July by dentist # 12. pregabalin 50 mg # 60 on 4/12/21, 5/10/21 & 6/2/21    Depression Screening Follow  Up  PHQ 8/18/2021   PHQ-9 Total Score 11   Q9: Thoughts of better off dead/self-harm past 2 weeks Several days   F/U: Thoughts of suicide or self-harm No   F/U: Self harm-plan -   F/U: Self-harm action -   F/U: Safety concerns No   PHQ-9 External Data -     Last PHQ-9 8/18/2021   1.  Little interest or pleasure in doing things 1   2.  Feeling down, depressed, or hopeless 1   3.  Trouble falling or staying asleep, or sleeping too much 1   4.  Feeling tired or having little energy 3   5.  Poor appetite or overeating 0   6.  Feeling bad about yourself 1   7.  Trouble concentrating 3   8.  Moving slowly or restless 0   Q9: Thoughts of better off dead/self-harm past 2 weeks 1   PHQ-9 Total Score 11   Difficulty at work, home, or with people -   In the past two weeks have you had thoughts of suicide or self harm? No   Do you have concerns about your personal safety or the safety of others? No   In the past 2 weeks have you thought about a plan or had intention to harm yourself? -   In the past 2 weeks have you acted on these thoughts in any way? -       [facility] ([facility]) 8/18/2021   Within the last month, have you wished you were dead or wished you could go to sleep and not wake up? No   Within the last month, have you had any actual thoughts of killing yourself? No   Within the last month, have you ever done anything, started to do anything, or prepared to do anything to end your life? No         Follow Up     Follow Up Actions Taken  Crisis resource information provided in the After Visit Summary  Referred patient back to mental health provider    Discussed the following ways the patient can remain in a safe environment:  remove alcohol, remove drugs, secure medications: does, remove access to firearms: none, remove things I could use to hurt myself: live sin sober community and be around others    Review of Systems   Constitutional: Negative for chills and fever.   HENT: Negative for congestion, ear pain, hearing  "loss and sore throat.    Eyes: Negative for pain and visual disturbance.   Respiratory: Negative for cough and shortness of breath.    Cardiovascular: Negative for chest pain, palpitations and peripheral edema.   Gastrointestinal: Negative for abdominal pain, constipation, diarrhea, heartburn, hematochezia and nausea.   Genitourinary: Positive for impotence. Negative for discharge, dysuria, frequency, genital sores, hematuria and urgency.   Musculoskeletal: Positive for arthralgias. Negative for joint swelling and myalgias.   Skin: Negative for rash.   Neurological: Positive for dizziness and weakness. Negative for headaches and paresthesias.   Psychiatric/Behavioral: Negative for mood changes. The patient is nervous/anxious.       Constitutional, HEENT, cardiovascular, pulmonary, GI, , musculoskeletal, neuro, skin, endocrine and psych systems are negative, except as otherwise noted.      Objective    /79 (BP Location: Right arm, Patient Position: Chair, Cuff Size: Adult Regular)   Pulse 66   Temp 97  F (36.1  C) (Tympanic)   Resp 16   Ht 1.784 m (5' 10.25\")   Wt 84.4 kg (186 lb)   SpO2 97%   BMI 26.50 kg/m    Body mass index is 26.5 kg/m .  Physical Exam   GENERAL: healthy, alert, no distress and fatigued  EYES: Eyes grossly normal to inspection, PERRL and conjunctivae and sclerae normal  HENT: ear canals and TM's normal, nose and mouth without ulcers or lesions  NECK: no adenopathy, no asymmetry, masses, or scars and thyroid normal to palpation  RESP: lungs clear to auscultation - no rales, rhonchi or wheezes  CV: regular rate and rhythm, normal S1 S2, no S3 or S4, no murmur, click or rub, no peripheral edema and peripheral pulses strong  ABDOMEN: soft, non tender, no hepatosplenomegaly, no masses and bowel sounds normal  MS: no gross musculoskeletal defects noted, no edema  SKIN: no suspicious lesions or rashes  NEURO: Normal strength and tone, mentation intact and speech normal  PSYCH: mentation " appears normal, affect normal/bright, judgement and insight intact and appearance well groomed    Results for orders placed or performed in visit on 08/18/21   CBC with platelets and differential     Status: None    Narrative    The following orders were created for panel order CBC with platelets and differential.  Procedure                               Abnormality         Status                     ---------                               -----------         ------                     CBC with platelets and d...[[id number]]                      Final result                 Please view results for these tests on the individual orders.   Comprehensive metabolic panel (BMP + Alb, Alk Phos, ALT, AST, Total. Bili, TP)     Status: Abnormal   Result Value Ref Range    Sodium 139 133 - 144 mmol/L    Potassium 4.2 3.4 - 5.3 mmol/L    Chloride 110 (H) 94 - 109 mmol/L    Carbon Dioxide (CO2) 23 20 - 32 mmol/L    Anion Gap 6 3 - 14 mmol/L    Urea Nitrogen 12 7 - 30 mg/dL    Creatinine 1.10 0.66 - 1.25 mg/dL    Calcium 8.1 (L) 8.5 - 10.1 mg/dL    Glucose 76 70 - 99 mg/dL    Alkaline Phosphatase 64 40 - 150 U/L    AST 27 0 - 45 U/L    ALT 25 0 - 70 U/L    Protein Total 5.6 (L) 6.8 - 8.8 g/dL    Albumin 3.2 (L) 3.4 - 5.0 g/dL    Bilirubin Total 0.3 0.2 - 1.3 mg/dL    GFR Estimate 75 >60 mL/min/1.73m2   Lipid panel reflex to direct LDL Fasting     Status: Abnormal   Result Value Ref Range    Cholesterol 207 (H) <200 mg/dL    Triglycerides 59 <150 mg/dL    Direct Measure HDL 56 >=40 mg/dL    LDL Cholesterol Calculated 139 (H) <=100 mg/dL    Non HDL Cholesterol 151 (H) <130 mg/dL    Patient Fasting > 8hrs? Yes    TSH with free T4 reflex     Status: Normal   Result Value Ref Range    TSH 0.80 0.40 - 4.00 mU/L   PSA, screen     Status: Normal   Result Value Ref Range    Prostate Specific Antigen Screen 1.33 0.00 - 4.00 ug/L   Vitamin B12     Status: Normal   Result Value Ref Range    Vitamin B12 625 193 - 986 pg/mL   Ferritin      Status: Abnormal   Result Value Ref Range    Ferritin 20 (L) 26 - 388 ng/mL   Iron and iron binding capacity     Status: Normal   Result Value Ref Range    Iron 75 35 - 180 ug/dL    Iron Binding Capacity 327 240 - 430 ug/dL    Iron Sat Index 23 15 - 46 %   Folate     Status: Normal   Result Value Ref Range    Folic Acid 14.9 >=5.4 ng/mL   CBC with platelets and differential     Status: None   Result Value Ref Range    WBC Count 5.8 4.0 - 11.0 10e3/uL    RBC Count 4.98 4.40 - 5.90 10e6/uL    Hemoglobin 15.1 13.3 - 17.7 g/dL    Hematocrit 45.0 40.0 - 53.0 %    MCV 90 78 - 100 fL    MCH 30.3 26.5 - 33.0 pg    MCHC 33.6 31.5 - 36.5 g/dL    RDW 12.9 10.0 - 15.0 %    Platelet Count 194 150 - 450 10e3/uL    % Neutrophils 63 %    % Lymphocytes 27 %    % Monocytes 7 %    % Eosinophils 3 %    % Basophils 1 %    % Immature Granulocytes 0 %    Absolute Neutrophils 3.7 1.6 - 8.3 10e3/uL    Absolute Lymphocytes 1.6 0.8 - 5.3 10e3/uL    Absolute Monocytes 0.4 0.0 - 1.3 10e3/uL    Absolute Eosinophils 0.2 0.0 - 0.7 10e3/uL    Absolute Basophils 0.0 0.0 - 0.2 10e3/uL    Absolute Immature Granulocytes 0.0 <=0.0 10e3/uL

## 2021-08-18 NOTE — RESULT ENCOUNTER NOTE
Padmini Mr. Sinha,  Some of your results came back and are within acceptable limits. -Normal red blood cell (hgb) levels, normal white blood cell count and normal platelet levels.. If you have any further concerns please do not hesitate to contact us by message, phone or making an appointment.  Have a good day   Dr Will KIM

## 2021-08-19 ENCOUNTER — TRANSFERRED RECORDS (OUTPATIENT)
Dept: HEALTH INFORMATION MANAGEMENT | Facility: CLINIC | Age: 56
End: 2021-08-19

## 2021-08-19 LAB
ALBUMIN SERPL-MCNC: 3.2 G/DL (ref 3.4–5)
ALP SERPL-CCNC: 64 U/L (ref 40–150)
ALT SERPL W P-5'-P-CCNC: 25 U/L (ref 0–70)
ANION GAP SERPL CALCULATED.3IONS-SCNC: 6 MMOL/L (ref 3–14)
AST SERPL W P-5'-P-CCNC: 27 U/L (ref 0–45)
BILIRUB SERPL-MCNC: 0.3 MG/DL (ref 0.2–1.3)
BUN SERPL-MCNC: 12 MG/DL (ref 7–30)
CALCIUM SERPL-MCNC: 8.1 MG/DL (ref 8.5–10.1)
CHLORIDE BLD-SCNC: 110 MMOL/L (ref 94–109)
CHOLEST SERPL-MCNC: 207 MG/DL
CO2 SERPL-SCNC: 23 MMOL/L (ref 20–32)
CREAT SERPL-MCNC: 1.1 MG/DL (ref 0.66–1.25)
FASTING STATUS PATIENT QL REPORTED: YES
FERRITIN SERPL-MCNC: 20 NG/ML (ref 26–388)
GFR SERPL CREATININE-BSD FRML MDRD: 75 ML/MIN/1.73M2
GLUCOSE BLD-MCNC: 76 MG/DL (ref 70–99)
HDLC SERPL-MCNC: 56 MG/DL
IRON SATN MFR SERPL: 23 % (ref 15–46)
IRON SERPL-MCNC: 75 UG/DL (ref 35–180)
LDLC SERPL CALC-MCNC: 139 MG/DL
NONHDLC SERPL-MCNC: 151 MG/DL
POTASSIUM BLD-SCNC: 4.2 MMOL/L (ref 3.4–5.3)
PROT SERPL-MCNC: 5.6 G/DL (ref 6.8–8.8)
PSA SERPL-MCNC: 1.33 UG/L (ref 0–4)
SODIUM SERPL-SCNC: 139 MMOL/L (ref 133–144)
TIBC SERPL-MCNC: 327 UG/DL (ref 240–430)
TRIGL SERPL-MCNC: 59 MG/DL
TSH SERPL DL<=0.005 MIU/L-ACNC: 0.8 MU/L (ref 0.4–4)

## 2021-08-19 ASSESSMENT — PATIENT HEALTH QUESTIONNAIRE - PHQ9: SUM OF ALL RESPONSES TO PHQ QUESTIONS 1-9: 11

## 2021-08-19 NOTE — RESULT ENCOUNTER NOTE
Hello Mr. Sinha,  Your results came back with a normal folate level. If you have any further concerns please do not hesitate to contact us by message, phone or making an appointment.  Have a good day   Dr Will KIM

## 2021-08-19 NOTE — RESULT ENCOUNTER NOTE
Mahendrayanna Mr. Sinha,  Your results came back showing:   -PSA (prostate specific antigen) test is normal.  This indicates a low likelihood of prostate cancer.  ADVISE: rechecking this in 1 year.  The 10-year ASCVD risk score (Raquel BARRETT Jr., et al., 2013) is: 4.7%    Values used to calculate the score:      Age: 56 years      Sex: Male      Is Non- : No      Diabetic: No      Tobacco smoker: No      Systolic Blood Pressure: 113 mmHg      Is BP treated: No      HDL Cholesterol: 56 mg/dL      Total Cholesterol: 207 mg/dL  -LDL(bad) cholesterol level is elevated which can increase your heart disease risk.  A diet high in fat and simple carbohydrates, genetics and being overweight can contribute to this. ADVISE: exercising 150 minutes of aerobic exercise per week (30 minutes for 5 days per week or 50 minutes for 3 days per week are options) and eating a low saturated fat/low carbohydrate diet are helpful to improve this. In 3 months, you should recheck your fasting cholesterol panel.   -Liver and gallbladder tests (ALT,AST, Alk phos,bilirubin) are normal.  -Kidney function (GFR) is normal.  -Sodium is normal.  -Potassium is normal.  -Calcium is decreased.  ADVISE: getting more calcium in your diet and then rechecking this in 3 months when I see you again.   Protein is on low side increase lean protein in diet  -Glucose (diabetic screening test) is normal.  -TSH (thyroid stimulating hormone) level is normal which indicates normal thyroid function.  Iron is normal but ferritin the iron storage form is low.  Colonoscopy in nov 2020 was normal. No report of blood loss from any where. This can contribute to fatigue. Increase iron in diet . Maybe take an iron pill otc about 27 mg elemental iron daily . Can cause constipation increase finger and water when on iron.w e can recheck in 3 months and if still low can refer to your hematologist to discuss if would need further eval or benefit from iron infusions  etc  If you have any further concerns please do not hesitate to contact us by message, phone or making an appointment.  Have a good day   Dr Will KIM

## 2021-08-22 PROBLEM — R93.1 DECREASED CARDIAC EJECTION FRACTION: Status: ACTIVE | Noted: 2021-08-22

## 2021-08-25 ENCOUNTER — TELEPHONE (OUTPATIENT)
Dept: CARDIOLOGY | Facility: CLINIC | Age: 56
End: 2021-08-25

## 2021-09-03 ENCOUNTER — IMMUNIZATION (OUTPATIENT)
Dept: NURSING | Facility: CLINIC | Age: 56
End: 2021-09-03
Attending: FAMILY MEDICINE
Payer: COMMERCIAL

## 2021-09-03 PROCEDURE — 91300 PR COVID VAC PFIZER DIL RECON 30 MCG/0.3 ML IM: CPT

## 2021-09-03 PROCEDURE — 0002A PR COVID VAC PFIZER DIL RECON 30 MCG/0.3 ML IM: CPT

## 2021-09-13 ENCOUNTER — TRANSFERRED RECORDS (OUTPATIENT)
Dept: HEALTH INFORMATION MANAGEMENT | Facility: CLINIC | Age: 56
End: 2021-09-13

## 2021-10-10 ENCOUNTER — HEALTH MAINTENANCE LETTER (OUTPATIENT)
Age: 56
End: 2021-10-10

## 2022-01-13 ENCOUNTER — TRANSFERRED RECORDS (OUTPATIENT)
Dept: HEALTH INFORMATION MANAGEMENT | Facility: CLINIC | Age: 57
End: 2022-01-13
Payer: COMMERCIAL

## 2022-01-20 DIAGNOSIS — Z86.711 HISTORY OF PULMONARY EMBOLISM: ICD-10-CM

## 2022-01-30 ENCOUNTER — HEALTH MAINTENANCE LETTER (OUTPATIENT)
Age: 57
End: 2022-01-30

## 2022-02-16 ENCOUNTER — TRANSFERRED RECORDS (OUTPATIENT)
Dept: HEALTH INFORMATION MANAGEMENT | Facility: CLINIC | Age: 57
End: 2022-02-16
Payer: COMMERCIAL

## 2022-02-16 LAB — PHQ9 SCORE: 12

## 2022-03-15 ENCOUNTER — TRANSFERRED RECORDS (OUTPATIENT)
Dept: HEALTH INFORMATION MANAGEMENT | Facility: CLINIC | Age: 57
End: 2022-03-15
Payer: COMMERCIAL

## 2022-03-15 LAB — PHQ9 SCORE: 16

## 2022-04-14 ENCOUNTER — TRANSFERRED RECORDS (OUTPATIENT)
Dept: HEALTH INFORMATION MANAGEMENT | Facility: CLINIC | Age: 57
End: 2022-04-14
Payer: COMMERCIAL

## 2022-04-14 LAB — PHQ9 SCORE: 18

## 2022-05-25 ENCOUNTER — TRANSFERRED RECORDS (OUTPATIENT)
Dept: HEALTH INFORMATION MANAGEMENT | Facility: CLINIC | Age: 57
End: 2022-05-25
Payer: COMMERCIAL

## 2022-05-25 LAB — PHQ9 SCORE: 15

## 2022-07-25 ENCOUNTER — TRANSFERRED RECORDS (OUTPATIENT)
Dept: FAMILY MEDICINE | Facility: CLINIC | Age: 57
End: 2022-07-25

## 2022-08-25 ENCOUNTER — TRANSFERRED RECORDS (OUTPATIENT)
Dept: HEALTH INFORMATION MANAGEMENT | Facility: CLINIC | Age: 57
End: 2022-08-25

## 2022-09-18 ENCOUNTER — HEALTH MAINTENANCE LETTER (OUTPATIENT)
Age: 57
End: 2022-09-18

## 2022-09-26 ENCOUNTER — LAB (OUTPATIENT)
Dept: URGENT CARE | Facility: URGENT CARE | Age: 57
End: 2022-09-26
Attending: FAMILY MEDICINE
Payer: COMMERCIAL

## 2022-09-26 DIAGNOSIS — Z20.822 SUSPECTED 2019 NOVEL CORONAVIRUS INFECTION: ICD-10-CM

## 2022-09-26 PROCEDURE — U0005 INFEC AGEN DETEC AMPLI PROBE: HCPCS

## 2022-09-26 PROCEDURE — U0003 INFECTIOUS AGENT DETECTION BY NUCLEIC ACID (DNA OR RNA); SEVERE ACUTE RESPIRATORY SYNDROME CORONAVIRUS 2 (SARS-COV-2) (CORONAVIRUS DISEASE [COVID-19]), AMPLIFIED PROBE TECHNIQUE, MAKING USE OF HIGH THROUGHPUT TECHNOLOGIES AS DESCRIBED BY CMS-2020-01-R: HCPCS

## 2022-09-27 LAB — SARS-COV-2 RNA RESP QL NAA+PROBE: NEGATIVE

## 2022-10-04 ENCOUNTER — OFFICE VISIT (OUTPATIENT)
Dept: FAMILY MEDICINE | Facility: CLINIC | Age: 57
End: 2022-10-04
Payer: COMMERCIAL

## 2022-10-04 VITALS
HEIGHT: 70 IN | TEMPERATURE: 97.6 F | RESPIRATION RATE: 18 BRPM | HEART RATE: 76 BPM | OXYGEN SATURATION: 95 % | BODY MASS INDEX: 26.69 KG/M2 | DIASTOLIC BLOOD PRESSURE: 56 MMHG | SYSTOLIC BLOOD PRESSURE: 145 MMHG

## 2022-10-04 DIAGNOSIS — J34.89 RHINORRHEA: ICD-10-CM

## 2022-10-04 DIAGNOSIS — R79.89 LOW SERUM CALCIUM: ICD-10-CM

## 2022-10-04 DIAGNOSIS — R41.3 MEMORY DIFFICULTIES: ICD-10-CM

## 2022-10-04 DIAGNOSIS — Z20.822 EXPOSURE TO 2019 NOVEL CORONAVIRUS: ICD-10-CM

## 2022-10-04 DIAGNOSIS — F41.1 GAD (GENERALIZED ANXIETY DISORDER): ICD-10-CM

## 2022-10-04 DIAGNOSIS — R05.9 COUGH, UNSPECIFIED TYPE: ICD-10-CM

## 2022-10-04 DIAGNOSIS — R42 DIZZINESS: ICD-10-CM

## 2022-10-04 DIAGNOSIS — F32.2 CURRENT SEVERE EPISODE OF MAJOR DEPRESSIVE DISORDER WITHOUT PSYCHOTIC FEATURES, UNSPECIFIED WHETHER RECURRENT (H): ICD-10-CM

## 2022-10-04 DIAGNOSIS — Z86.718 PERSONAL HISTORY OF DVT (DEEP VEIN THROMBOSIS): ICD-10-CM

## 2022-10-04 DIAGNOSIS — Z86.711 HISTORY OF PULMONARY EMBOLISM: ICD-10-CM

## 2022-10-04 DIAGNOSIS — R09.81 NASAL CONGESTION: ICD-10-CM

## 2022-10-04 DIAGNOSIS — J06.9 VIRAL UPPER RESPIRATORY TRACT INFECTION: Primary | ICD-10-CM

## 2022-10-04 DIAGNOSIS — F10.21 ALCOHOLISM IN REMISSION (H): ICD-10-CM

## 2022-10-04 DIAGNOSIS — R93.1 DECREASED CARDIAC EJECTION FRACTION: ICD-10-CM

## 2022-10-04 DIAGNOSIS — R03.0 ELEVATED BLOOD PRESSURE READING WITHOUT DIAGNOSIS OF HYPERTENSION: ICD-10-CM

## 2022-10-04 DIAGNOSIS — Z13.1 SCREENING FOR DIABETES MELLITUS: ICD-10-CM

## 2022-10-04 DIAGNOSIS — R79.0 LOW FERRITIN: ICD-10-CM

## 2022-10-04 DIAGNOSIS — Z79.01 CHRONIC ANTICOAGULATION: ICD-10-CM

## 2022-10-04 DIAGNOSIS — E78.00 ELEVATED LDL CHOLESTEROL LEVEL: ICD-10-CM

## 2022-10-04 DIAGNOSIS — R53.82 CHRONIC FATIGUE: ICD-10-CM

## 2022-10-04 DIAGNOSIS — R10.13 EPIGASTRIC PAIN: ICD-10-CM

## 2022-10-04 DIAGNOSIS — R45.851 PASSIVE SUICIDAL IDEATIONS: ICD-10-CM

## 2022-10-04 LAB
ALBUMIN SERPL-MCNC: 3.4 G/DL (ref 3.4–5)
ALP SERPL-CCNC: 80 U/L (ref 40–150)
ALT SERPL W P-5'-P-CCNC: 29 U/L (ref 0–70)
ANION GAP SERPL CALCULATED.3IONS-SCNC: 9 MMOL/L (ref 3–14)
AST SERPL W P-5'-P-CCNC: 26 U/L (ref 0–45)
BASOPHILS # BLD AUTO: 0 10E3/UL (ref 0–0.2)
BASOPHILS NFR BLD AUTO: 1 %
BILIRUB SERPL-MCNC: 0.5 MG/DL (ref 0.2–1.3)
BUN SERPL-MCNC: 13 MG/DL (ref 7–30)
CALCIUM SERPL-MCNC: 8.8 MG/DL (ref 8.5–10.1)
CHLORIDE BLD-SCNC: 106 MMOL/L (ref 94–109)
CHOLEST SERPL-MCNC: 248 MG/DL
CO2 SERPL-SCNC: 23 MMOL/L (ref 20–32)
CREAT SERPL-MCNC: 1.04 MG/DL (ref 0.66–1.25)
CREAT UR-MCNC: 218 MG/DL
DEPRECATED S PYO AG THROAT QL EIA: NEGATIVE
EOSINOPHIL # BLD AUTO: 0.1 10E3/UL (ref 0–0.7)
EOSINOPHIL NFR BLD AUTO: 1 %
ERYTHROCYTE [DISTWIDTH] IN BLOOD BY AUTOMATED COUNT: 12.3 % (ref 10–15)
FASTING STATUS PATIENT QL REPORTED: YES
FERRITIN SERPL-MCNC: 50 NG/ML (ref 26–388)
GFR SERPL CREATININE-BSD FRML MDRD: 84 ML/MIN/1.73M2
GLUCOSE BLD-MCNC: 93 MG/DL (ref 70–99)
GROUP A STREP BY PCR: NOT DETECTED
HBA1C MFR BLD: 4.9 % (ref 0–5.6)
HCT VFR BLD AUTO: 44.2 % (ref 40–53)
HDLC SERPL-MCNC: 56 MG/DL
HGB BLD-MCNC: 15.3 G/DL (ref 13.3–17.7)
IMM GRANULOCYTES # BLD: 0 10E3/UL
IMM GRANULOCYTES NFR BLD: 0 %
IRON SATN MFR SERPL: 31 % (ref 15–46)
IRON SERPL-MCNC: 94 UG/DL (ref 35–180)
LDLC SERPL CALC-MCNC: 178 MG/DL
LIPASE SERPL-CCNC: 40 U/L (ref 13–60)
LYMPHOCYTES # BLD AUTO: 1.8 10E3/UL (ref 0.8–5.3)
LYMPHOCYTES NFR BLD AUTO: 23 %
MAGNESIUM SERPL-MCNC: 2.1 MG/DL (ref 1.6–2.3)
MCH RBC QN AUTO: 31 PG (ref 26.5–33)
MCHC RBC AUTO-ENTMCNC: 34.6 G/DL (ref 31.5–36.5)
MCV RBC AUTO: 90 FL (ref 78–100)
MICROALBUMIN UR-MCNC: 12 MG/L
MICROALBUMIN/CREAT UR: 5.5 MG/G CR (ref 0–17)
MONOCYTES # BLD AUTO: 0.4 10E3/UL (ref 0–1.3)
MONOCYTES NFR BLD AUTO: 5 %
NEUTROPHILS # BLD AUTO: 5.6 10E3/UL (ref 1.6–8.3)
NEUTROPHILS NFR BLD AUTO: 71 %
NONHDLC SERPL-MCNC: 192 MG/DL
PLATELET # BLD AUTO: 274 10E3/UL (ref 150–450)
POTASSIUM BLD-SCNC: 4.5 MMOL/L (ref 3.4–5.3)
PROT SERPL-MCNC: 6.8 G/DL (ref 6.8–8.8)
RBC # BLD AUTO: 4.93 10E6/UL (ref 4.4–5.9)
SARS-COV-2 RNA RESP QL NAA+PROBE: NEGATIVE
SODIUM SERPL-SCNC: 138 MMOL/L (ref 133–144)
TIBC SERPL-MCNC: 307 UG/DL (ref 240–430)
TRIGL SERPL-MCNC: 69 MG/DL
TSH SERPL DL<=0.005 MIU/L-ACNC: 0.51 MU/L (ref 0.4–4)
VIT B12 SERPL-MCNC: 929 PG/ML (ref 232–1245)
WBC # BLD AUTO: 7.9 10E3/UL (ref 4–11)

## 2022-10-04 PROCEDURE — 83550 IRON BINDING TEST: CPT | Performed by: FAMILY MEDICINE

## 2022-10-04 PROCEDURE — 80061 LIPID PANEL: CPT | Performed by: FAMILY MEDICINE

## 2022-10-04 PROCEDURE — 87651 STREP A DNA AMP PROBE: CPT | Performed by: FAMILY MEDICINE

## 2022-10-04 PROCEDURE — 82607 VITAMIN B-12: CPT | Performed by: FAMILY MEDICINE

## 2022-10-04 PROCEDURE — U0003 INFECTIOUS AGENT DETECTION BY NUCLEIC ACID (DNA OR RNA); SEVERE ACUTE RESPIRATORY SYNDROME CORONAVIRUS 2 (SARS-COV-2) (CORONAVIRUS DISEASE [COVID-19]), AMPLIFIED PROBE TECHNIQUE, MAKING USE OF HIGH THROUGHPUT TECHNOLOGIES AS DESCRIBED BY CMS-2020-01-R: HCPCS | Performed by: FAMILY MEDICINE

## 2022-10-04 PROCEDURE — 83690 ASSAY OF LIPASE: CPT | Performed by: FAMILY MEDICINE

## 2022-10-04 PROCEDURE — 80050 GENERAL HEALTH PANEL: CPT | Performed by: FAMILY MEDICINE

## 2022-10-04 PROCEDURE — U0005 INFEC AGEN DETEC AMPLI PROBE: HCPCS | Performed by: FAMILY MEDICINE

## 2022-10-04 PROCEDURE — 82306 VITAMIN D 25 HYDROXY: CPT | Performed by: FAMILY MEDICINE

## 2022-10-04 PROCEDURE — 82728 ASSAY OF FERRITIN: CPT | Performed by: FAMILY MEDICINE

## 2022-10-04 PROCEDURE — 82043 UR ALBUMIN QUANTITATIVE: CPT | Performed by: FAMILY MEDICINE

## 2022-10-04 PROCEDURE — 83036 HEMOGLOBIN GLYCOSYLATED A1C: CPT | Performed by: FAMILY MEDICINE

## 2022-10-04 PROCEDURE — 99215 OFFICE O/P EST HI 40 MIN: CPT | Mod: CS | Performed by: FAMILY MEDICINE

## 2022-10-04 PROCEDURE — 36415 COLL VENOUS BLD VENIPUNCTURE: CPT | Performed by: FAMILY MEDICINE

## 2022-10-04 PROCEDURE — 83735 ASSAY OF MAGNESIUM: CPT | Performed by: FAMILY MEDICINE

## 2022-10-04 RX ORDER — DULOXETIN HYDROCHLORIDE 30 MG/1
30 CAPSULE, DELAYED RELEASE ORAL DAILY
COMMUNITY
End: 2022-12-01

## 2022-10-04 ASSESSMENT — COLUMBIA-SUICIDE SEVERITY RATING SCALE - C-SSRS
5. IN THE PAST MONTH, HAVE YOU STARTED TO WORK OUT OR WORKED OUT THE DETAILS OF HOW TO KILL YOURSELF? DO YOU INTEND TO CARRY OUT THIS PLAN?: NO
6. HAVE YOU EVER DONE ANYTHING, STARTED TO DO ANYTHING, OR PREPARED TO DO ANYTHING TO END YOUR LIFE?: NO
1. WITHIN THE PAST MONTH, HAVE YOU WISHED YOU WERE DEAD OR WISHED YOU COULD GO TO SLEEP AND NOT WAKE UP?: NO
3. IN THE PAST MONTH, HAVE YOU BEEN THINKING ABOUT HOW YOU MIGHT KILL YOURSELF?: NO
2. IN THE PAST MONTH, HAVE YOU ACTUALLY HAD ANY THOUGHTS OF KILLING YOURSELF?: YES
5. IN THE PAST MONTH, HAVE YOU STARTED TO WORK OUT OR WORKED OUT THE DETAILS OF HOW TO KILL YOURSELF? DO YOU INTEND TO CARRY OUT THIS PLAN?: NO

## 2022-10-04 ASSESSMENT — PATIENT HEALTH QUESTIONNAIRE - PHQ9: SUM OF ALL RESPONSES TO PHQ QUESTIONS 1-9: 15

## 2022-10-04 NOTE — RESULT ENCOUNTER NOTE
Padmini Mr. Sinha,  Some of your results came back and are within acceptable limits. -Normal red blood cell (hgb) levels, normal white blood cell count and normal platelet levels.  -A1C (diabetic test) is normal and indicates that your blood sugar has been in a normal range the last 3 months.  - Negative for strep  . If you have any further concerns please do not hesitate to contact us by message, phone or making an appointment.  Have a good day   Dr Will KIM

## 2022-10-04 NOTE — PATIENT INSTRUCTIONS
Was originally scheduled as a physical.  But due to respiratory symptoms recently and exposure to positive COVID last night changed to an office visit.    COVID and strep test done for history of runny nose nasal congestion cough started 10 to 12 days ago improving and reported negative COVID PCR test on day 5 of illness but also newly exposed to positive COVID case yesterday.  Quarantine till COVID test comes back negative if negative recommend rechecking in 5 days as testing may be negative as to early since time of exposure.    Chronic fatigue could be multifactorial due to mental health, medications, prior noted low ejection fraction of heart, history of pulmonary embolism, undiagnosed sleep apnea etc.  Will do lab work today to evaluate other causes.  Refer to cardiology and sleep to further evaluate and treat and return in 4 to 6 weeks to follow-up from today.    Recheck echo and see cardiology for prior noted decreased EF suspected due to pulmonary embolism in the past.    History of chronic dizziness refer to cardiology and neurology and will do lab work.    For prior low ferritin and low calcium we will recheck labs today and make further recommendations once seen.    History of elevated LDL we will recheck labs today and if cardiovascular risk is elevated consider statin medication.    History of prior pulmonary embolism and DVT took self off Xarelto in May 2022 has medication at home.  Recommend to return to hematology discuss risks and if medication needs to be resumed.    Upper abdominal pain ongoing many years, could be due to gastritis, ulcer, heartburn etc.  We will check labs H. pylori bacteria that can cause an ulcer and a CT abdomen pelvis given duration of symptoms.  Doing over-the-counter Prilosec as needed with some relief.  Chronic use of this medication can increase risk of C. difficile colitis, fractures, magnesium and B12 deficiency.  May try Pepcid 20 mg twice a day over-the-counter  instead in the meantime    Elevated blood pressure, eat a low-salt diet and we will recheck at follow-up and if persists consider medication.    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 Congratulations !    Major depression with anxiety and passive suicidal ideation currently safe under care of psychiatry has appointment with them tomorrow to discuss medications.  Has had changes over time and meds.  Continue with therapy every 2 weeks.  Currently gainfully employed.    Memory concerns could be due to prior alcohol use and current mental health.  We will check B12 and magnesium levels and referred to see neurology to further evaluate and treat    Return in 4 to 6 weeks for physical and follow-up from today plan to get flu shot, COVID-vaccine, pneumonia vaccine and shingles.  Will discuss advanced directives Healthcare maintenance at that time.  Currently ruling out COVID.  Former smoker currently does not meet criteria for lung CT cancer screening

## 2022-10-04 NOTE — RESULT ENCOUNTER NOTE
Mahendrayanna Mr. Sinha,  Your results came back and showing  The 10-year ASCVD risk score (Raquel BARRETT JrFozia, et al., 2013) is: 9.4%    Values used to calculate the score:      Age: 57 years      Sex: Male      Is Non- : No      Diabetic: No      Tobacco smoker: No      Systolic Blood Pressure: 145 mmHg      Is BP treated: No      HDL Cholesterol: 56 mg/dL      Total Cholesterol: 248 mg/dL   -LDL(bad) cholesterol level is elevated which can increase your heart disease risk.  A diet high in fat and simple carbohydrates, genetics and being overweight can contribute to this. ADVISE: exercising 150 minutes of aerobic exercise per week (30 minutes for 5 days per week or 50 minutes for 3 days per week are options) and eating a low saturated fat/low carbohydrate diet are helpful to improve this. In 6 months, you should recheck your fasting cholesterol panel by scheduling a lab-only appointment.  -Liver and gallbladder tests are normal (ALT,AST, Alk phos, bilirubin), kidney function is normal (Cr, GFR), sodium is normal, potassium is normal, calcium is now normal, glucose is normal.  -TSH (thyroid stimulating hormone) level is normal which indicates normal thyroid function.  -Magnesium is normal.  -Ferritin (iron) level is now normal.. If you have any further concerns please do not hesitate to contact us by message, phone or making an appointment.  Have a good day   Dr Will KIM

## 2022-10-04 NOTE — RESULT ENCOUNTER NOTE
Hello Mr. Sinha,  Your results came back negative for covid. Recheck in 5 days to be sure given recent covid exposure.   Normal lipase ( no pancreatic inflammation)   Normal vitamin b 12   If you have any further concerns please do not hesitate to contact us by message, phone or making an appointment.  Have a good day   Dr Will KIM

## 2022-10-04 NOTE — PROGRESS NOTES
Assessment & Plan     Viral upper respiratory tract infection  Was originally scheduled as a physical.  But due to respiratory symptoms recently and exposure to positive COVID last night changed to an office visit.    COVID and strep test done for history of runny nose nasal congestion cough started 10 to 12 days ago improving and reported negative COVID PCR test on day 5 of illness but also newly exposed to positive COVID case yesterday.  Quarantine till COVID test comes back negative,  if negative recommend rechecking in 5 days as testing may be negative as too early since time of new exposure.    Chronic fatigue could be multifactorial due to mental health, medications, prior noted low ejection fraction of heart, history of pulmonary embolism, undiagnosed sleep apnea etc.  Will do lab work today to evaluate other causes.  Refer to cardiology and sleep to further evaluate and treat and return in 4 to 6 weeks to follow-up from today.    Recheck echo and see cardiology for prior noted decreased EF suspected due to pulmonary embolism in the past.    History of chronic dizziness refer to cardiology and neurology and will do lab work.    For prior low ferritin and low calcium we will recheck labs today and make further recommendations once seen.    History of elevated LDL we will recheck labs today and if cardiovascular risk is elevated consider statin medication.    History of prior pulmonary embolism and DVT took self off Xarelto in May 2022 has medication at home.  Recommend to return to hematology discuss risks and if medication needs to be resumed.    Upper abdominal pain ongoing many years, could be due to gastritis, ulcer, heartburn etc.  We will check labs H. pylori bacteria that can cause an ulcer and a CT abdomen pelvis given duration of symptoms.  Doing over-the-counter Prilosec as needed with some relief.  Chronic use of this medication can increase risk of C. difficile colitis, fractures, magnesium and  B12 deficiency.  May try Pepcid 20 mg twice a day over-the-counter instead in the meantime    Elevated blood pressure, eat a low-salt diet and we will recheck at follow-up and if persists consider medication.    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 Congratulations !    Major depression with anxiety and passive suicidal ideation currently safe under care of psychiatry has appointment with them tomorrow to discuss medications.  Has had changes over time and meds.  Continue with therapy every 2 weeks.  Currently gainfully employed.    Memory concerns could be due to prior alcohol use and current mental health.  We will check B12 and magnesium levels and referred to see neurology to further evaluate and treat    Did not have time to discuss lesion on left side of nose that he has had that he reports does not heal.  Will review at next appointment and refer to dermatology if needed    Return in 4 to 6 weeks for physical and follow-up from today plan to get flu shot, COVID-vaccine, pneumonia vaccine and shingles.  Will discuss advanced directives Healthcare maintenance at that time.  Currently ruling out COVID.  Former smoker currently does not meet criteria for lung CT cancer screening    - Symptomatic; Yes; 9/27/2022 COVID-19 Virus (Coronavirus) by PCR Nasopharyngeal; Future  - Streptococcus A Rapid Screen w/Reflex to PCR - Clinic Collect  - Symptomatic; Yes; 9/27/2022 COVID-19 Virus (Coronavirus) by PCR Nasopharyngeal  - Group A Streptococcus PCR Throat Swab    Exposure to 2019 novel coronavirus  COVID and strep test done for history of runny nose nasal congestion cough started 10 to 12 days ago improving and reported negative COVID PCR test on day 5 of illness but also newly exposed to positive COVID case yesterday.  Quarantine till COVID test comes back negative,  if negative recommend rechecking in 5 days as testing may be negative as too early since time of new exposure.  - Symptomatic; Yes; 9/27/2022 COVID-19  Virus (Coronavirus) by PCR Nasopharyngeal; Future    Rhinorrhea  - Symptomatic; Yes; 9/27/2022 COVID-19 Virus (Coronavirus) by PCR Nasopharyngeal; Future  - Streptococcus A Rapid Screen w/Reflex to PCR - Clinic Collect  - Symptomatic; Yes; 9/27/2022 COVID-19 Virus (Coronavirus) by PCR Nasopharyngeal  - Group A Streptococcus PCR Throat Swab  - CBC with platelets and differential; Future  - CBC with platelets and differential    Nasal congestion  - Symptomatic; Yes; 9/27/2022 COVID-19 Virus (Coronavirus) by PCR Nasopharyngeal; Future  - Streptococcus A Rapid Screen w/Reflex to PCR - Clinic Collect  - Symptomatic; Yes; 9/27/2022 COVID-19 Virus (Coronavirus) by PCR Nasopharyngeal  - Group A Streptococcus PCR Throat Swab  - CBC with platelets and differential; Future  - CBC with platelets and differential    Cough, unspecified type  - Symptomatic; Yes; 9/27/2022 COVID-19 Virus (Coronavirus) by PCR Nasopharyngeal; Future  - CBC with platelets and differential; Future  - CBC with platelets and differential    Chronic fatigue  Chronic fatigue could be multifactorial due to mental health, medications, prior noted low ejection fraction of heart, history of pulmonary embolism, undiagnosed sleep apnea etc.  Will do lab work today to evaluate other causes.  Refer to cardiology and sleep to further evaluate and treat and return in 4 to 6 weeks to follow-up from today.  - Symptomatic; Yes; 9/27/2022 COVID-19 Virus (Coronavirus) by PCR Nasopharyngeal; Future  - Streptococcus A Rapid Screen w/Reflex to PCR - Clinic Collect  - Symptomatic; Yes; 9/27/2022 COVID-19 Virus (Coronavirus) by PCR Nasopharyngeal  - Group A Streptococcus PCR Throat Swab  - Adult Cardiology Eval  Referral; Future  - Adult Sleep Eval & Management  Referral; Future  - CBC with platelets and differential; Future  - TSH with free T4 reflex; Future  - Vitamin D Deficiency; Future  - Iron and iron binding capacity; Future  - Ferritin; Future  -  Vitamin B12; Future  - Magnesium; Future  - PRIMARY CARE FOLLOW-UP SCHEDULING; Future  - CBC with platelets and differential  - TSH with free T4 reflex  - Vitamin D Deficiency  - Iron and iron binding capacity  - Ferritin  - Vitamin B12  - Magnesium    Decreased cardiac ejection fraction  Recheck echo and see cardiology for prior noted decreased EF suspected due to pulmonary embolism in the past.  - Adult Cardiology Eval  Referral; Future  - Echocardiogram Complete; Future  - TSH with free T4 reflex; Future  - TSH with free T4 reflex    Dizziness  History of chronic dizziness refer to cardiology and neurology and will do lab work.  - Adult Cardiology Eval  Referral; Future  - Adult Neurology  Referral; Future  - CBC with platelets and differential; Future  - TSH with free T4 reflex; Future  - CBC with platelets and differential  - TSH with free T4 reflex    Low ferritin  For prior low ferritin and low calcium we will recheck labs today and make further recommendations once seen.  - Adult Oncology/Hematology  Referral; Future  - CBC with platelets and differential; Future  - TSH with free T4 reflex; Future  - Iron and iron binding capacity; Future  - Ferritin; Future  - Vitamin B12; Future  - CBC with platelets and differential  - TSH with free T4 reflex  - Iron and iron binding capacity  - Ferritin  - Vitamin B12    Low serum calcium  - Comprehensive metabolic panel (BMP + Alb, Alk Phos, ALT, AST, Total. Bili, TP); Future  - Comprehensive metabolic panel (BMP + Alb, Alk Phos, ALT, AST, Total. Bili, TP)    Elevated LDL cholesterol level  History of elevated LDL we will recheck labs today and if cardiovascular risk is elevated consider statin medication.  - Lipid Profile (Chol, Trig, HDL, LDL calc); Future  - TSH with free T4 reflex; Future  - Lipid Profile (Chol, Trig, HDL, LDL calc)  - TSH with free T4 reflex    History of pulmonary embolism  Personal history of DVT (deep vein  thrombosis)  Chronic anticoagulation  History of prior pulmonary embolism and DVT took self off Xarelto in May 2022 has medication at home.  Recommend to return to hematology discuss risks and if medication needs to be resumed.  - Adult Oncology/Hematology  Referral; Future    Epigastric pain  Upper abdominal pain ongoing many years, could be due to gastritis, ulcer, heartburn etc.  We will check labs H. pylori bacteria that can cause an ulcer and a CT abdomen pelvis given duration of symptoms.  Doing over-the-counter Prilosec as needed with some relief.  Chronic use of this medication can increase risk of C. difficile colitis, fractures, magnesium and B12 deficiency.  May try Pepcid 20 mg twice a day over-the-counter instead in the meantime  - Helicobacter pylori Antigen Stool; Future  - Lipase; Future  - CT Abdomen Pelvis w Contrast; Future  - Helicobacter pylori Antigen Stool  - Lipase    Elevated blood pressure reading without diagnosis of hypertension  Elevated blood pressure, eat a low-salt diet and we will recheck at follow-up and if persists consider medication.  - Albumin Random Urine Quantitative with Creat Ratio; Future  - Albumin Random Urine Quantitative with Creat Ratio    Alcoholism in remission (H)  Alcoholism in remission sober 3 years. Congratulations !  - TSH with free T4 reflex; Future  - Vitamin D Deficiency; Future  - Iron and iron binding capacity; Future  - Ferritin; Future  - Vitamin B12; Future  - Magnesium; Future  - TSH with free T4 reflex  - Vitamin D Deficiency  - Iron and iron binding capacity  - Ferritin  - Vitamin B12  - Magnesium    Passive suicidal ideations  Current severe episode of major depressive disorder without psychotic features, unspecified whether recurrent (H)  ZAIDA (generalized anxiety disorder)  Major depression with anxiety and passive suicidal ideation currently safe under care of psychiatry has appointment with them tomorrow to discuss medications.  Has had  changes over time and meds.  Continue with therapy every 2 weeks.  Currently gainfully employed.  - TSH with free T4 reflex; Future  - TSH with free T4 reflex    Memory difficulties  Memory concerns could be due to prior alcohol use and current mental health.  We will check B12 and magnesium levels and referred to see neurology to further evaluate and treat  - Adult Neurology  Referral; Future  - Vitamin B12; Future  - Magnesium; Future  - Vitamin B12  - Magnesium    Screening for diabetes mellitus  - Hemoglobin A1c; Future  - Hemoglobin A1c    Did not have time to discuss lesion on left side of nose that he has had that he reports does not heal.  Will review at next appointment and refer to dermatology if needed  Return in 4 to 6 weeks for physical and follow-up from today plan to get flu shot, COVID-vaccine, pneumonia vaccine and shingles.  Will discuss advanced directives Healthcare maintenance at that time.  Currently ruling out COVID.  Former smoker currently does not meet criteria for lung CT cancer screening    Review of the result(s) of each unique test - labs since 2020  Diagnosis or treatment significantly limited by social determinants of health - mental health  Ordering of each unique test  Prescription drug management  57 minutes spent on the date of the encounter doing chart review, history and exam, documentation and further activities per the note     Depression Screening Follow Up  PHQ 10/4/2022   PHQ-9 Total Score 15   Q9: Thoughts of better off dead/self-harm past 2 weeks Several days   F/U: Thoughts of suicide or self-harm -   F/U: Self harm-plan -   F/U: Self-harm action -   F/U: Safety concerns -   PHQ-9 External Data -     C-SSRS ([facility]) 10/4/2022   Within the last month, have you wished you were dead or wished you could go to sleep and not wake up? No   Within the last month, have you had any actual thoughts of killing yourself? Yes   Within the last month, have you been  thinking about how you might do this? No   Within the last month, have you had these thoughts and had some intention of acting on them? No   Within the last month, have you started to work out or worked out the details of how to kill yourself with the intent to carry out this plan? No   Within the last month, have you ever done anything, started to do anything, or prepared to do anything to end your life? No     Follow Up     Follow Up Actions Taken  Crisis resource information provided in the After Visit Summary  Referred patient back to mental health provider    Discussed the following ways the patient can remain in a safe environment:  follow up with their psychiatrist and therapist  Work on weight loss  Regular exercise  See Patient Instructions    Return in about 4 weeks (around 11/1/2022) for Follow up, Routine preventive, with me.   Follow-up Visit   Expected date:  Nov 04, 2022 (Approximate)      Follow Up Appointment Details:     Follow-up with whom?: Me    Follow-Up for what?: Adult Preventive    How?: In Person                Adriana Solis MD  [facility]    Quinton Calvin is a 57 year old, presenting for the following health issues:  Cough    HPI   Acute Illness  Acute illness concerns: cold  Onset/Duration: 12 day s  Symptoms:  Fever: No  Fussiness: No  Decreased energy level: YES  Conjunctivitis: YES  Ear Pain: YES  Rhinorrhea: YES  Congestion: YES  Sore Throat: No  Cough: YES-productive of yellow sputum  Wheeze: No  Breathing fast: No           Decreased Appetite/Intake: No  Nausea: No  Vomiting: No  Diarrhea: No  Decreased wet diapers/output No  Progression of Symptoms: same better  Sick/Strep Exposure: YES  Therapies tried and outcome: None    Appointment made for a physical but due to recent respiratory illness and exposure changed to an office visit to address acute symptoms and return for a physical which she was agreeable to do.  Notes 12 days ago started with runny  nose and congestion, had a cough that was causing some pain, no pain currently, had chest congestion, no ear or eye pain as marked on the tablet.  Tested on 9/26/2022 reported day 5 of illness and PCR was negative.  Did 2 home COVID test as well that were negative.  45 minutes before coming into appointment today was told that he had been exposed to a client he had been with for 2 hours yesterday tested positive for COVID.  Currently he feels fine.    Chronic fatigue is unchanged.  Did not see cardiology or get a sleep study since last seen more than a year ago.  Has history of prior decreased EF.  History of chronic dizziness mostly postural changes but also feels like may blackout but not very often.  Did not set up appointment with cardiology or neurology.  History of low ferritin and low calcium in the past and elevated LDL did not return for recheck since last checked.  History of pulmonary embolism and DVT and was on chronic anticoagulation but stopped in may 2022.  Did not contact the hematologist.  Has not followed up with them.  Reports not any different off the medication.    Some epigastric pain going on off and on long time constant for over a year.  Denies any heartburn or reflux.  Tried over-the-counter med Prilosec helps but as soon as he stops it comes back.  Has been eating only once a day to try and lose weight.    Elevated blood pressure.  But is a bit anxious and given symptoms.    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    Passive SI currently safe.  History of MDD ZAIDA, ongoing bupropion, Lexapro, Paxil etc.  Was on Cymbalta 60 and now down to 30 and is planning to speak to his nurse practitioner psychiatrist (tomorrow to discuss coming off the med as he feels its not helping.  He remains in counseling in therapy every 2 weeks.  Working for [employer] as a .  Remains connected with his friend in [place] recently got a passport and hopes to visit them soon.  Sober from alcohol 3  years.    Wound nose left side not healed    Depression Screening Follow Up  PHQ 10/4/2022   PHQ-9 Total Score 15   Q9: Thoughts of better off dead/self-harm past 2 weeks Several days   F/U: Thoughts of suicide or self-harm -   F/U: Self harm-plan -   F/U: Self-harm action -   F/U: Safety concerns -   PHQ-9 External Data -     Last PHQ-9 10/4/2022   1.  Little interest or pleasure in doing things 2   2.  Feeling down, depressed, or hopeless 2   3.  Trouble falling or staying asleep, or sleeping too much 2   4.  Feeling tired or having little energy 3   5.  Poor appetite or overeating 1   6.  Feeling bad about yourself 1   7.  Trouble concentrating 3   8.  Moving slowly or restless 0   Q9: Thoughts of better off dead/self-harm past 2 weeks 1   PHQ-9 Total Score 15   Difficulty at work, home, or with people Very difficult   In the past two weeks have you had thoughts of suicide or self harm? -   Do you have concerns about your personal safety or the safety of others? -   In the past 2 weeks have you thought about a plan or had intention to harm yourself? -   In the past 2 weeks have you acted on these thoughts in any way? -     C-SSRS ([facility]) 10/4/2022   Within the last month, have you wished you were dead or wished you could go to sleep and not wake up? No   Within the last month, have you had any actual thoughts of killing yourself? Yes   Within the last month, have you been thinking about how you might do this? No   Within the last month, have you had these thoughts and had some intention of acting on them? No   Within the last month, have you started to work out or worked out the details of how to kill yourself with the intent to carry out this plan? No   Within the last month, have you ever done anything, started to do anything, or prepared to do anything to end your life? No       Follow Up     Follow Up Actions Taken  Crisis resource information provided in the After Visit Summary  Referred patient  back to mental health provider    Discussed the following ways the patient can remain in a safe environment:  remove alcohol, remove drugs, secure medications: yes, dispose of old medications  and be around others  Depression Screening Follow-up  PHQ 10/4/2022   PHQ-9 Total Score 15   Q9: Thoughts of better off dead/self-harm past 2 weeks Several days   F/U: Thoughts of suicide or self-harm -   F/U: Self harm-plan -   F/U: Self-harm action -   F/U: Safety concerns -   PHQ-9 External Data -   Does the patient currently have a mental health provider?  Yes, patient was referred back to current mental health provider.    BACKGROUND  Former smoker, 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 with Xarelto managed by hematology, decreased EF, hx of postural dizziness,chronic fatigue, elevated LDL, chronic tinnitus, resolved chest pain, history of alcohol abuse and dependence currently in remission, ZAIDA, MDD, history of passive suicidal ideation, prior active thoughts, on Wellbutrin 300, Lexapro 20, no longer on gabapentin, propranolol as needed and trazodone as needed, history of memory issues now improved off the alcohol, history of bilateral tinnitus, seasonal allergic rhinitis, resolved essential tremor once he stopped the alcohol, resolved epigastric pain once he stopped the alcohol, resolved palpitations, history of dental caries needing dentures, weight gain due to sedentary state, positive test,  history of ADD, ED, SK, on multivitamin and prior tonsillectomy, allergic to ragweed's,     In 2018 was unemployed, from [place] originally where was getting his care at the local clinic there, hx of alcohol dependence in early remission, ZAIDA, MDD, hx of SI, on Wellbutrin, Lexapro, gabapentin & MV, under care of psychiatry at [facility], memory changes, epigastric pain, palpitations, weight gain, hx of chest pain, fatigue, dizziness, ED, improved with time away from alcohol, essential tremor on  "propranolol, B/l tinnitus, seasonal allergic rhinitis, allergic to ragweed, dental caries / abscess, S/p extraction nov 2019, 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 Xarelto under care of hematology, prior tonsillectomy,   Hx of ACS, NSTEMI with elevated troponin, no CAD on angiogram,  but found to have an acute saddle embolism with cor pulmonale & hx of DVT,  in 8/2019 on lifelong anticoagulation now on Xarelto 20 mg daily, ever since under care of hematology, hx of moderate alcohol dependence in early remission with hx of withdrawal & mood disorder, previously on campral ( discontinued secondary to diarrhea), & naltrexone( stopped as ineffective), ZAIDA, MDD, suicidal ideation, on Wellbutrin Xl 150 mg daily, Lexapro 20 mg daily, gabapentin 300 mg bedtime & a multivitamin, previously  also on propranolol, previously on Abilify 5 mg, citalopram 10 mg, hydroxyzine, mirtazapine & trazodone, hx of dental caries, recent dental abscess S/p abx, hx of essential tremor on propranolol 20 mg tid, allergic to ragweed's, no records from [facility] but reviewed care everywhere [facility] records,      Seen at [facility] ER 12/26/18 with suicidal ideation. Stabilized , treated ( see notes for details & discharged to [facility]).  Seen in ER at [facility] on 6/12/19 \" for increased depression, hopelessness, and alcohol abuse. Had been discharged from his sober due to smelling like alcohol.   Admitted [facility] on 8/2019: with shortness of breath and 2 days of chest pain and near syncope. He presented to the emergency room on 8/4/2019 after he had a syncopal episode in front of [facility]. He did not recall particular chest pain or shortness of breath. He noted swelling of his leg in various spots but he denied any persistent pain or swelling or redness. No injury. No acute illness prior to that. However he had donated plasma 10 times in 5 weeks prior to that. Upon initial evaluation, d-dimer was elevated at 6.7. PTT was " normal at 28, INR is 1.09.  Due to an elevated troponin he underwent coronary angiogram which demonstrated no significant CAD. Due to an elevated d-dimer underwent CT scan of the chest which showed extensive acute bilateral pulmonary embolism with saddle embolus.  Doppler legs showed Nonocclusive right femoral DVT from upper to mid thigh. Left calf DVT within one of the peroneal veins in both of the posterior tibial veins from upper to low calf. Echocardiogram revealed lower limit of normal left ventricular systolic function of 50% with septal motion compatible with right ventricular overload.  He had cor pulmonale and was started on heparin infusion. No thrombolytics were given due hemodynamic and respiratory stability. After several days of monitoring on IV heparin Mr. Sinha continued to improve and he was transitioned to Xarelto for ongoing anticoagulation therapy. He tolerated Xarelto well. Had no major bleeding except intermittent gum bleeding. Denied family history of venous thromboembolism. He had a brother who passed away from [facility]. He was single. Did not have children. Was advised to continue with indefinite anticoagulation. Hematology thought maybe plasma donation had led to a transient hypercoagulable state. Mr. Sinha was advised not to donate plasma moving forward. An outpatient referral to hematology had been made.  Seen by hematology 9/2019 at [facility]. She reviewed the pathophysiology of venous thromboembolic exam and risks of recurrence. Was advised of possible etiology, he was indicated for long-term anticoagulation due to initial presentation of a life-threatening episode. No prior history of venous thromboembolism or family history of thromboembolism. Was not recommended family/hereditary thrombophilia work-upa repeat CT scan of the chest to know the resolution of blood clots was not needed & only recommended to obtain a CTA if he had signs and symptoms concerning for venous  "thromboembolism. Was advised strict abstinence from alcohol with anticoagulants & to obtain kidney, liver function monitoring at least once a year.   He had age-appropriate cancer screening. He admitted that he had a positive stool test (sounds like FOBT) a couple years prior. It was unclear if it was related to his alcohol use. He was recommended to proceed with colonoscopy, but to wait at least 3-6 months because of this recent pulmonary emboli event, &  recommended interruption of anticoagulation prior to that. He voiced understanding. His previous primary care provider retired.    He was engaged with outpatient CD treatment at [facility] but he had been unable to maintain any sobriety outside of a structured facility. Roughly one month prior to next ER  visit he stopped taking all of his psych medications.      Was in the ER at [facility] on 10/17/2019 for suicidal ideation & alcohol abuse: had increased depressive symptoms, anxiety, and suicidal ideation. He presented to the outpatient clinic for a Rule 25 assessment. He endorsed numerous acute depressive symptoms. He relapsed on alcohol and had been unsuccessful outside of a structured program. He was previously working a good job as a Scoring Director at [employer] \"but I drank myself out of that job.\" He had been evicted from his apartment and found himself homeless. He rated his depression as 8 out of 10 and anxiety as 9 out of 10. He was feeling hopeless with thoughts of suicide and a plan to hang himself. He stated that he hadn't taken any steps towards acting on the thoughts but that \"it's something in my mind.\" He felt somewhat isolated. He had one friend in [place], but no supportive family that were still alive.   He was admitted on a voluntary basis. Agreeable to stay voluntarily to coordinate cares and medication management. Was seen by the hospitalist during course of hospitalization. Started on antibiotics for a dental abscess. He was placed on a CIWA " protocol & did not demonstrate signs or symptoms of complicated alcohol withdrawal. Psychiatric medication management was performed in detail to target signs and symptoms of principal diagnosis & comorbid illnesses. Medication management included Lexapro which was restarted for depression and anxiety as well as Wellbutrin XL, & Gabapentin as needed for anxiety. Propranolol as needed for anxiety. Given Naltrexone as a trial for alcohol use disorder; LFT's were normalizing & Campral was discontinued secondary to GI side- effects. Denied psychosis. Denied suicidal and homicidal ideation. Did have an appropriate crisis and relapse plan. Was future oriented. Denied gun access. Indicated motivation to proceed with MI CD treatment as coordinated by  with intake date on Friday, November 1, 2019.   followed in regards to collecting and reviewing collateral information, coordination of cares and discharge planning. Including, MI CD evaluation completed with recommendation for placement to unit 27[facility]. Due to dental issues, he was discharged to medical respite bed to bridge placement. He was treated for dental abscess with PCN.     Seen 11/29/19 for the first time by this writer. See that note for details. Noted Hx of severe MDD, ZAIDA, hx of passive suicidal thoughts, early remission from alcohol dependence just completed rule 25 , detox & rehab & in sober living. had just moved from [facility] to [place]. Had poor social support but was forward thinking  & had apt's for the dentist & psyche & noted had been taking meds & contracted to safety. 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 No hypercoag work up recommended given lifelong anticoagulation indicated & seen by hematology. Note from sept 2019 reviewed.   Reported fatigue, weight gain, dizziness since on new meds for mental health. Denied chest  pain or palpitations though noted had had these in the past. Exam was benign & vitally stable.Did not appear to be in heart failure. Noted his memory changes, abdominal pain, tinnitus, ED thought  related to alcohol abuse & noted symptoms had improved since quit drinking. Advised to try Pepcid OTC for abdominal pain. Declined referral to ENT/ audiology. Seasonal allergic rhinitis was not an issue. Was to consider a GI referral for a scope in future once could come off Xarelto a few days. Noted due for follow up with hematology by feb 2020. Was to sign an KISHOR to get records from [place] where previously doctored. Was given the Tdap. Was negative for Hep C, HIV, had a normal folic acid, vit B12, CMP, TSH, cbc, HB A1C & LDL was elevated.   Seen by [facility] psychiatry on 12/5/19 for Francesca, depression and alcohol abuse in remission and noted was taking propranolol 1 a day instead of three times and continued on same meds till reevaluated in 4 weeks.      Seen 12/20/2019 & noted remained sober and symptoms were improved.  Seen by psychiatry at [facility] 1/9/2020 when noted had stopped gabapentin and was continued on Lexapro, Wellbutrin along with using propanolol rarely as needed.  Did see the hematologist 1/23 and told to complete therapeutic course of Xarelto and then continue indefinitely prophylactically due to unprovoked prior DVT and PE.  Advised that it was okay to get dental procedures and colonoscopy after March 1 and he would be low risk to hold the medication 1 to 2 days before and after depending on the procedure risk.  And to follow-up with him in 1 year.       Seen by psychiatry 5/2020 by telephone encounter due to pandemic and continued on Lexapro and propranolol and Wellbutrin but Lexapro increased to 30 mg.  Was doing therapy. Seen by psych 6/2/20 and bupropion increased to 300 and continued on rest of meds.  Was in the ER 6/04/20 dental pain and abscess of tooth #20.  Was drained and put on amoxicillin  and advised to see the dentist for root canal.  He called on 6/9 and medication was changed to Augmentin and he did see the dentist on 6/11/20 but tooth had broken off by then and root canal not indicated as it was too far gone and instead his tooth was pulled  to make dentures as he had quite a few teeth pulled. Seen psychiatry on 7/2/20 and continued on all his meds and trazodone added and noted propanolol made him tired and he only took half tablet prior to an AA meeting.      TE done 7/7/20 noted was doing reasonably well.  Had passive suicidal thoughts but no active plan.  Under care of a psychiatrist and therapist.  He was to see his therapist in person the next day in person for the first time since the pandemic began.  He had still been going to AA and conversing with his psychiatrist via telephone.  Notes from the psychiatrist reviewed.  He was concerned about some skin changes, ear wax and tinnitus.  His abdominal pain and palpitations had resolved since he stopped the alcohol noted he had been sober then 9 months.  He continued to have ED and some fatigue and occasional lightheadedness which he believed was side effects from the medications he took. Was very rarely forgetful not as significant to be of any concern to him. He was planning to get dentures. Was to continue care with his hematologist for hx of unprovoked DVT & PE.  Due for appointment with them in the new year. Advised to work on advanced directives. Due to a prior positive stool test was recommended a colonoscopy & per hematology they said he could go ahead with that we would just have to hold his blood thinner a day before and a day after the scope or depending on what they find & do. He had no symptoms and wanted  to wait to do the colonoscopy once the pandemic situation was  better. Advised  to decrease salt in diet to help tinnitus. Tinnitus thought a result of prior Advil/ alcohol use/ hearing loss  Fatigue as well as lightheadedness  and dizziness  thought related to medications but recommended labs at appointment and if no answer could consider referral to sleep specialist for undiagnosed sleep apnea and/or referral to neurology/cardiology and to  discuss medications further with his psychiatrist. Was to continue to work on diet and weight loss with exercise. Declined Neurology referral.  [place] it was not too serious right at the time, having just rare episodes of occasional fogginess and he felt that was related to his medications.     Seen 7/16/2020 for a physical & chronic issues. Advised self testicular exams monthly. Skin check of moles right inner arm, left mid back and right iliac area all looked like benign sebhoresic keratosis. Offered referral to derm if remained concerned or they changed. Ear check: showed minimal wax, likely not a cause of tinnitus. Referred to see ENT for tinnitus. Was noted due for a colonoscopy. was to follow up with his hematologist regarding hx of unprovoked PE and DVT and blood thinners. Labs done and advised If normal and remained concerned about fatigue recommended to see sleep to be evaluate for sleep apnea. LDL elevated, ASCVD 7.7% . Diet , exercise, weight loss recommended. Was to recheck in 6 months. Noted remained sober from alcohol. Advised if dizziness persisted or got worse can to see cardio/ neuro. Opted then to defer due to cost and felt was managing ok. Thought was from his psyche meds. Was advised to stay well hydrated. Consider the Shingrix vaccine. With regards to his MDD/ Francesca/ chronic passive suicidal thoughts, had no active plan, was under care of psyche and & his Counsellor & had upcoming apt's with them. Was to continue care with his dentist. Had left jaw swelling from recent tooth extraction and infection & had been started on Augmentin twice a day and tramadol as needed by his dentist.  Understood risk of antibiotics and chronic narcotics.       Seen by [facility] on 9/8/20 & 10/2020 &  continued on Wellbutrin 300 mg, propranolol prn, & trazodone 25 to 50 mg bedtime prn. Had a colonoscopy in nov 2020 which was normal. 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 3/2021 did an e visit for covid symptoms but covid was negative.   Seen by psych he 4/2021 & continue don Lexapro 30, trazodone 50, Wellbutrin decreased to 150 & started pregabalin 50 bod and hydroxyzine prn & propranolol held. Seen again by psyche on 5/2021 & hydroxyzine discontinued as caused drowsiness. Seen by psyche on 7/2021 and pregabalin and propranolol discontinued.     [place]  shows received gabapentin 100 mg #270 on 1/4/19 at [place], then 300 mg # 90  On 2/11/19 in [facility], 100 mg # 60 on 3/6/19 in [facility], then # 180 on 6/20/19, 300 mg # 60 on 10/30/19 & # 30 of 300 mg on 11/21/19.  Tylenol 3 12 tablets on 2/11/2021 and tramadol 50 mg #12 on 2/25/2020. Tramadol in July by dentist # 12. pregabalin 50 mg # 60 on 4/12/21, 5/10/21 & 6/2/21    Seen 8/18/21 for chronic fatigue and dizziness thought multifactorial from meds, mood, and prior PE. Recommended labs, echo to assess cardiac function since last checked in 2019 when had effects of large Pulmonary embolism. Had No evidence of coronary artery disease. EKG looked normal with right heart axis from prior PE. Advised to avoid holding  breath if that made him dizzy. Reccommended a sleep Eval to rule out undiagnosed sleep apnea & to see cardiology and neuro to complete work up. Orthostatics today were negative.  Was to continue care with hematology and chronic anticoagulation. See ENT for chronic tinnitus. Noted MDD/ ZAIDA/ passive SI, safe remained 2 yrs nato, in sober housing, to start a new job. Was to continue care with counseling and psyche & go to the ER if suicidal thoughts increased or rochelle worse. [place] ED was related to meds, mood, prior effect of alcohol, SK was stable. Seasonal allergies were not a big  "issue. Was to continue care with the dentist. Health care maintenance was reviewed. Advised self testicular check regularly. Recommended the pneumonia vaccine, shingles # 2 & was to do after done with covid # 2 on 9/3/21was to return in 3 months for a preventive physical then.    Labs showed normal PSA, elevated LDL, CMP showed mildly low albumin advise recheck in 3 months, TSH was normal iron normal ferritin was low, B12 was normal folate was normal CBC was normal.  Did not return for 3-month follow-up.    Review of Systems   Constitutional, HEENT, cardiovascular, pulmonary, GI, , musculoskeletal, neuro, skin, endocrine and psych systems are negative, except as otherwise noted.      Objective    BP (!) 145/56 (BP Location: Left arm, Patient Position: Sitting, Cuff Size: Adult Regular)   Pulse 76   Temp 97.6  F (36.4  C) (Temporal)   Resp 18   Ht 1.778 m (5' 10\")   SpO2 95%   BMI 26.69 kg/m    Body mass index is 26.69 kg/m .  Physical Exam   GENERAL: healthy, alert and no distress  EYES: Eyes grossly normal to inspection, PERRL and conjunctivae and sclerae normal, glasses  HENT: ear canals and TM's normal, nose and mouth without ulcers or lesions, has dentures  NECK: no adenopathy, no asymmetry, masses, or scars and thyroid normal to palpation  RESP: lungs clear to auscultation - no rales, rhonchi or wheezes  CV: regular rate and rhythm, normal S1 S2, no S3 or S4, no murmur, click or rub, no peripheral edema and peripheral pulses strong  ABDOMEN: soft, nontender, no hepatosplenomegaly, no masses and bowel sounds normal  MS: no gross musculoskeletal defects noted, no edema  SKIN: no suspicious lesions or rashes  NEURO: Normal strength and tone, mentation intact and speech normal  PSYCH: mentation appears normal, affect normal/bright    Results for orders placed or performed in visit on 10/04/22   Symptomatic; Yes; 9/27/2022 COVID-19 Virus (Coronavirus) by PCR Nasopharyngeal     Status: Normal    Specimen: " Nasopharyngeal; Swab   Result Value Ref Range    SARS CoV2 PCR Negative Negative    Narrative    Testing was performed using the Xpert Xpress SARS-CoV-2 Assay on the  Cepheid Gene-Xpert Instrument Systems. Additional information about  this Emergency Use Authorization (EUA) assay can be found via the Lab  Guide. This test should be ordered for the detection of SARS-CoV-2 in  individuals who meet SARS-CoV-2 clinical and/or epidemiological  criteria. Test performance is unknown in asymptomatic patients. This  test is for in vitro diagnostic use under the FDA EUA for  laboratories certified under CLIA to perform high complexity testing.  This test has not been FDA cleared or approved. A negative result  does not rule out the presence of PCR inhibitors in the specimen or  target RNA in concentration below the limit of detection for the  assay. The possibility of a false negative should be considered if  the patient's recent exposure or clinical presentation suggests  COVID-19. This test was validated by the [facility]. This laboratory is certified under  the Clinical Laboratory Improvement Amendments of 1988 (CLIA-88) as  qualified to perform high complexity laboratory testing.     Comprehensive metabolic panel (BMP + Alb, Alk Phos, ALT, AST, Total. Bili, TP)     Status: Normal   Result Value Ref Range    Sodium 138 133 - 144 mmol/L    Potassium 4.5 3.4 - 5.3 mmol/L    Chloride 106 94 - 109 mmol/L    Carbon Dioxide (CO2) 23 20 - 32 mmol/L    Anion Gap 9 3 - 14 mmol/L    Urea Nitrogen 13 7 - 30 mg/dL    Creatinine 1.04 0.66 - 1.25 mg/dL    Calcium 8.8 8.5 - 10.1 mg/dL    Glucose 93 70 - 99 mg/dL    Alkaline Phosphatase 80 40 - 150 U/L    AST 26 0 - 45 U/L    ALT 29 0 - 70 U/L    Protein Total 6.8 6.8 - 8.8 g/dL    Albumin 3.4 3.4 - 5.0 g/dL    Bilirubin Total 0.5 0.2 - 1.3 mg/dL    GFR Estimate 84 >60 mL/min/1.73m2   Lipid Profile (Chol, Trig, HDL, LDL calc)     Status:  Abnormal   Result Value Ref Range    Cholesterol 248 (H) <200 mg/dL    Triglycerides 69 <150 mg/dL    Direct Measure HDL 56 >=40 mg/dL    LDL Cholesterol Calculated 178 (H) <=100 mg/dL    Non HDL Cholesterol 192 (H) <130 mg/dL    Patient Fasting > 8hrs? Yes     Narrative    Cholesterol  Desirable:  <200 mg/dL    Triglycerides  Normal:  Less than 150 mg/dL  Borderline High:  150-199 mg/dL  High:  200-499 mg/dL  Very High:  Greater than or equal to 500 mg/dL    Direct Measure HDL  Female:  Greater than or equal to 50 mg/dL   Male:  Greater than or equal to 40 mg/dL    LDL Cholesterol  Desirable:  <100mg/dL  Above Desirable:  100-129 mg/dL   Borderline High:  130-159 mg/dL   High:  160-189 mg/dL   Very High:  >= 190 mg/dL    Non HDL Cholesterol  Desirable:  130 mg/dL  Above Desirable:  130-159 mg/dL  Borderline High:  160-189 mg/dL  High:  190-219 mg/dL  Very High:  Greater than or equal to 220 mg/dL   TSH with free T4 reflex     Status: Normal   Result Value Ref Range    TSH 0.51 0.40 - 4.00 mU/L   Hemoglobin A1c     Status: Normal   Result Value Ref Range    Hemoglobin A1C 4.9 0.0 - 5.6 %   Albumin Random Urine Quantitative with Creat Ratio     Status: None   Result Value Ref Range    Creatinine Urine mg/dL 218 mg/dL    Albumin Urine mg/L 12 mg/L    Albumin Urine mg/g Cr 5.50 0.00 - 17.00 mg/g Cr   Vitamin D Deficiency     Status: Normal   Result Value Ref Range    Vitamin D, Total (25-Hydroxy) 22 20 - 75 ug/L    Narrative    Season, race, dietary intake, and treatment affect the concentration of 25-hydroxy-Vitamin D. Values may decrease during winter months and increase during summer months. Values 20-29 ug/L may indicate Vitamin D insufficiency and values <20 ug/L may indicate Vitamin D deficiency.    Vitamin D determination is routinely performed by an immunoassay specific for 25 hydroxyvitamin D3.  If an individual is on vitamin D2(ergocalciferol) supplementation, please specify 25 OH vitamin D2 and D3 level  determination by LCMSMS test VITD23.     Iron and iron binding capacity     Status: Normal   Result Value Ref Range    Iron 94 35 - 180 ug/dL    Iron Binding Capacity 307 240 - 430 ug/dL    Iron Sat Index 31 15 - 46 %   Ferritin     Status: Normal   Result Value Ref Range    Ferritin 50 26 - 388 ng/mL   Vitamin B12     Status: Normal   Result Value Ref Range    Vitamin B12 929 232 - 1,245 pg/mL   Magnesium     Status: Normal   Result Value Ref Range    Magnesium 2.1 1.6 - 2.3 mg/dL   CBC with platelets and differential     Status: None   Result Value Ref Range    WBC Count 7.9 4.0 - 11.0 10e3/uL    RBC Count 4.93 4.40 - 5.90 10e6/uL    Hemoglobin 15.3 13.3 - 17.7 g/dL    Hematocrit 44.2 40.0 - 53.0 %    MCV 90 78 - 100 fL    MCH 31.0 26.5 - 33.0 pg    MCHC 34.6 31.5 - 36.5 g/dL    RDW 12.3 10.0 - 15.0 %    Platelet Count 274 150 - 450 10e3/uL    % Neutrophils 71 %    % Lymphocytes 23 %    % Monocytes 5 %    % Eosinophils 1 %    % Basophils 1 %    % Immature Granulocytes 0 %    Absolute Neutrophils 5.6 1.6 - 8.3 10e3/uL    Absolute Lymphocytes 1.8 0.8 - 5.3 10e3/uL    Absolute Monocytes 0.4 0.0 - 1.3 10e3/uL    Absolute Eosinophils 0.1 0.0 - 0.7 10e3/uL    Absolute Basophils 0.0 0.0 - 0.2 10e3/uL    Absolute Immature Granulocytes 0.0 <=0.4 10e3/uL   Lipase     Status: Normal   Result Value Ref Range    Lipase 40 13 - 60 U/L   Streptococcus A Rapid Screen w/Reflex to PCR - Clinic Collect     Status: Normal    Specimen: Throat; Swab   Result Value Ref Range    Group A Strep antigen Negative Negative   Group A Streptococcus PCR Throat Swab     Status: Normal    Specimen: Throat; Swab   Result Value Ref Range    Group A strep by PCR Not Detected Not Detected    Narrative    The Xpert Xpress Strep A test, performed on the [employer], is a rapid, qualitative in vitro diagnostic test for the detection of Streptococcus pyogenes (Group A ß-hemolytic Streptococcus, Strep A) in throat swab specimens from  patients with signs and symptoms of pharyngitis. The Xpert Xpress Strep A test can be used as an aid in the diagnosis of Group A Streptococcal pharyngitis. The assay is not intended to monitor treatment for Group A Streptococcus infections. The Xpert Xpress Strep A test utilizes an automated real-time polymerase chain reaction (PCR) to detect Streptococcus pyogenes DNA.   CBC with platelets and differential     Status: None    Narrative    The following orders were created for panel order CBC with platelets and differential.  Procedure                               Abnormality         Status                     ---------                               -----------         ------                     CBC with platelets and d...[[id number]]                      Final result                 Please view results for these tests on the individual orders.     No results found for this or any previous visit (from the past 24 hour(s)).

## 2022-10-04 NOTE — RESULT ENCOUNTER NOTE
Hello Mr. Sinha,  Your results came back with normal iron   If you have any further concerns please do not hesitate to contact us by message, phone or making an appointment.  Have a good day   Dr Will KIM

## 2022-10-05 ENCOUNTER — TRANSFERRED RECORDS (OUTPATIENT)
Dept: HEALTH INFORMATION MANAGEMENT | Facility: CLINIC | Age: 57
End: 2022-10-05

## 2022-10-05 LAB
DEPRECATED CALCIDIOL+CALCIFEROL SERPL-MC: 22 UG/L (ref 20–75)
PHQ9 SCORE: 16

## 2022-10-05 NOTE — RESULT ENCOUNTER NOTE
Hello Mr. Sinha,  Your results came back with a normal Vitamin D level   If you have any further concerns please do not hesitate to contact us by message, phone or making an appointment.  Have a good day   Dr Will KIM

## 2022-10-19 NOTE — PROGRESS NOTES
[facility]  [facility]  [facility] [address], [address], [place] [address]  Main: [phone], Fax: [phone]      Outpatient Visit Note:    Patient: Nikunj Sinha  MRN: [id number]  : 1965  MARTA: Oct 20, 2022    History of Present Illness:  Nikunj Sinha is a 57 year old male with a history of bilateral DVT and high burden pulmonary embolism in 2019. 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 His initial consultation with the [facility] was with Dr. Pires on 2020. Please refer to this note for additional details regarding Nikunj's history and initial presentation.     Due the unprovoked nature of his venous thromboembolic event and his high risk of recurrence, he was kept on anticoagulation indefinitely (Xarelto 20mg daily).    He had a follow-up ultrasound in 2020 that demonstrated no residual thrombus.     His last visit with the [facility] was with Dr. Pires on 2021.    Interval History:   Early this year, Nikunj took himself off anticoagulation as he wasn't certain that anticoagulation was indicated for him anymore. Fortunately, he has had no recurrent venous thromboembolism.    Retrospectively, the now feels that his venous thromboembolic event in 2019 may have been provoked. He was heavily drinking at that time and living out of his car. He reports spending many nights in his car sleeping upright.     He has been sober for years now and has housing/is no longer living/sleeping in his car. He is questioning whether anticoagulation is truly indicated for him anymore. He reports never having any bleeding issues/other side effects while on Xarelto, but understandably does not want to take medications that are not indicated.     ROS:  Denies epistaxis, oral/mucosal bleeding, excessive bruising/ecchymosis, hematuria, hematochezia, and  melena. Denies LE pain/swelling. Denies chest pain. Denies shortness of breath.     Objectives:  /76 (BP Location: Right arm, Patient Position: Sitting, Cuff Size: Adult Large)   Pulse 73   Temp 98.1  F (36.7  C) (Oral)   Wt 88 kg (194 lb)   SpO2 95%   BMI 27.84 kg/m    Exam:   Constitutional: Appears well, no distress  Respiratory: Normal work of breathing.  Skin: no petechiae, no ecchymosis.  Neuro: AOx3    Labs:  Component      Latest Ref Rng & Units 10/4/2022   Sodium      133 - 144 mmol/L 138   Potassium      3.4 - 5.3 mmol/L 4.5   Chloride      94 - 109 mmol/L 106   Carbon Dioxide      20 - 32 mmol/L 23   Anion Gap      3 - 14 mmol/L 9   Urea Nitrogen      7 - 30 mg/dL 13   Creatinine      0.66 - 1.25 mg/dL 1.04   Calcium      8.5 - 10.1 mg/dL 8.8   Glucose      70 - 99 mg/dL 93   Alkaline Phosphatase      40 - 150 U/L 80   AST      0 - 45 U/L 26   ALT      0 - 70 U/L 29   Protein Total      6.8 - 8.8 g/dL 6.8   Albumin      3.4 - 5.0 g/dL 3.4   Bilirubin Total      0.2 - 1.3 mg/dL 0.5   GFR Estimate      >60 mL/min/1.73m2 84       Assessment:  #History of bilateral DVT and high burden pulmonary embolism in 2019.  At the time, this was considered an unprovoked event, and thus indefinite anticoagulation (Xarelto 20mg daily) was recommended. Retrospectively, Nikunj feels that there were some provoking factors that led to this, namely heavy drinking and immobilization (sleeping upright in his car routinely). In light of this new information, I think it would reasonable to categorize this as a provoked venous thromboembolic event, however, given that there is some degree of uncertainty, and given that this venous thromboembolic event was quite severe, I would favor indefinite anticoagulation over discontinuation of anticoagulation for this particular patient. That said, I think prophylactic intensity dosing is sufficient. We had a long discussion regarding this risks vs. benefits of both remaining off  anticoagulation and re-starting anticoagulation for indefinite continuation.   Ultimately, we agreed to re-start prophylactic intensity Xarelto (10mg daily) indefinitely for as long as Nikunj should remain an appropriate candidate. This plan should be reviewed yearly.    The patient understands and agrees with the above recommendations. The patient was given our center's contact information and was instructed to call if any further questions/concerns arise.    40 minutes spent on the date of the encounter doing chart review, history and exam, documentation and further activities per the note.           Cat Hawkins PA-C, [facility]  [facility]  [address] [place] [address], [address], [place] [address]  Main: [phone], Fax: [phone]

## 2022-10-20 ENCOUNTER — OFFICE VISIT (OUTPATIENT)
Dept: HEMATOLOGY | Facility: CLINIC | Age: 57
End: 2022-10-20
Attending: FAMILY MEDICINE
Payer: COMMERCIAL

## 2022-10-20 VITALS
DIASTOLIC BLOOD PRESSURE: 76 MMHG | TEMPERATURE: 98.1 F | BODY MASS INDEX: 27.84 KG/M2 | HEART RATE: 73 BPM | SYSTOLIC BLOOD PRESSURE: 128 MMHG | OXYGEN SATURATION: 95 % | WEIGHT: 194 LBS

## 2022-10-20 DIAGNOSIS — Z79.01 CHRONIC ANTICOAGULATION: ICD-10-CM

## 2022-10-20 DIAGNOSIS — Z86.718 PERSONAL HISTORY OF DVT (DEEP VEIN THROMBOSIS): ICD-10-CM

## 2022-10-20 DIAGNOSIS — R79.0 LOW FERRITIN: ICD-10-CM

## 2022-10-20 DIAGNOSIS — Z86.711 HISTORY OF PULMONARY EMBOLISM: ICD-10-CM

## 2022-10-20 PROCEDURE — 99215 OFFICE O/P EST HI 40 MIN: CPT | Performed by: PHYSICIAN ASSISTANT

## 2022-10-20 PROCEDURE — G0463 HOSPITAL OUTPT CLINIC VISIT: HCPCS

## 2022-10-21 ENCOUNTER — ANCILLARY PROCEDURE (OUTPATIENT)
Dept: CT IMAGING | Facility: CLINIC | Age: 57
End: 2022-10-21
Attending: FAMILY MEDICINE
Payer: COMMERCIAL

## 2022-10-21 ENCOUNTER — TELEPHONE (OUTPATIENT)
Dept: FAMILY MEDICINE | Facility: CLINIC | Age: 57
End: 2022-10-21

## 2022-10-21 DIAGNOSIS — K86.89 DILATED PANCREATIC DUCT: ICD-10-CM

## 2022-10-21 DIAGNOSIS — R10.13 EPIGASTRIC PAIN: ICD-10-CM

## 2022-10-21 DIAGNOSIS — K83.8 DILATION OF COMMON BILE DUCT: ICD-10-CM

## 2022-10-21 DIAGNOSIS — R10.13 EPIGASTRIC PAIN: Primary | ICD-10-CM

## 2022-10-21 PROCEDURE — 74177 CT ABD & PELVIS W/CONTRAST: CPT | Performed by: RADIOLOGY

## 2022-10-21 RX ORDER — IOPAMIDOL 755 MG/ML
108 INJECTION, SOLUTION INTRAVASCULAR ONCE
Status: COMPLETED | OUTPATIENT
Start: 2022-10-21 | End: 2022-10-21

## 2022-10-21 RX ADMIN — IOPAMIDOL 108 ML: 755 INJECTION, SOLUTION INTRAVASCULAR at 11:46

## 2022-10-22 ENCOUNTER — MYC MEDICAL ADVICE (OUTPATIENT)
Dept: FAMILY MEDICINE | Facility: CLINIC | Age: 57
End: 2022-10-22

## 2022-10-26 NOTE — TELEPHONE ENCOUNTER
The stomach looked normal on the scan if read the body of the ct  note  The findings to date seen do not explain the pain but need further eval with mri which was ordered as upper abdominal pain can be due to many reasons not just the stomach  Continue with plan as discussed for symptom relief and  recomend seeing GI for concerns if no answer after that   May need scopes to actually see into the stomach

## 2022-11-22 ENCOUNTER — OFFICE VISIT (OUTPATIENT)
Dept: CARDIOLOGY | Facility: CLINIC | Age: 57
End: 2022-11-22
Attending: FAMILY MEDICINE
Payer: COMMERCIAL

## 2022-11-22 VITALS
HEART RATE: 69 BPM | OXYGEN SATURATION: 97 % | WEIGHT: 196.7 LBS | HEIGHT: 69 IN | SYSTOLIC BLOOD PRESSURE: 114 MMHG | DIASTOLIC BLOOD PRESSURE: 75 MMHG | BODY MASS INDEX: 29.13 KG/M2

## 2022-11-22 DIAGNOSIS — R93.1 DECREASED CARDIAC EJECTION FRACTION: ICD-10-CM

## 2022-11-22 DIAGNOSIS — R42 DIZZINESS: ICD-10-CM

## 2022-11-22 DIAGNOSIS — R53.82 CHRONIC FATIGUE: ICD-10-CM

## 2022-11-22 LAB — LVEF ECHO: NORMAL

## 2022-11-22 PROCEDURE — G0463 HOSPITAL OUTPT CLINIC VISIT: HCPCS | Mod: 25

## 2022-11-22 PROCEDURE — 99204 OFFICE O/P NEW MOD 45 MIN: CPT | Mod: 25 | Performed by: INTERNAL MEDICINE

## 2022-11-22 PROCEDURE — 93306 TTE W/DOPPLER COMPLETE: CPT | Performed by: STUDENT IN AN ORGANIZED HEALTH CARE EDUCATION/TRAINING PROGRAM

## 2022-11-22 RX ORDER — ARIPIPRAZOLE 2 MG/1
2 TABLET ORAL DAILY
COMMUNITY
Start: 2022-10-11 | End: 2023-03-09

## 2022-11-22 ASSESSMENT — PAIN SCALES - GENERAL: PAINLEVEL: NO PAIN (0)

## 2022-11-22 NOTE — RESULT ENCOUNTER NOTE
Hello Mr. Sinha,  Your results came back and echocardiogram shows normal cardiac function , improved form before. Please see the specialist as discussed at last apt to further evaluate symptoms  If you have any further concerns please do not hesitate to contact us by message, phone or making an appointment.  Have a good day   Dr Will KIM

## 2022-11-22 NOTE — NURSING NOTE
Chief Complaint   Patient presents with     New Patient     new - referral from PCP 10-4-22 visit       Vitals were taken and medications reconciled.    Adarsh Camarillo, EMT  10:46 AM

## 2022-11-22 NOTE — PROGRESS NOTES
SUBJECTIVE:  Nikunj Sinha is a 57 year old male who presents for cardiac evaluation due to history of decreased heart function.  Patient works with the mental health facility.  Do have fatigue for many years.  As part of evaluation wanted to exclude cardiac cause for fatigue.  Patient known to have low EF of 50-55 in the past.  Currently patient is fairly active with no cardiac symptoms.  Cardiac risk factors are remote history of smoking and elevated LDL.  No diabetes or hypertension.  No premature coronary artery disease in family.  Though there is family history of atrial fibrillation according to patient.  He has a history of DVT and is on chronic anticoagulation with Xarelto.  No other cardiac medications.  Patient was admitted in August 2019.  History is that he may donated excessive amount of plasma in a short period of time.  This caused significant dehydration WINSTON and mild elevation of troponin 2.4.  Patient had a coronary angiogram showing no flow-limiting lesions.  Echocardiogram at that time reported as showing an EF of 50% there was evidence for RV dilation and dysfunction.  PA pressure was not estimated.  But at this point there is a clear history of excess alcoholism.  Patient is sober for past 3 years.  Patient is scheduled for a sleep study in February.    Patient Active Problem List    Diagnosis Date Noted     Decreased cardiac ejection fraction 08/22/2021     Priority: Medium     Seborrheic keratoses 07/16/2020     Priority: Medium     Need for shingles vaccine 07/16/2020     Priority: Medium     Elevated LDL cholesterol level 12/20/2019     Priority: Medium     Chronic anticoagulation 12/01/2019     Priority: Medium     Personal history of DVT (deep vein thrombosis) 12/01/2019     Priority: Medium     Current severe episode of major depressive disorder without psychotic features, unspecified whether recurrent (H) 11/29/2019     Priority: Medium     11/29/19:  Unemployed, from [place] [employer]  "originally where was getting his care at the local clinic there, new to me & this clinic, limited records on care everywhere only viewable after he got to the room, with limited apt time.   Hx of ACS, NSTEMI with elevated troponin, no CAD on angiogram,  but found to have a acute saddle embolism with cor pulmonale & hx of dvt,  in 2019 on lifelong anticoagulation now on xarelto 20 mg daily, ever since under care of hematology, hx of moderate alcohol dependance in early remission with hx of withdrawal & mood disorder, previously on campral ( discontinued secondary to diarrhea), & naltrexone( stopped as ineffective), ZAIDA, MDD, suicidal ideation, on wellbutrin xl 150 mg daily, lexapro 20 mg daily, gabapentin 300 mg bedtime & a multivitamin, also on proranolol, previously on abilify 5 mg, citalopram 10 mg, hydroxyzine, mirtazipine & trazodone, , dental caries, recent dental abscess s/p abx to see the dentist soon, hx of essential tremor on propranolol 20 mg tid, allergic to ragweeds, no records from [facility] but reviewed care everywhere [facility] records,     Seen at [facility] 18 with suicidal ideation. \" after a friend called  worried about pt.'s suicidal ideation. ISIS met with Pt who appeared Flat and guarded. When SW asked Pt what brought him in he indicated it was a long story. Pt reports that his mother, father and sibling all  within a 10 year time period of various medical issues. 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 Pt reports that he used credit cards to pay for daily expenses. Pt reports that he now has extreme credit card debt. Pt lost his job recently due to his car having mechanical issues and he was missing work. Today Pt was served a 24 hour notice by the iogyn department to leave his apartment. Pt is hopeless and feels that he is \"in too deep\" and nothing will get better. Pt did not appear able to contract for safety. When " "SW asked what Pt would do if he went home, he shrugged and said \"I don't know\", SW indicated that she was worried Pt would go home and hurt himself because he felt so hopeless Pt shrugged again but did not reply. Pt has no support in the community and is isolated. The friend that called 9-1-1 lives in [place] and Pt has never met her in person but they have talked on the phone for 20 years. SW indicated that she must have been worried enough to figure out how to call the police in [place]. Pt shrugged and said \"I guess\". Pt does not feel an admission will change anything, \"I just don't see what being admitted will do or how it can help\". ISIS and MD discussed their concern over Pt.'s safety and his inability to contract for safety along with his state of hopelessness with Pt. He only nodded but agreed to be admitted voluntary in the hopes that the SW on the unit may have some idea's on how to help him. BAL/Breathalyzer completed (date/results): yes Pt was .219 when the police picked him up several hours ago  He does not see a therapist or psychiatrist, however, and although his primary care physician does prescribe him citalopram he has not recently spoken with him or does not sound as if he is disclosed how severe his symptoms are. He does admit to self-medicating with alcohol. When he has stopped drinking in the past he will get rather shaky and a bit nauseous. Otherwise he has not had hallucinations or seizures in the past related to that. When he spoke with his friend today he told him that he thought he should prepared to say goodbye to him, but when I asked why his friend would be concerned with that if this was a daily thought process for him he stated that he does not talk about it every day. He is rather hopeless and he is not sure why he has not done anything yet to harm himself.\" did admit to possibly hanging himself. Initially he was started on the alcohol withdrawal protocol and monitored for a safe " "detoxification. Was drinking up to a liter per day for the past few weeks prior to admission. Admitted on a voluntary basis. Agreeable to stay voluntarily to coordinate cares medication management. He was admitted to psychiatric unit for safety, stabilization and medication management. Eventually gained insight into the disease process and addiction. Was able to cooperate and participate in programming and educate on the medication as well. Started on campral, abilify, gabapentin, mirtazipine & trazodone.  hydroxyzine, & continued on citalopram. A rule 25 was completed. He spoke of some of his historical challenges including taling care of his brother with ALS and his mother who was dying of metastatic cancer and his ailing father. During this time he developed a drinking habit. Additional stressors include being evicted from his apartment. He reports that he was drinking 1.75 L of Vodka per day. He denies suicidal ideations and last felt suicidal about 6 days ago. He stated, Suicide use to be plan A but I no longer feel that way. He has hope for the future. He looks forward to discharge to [facility]. He feels much less hopeless\"    Seen in ER at [facility] on 6/12/19 \" for increased depression, hopelessness, and alcohol abuse. Pt was kicked out of his sober living today after coming home from a therapy appt smelling like alcohol. Pt reports that he has been having a good week and had a really good therapy appt today because he drank before hand and was able to disclose information to his therapist he did not have the courage to do before. Pt returned to his sober house and was confronted about his alcohol abuse, they called the police and sent him here. Pt has been seen in this ED previously for depression and suicidal ideation. Pt is currently denying any Suicidal ideation or plan but also is stating that he has \"fucked up his life beyond repair\" and that \"he doesn't know if he wants to live\". Pt has no family or any " "support, recently lost his housing, and is struggling to deal with all the loss he has endured in his life. 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 Pt has many regrets in his life and struggling to cope with all the loss. Pt would benefit from a crisis residence\".     In ER [facility] on 10/17/2019 for sucidal ideation & alcohol abuse: \"Nikunj Sinha is a 54 y.o. male being seen by Crisis Social Work regarding increased depressive symptoms, anxiety, and suicidal ideation. The patient presented to the outpatient clinic today for a Rule 25 assessment. He endorsed numerous acute depressive symptoms. The patient states that he was on 2700 for a period of time and found the program to be very helpful. He was then discharged to a sober house with a referral for day programming at [facility]. He has continued to relapse on alcohol and has been unsuccessful outside of a structured program. He was previously working a good job as a Scoring Director at [employer] \"but I drank myself out of that job.\" He has now been evicted from his apartment and finds himself homeless. The patient rates his depression as 8 out of 10 and anxiety as 9 out of 10. He is now feeling hopeless with thoughts of suicide and a plan to hang himself. He states that he hasn't taken any steps towards acting on the thoughts but that \"it's something in my mind.\" The patient states that he feels somewhat isolated. He has one friend in [place], but no supportive family that are still alive. The patient's Rule 25 was completed today and this writer verified with Isabella Joe that he is not on her list. She states that once she receives the Rule 25 from the clinic and he is psychiatrically treated he can be considered for 2700. The patient has remained calm, cooperative and pleasant. Patient was sent to the ER from the outpatient addiction clinic. He was here today for a Rule 25 in the outpatient " addiction clinic at [facility]. He was sent by his clinician Alexandr after completing a Rule 25 assessment. The patient endorsed numerous depressive symptoms and states that he is having suicidal ideation. The patient does not identify a specific plan but states that he has several and hasn't decided which to act on. The patient's last drink was yesterday at noon and is now observed as having some withdrawal symptoms. Alexandr states that the patient is engaged with outpatient CD treatment at [facility] but he has been unable to maintain any sobriety outside of a structured facility. Roughly one month ago he stopped taking all of his psych medications. Alexandr recommends that the patient go to inpatient mental health for his suicidal ideation, and if he is not acute enough for that placement that we pursue 2700.  Admitted on a voluntary basis. Agreeable to stay voluntarily to coordinate cares and medication management. Disposition recommended on today's date prior to intake for MI CD treatment. Recommendation by hospitalist to proceed with dental cares. Further efforts to address community concerns prior to proceeding with MI CD treatment. Patient seen by hospitalist during course of hospitalization. Started on antibiotic. Recommendation for evaluation by hospitalist in the community, given lack of provider at the facility. Patient requesting to be seen by dentist prior to MI CD treatment.  Patient placed on a CIWA protocol. Did not demonstrate signs or symptoms of complicated alcohol withdrawal.  Psychiatric medication management performed in detail. Medication management performed to target signs and symptoms of principal diagnosis. Further medication management performed to target comorbid illnesses. Medication management included:    Lexapro: Continued restart of PTA medication for depression and anxiety.    Wellbutrin XL:  Continued restart of of PTA medication for combination therapy.    Gabapentin:  Scheduled  "medication management for augmentation. Continued restart of PTA medication as needed for anxiety.    Propranolol: Continue restart of PTA medication as needed for anxiety.    Naltrexone: Continued trial for alcohol use disorder; LFTs normalizing. Repeat LFTs scheduled.    Campral: Discontinued PTA medication secondary to GI side effects.  Medication adherent. Tolerating medication management. Progressed with mood and anxiety. Denies psychosis. Denies suicidal and homicidal ideation. Did an appropriate crisis and relapse plan. Future oriented. Denies gun access. Indicated motivation to proceed with MI CD treatment as coordinated by  with intake date on Friday, November 1, 2019.   followed in regards to collecting and reviewing collateral information, coordination of cares and discharge planning. Including, MI CD evaluation completed with recommendation for placement to unit 2700. Due to dental issues, patient discharged to medical respite bed and secured on today's date to bridge placement. Discharged to Medical Respite at noon and Inpatient CD treatment on unit 2700 on 11/01 at noon. Further recommendation to refer to psychiatrist in the community after discharge from unit 2700. Cares coordinated with case management.  Treated for dental abscess with pcn.\"    [place]  shows received gabapentin 100 mg #270 on 1/4/19 at [place], then 300 mg # 90  [place] [facility] [place] [facility] 100 mg # 60 on 3/6/19 in [facility], then # 180 on 6/20/19, 300 mg # 60 on 10/30/19 & # 30 of 300 mg on 11/21/19    Seen nov 29th, 2019 to establish care as doesnt have a PCP.   Severe MDD/ZAIDA./ hx of passive suicidal thoughts: discharged nov 22nd 7 days ago, currently in sober housing, has leonard chen with [facility] on 12/5. To also see psyhcology 12/11 but plans to reschedule that at upcoming [facility] apt next week as it is in the morning & he needs to go to out patient rx in the am. Has enough meds " "till sees psyche. Has passive suicidal thoughts. No active plan. Contracts to safety & forwarding thinking about apts etc. Is taking all his meds.     Hx of alcohol abuse, dependance etc, Currently sober, last drink was oct 14th, notes was on naltrexone then stopped as no longer helped. Was on campral before but had to discontinue due to diarrhea. Has done [facility] program twice once beginning of the year and one last week       Moderate alcohol dependence in early remission (H) 11/29/2019     Priority: Medium     ZAIDA (generalized anxiety disorder) 11/29/2019     Priority: Medium     History of suicidal ideation 11/29/2019     Priority: Medium     Fatigue, unspecified type 11/29/2019     Priority: Medium     Dizziness 11/29/2019     Priority: Medium     Tinnitus, bilateral 11/29/2019     Priority: Medium     Dental caries 11/29/2019     Priority: Medium     Seasonal allergic rhinitis, unspecified trigger 11/29/2019     Priority: Medium     History of pulmonary embolism 11/29/2019     Priority: Medium     [facility]: \"hx of depression, anxiety, alcohol abuse disorder (sober x 2 months, He is a recovering alcoholic. He currently resides in sober house since June 2019) who presented with shortness of breath and 2 days of chest pain and near syncope. He presented to the emergency room on 8/4/2019 after [facility] He did not recall particular chest pain or shortness of breath. He noted swelling of his leg in various spot but he denies any persistent pain or swelling or redness. No injury. No acute illness prior to that. However he donated plasma 10 times in 5 weeks prior to that. Upon initial evaluation, d-dimer was elevated at 6.7. PTT was normal at 28, INR is 1.09. He underwent coronary angiogram and it did not show any obstructive coronary artery disease.Due to an elevated troponin he underwent coronary angiogram which demonstrated no significant CAD. Due to an " "elevated d-dimer underwent CT scan of the chest which showed extensive acute bilateral pulmonary embolism with saddle embolus.  Doppler legs showed Nonocclusive right femoral DVT from upper to mid thigh. Left calf DVT within one of the peroneal veins in both of the posterior tibial veins from upper to low calf. Echocardiogram revealed lower limit of normal left ventricular systolic function of 50% with septal motion compatible with right ventricular overload.  He had cor pulmonale and was started on heparin infusion. No thrombolytics were given due hemodynamic and respiratory stability. After several days of monitoring on IV heparin Mr. Sinha continued to improve and he was transitioned to Xarelto for ongoing anticoagulation therapy. He tolerated Xarelto well. No major bleeding except intermittent gum bleeding. Denies family history of venous thromboembolism. He had a brother who passed away from hospitals. He is single. Does not have children. Was advised to continue with indefinate anticoagulation. Mr. Sinha did donate plasma 2x/week for the last 5 weeks and I wonder if this led to a transient hypercoagulable state. Mr. Sinha was advised not to donate plasma moving forward. An outpatient referral to hematology has been made\".      Seen by hematology 9/2019 a[facility]t [facility]. She reviewed the pathophysiology of venous thromboembolic exam and risks of recurrence. He is wondering about excessive plasma donation trigger that event and I think it is possible. At this point, regardless of the etiology, he is indicated for long-term anticoagulation due to initial presentation of life-threatening episode. No prior history of venous thromboembolism or family history of thromboembolism. I would not recommend family/hereditary thrombophilia work-up. I also discussed that we do not need to repeat CT scan of the chest to know the resolution of blood clots, however will obtain CTA if he has signs and symptoms concerning " "for venous thromboembolism. I discussed about strict abstinence from alcohol with anticoagulants. I recommended to obtain kidney, liver function monitoring at least once a year.   I discussed about age-appropriate cancer screening. He admitted that he had positive stool test (sounds like FOBT) a couple years ago. Unclear if it was related to his alcohol use. He was recommended to proceed with colonoscopy, but has not completed yet. I recommended him to complete colonoscopy however, we will need to wait at least 3-6 months because of this recent pulmonary emboli event and I would not recommend interruption of anticoagulation at this point. He voiced understanding. I noted that he has a gastroenterology referral. Recommended him to have a primary care provider as His previous primary care provider is retiring/retired. Until he finds a primary care provider, I will continue to manage his anticoagulation. Follow-up with me in about 6 months with labs including hemogram, CMP.\"           Erectile dysfunction, unspecified erectile dysfunction type 11/29/2019     Priority: Medium    .  Current Outpatient Medications   Medication Sig     ARIPiprazole (ABILIFY) 2 MG tablet 2 mg daily     rivaroxaban ANTICOAGULANT (XARELTO) 10 MG TABS tablet Take 1 tablet (10 mg) by mouth daily with food     DULoxetine (CYMBALTA) 30 MG capsule Take 30 mg by mouth daily (Patient not taking: Reported on 10/20/2022)     rivaroxaban ANTICOAGULANT (XARELTO) 20 MG TABS tablet Take 1 tablet (20 mg) by mouth daily (with dinner) (Patient not taking: Reported on 10/4/2022)     No current facility-administered medications for this visit.     Past Medical History:   Diagnosis Date     Abdominal pain, epigastric 11/29/2019     Chronic anticoagulation 12/1/2019     Current severe episode of major depressive disorder without psychotic features, unspecified whether recurrent (H) 11/29/2019     Dental caries 11/29/2019     Dizziness 11/29/2019     Erectile " dysfunction, unspecified erectile dysfunction type 2019     Essential tremor 2019     Fatigue, unspecified type 2019     ZAIDA (generalized anxiety disorder) 2019     History of chest pain 2019     History of pulmonary embolism 2019     History of suicidal ideation 2019     Memory changes 2019     Moderate alcohol dependence in early remission (H) 2019     Palpitations 2019     Positive FIT (fecal immunochemical test) 2019     Positive FIT (fecal immunochemical test) 2019    Colonoscopy 2020 normal, labs normal     Seasonal allergic rhinitis, unspecified trigger 2019     Tinnitus, bilateral 2019     Weight gain 2019     Weight gain 2019     Past Surgical History:   Procedure Laterality Date     COLONOSCOPY N/A 2020    Procedure: COLONOSCOPY;  Surgeon: Belinda Jorgensen MD;  Location: [facility] OR     CV CORONARY ANGIOGRAM N/A 2019    Procedure: Coronary Angiogram;  Surgeon: Ambrose Mayfield MD;  Location: [facility] Cath Lab;  Service: Cardiology     CV LEFT HEART CATHETERIZATION WITHOUT LEFT VENTRICULOGRAM Left 2019    Procedure: Left Heart Catheterization Without Left Ventriculogram;  Surgeon: Ambrose Mayfield MD;  Location: [facility] Cath Lab;  Service: Cardiology     TONSILLECTOMY  1984     TONSILLECTOMY       Allergies   Allergen Reactions     Ragweeds Other (See Comments)     congestion     Social History     Socioeconomic History     Marital status: Single     Spouse name: Not on file     Number of children: Not on file     Years of education: Not on file     Highest education level: Not on file   Occupational History     Not on file   Tobacco Use     Smoking status: Former     Packs/day: 0.50     Years: 4.00     Pack years: 2.00     Types: Cigarettes     Start date: 1978     Quit date: 1982     Years since quittin.9     Smokeless tobacco: Never   Substance and Sexual  "Activity     Alcohol use: Not Currently     Comment: sober second time oct 14 , did detox adn rehab, in a sober house 2019     Drug use: Never     Sexual activity: Not Currently   Other Topics Concern     Not on file   Social History Narrative    2019: moved in 1 week ago to sober house [employer] [facility], previously living alone, no pets,      Social Determinants of Health     Financial Resource Strain: Not on file   Food Insecurity: Not on file   Transportation Needs: Not on file   Physical Activity: Not on file   Stress: Not on file   Social Connections: Not on file   Intimate Partner Violence: Not on file   Housing Stability: Not on file     Family History   Problem Relation Age of Onset     Arthritis Mother      Breast Cancer Mother      Depression Mother      Allergies Mother      Migraines Mother      Alcoholism Father      Lymphoma Father         non hodgkins     Other - See Comments Brother         genetic disorder,  of some slow form of ALS     Suicidality Paternal Grandfather           REVIEW OF SYSTEMS:  General: negative, fever, chills, night sweats  Skin: negative, acne, rash and scaling  Eyes: negative, double vision, eye pain and photophobia  Ears/Nose/Throat: negative, nasal congestion and purulent rhinorrhea  Respiratory: No dyspnea on exertion, No cough, No hemoptysis and negative  Cardiovascular: negative, palpitations, tachycardia, irregular heart beat, chest pain, exertional chest pain or pressure, paroxysmal nocturnal dyspnea, dyspnea on exertion and orthopnea       OBJECTIVE:  Blood pressure 114/75, pulse 69, height 1.751 m (5' 8.94\"), weight 89.2 kg (196 lb 11.2 oz), SpO2 97 %.  General Appearance: alert, active and no distress  Head: Normocephalic. No masses, lesions, tenderness or abnormalities  Eyes: conjuctiva clear, PERRL, EOM intact  Ears: External ears normal. Canals clear. TM's normal.  Nose: Nares normal  Mouth: normal  Neck: Supple, no cervical adenopathy, no " thyromegaly  Lungs: clear to auscultation  Cardiac: regular rate and rhythm, normal S1 and S2, no murmur       ASSESSMENT/PLAN:  Patient here for cardiac evaluation due to prior history of decreased cardiac function and also fatigue.  Patient do have history of fatigue for few years.  Nonprogressive.  Currently patient is on anticoagulation with Xarelto for the prior history of DVT.  His cardiac risk factors are remote history of smoking and an LDL of 179.  No diabetes or hypertension.  No premature coronary artery disease in family.  Patient was admitted in 2019.  Records indicate that he did donated excessive plasma over a period of 5 weeks and had significant hypovolemia dehydration [place] and mild troponin elevation of 0.4.  Because of this NSTEMI patient had a coronary angiogram which showed no flow-limiting lesions.  He had a significant history of alcohol intake at that time.  Echocardiogram showed an EF of 50% with RV dysfunction.  Currently patient is active and had no cardiac symptoms during activity.  EKG reviewed.  Normal sinus rhythm.  Low voltage otherwise unremarkable.  Echocardiogram done today was reviewed and result reviewed discussed with patient.  Normal biventricular size and function.  LVEF 55 to 60%.  No significant valvular abnormalities.  No pericardial effusion.  Overall normal echocardiogram.  Discussed results with patient.  Only test left is an exercise stress echo but this may not explain his fatigue.  Patient is not interested at this time as he has too many appointments in recent weeks.  Suggested a statin for elevated LDL but patient would like to discuss with PCP.  Overall no additional cardiac evaluation is needed at this time.  Patient is scheduled for a sleep study in February.  Advised to proceed with this test.  Total visit duration 45 minutes.  This included face-to-face interview, physical exam, chart review, review of EKG echocardiograms outside records coronary angiogram  report and documentation.

## 2022-11-22 NOTE — LETTER
11/22/2022      RE: Nikunj Sinha  [address]  [facility] [id number]       Dear Colleague,    Thank you for the opportunity to participate in the care of your patient, Nikunj Sinha, at the [facility] [place] at [facility]. Please see a copy of my visit note below.       SUBJECTIVE:  Nikunj Sinha is a 57 year old male who presents for cardiac evaluation due to history of decreased heart function.  Patient works with the [employer] [facility].  Do have fatigue for many years.  As part of evaluation wanted to exclude cardiac cause for fatigue.  Patient known to have low EF of 50-55 in the past.  Currently patient is fairly active with no cardiac symptoms.  Cardiac risk factors are remote history of smoking and elevated LDL.  No diabetes or hypertension.  No premature coronary artery disease in family.  Though there is family history of atrial fibrillation according to patient.  He has a history of DVT and is on chronic anticoagulation with Xarelto.  No other cardiac medications.  Patient was admitted in August 2019.  History is that he may donated excessive amount of plasma in a short period of time.  This caused significant dehydration WINSTON and mild elevation of troponin 2.4.  Patient had a coronary angiogram showing no flow-limiting lesions.  Echocardiogram at that time reported as showing an EF of 50% there was evidence for RV dilation and dysfunction.  PA pressure was not estimated.  But at this point there is a clear history of excess alcoholism.  Patient is sober for past 3 years.  Patient is scheduled for a sleep study in February.    Patient Active Problem List    Diagnosis Date Noted     Decreased cardiac ejection fraction 08/22/2021     Priority: Medium     Seborrheic keratoses 07/16/2020     Priority: Medium     Need for shingles vaccine 07/16/2020     Priority: Medium     Elevated LDL cholesterol level 12/20/2019      "Priority: Medium     Chronic anticoagulation 2019     Priority: Medium     Personal history of DVT (deep vein thrombosis) 2019     Priority: Medium     Current severe episode of major depressive disorder without psychotic features, unspecified whether recurrent (H) 2019     Priority: Medium     19:  Unemployed, from [place] originally where was getting his care at the local clinic there, new to me & this clinic, limited records on care everywhere only viewable after he got to the room, with limited apt time.   Hx of ACS, NSTEMI with elevated troponin, no CAD on angiogram,  but found to have a acute saddle embolism with cor pulmonale & hx of dvt,  in 2019 on lifelong anticoagulation now on xarelto 20 mg daily, ever since under care of hematology, hx of moderate alcohol dependance in early remission with hx of withdrawal & mood disorder, previously on campral ( discontinued secondary to diarrhea), & naltrexone( stopped as ineffective), ZAIDA, MDD, suicidal ideation, on wellbutrin xl 150 mg daily, lexapro 20 mg daily, gabapentin 300 mg bedtime & a multivitamin, also on proranolol, previously on abilify 5 mg, citalopram 10 mg, hydroxyzine, mirtazipine & trazodone, , dental caries, recent dental abscess s/p abx to see the dentist soon, hx of essential tremor on propranolol 20 mg tid, allergic to ragweeds, no records from [facility] but reviewed care everywhere [facility] records,     [facility]en at [facility] with suicidal ideation. \" after a friend called  worried about pt.'s suicidal ideation. SW met with Pt who appeared Flat and guarded. When SW asked Pt what brought him in he indicated it was a long story. Pt reports that his mother, father and sibling all  within a 10 year time period of various medical issues. 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 Pt reports that he used credit cards to pay for daily expenses. Pt " "reports that he now has extreme credit card debt. Pt lost his job recently due to his car having mechanical issues and he was missing work. Today Pt was served a 24 hour notice by the [employer] department to leave his apartment. Pt is hopeless and feels that he is \"in too deep\" and nothing will get better. Pt did not appear able to contract for safety. When SW asked what Pt would do if he went home, he shrugged and said \"I don't know\", SW indicated that she was worried Pt would go home and hurt himself because he felt so hopeless Pt shrugged again but did not reply. Pt has no support in the community and is isolated. The friend that called 9-1-1 lives in [place] and Pt has never met her in person but they have talked on the phone for 20 years. SW indicated that she must have been worried enough to figure out how to call the police in [place]. Pt shrugged and said \"I guess\". Pt does not feel an admission will change anything, \"I just don't see what being admitted will do or how it can help\". ISIS and MD discussed their concern over Pt.'s safety and his inability to contract for safety along with his state of hopelessness with Pt. He only nodded but agreed to be admitted voluntary in the hopes that the SW on the unit may have some idea's on how to help him. BAL/Breathalyzer completed (date/results): yes Pt was .219 when the police picked him up several hours ago  He does not see a therapist or psychiatrist, however, and although his primary care physician does prescribe him citalopram he has not recently spoken with him or does not sound as if he is disclosed how severe his symptoms are. He does admit to self-medicating with alcohol. When he has stopped drinking in the past he will get rather shaky and a bit nauseous. Otherwise he has not had hallucinations or seizures in the past related to that. When he spoke with his friend today he told him that he thought he should prepared to say goodbye to him, but when I asked why " "his friend would be concerned with that if this was a daily thought process for him he stated that he does not talk about it every day. He is rather hopeless and he is not sure why he has not done anything yet to harm himself.\" did admit to possibly hanging himself. Initially he was started on the alcohol withdrawal protocol and monitored for a safe detoxification. Was drinking up to a liter per day for the past few weeks prior to admission. Admitted on a voluntary basis. Agreeable to stay voluntarily to coordinate cares medication management. He was admitted to psychiatric unit for safety, stabilization and medication management. Eventually gained insight into the disease process and addiction. Was able to cooperate and participate in programming and educate on the medication as well. Started on campral, abilify, gabapentin, mirtazipine & trazodone.  hydroxyzine, & continued on citalopram. A rule 25 was completed. He spoke of some of his historical challenges including taling care of his brother with ALS and his mother who was dying of metastatic cancer and his ailing father. During this time he developed a drinking habit. Additional stressors include being evicted from his apartment. He reports that he was drinking 1.75 L of Vodka per day. He denies suicidal ideations and last felt suicidal about 6 days ago. He stated, Suicide use to be plan A but I no longer feel that way. He has hope for the future. He looks forward to discharge to [facility]. He feels much less hopeless\"    Seen in ER at [facility] on 6/12/19 \" for increased depression, hopelessness, and alcohol abuse. Pt was kicked out of his sober living today after coming home from a therapy appt smelling like alcohol. Pt reports that he has been having a good week and had a really good therapy appt today because he drank before hand and was able to disclose information to his therapist he did not have the courage to do before. Pt returned to his sober house and " "was confronted about his alcohol abuse, they called the police and sent him here. Pt has been seen in this ED previously for depression and suicidal ideation. Pt is currently denying any Suicidal ideation or plan but also is stating that he has \"fucked up his life beyond repair\" and that \"he doesn't know if he wants to live\". Pt has no family or any support, recently lost his housing, and is struggling to deal with all the loss he has endured in his life. 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 Pt has many regrets in his life and struggling to cope with all the loss. Pt would benefit from a crisis residence\".     In [facility] on 10/17/2019 for sucidal ideation & alcohol abuse: \"Nikunj Sinha is a 54 y.o. male being seen by Crisis Social Work regarding increased depressive symptoms, anxiety, and suicidal ideation. The patient presented to the outpatient clinic today for a Rule 25 assessment. He endorsed numerous acute depressive symptoms. The patient states that he was on 2700 for a period of time and found the program to be very helpful. He was then discharged to a sober house with a referral for day programming at [facility]. He has continued to relapse on alcohol and has been unsuccessful outside of a structured program. He was previously working a good job as a Scoring Director at [employer] \"but I drank myself out of that job.\" He has now been evicted from his apartment and finds himself homeless. The patient rates his depression as 8 out of 10 and anxiety as 9 out of 10. He is now feeling hopeless with thoughts of suicide and a plan to hang himself. He states that he hasn't taken any steps towards acting on the thoughts but that \"it's something in my mind.\" The patient states that he feels somewhat isolated. He has one friend in [place], but no supportive family that are still alive. The patient's Rule 25 was completed today and this writer " verified with Isabella Joe that he is not on her list. She states that once she receives the Rule 25 from the clinic and he is psychiatrically treated he can be considered for 2700. The patient has remained calm, cooperative and pleasant. Patient was sent to the ER from the outpatient addiction clinic. He was here today for a Rule 25 in the outpatient addiction clinic at [facility]. He was sent by his clinician Alexandr after completing a Rule 25 assessment. The patient endorsed numerous depressive symptoms and states that he is having suicidal ideation. The patient does not identify a specific plan but states that he has several and hasn't decided which to act on. The patient's last drink was yesterday at noon and is now observed as having some withdrawal symptoms. Alexandr states that the patient is engaged with outpatient CD treatment at [facility] but he has been unable to maintain any sobriety outside of a structured facility. Roughly one month ago he stopped taking all of his psych medications. Alexandr recommends that the patient go to inpatient mental health for his suicidal ideation, and if he is not acute enough for that placement that we pursue 2700.  Admitted on a voluntary basis. Agreeable to stay voluntarily to coordinate cares and medication management. Disposition recommended on today's date prior to intake for MI CD treatment. Recommendation by hospitalist to proceed with dental cares. Further efforts to address community concerns prior to proceeding with MI CD treatment. Patient seen by hospitalist during course of hospitalization. Started on antibiotic. Recommendation for evaluation by hospitalist in the community, given lack of provider at the facility. Patient requesting to be seen by dentist prior to MI CD treatment.  Patient placed on a CIWA protocol. Did not demonstrate signs or symptoms of complicated alcohol withdrawal.  Psychiatric medication management performed in detail. Medication  "management performed to target signs and symptoms of principal diagnosis. Further medication management performed to target comorbid illnesses. Medication management included:    Lexapro: Continued restart of PTA medication for depression and anxiety.    Wellbutrin XL:  Continued restart of of PTA medication for combination therapy.    Gabapentin:  Scheduled medication management for augmentation. Continued restart of PTA medication as needed for anxiety.    Propranolol: Continue restart of PTA medication as needed for anxiety.    Naltrexone: Continued trial for alcohol use disorder; LFTs normalizing. Repeat LFTs scheduled.    Campral: Discontinued PTA medication secondary to GI side effects.  Medication adherent. Tolerating medication management. Progressed with mood and anxiety. Denies psychosis. Denies suicidal and homicidal ideation. Did an appropriate crisis and relapse plan. Future oriented. Denies gun access. Indicated motivation to proceed with MI CD treatment as coordinated by  with intake date on Friday, November 1, 2019.   followed in regards to collecting and reviewing collateral information, coordination of cares and discharge planning. Including, MI CD evaluation completed with recommendation for placement to unit 2700. Due to dental issues, patient discharged to medical respite bed and secured on today's date to bridge placement. Discharged to Medical Respite at noon and Inpatient CD treatment on unit 2700 on 11/01 at noon. Further recommendation to refer to psychiatrist in the community after discharge from unit 2700. Cares coordinated with case management.  Treated for dental abscess with pcn.\"    [place]  shows received gabapentin 100 mg #270 on 1/4/19 at [place], then 300 mg # 90  On 2/11/19 in [facility], 100 mg # 60 on 3/6/19 in [facility], then # 180 on 6/20/19, 300 mg # 60 on 10/30/19 & # 30 of 300 mg on 11/21/19    Seen nov 29th, 2019 to establish care as doesnt " "have a PCP.   Severe MDD/ZAIDA./ hx of passive suicidal thoughts: discharged nov 22nd 7 days ago, currently in sober housing, has leonard apt with psyche [facility] on 12/5. To also see psyhcology 12/11 but plans to reschedule that at upcoming psyche apt next week as it is in the morning & he needs to go to out patient rx in the am. Has enough meds till sees psyche. Has passive suicidal thoughts. No active plan. Contracts to safety & forwarding thinking about apts etc. Is taking all his meds.     Hx of alcohol abuse, dependance etc, Currently sober, last drink was oct 14th, notes was on naltrexone then stopped as no longer helped. Was on campral before but had to discontinue due to diarrhea. Has done [facility] program twice once beginning of the year and one last week       Moderate alcohol dependence in early remission (H) 11/29/2019     Priority: Medium     ZAIDA (generalized anxiety disorder) 11/29/2019     Priority: Medium     History of suicidal ideation 11/29/2019     Priority: Medium     Fatigue, unspecified type 11/29/2019     Priority: Medium     Dizziness 11/29/2019     Priority: Medium     Tinnitus, bilateral 11/29/2019     Priority: Medium     Dental caries 11/29/2019     Priority: Medium     Seasonal allergic rhinitis, unspecified trigger 11/29/2019     Priority: Medium     History of pulmonary embolism 11/29/2019     Priority: Medium     [facility] on [facility]: \"hx of depression, anxiety, alcohol abuse disorder (sober x 2 months, He is a recovering alcoholic. He currently resides in sober house since June 2019) who presented with shortness of breath and 2 days of chest pain and near syncope. He presented to the emergency room on 8/4/2019 after he had a syncopal episode in front of [facility]. He did not recall particular chest pain or shortness of breath. He noted swelling of his leg in various spot but he denies any persistent pain or swelling or redness. No injury. No acute illness prior to " "that. However he donated plasma 10 times in 5 weeks prior to that. Upon initial evaluation, d-dimer was elevated at 6.7. PTT was normal at 28, INR is 1.09. He underwent coronary angiogram and it did not show any obstructive coronary artery disease.Due to an elevated troponin he underwent coronary angiogram which demonstrated no significant CAD. Due to an elevated d-dimer underwent CT scan of the chest which showed extensive acute bilateral pulmonary embolism with saddle embolus.  Doppler legs showed Nonocclusive right femoral DVT from upper to mid thigh. Left calf DVT within one of the peroneal veins in both of the posterior tibial veins from upper to low calf. Echocardiogram revealed lower limit of normal left ventricular systolic function of 50% with septal motion compatible with right ventricular overload.  He had cor pulmonale and was started on heparin infusion. No thrombolytics were given due hemodynamic and respiratory stability. After several days of monitoring on IV heparin Mr. Sinha continued to improve and he was transitioned to Xarelto for ongoing anticoagulation therapy. He tolerated Xarelto well. No major bleeding except intermittent gum bleeding. Denies family history of venous thromboembolism. He had a brother who passed away from [facility]. He is single. Does not have children. Was advised to continue with indefinate anticoagulation. Mr. Sinha did donate plasma 2x/week for the last 5 weeks and I wonder if this led to a transient hypercoagulable state. Mr. Sinha was advised not to donate plasma moving forward. An outpatient referral to hematology has been made\".      Seen by hematology 9/2019 [facility]. She reviewed the pathophysiology of venous thromboembolic exam and risks of recurrence. He is wondering about excessive plasma donation trigger that event and I think it is possible. At this point, regardless of the etiology, he is indicated for long-term anticoagulation due to initial " "presentation of life-threatening episode. No prior history of venous thromboembolism or family history of thromboembolism. I would not recommend family/hereditary thrombophilia work-up. I also discussed that we do not need to repeat CT scan of the chest to know the resolution of blood clots, however will obtain CTA if he has signs and symptoms concerning for venous thromboembolism. I discussed about strict abstinence from alcohol with anticoagulants. I recommended to obtain kidney, liver function monitoring at least once a year.   I discussed about age-appropriate cancer screening. He admitted that he had positive stool test (sounds like FOBT) a couple years ago. Unclear if it was related to his alcohol use. He was recommended to proceed with colonoscopy, but has not completed yet. I recommended him to complete colonoscopy however, we will need to wait at least 3-6 months because of this recent pulmonary emboli event and I would not recommend interruption of anticoagulation at this point. He voiced understanding. I noted that he has a gastroenterology referral. Recommended him to have a primary care provider as His previous primary care provider is retiring/retired. Until he finds a primary care provider, I will continue to manage his anticoagulation. Follow-up with me in about 6 months with labs including hemogram, CMP.\"           Erectile dysfunction, unspecified erectile dysfunction type 11/29/2019     Priority: Medium    .  Current Outpatient Medications   Medication Sig     ARIPiprazole (ABILIFY) 2 MG tablet 2 mg daily     rivaroxaban ANTICOAGULANT (XARELTO) 10 MG TABS tablet Take 1 tablet (10 mg) by mouth daily with food     DULoxetine (CYMBALTA) 30 MG capsule Take 30 mg by mouth daily (Patient not taking: Reported on 10/20/2022)     rivaroxaban ANTICOAGULANT (XARELTO) 20 MG TABS tablet Take 1 tablet (20 mg) by mouth daily (with dinner) (Patient not taking: Reported on 10/4/2022)     No current " facility-administered medications for this visit.     Past Medical History:   Diagnosis Date     Abdominal pain, epigastric 11/29/2019     Chronic anticoagulation 12/1/2019     Current severe episode of major depressive disorder without psychotic features, unspecified whether recurrent (H) 11/29/2019     Dental caries 11/29/2019     Dizziness 11/29/2019     Erectile dysfunction, unspecified erectile dysfunction type 11/29/2019     Essential tremor 12/1/2019     Fatigue, unspecified type 11/29/2019     ZAIDA (generalized anxiety disorder) 11/29/2019     History of chest pain 11/29/2019     History of pulmonary embolism 11/29/2019     History of suicidal ideation 11/29/2019     Memory changes 11/29/2019     Moderate alcohol dependence in early remission (H) 11/29/2019     Palpitations 11/29/2019     Positive FIT (fecal immunochemical test) 11/29/2019     Positive FIT (fecal immunochemical test) 11/29/2019    Colonoscopy 11/2020 normal, labs normal     Seasonal allergic rhinitis, unspecified trigger 11/29/2019     Tinnitus, bilateral 11/29/2019     Weight gain 11/29/2019     Weight gain 11/29/2019     Past Surgical History:   Procedure Laterality Date     COLONOSCOPY N/A 11/12/2020    Procedure: COLONOSCOPY;  Surgeon: Belinda Jorgensen MD;  Location: [facility] OR     CV CORONARY ANGIOGRAM N/A 8/4/2019    Procedure: Coronary Angiogram;  Surgeon: Ambrose Mayfield MD;  Location: [facility] Cath Lab;  Service: Cardiology     CV LEFT HEART CATHETERIZATION WITHOUT LEFT VENTRICULOGRAM Left 8/4/2019    Procedure: Left Heart Catheterization Without Left Ventriculogram;  Surgeon: Ambrose Mayfield MD;  Location: [facility] Cath Lab;  Service: Cardiology     TONSILLECTOMY  1984     TONSILLECTOMY       Allergies   Allergen Reactions     Ragweeds Other (See Comments)     congestion     Social History     Socioeconomic History     Marital status: Single     Spouse name: Not on file     Number of children: Not on file      Years of education: Not on file     Highest education level: Not on file   Occupational History     Not on file   Tobacco Use     Smoking status: Former     Packs/day: 0.50     Years: 4.00     Pack years: 2.00     Types: Cigarettes     Start date: 1978     Quit date: 1982     Years since quittin.9     Smokeless tobacco: Never   Substance and Sexual Activity     Alcohol use: Not Currently     Comment: sober second time oct 14 , did detox adn rehab, in a sober house 2019     Drug use: Never     Sexual activity: Not Currently   Other Topics Concern     Not on file   Social History Narrative    2019: moved in 1 week ago to sober house [employer], previously living alone, no pets,      Social Determinants of Health     Financial Resource Strain: Not on file   Food Insecurity: Not on file   Transportation Needs: Not on file   Physical Activity: Not on file   Stress: Not on file   Social Connections: Not on file   Intimate Partner Violence: Not on file   Housing Stability: Not on file     Family History   Problem Relation Age of Onset     Arthritis Mother      Breast Cancer Mother      Depression Mother      Allergies Mother      Migraines Mother      Alcoholism Father      Lymphoma Father         non hodgkins     Other - See Comments Brother         genetic disorder,  of some slow form of ALS     Suicidality Paternal Grandfather           REVIEW OF SYSTEMS:  General: negative, fever, chills, night sweats  Skin: negative, acne, rash and scaling  Eyes: negative, double vision, eye pain and photophobia  Ears/Nose/Throat: negative, nasal congestion and purulent rhinorrhea  Respiratory: No dyspnea on exertion, No cough, No hemoptysis and negative  Cardiovascular: negative, palpitations, tachycardia, irregular heart beat, chest pain, exertional chest pain or pressure, paroxysmal nocturnal dyspnea, dyspnea on exertion and orthopnea       OBJECTIVE:  Blood pressure 114/75, pulse 69, height 1.751 m  "(5' 8.94\"), weight 89.2 kg (196 lb 11.2 oz), SpO2 97 %.  General Appearance: alert, active and no distress  Head: Normocephalic. No masses, lesions, tenderness or abnormalities  Eyes: conjuctiva clear, PERRL, EOM intact  Ears: External ears normal. Canals clear. TM's normal.  Nose: Nares normal  Mouth: normal  Neck: Supple, no cervical adenopathy, no thyromegaly  Lungs: clear to auscultation  Cardiac: regular rate and rhythm, normal S1 and S2, no murmur       ASSESSMENT/PLAN:  Patient here for cardiac evaluation due to prior history of decreased cardiac function and also fatigue.  Patient do have history of fatigue for few years.  Nonprogressive.  Currently patient is on anticoagulation with Xarelto for the prior history of DVT.  His cardiac risk factors are remote history of smoking and an LDL of 179.  No diabetes or hypertension.  No premature coronary artery disease in family.  Patient was admitted in 2019.  Records indicate that he did donated excessive plasma over a period of 5 weeks and had significant hypovolemia dehydration WINSTON and mild troponin elevation of 0.4.  Because of this NSTEMI patient had a coronary angiogram which showed no flow-limiting lesions.  He had a significant history of alcohol intake at that time.  Echocardiogram showed an EF of 50% with RV dysfunction.  Currently patient is active and had no cardiac symptoms during activity.  EKG reviewed.  Normal sinus rhythm.  Low voltage otherwise unremarkable.  Echocardiogram done today was reviewed and result reviewed discussed with patient.  Normal biventricular size and function.  LVEF 55 to 60%.  No significant valvular abnormalities.  No pericardial effusion.  Overall normal echocardiogram.  Discussed results with patient.  Only test left is an exercise stress echo but this may not explain his fatigue.  Patient is not interested at this time as he has too many appointments in recent weeks.  Suggested a statin for elevated LDL but patient would " like to discuss with PCP.  Overall no additional cardiac evaluation is needed at this time.  Patient is scheduled for a sleep study in February.  Advised to proceed with this test.  Total visit duration 45 minutes.  This included face-to-face interview, physical exam, chart review, review of EKG echocardiograms outside records coronary angiogram report and documentation.      Please do not hesitate to contact me if you have any questions/concerns.     Sincerely,     OTILIA Hudson MD

## 2022-11-25 ENCOUNTER — ANCILLARY PROCEDURE (OUTPATIENT)
Dept: MRI IMAGING | Facility: CLINIC | Age: 57
End: 2022-11-25
Attending: FAMILY MEDICINE
Payer: COMMERCIAL

## 2022-11-25 DIAGNOSIS — K83.8 DILATION OF COMMON BILE DUCT: ICD-10-CM

## 2022-11-25 DIAGNOSIS — K86.89 DILATED PANCREATIC DUCT: ICD-10-CM

## 2022-11-25 DIAGNOSIS — R10.13 EPIGASTRIC PAIN: ICD-10-CM

## 2022-11-25 PROCEDURE — 74183 MRI ABD W/O CNTR FLWD CNTR: CPT | Performed by: RADIOLOGY

## 2022-11-25 PROCEDURE — A9585 GADOBUTROL INJECTION: HCPCS | Performed by: RADIOLOGY

## 2022-11-25 RX ORDER — GADOBUTROL 604.72 MG/ML
10 INJECTION INTRAVENOUS ONCE
Status: COMPLETED | OUTPATIENT
Start: 2022-11-25 | End: 2022-11-25

## 2022-11-25 RX ADMIN — GADOBUTROL 9 ML: 604.72 INJECTION INTRAVENOUS at 13:44

## 2022-11-25 NOTE — DISCHARGE INSTRUCTIONS
MRI Contrast Discharge Instructions    The IV contrast you received today will pass out of your body in your  urine. This will happen in the next 24 hours. You will not feel this process.  Your urine will not change color.    Drink at least 4 extra glasses of water or juice today (unless your doctor  has restricted your fluids). This reduces the stress on your kidneys.  You may take your regular medicines.    If you are on dialysis: It is best to have dialysis today.    If you have a reaction: Most reactions happen right away. If you have  any new symptoms after leaving the hospital (such as hives or swelling),  call your hospital at the correct number below. Or call your family doctor.  If you have breathing distress or wheezing, call 911.    Special instructions: ***    I have read and understand the above information.    Signature:______________________________________ Date:___________    Staff:__________________________________________ Date:___________     Time:__________    [facility] Departments:    ___[place]: [phone]  ___[facility]: [phone]  ___[place]: [phone] ___[facility]: [phone]  ___[facility]: [phone]  ___[facility]: [phone]  ___[phone]  ___[facility]: [phone]  ___[phone]

## 2022-11-28 PROCEDURE — 87338 HPYLORI STOOL AG IA: CPT | Performed by: FAMILY MEDICINE

## 2022-11-28 NOTE — RESULT ENCOUNTER NOTE
Mahendrayanna Mr. 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  Have a good day   Dr Will KIM

## 2022-11-29 LAB — H PYLORI AG STL QL IA: NEGATIVE

## 2022-11-29 NOTE — RESULT ENCOUNTER NOTE
Hello Mr. Sinha,  Your results came back negative for H. pylori   if you have any further concerns please do not hesitate to contact us by message, phone or making an appointment.  Have a good day   Dr Will KIM

## 2022-12-01 ENCOUNTER — OFFICE VISIT (OUTPATIENT)
Dept: FAMILY MEDICINE | Facility: CLINIC | Age: 57
End: 2022-12-01
Attending: FAMILY MEDICINE
Payer: COMMERCIAL

## 2022-12-01 VITALS
TEMPERATURE: 97.7 F | RESPIRATION RATE: 15 BRPM | HEIGHT: 69 IN | DIASTOLIC BLOOD PRESSURE: 78 MMHG | OXYGEN SATURATION: 97 % | BODY MASS INDEX: 29.18 KG/M2 | WEIGHT: 197 LBS | SYSTOLIC BLOOD PRESSURE: 119 MMHG | HEART RATE: 66 BPM

## 2022-12-01 DIAGNOSIS — K76.89 LIVER CYST: ICD-10-CM

## 2022-12-01 DIAGNOSIS — Z71.89 ADVANCED DIRECTIVES, COUNSELING/DISCUSSION: ICD-10-CM

## 2022-12-01 DIAGNOSIS — N52.9 ERECTILE DYSFUNCTION, UNSPECIFIED ERECTILE DYSFUNCTION TYPE: ICD-10-CM

## 2022-12-01 DIAGNOSIS — Z86.718 PERSONAL HISTORY OF DVT (DEEP VEIN THROMBOSIS): ICD-10-CM

## 2022-12-01 DIAGNOSIS — L82.1 SEBORRHEIC KERATOSES: ICD-10-CM

## 2022-12-01 DIAGNOSIS — R93.1 DECREASED CARDIAC EJECTION FRACTION: ICD-10-CM

## 2022-12-01 DIAGNOSIS — E78.00 ELEVATED LDL CHOLESTEROL LEVEL: ICD-10-CM

## 2022-12-01 DIAGNOSIS — R59.0 MESENTERIC LYMPHADENOPATHY: ICD-10-CM

## 2022-12-01 DIAGNOSIS — K86.89 DILATED PANCREATIC DUCT: ICD-10-CM

## 2022-12-01 DIAGNOSIS — K83.8 DILATION OF COMMON BILE DUCT: ICD-10-CM

## 2022-12-01 DIAGNOSIS — R53.82 CHRONIC FATIGUE: ICD-10-CM

## 2022-12-01 DIAGNOSIS — F32.2 CURRENT SEVERE EPISODE OF MAJOR DEPRESSIVE DISORDER WITHOUT PSYCHOTIC FEATURES, UNSPECIFIED WHETHER RECURRENT (H): ICD-10-CM

## 2022-12-01 DIAGNOSIS — K57.30 DIVERTICULOSIS OF LARGE INTESTINE WITHOUT HEMORRHAGE: ICD-10-CM

## 2022-12-01 DIAGNOSIS — Z79.01 CHRONIC ANTICOAGULATION: ICD-10-CM

## 2022-12-01 DIAGNOSIS — Z00.00 ROUTINE HISTORY AND PHYSICAL EXAMINATION OF ADULT: Primary | ICD-10-CM

## 2022-12-01 DIAGNOSIS — R03.0 ELEVATED BLOOD PRESSURE READING WITHOUT DIAGNOSIS OF HYPERTENSION: ICD-10-CM

## 2022-12-01 DIAGNOSIS — K42.9 UMBILICAL HERNIA WITHOUT OBSTRUCTION AND WITHOUT GANGRENE: ICD-10-CM

## 2022-12-01 DIAGNOSIS — Z23 NEED FOR SHINGLES VACCINE: ICD-10-CM

## 2022-12-01 DIAGNOSIS — D49.0 IPMN (INTRADUCTAL PAPILLARY MUCINOUS NEOPLASM): ICD-10-CM

## 2022-12-01 DIAGNOSIS — F10.21 MODERATE ALCOHOL DEPENDENCE IN EARLY REMISSION (H): ICD-10-CM

## 2022-12-01 DIAGNOSIS — K02.9 DENTAL CARIES: ICD-10-CM

## 2022-12-01 DIAGNOSIS — R42 DIZZINESS: ICD-10-CM

## 2022-12-01 DIAGNOSIS — J30.2 SEASONAL ALLERGIC RHINITIS, UNSPECIFIED TRIGGER: ICD-10-CM

## 2022-12-01 DIAGNOSIS — M89.8X9 BONE ISLAND: ICD-10-CM

## 2022-12-01 DIAGNOSIS — Z23 NEED FOR INFLUENZA VACCINATION: ICD-10-CM

## 2022-12-01 DIAGNOSIS — K40.20 BILATERAL INGUINAL HERNIA WITHOUT OBSTRUCTION OR GANGRENE, RECURRENCE NOT SPECIFIED: ICD-10-CM

## 2022-12-01 DIAGNOSIS — R41.3 MEMORY DIFFICULTIES: ICD-10-CM

## 2022-12-01 DIAGNOSIS — Z23 NEED FOR HEPATITIS B VACCINATION: ICD-10-CM

## 2022-12-01 DIAGNOSIS — Z23 NEED FOR COVID-19 VACCINE: ICD-10-CM

## 2022-12-01 DIAGNOSIS — K82.8 SLUDGE IN GALLBLADDER: ICD-10-CM

## 2022-12-01 DIAGNOSIS — Z86.59 HISTORY OF SUICIDAL IDEATION: ICD-10-CM

## 2022-12-01 DIAGNOSIS — R10.13 EPIGASTRIC PAIN: ICD-10-CM

## 2022-12-01 DIAGNOSIS — Z00.00 HEALTH CARE MAINTENANCE: ICD-10-CM

## 2022-12-01 DIAGNOSIS — H93.13 TINNITUS, BILATERAL: ICD-10-CM

## 2022-12-01 DIAGNOSIS — K57.10 DUODENAL DIVERTICULUM: ICD-10-CM

## 2022-12-01 DIAGNOSIS — F41.1 GAD (GENERALIZED ANXIETY DISORDER): ICD-10-CM

## 2022-12-01 DIAGNOSIS — Z86.711 HISTORY OF PULMONARY EMBOLISM: ICD-10-CM

## 2022-12-01 DIAGNOSIS — L98.9 LESION OF SKIN OF NOSE: ICD-10-CM

## 2022-12-01 PROCEDURE — 0124A COVID-19 VACCINE BIVALENT BOOSTER 12+ (PFIZER): CPT | Performed by: FAMILY MEDICINE

## 2022-12-01 PROCEDURE — 99214 OFFICE O/P EST MOD 30 MIN: CPT | Mod: 25 | Performed by: FAMILY MEDICINE

## 2022-12-01 PROCEDURE — 99396 PREV VISIT EST AGE 40-64: CPT | Mod: 25 | Performed by: FAMILY MEDICINE

## 2022-12-01 PROCEDURE — 90682 RIV4 VACC RECOMBINANT DNA IM: CPT | Performed by: FAMILY MEDICINE

## 2022-12-01 PROCEDURE — 91312 COVID-19 VACCINE BIVALENT BOOSTER 12+ (PFIZER): CPT | Performed by: FAMILY MEDICINE

## 2022-12-01 PROCEDURE — 90471 IMMUNIZATION ADMIN: CPT | Performed by: FAMILY MEDICINE

## 2022-12-01 RX ORDER — ATORVASTATIN CALCIUM 10 MG/1
10 TABLET, FILM COATED ORAL DAILY
Status: CANCELLED | OUTPATIENT
Start: 2022-12-01

## 2022-12-01 ASSESSMENT — PATIENT HEALTH QUESTIONNAIRE - PHQ9
SUM OF ALL RESPONSES TO PHQ QUESTIONS 1-9: 15
10. IF YOU CHECKED OFF ANY PROBLEMS, HOW DIFFICULT HAVE THESE PROBLEMS MADE IT FOR YOU TO DO YOUR WORK, TAKE CARE OF THINGS AT HOME, OR GET ALONG WITH OTHER PEOPLE: SOMEWHAT DIFFICULT
SUM OF ALL RESPONSES TO PHQ QUESTIONS 1-9: 15

## 2022-12-01 ASSESSMENT — ENCOUNTER SYMPTOMS
DYSURIA: 0
COUGH: 0
PALPITATIONS: 0
FEVER: 0
PARESTHESIAS: 0
DIARRHEA: 0
JOINT SWELLING: 0
NAUSEA: 0
CONSTIPATION: 0
WEAKNESS: 1
MYALGIAS: 1
SHORTNESS OF BREATH: 0
CHILLS: 0
HEADACHES: 0
DIZZINESS: 1
HEARTBURN: 0
ARTHRALGIAS: 0
NERVOUS/ANXIOUS: 1
SORE THROAT: 0
HEMATURIA: 0
ABDOMINAL PAIN: 1
FREQUENCY: 0
EYE PAIN: 0
HEMATOCHEZIA: 0

## 2022-12-01 ASSESSMENT — PAIN SCALES - GENERAL: PAINLEVEL: NO PAIN (0)

## 2022-12-01 NOTE — PROGRESS NOTES
SUBJECTIVE:   CC: Nikunj is an 57 year old who presents for preventative health visit.   Patient has been advised of split billing requirements and indicates understanding: Yes  Healthy Habits:     Getting at least 3 servings of Calcium per day:  NO    Bi-annual eye exam:  Yes    Dental care twice a year:  NO    Sleep apnea or symptoms of sleep apnea:  Daytime drowsiness    Diet:  Regular (no restrictions)    Frequency of exercise:  2-3 days/week    Duration of exercise:  15-30 minutes    Taking medications regularly:  Yes    Medication side effects:  Lightheadedness    PHQ-2 Total Score: 4    Additional concerns today:  No  Answers for HPI/ROS submitted by the patient on 12/1/2022  If you checked off any problems, how difficult have these problems made it for you to do your work, take care of things at home, or get along with other people?: Somewhat difficult  PHQ9 TOTAL SCORE: 15      BACKGROUND  Former smoker, 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 with Xarelto managed by hematology, decreased EF, hx of postural dizziness,chronic fatigue, elevated LDL, chronic tinnitus, resolved chest pain, history of alcohol abuse and dependence currently in remission, ZAIDA, MDD, history of passive suicidal ideation, prior active thoughts, previously on Wellbutrin 300, Lexapro 20, no longer on gabapentin, propranolol as needed and trazodone as needed, then on Cymbalta & briefly on Abilify, history of memory issues improved off the alcohol, under care of psyche & therapy, history of bilateral tinnitus, seasonal allergic rhinitis, resolved essential tremor once he stopped the alcohol, resolved epigastric pain once he stopped the alcohol, Long standing epigastric pain, been taking both Pepcid and Prilosec since oct visit. Symptoms can go away but usually constant. When pain goes away mood is better. Taking Prilosec 20 mg daily & Pepcid 20 mg once day & a Ginger capsule. Lab work recently including lipase  & H pylori were negative. did not Ct abdomen and then mri showed benign CBD dilation, dilated pancreatic duct, evidence of pancreatic scarring, suggestion of IPMN, a duodenal diverticulum, and non specific mesenteric lymphadenopathy & a benign right liver cyst & asymptomatic diverticulosis, B/inguinal hernia and small asymptomatic umbilical hernia & possible gall stones, right iliac bone island, left nose skin lesion, SK,.resolved palpitations, history of dental caries needing dentures, weight gain due to sedentary state, positive test,  history of ADD, ED, SK, on multivitamin and prior tonsillectomy, allergic to ragweed's,     In 2018 was unemployed, from [place] originally where was getting his care at the local clinic there, hx of alcohol dependence in early remission, ZAIDA, MDD, hx of SI, on Wellbutrin, Lexapro, gabapentin & MV, under care of psychiatry at [facility], memory changes, epigastric pain, palpitations, weight gain, hx of chest pain, fatigue, dizziness, ED, improved with time away from alcohol, essential tremor on propranolol, B/l tinnitus, seasonal allergic rhinitis, allergic to ragweed, dental caries / abscess, S/p extraction nov 2019, 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 Xarelto under care of hematology, prior tonsillectomy,   Hx of ACS, NSTEMI with elevated troponin, no CAD on angiogram,  but found to have an acute saddle embolism with cor pulmonale & hx of DVT,  in 8/2019 on lifelong anticoagulation now on Xarelto 20 mg daily, ever since under care of hematology, hx of moderate alcohol dependence in early remission with hx of withdrawal & mood disorder, previously on campral ( discontinued secondary to diarrhea), & naltrexone( stopped as ineffective), ZAIDA, MDD, suicidal ideation, on Wellbutrin Xl 150 mg daily, Lexapro 20 mg daily, gabapentin 300 mg bedtime & a multivitamin, previously  also on propranolol, previously on Abilify 5 mg, citalopram 10 mg, hydroxyzine, mirtazapine &  "trazodone, hx of dental caries, recent dental abscess S/p abx, hx of essential tremor on propranolol 20 mg tid, allergic to ragweed's, [facility],      Seen at [facility] ER 12/26/18 with suicidal ideation. Stabilized , treated ( see notes for details & discharged to [address]).  Seen in ER at [facility] on 6/12/19 \" for increased depression, hopelessness, and alcohol abuse. Had been discharged from his sober due to smelling like alcohol.   Admitted [facility] on 8/2019: with shortness of breath and 2 days of chest pain and near syncope. He presented to the emergency room on 8/4/2019 after [facility]. He did not recall particular chest pain or shortness of breath. He noted swelling of his leg in various spots but he denied any persistent pain or swelling or redness. No injury. No acute illness prior to that. However he had donated plasma 10 times in 5 weeks prior to that. Upon initial evaluation, d-dimer was elevated at 6.7. PTT was normal at 28, INR is 1.09.  Due to an elevated troponin he underwent coronary angiogram which demonstrated no significant CAD. Due to an elevated d-dimer underwent CT scan of the chest which showed extensive acute bilateral pulmonary embolism with saddle embolus.  Doppler legs showed Nonocclusive right femoral DVT from upper to mid thigh. Left calf DVT within one of the peroneal veins in both of the posterior tibial veins from upper to low calf. Echocardiogram revealed lower limit of normal left ventricular systolic function of 50% with septal motion compatible with right ventricular overload.  He had cor pulmonale and was started on heparin infusion. No thrombolytics were given due hemodynamic and respiratory stability. After several days of monitoring on IV heparin Mr. Sinha continued to improve and he was transitioned to Xarelto for ongoing anticoagulation therapy. He tolerated Xarelto " well. Had no major bleeding except intermittent gum bleeding. Denied family history of venous thromboembolism. He had a brother who passed away from [facility]. He was single. Did not have children. Was advised to continue with indefinite anticoagulation. Hematology thought maybe plasma donation had led to a transient hypercoagulable state. Mr. Sinha was advised not to donate plasma moving forward. An outpatient referral to hematology had been made.  Seen by hematology 9/2019 at [facility]. She reviewed the pathophysiology of venous thromboembolic exam and risks of recurrence. Was advised of possible etiology, he was indicated for long-term anticoagulation due to initial presentation of a life-threatening episode. No prior history of venous thromboembolism or family history of thromboembolism. Was not recommended family/hereditary thrombophilia work-upa repeat CT scan of the chest to know the resolution of blood clots was not needed & only recommended to obtain a CTA if he had signs and symptoms concerning for venous thromboembolism. Was advised strict abstinence from alcohol with anticoagulants & to obtain kidney, liver function monitoring at least once a year.   He had age-appropriate cancer screening. He admitted that he had a positive stool test (sounds like FOBT) a couple years prior. It was unclear if it was related to his alcohol use. He was recommended to proceed with colonoscopy, but to wait at least 3-6 months because of this recent pulmonary emboli event, &  recommended interruption of anticoagulation prior to that. He voiced understanding. His previous primary care provider retired.    He was engaged with outpatient CD treatment at [facility] but he had been unable to maintain any sobriety outside of a structured facility. Roughly one month prior to next ER  visit he stopped taking all of his psych medications.      Was in the ER at [facility] on 10/17/2019 for suicidal ideation & alcohol abuse: had  "increased depressive symptoms, anxiety, and suicidal ideation. He presented to the outpatient clinic for a Rule 25 assessment. He endorsed numerous acute depressive symptoms. He relapsed on alcohol and had been unsuccessful outside of a structured program. He was previously working a good job as a Scoring Director at [employer] \"but I drank myself out of that job.\" He had been evicted from his apartment and found himself homeless. He rated his depression as 8 out of 10 and anxiety as 9 out of 10. He was feeling hopeless with thoughts of suicide and a plan to hang himself. He stated that he hadn't taken any steps towards acting on the thoughts but that \"it's something in my mind.\" He felt somewhat isolated. He had one friend in [place], but no supportive family that were still alive.   He was admitted on a voluntary basis. Agreeable to stay voluntarily to coordinate cares and medication management. Was seen by the hospitalist during course of hospitalization. Started on antibiotics for a dental abscess. He was placed on a CIWA protocol & did not demonstrate signs or symptoms of complicated alcohol withdrawal. Psychiatric medication management was performed in detail to target signs and symptoms of principal diagnosis & comorbid illnesses. Medication management included Lexapro which was restarted for depression and anxiety as well as Wellbutrin XL, & Gabapentin as needed for anxiety. Propranolol as needed for anxiety. Given Naltrexone as a trial for alcohol use disorder; LFT's were normalizing & Campral was discontinued secondary to GI side- effects. Denied psychosis. Denied suicidal and homicidal ideation. Did have an appropriate crisis and relapse plan. Was future oriented. Denied gun access. Indicated motivation to proceed with MI CD treatment as coordinated by  with intake date on Friday, November 1, 2019.   followed in regards to collecting and reviewing collateral information, " coordination of cares and discharge planning. Including, MI CD evaluation completed with recommendation for placement to unit 2700. Due to dental issues, he was discharged to medical respite bed to bridge placement. He was treated for dental abscess with PCN.     Seen 11/29/19 for the first time by this writer. See that note for details. Noted Hx of severe MDD, ZAIDA, hx of passive suicidal thoughts, early remission from alcohol dependence just completed rule 25 , detox & rehab & in sober living. had just moved from [facility] to [place]. Had poor social support but was forward thinking  & had apt's for the dentist & psyche & noted had been taking meds & contracted to safety. 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 No hypercoag work up recommended given lifelong anticoagulation indicated & seen by hematology. Note from sept 2019 reviewed.   Reported fatigue, weight gain, dizziness since on new meds for mental health. Denied chest pain or palpitations though noted had had these in the past. Exam was benign & vitally stable.Did not appear to be in heart failure. Noted his memory changes, abdominal pain, tinnitus, ED thought  related to alcohol abuse & noted symptoms had improved since quit drinking. Advised to try Pepcid OTC for abdominal pain. Declined referral to ENT/ audiology. Seasonal allergic rhinitis was not an issue. Was to consider a GI referral for a scope in future once could come off Xarelto a few days. Noted due for follow up with hematology by feb 2020. Was to sign an KISHOR to get records from [place] where previously doctored. Was given the Tdap. Was negative for Hep C, HIV, had a normal folic acid, vit B12, CMP, TSH, cbc, HB A1C & LDL was elevated.   Seen by [facility] psychiatry on 12/5/19 for ZAIDA, depression and alcohol abuse in remission and noted was taking propranolol 1 a day instead of three  times and continued on same meds till reevaluated in 4 weeks.      Seen 12/20/2019 & noted remained sober and symptoms were improved.  Seen by psychiatry at [facility] 1/9/2020 when noted had stopped gabapentin and was continued on Lexapro, Wellbutrin along with using propanolol rarely as needed.  Did see the hematologist 1/23 and told to complete therapeutic course of Xarelto and then continue indefinitely prophylactically due to unprovoked prior DVT and PE.  Advised that it was okay to get dental procedures and colonoscopy after March 1 and he would be low risk to hold the medication 1 to 2 days before and after depending on the procedure risk.  And to follow-up with him in 1 year.       Seen by psychiatry 5/2020 by telephone encounter due to pandemic and continued on Lexapro and propranolol and Wellbutrin but Lexapro increased to 30 mg.  Was doing therapy. Seen by psych 6/2/20 and bupropion increased to 300 and continued on rest of meds.  Was in the ER 6/04/20 dental pain and abscess of tooth #20.  Was drained and put on amoxicillin and advised to see the dentist for root canal.  He called on 6/9 and medication was changed to Augmentin and he did see the dentist on 6/11/20 but tooth had broken off by then and root canal not indicated as it was too far gone and instead his tooth was pulled  to make dentures as he had quite a few teeth pulled. Seen psychiatry on 7/2/20 and continued on all his meds and trazodone added and noted propanolol made him tired and he only took half tablet prior to an AA meeting.      TE done 7/7/20 noted was doing reasonably well.  Had passive suicidal thoughts but no active plan.  Under care of a psychiatrist and therapist.  He was to see his therapist in person the next day in person for the first time since the pandemic began.  He had still been going to AA and conversing with his psychiatrist via telephone.  Notes from the psychiatrist reviewed.  He was concerned about some skin changes,  ear wax and tinnitus.  His abdominal pain and palpitations had resolved since he stopped the alcohol noted he had been sober then 9 months.  He continued to have ED and some fatigue and occasional lightheadedness which he believed was side effects from the medications he took. Was very rarely forgetful not as significant to be of any concern to him. He was planning to get dentures. Was to continue care with his hematologist for hx of unprovoked DVT & PE.  Due for appointment with them in the new year. Advised to work on advanced directives. Due to a prior positive stool test was recommended a colonoscopy & per hematology they said he could go ahead with that we would just have to hold his blood thinner a day before and a day after the scope or depending on what they find & do. He had no symptoms and wanted  to wait to do the colonoscopy once the pandemic situation was  better. Advised  to decrease salt in diet to help tinnitus. Tinnitus thought a result of prior Advil/ alcohol use/ hearing loss  Fatigue as well as lightheadedness and dizziness  thought related to medications but recommended labs at appointment and if no answer could consider referral to sleep specialist for undiagnosed sleep apnea and/or referral to neurology/cardiology and to  discuss medications further with his psychiatrist. Was to continue to work on diet and weight loss with exercise. Declined Neurology referral.  Bennet it was not too serious right at the time, having just rare episodes of occasional fogginess and he felt that was related to his medications.     Seen 7/16/2020 for a physical & chronic issues. Advised self testicular exams monthly. Skin check of moles right inner arm, left mid back and right iliac area all looked like benign seborheic keratosis. Offered referral to derm if remained concerned or they changed. Ear check: showed minimal wax, likely not a cause of tinnitus. Referred to see ENT for tinnitus. Was noted due for a  colonoscopy. was to follow up with his hematologist regarding hx of unprovoked PE and DVT and blood thinners. Labs done and advised If normal and remained concerned about fatigue recommended to see sleep to be evaluate for sleep apnea. LDL elevated, ASCVD 7.7% . Diet , exercise, weight loss recommended. Was to recheck in 6 months. Noted remained sober from alcohol. Advised if dizziness persisted or got worse can to see cardio/ neuro. Opted then to defer due to cost and felt was managing ok. Thought was from his psyche meds. Was advised to stay well hydrated. Consider the Shingrix vaccine. With regards to his MDD/ Francesca/ chronic passive suicidal thoughts, had no active plan, was under care of psychalva and & his Counsellor & had upcoming apt's with them. Was to continue care with his dentist. Had left jaw swelling from recent tooth extraction and infection & had been started on Augmentin twice a day and tramadol as needed by his dentist.  Understood risk of antibiotics and chronic narcotics.       Seen by Tae lion on 9/8/20 & 10/2020 & continued on Wellbutrin 300 mg, propranolol prn, & trazodone 25 to 50 mg bedtime prn. Had a colonoscopy in nov 2020 which was normal. 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 3/2021 did an e visit for Covid symptoms but Covid was negative.   Seen by psych he 4/2021 & continue don Lexapro 30, trazodone 50, Wellbutrin decreased to 150 & started pregabalin 50 bid and hydroxyzine prn & propranolol held. Seen again by amisha on 5/2021 & hydroxyzine discontinued as caused drowsiness. Seen by amisha on 7/2021 and pregabalin and propranolol discontinued.      [place]  shows received gabapentin 100 mg #270 on 1/4/19 at [place], then 300 mg # 90  On 2/11/19 in [facility], 100 mg # 60 on 3/6/19 in [facility], then # 180 on 6/20/19, 300 mg # 60 on 10/30/19 & # 30 of 300 mg on 11/21/19.  Tylenol 3 12 tablets on 2/11/2021 and  tramadol 50 mg #12 on 2/25/2020. Tramadol in July by dentist # 12. pregabalin 50 mg # 60 on 4/12/21, 5/10/21 & 6/2/21     Seen 8/18/21 for chronic fatigue and dizziness thought multifactorial from meds, mood, and prior PE. Recommended labs, echo to assess cardiac function since last checked in 2019 when had effects of large Pulmonary embolism. Had No evidence of coronary artery disease. EKG looked normal with right heart axis from prior PE. Advised to avoid holding  breath if that made him dizzy. Recommended a sleep Eval to rule out undiagnosed sleep apnea & to see cardiology and neuro to complete work up. Orthostatics today were negative.  Was to continue care with hematology and chronic anticoagulation. See ENT for chronic tinnitus. Noted MDD/ ZAIDA/ passive SI, safe remained 2 yrs nato, in sober housing, to start a new job. Was to continue care with counseling and psyche & go to the ER if suicidal thoughts increased or got worse. [place] ED was related to meds, mood, prior effect of alcohol, SK was stable. Seasonal allergies were not a big issue. Was to continue care with the dentist. Health care maintenance was reviewed. Advised self testicular check regularly. Recommended the pneumonia vaccine, shingles # 2 & was to do after done with Covid # 2 on 9/3/21 was to return in 3 months for a preventive physical then.  Labs showed normal PSA, elevated LDL, CMP showed mildly low albumin advise recheck in 3 months, TSH was normal iron normal ferritin was low, B12 was normal folate was normal CBC was normal.  Did not return for 3-month follow-up.    Seen 10/4/22, was originally scheduled as a physical.  But due to respiratory symptoms and exposure to positive COVID night before was changed to an office visit.  COVID and strep test done for history of runny nose nasal congestion cough started 10 to 12 days prior improving and reported negative COVID PCR test on day 5 of illness but also newly exposed to positive COVID case day  before.  Quarantine till COVID test comes back negative,  if negative recommend rechecking in 5 days as testing could be negative as too early since time of new exposure.  Chronic fatigue felt multifactorial due to mental health, medications, prior noted low ejection fraction of heart, history of pulmonary embolism, undiagnosed sleep apnea etc. Labs done & referred to cardiology and sleep to further evaluate and treat and return in 4 to 6 weeks to follow-up. Recheck echo and to see cardiology for prior noted decreased EF suspected due to pulmonary embolism in the past. History of chronic dizziness & referred to cardiology and neurology. For prior low ferritin and low calcium labs done.   History of elevated LDL & labs done and if cardiovascular risk was elevated to consider statin medication.  History of prior pulmonary embolism and DVT,noted had taken self off Xarelto in May 2022 but had medication at home.  Recommended to return to hematology to discuss risks and if medication needs to be resumed.  Hx of Upper abdominal pain ongoing many years, thought could be due to gastritis, ulcer, heartburn etc.  Labs including H. pylori ordered as well as a CT abdomen pelvis given duration of symptoms.  Was doing over-the-counter Prilosec as needed with some relief.  Discussed chronic use of this medication could increase risk of C. difficile colitis, fractures, magnesium and B12 deficiency. To try Pepcid 20 mg twice a day over-the-counter instead in the meantime  Noted an elevated blood pressure, felt due to cough& anxiety. Was recommended to eat a low-salt diet and recheck at follow-up and if persisted to consider medication. 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   Reviewed major depression with anxiety and passive suicidal ideation, felt safe under care of psychiatry & had an appointment with them the following day to discuss medications.  Had had changes to meds over time, was to continue with therapy  every 2 weeks.  & noted was gainfully employed. Memory concerns felt could be due to prior alcohol use and current mental health. Did check B12 and magnesium levels and referred to see neurology to further evaluate and treat  Did not have time to discuss a lesion on left side of nose that he had had for some time that was not healing. Was to review at next appointment and refer to dermatology if needed  Was to return in 4 to 6 weeks for physical and update flu shot, COVID-vaccine, pneumonia vaccine and shingles etc. Noted was a Former smoker but did not meet criteria for lung CT cancer screening.    Covid & strep was negative, LDL elevated with ASCVD risk of 9.4 % normal lipase, b12, ferritin, calcium, vit d, CMP, TSH, magnesium, iron, HBA1c was normal. H pylori came back normal.   On 10/6/2022 noted hematology PA filled his Xarelto at reduced dose of 10 mg for one year after he discussed with them a compromise to restart meds,  On 10/24/22 ct showed a benign right liver cyst, CBD dilation, & advised an mri. Also noted duodenal diverticulum, diverticulosis, bone island right iliac that was asymptomatic & B/l inguinal hernia & umbilical hernia that was asymptomatic  Echo showed improvement with normal ef & no valvular concerns.   Met with cardiology on 11/22 who noted normal EKG & echo, advised a statin & felt could do a stress test but would not provide more infor & patient opted not to do.  MRI done 11/25 showed CBD dilation was not of concern, had possible gallstones/ sludge, had evidence of pancreatic scarring, suggestion of IPMN, a duodenal diverticulum and non specific mesenteric lymph nodes.     CURRENTLY  Here for a physical & follow up from last visit     MDD/Francesca/ passive SI: under care of therapy and psyche. Had been started Abilify & stopped as didn't like it. No longer on Cymbalta, was supposed to talk to psyche 2 weeks ago bu it got cancelled and has pat now on dec 15 th, thinking now about going back to  restarting Lexapro as in retrospect felt better while on it.  Feels safe. Though mood and ZAIDA worse off any meds. Currently no urge to drink alcohol.     Remains sober from alcohol since 2019    Long standing epigastric pain, been taking both Pepcid and Prilosec since oct visit. Symptoms can go away but usually constant. When pain goes away mood is better. Taking Prilosec 20 mg daily & Pepcid 20 mg once day & a Raeann capsule. Lab work recently did not reveal a cause there. Ct abdomen and then mri showed benign CBD dilation, dilated pancreatic duct, evidence of pancreatic scarring, suggestion of IPMN, a duodenal diverticulum, and non specific mesenteric lymphadenopathy & a benign right liver cyst.& asymptomatic diverticulosis, B/inguinal hernia and small asymptomatic umbilical hernia.     Chronic fatigue, Seen by cardio, Echo & EKG & labs normal, opted no stress test. Has upcoming apt with sleep in new year  decreased EF with prior PE now looks resolved  Elevated BP , recheck end of visit was wnl, suspected due to anxiety     Dizziness persists, sometime it is postural, sometimes can occur while standing with no posture changes. Rarely feels like will black out. At times gets up and has to sit right down again. Feels drinking enough water. Seen by cardio EKG, echo normal. Cardio felt stress test would not add more. 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 No sign of bleeding. Labs all normal. Will be seeing neuro to evaluate more in the new year.     LDL elevated.   The 10-year ASCVD risk score (Jerry ROCKWELL, et al., 2019) is: 6.7%    Values used to calculate the score:      Age: 57 years      Sex: Male      Is Non- : No      Diabetic: No      Tobacco smoker: No      Systolic Blood Pressure: 119 mmHg      Is BP treated: No      HDL Cholesterol: 56 mg/dL      Total Cholesterol: 248 mg/dL  Discussed risk using [place]'s medical model for this risk group. Opted currently to hold off on statin      Subjective memory concerns, Able to do job ok but feels struggles with things sometimes. B 12 and TSH normal.to discuss with neuro in march when has apt.    Hx of PE & DVT & back on Xarelto since stopped in may but now on a lower dose of 10 mg a compromise he made with hematology.    Incidental right iliac bone island noted on ct scan, felt  To be benign, is asymptomatic    Left nose lesion many months started as injury / pimple which he has picked at often and not been healing.     SK no new changes to skin otherwise.    B/l tinnitus stable     Allergies not bothering now, as gotten older allergy to ragweed noted got better    Dental caries improved    ED noted better off the antidepressants    HM reviewed  No ACP on file  Vaccines discussed.   Will do flu and Covid pfizer bivalent vaccine today  Will make an apt to do long overdue shingrex #2 in a few weeks with the MA & a BP check  Desires to hold off Hep B vaccine for now   Will follow up again in march after sees sleep & neuro    Depression Screening Follow Up  PHQ 12/1/2022   PHQ-9 Total Score 15   Q9: Thoughts of better off dead/self-harm past 2 weeks Several days   F/U: Thoughts of suicide or self-harm No   F/U: Self harm-plan -   F/U: Self-harm action -   F/U: Safety concerns No   PHQ-9 External Data -     Last PHQ-9 12/1/2022   1.  Little interest or pleasure in doing things 2   2.  Feeling down, depressed, or hopeless 2   3.  Trouble falling or staying asleep, or sleeping too much 1   4.  Feeling tired or having little energy 3   5.  Poor appetite or overeating 1   6.  Feeling bad about yourself 2   7.  Trouble concentrating 3   8.  Moving slowly or restless 0   Q9: Thoughts of better off dead/self-harm past 2 weeks 1   PHQ-9 Total Score 15   Difficulty at work, home, or with people -   In the past two weeks have you had thoughts of suicide or self harm? No   Do you have concerns about your personal safety or the safety of others? No   In the past 2  weeks have you thought about a plan or had intention to harm yourself? -   In the past 2 weeks have you acted on these thoughts in any way? -       C-SSRS (Brief [place]) 10/4/2022   Within the last month, have you wished you were dead or wished you could go to sleep and not wake up? No   Within the last month, have you had any actual thoughts of killing yourself? Yes   Within the last month, have you been thinking about how you might do this? No   Within the last month, have you had these thoughts and had some intention of acting on them? No   Within the last month, have you started to work out or worked out the details of how to kill yourself with the intent to carry out this plan? No   Within the last month, have you ever done anything, started to do anything, or prepared to do anything to end your life? No         Follow Up      Follow Up Actions Taken  Referred patient back to mental health provider    Discussed the following ways the patient can remain in a safe environment:  be around others  Depression Screening Follow-up    PHQ 12/1/2022   PHQ-9 Total Score 15   Q9: Thoughts of better off dead/self-harm past 2 weeks Several days   F/U: Thoughts of suicide or self-harm No   F/U: Self harm-plan -   F/U: Self-harm action -   F/U: Safety concerns No   PHQ-9 External Data -     Does the patient currently have a mental health provider?  Yes, patient was referred back to current mental health provider.    Today's PHQ-2 Score:   PHQ-2 ( 1999 Pfizer) 12/1/2022   Q1: Little interest or pleasure in doing things 2   Q2: Feeling down, depressed or hopeless 2   PHQ-2 Score 4   PHQ-2 Total Score (12-17 Years)- Positive if 3 or more points; Administer PHQ-A if positive -   Q1: Little interest or pleasure in doing things More than half the days   Q2: Feeling down, depressed or hopeless More than half the days   PHQ-2 Score 4     Social History     Tobacco Use     Smoking status: Former     Packs/day: 0.50     Years: 4.00      Pack years: 2.00     Types: Cigarettes     Start date: 1978     Quit date: 1982     Years since quittin.9     Smokeless tobacco: Never   Substance Use Topics     Alcohol use: Not Currently     Comment: sober second time oct 14 , did detox adn rehab, in a sober house 2019     If you drink alcohol do you typically have >3 drinks per day or >7 drinks per week? No    Alcohol Use 2022   Prescreen: >3 drinks/day or >7 drinks/week? Not Applicable   Prescreen: >3 drinks/day or >7 drinks/week? -     Last PSA:   Prostate Specific Antigen Screen   Date Value Ref Range Status   2021 1.33 0.00 - 4.00 ug/L Final     Reviewed orders with patient. Reviewed health maintenance and updated orders accordingly - Yes  Lab work is in process  Labs reviewed in EPIC  BP Readings from Last 3 Encounters:   22 119/78   22 114/75   10/20/22 128/76    Wt Readings from Last 3 Encounters:   22 89.4 kg (197 lb)   22 89.2 kg (196 lb 11.2 oz)   10/20/22 88 kg (194 lb)         Patient Active Problem List   Diagnosis     Current severe episode of major depressive disorder without psychotic features, unspecified whether recurrent (H)     Moderate alcohol dependence in early remission (H)     ZAIDA (generalized anxiety disorder)     History of suicidal ideation     Chronic fatigue     Dizziness     Tinnitus, bilateral     Dental caries     Seasonal allergic rhinitis, unspecified trigger     History of pulmonary embolism     Epigastric pain     Erectile dysfunction, unspecified erectile dysfunction type     Chronic anticoagulation     Personal history of DVT (deep vein thrombosis)     Elevated LDL cholesterol level     Seborrheic keratoses     Need for shingles vaccine     Bilateral inguinal hernia without obstruction or gangrene, recurrence not specified     Umbilical hernia without obstruction and without gangrene     Diverticulosis of large intestine without hemorrhage     Liver cyst     Duodenal  diverticulum     IPMN (intraductal papillary mucinous neoplasm)     Dilation of common bile duct     Past Surgical History:   Procedure Laterality Date     COLONOSCOPY N/A 2020    Procedure: COLONOSCOPY;  Surgeon: Belinda Jorgensen MD;  Location: [facility] OR     CV CORONARY ANGIOGRAM N/A 2019    Procedure: Coronary Angiogram;  Surgeon: Ambrose Mayfield MD;  Location: [facility] Cath Lab;  Service: Cardiology     CV LEFT HEART CATHETERIZATION WITHOUT LEFT VENTRICULOGRAM Left 2019    Procedure: Left Heart Catheterization Without Left Ventriculogram;  Surgeon: Ambrose Mayfield MD;  Location: [facility] Cath Lab;  Service: Cardiology     TONSILLECTOMY  1984     TONSILLECTOMY         Social History     Tobacco Use     Smoking status: Former     Packs/day: 0.50     Years: 4.00     Pack years: 2.00     Types: Cigarettes     Start date: 1978     Quit date: 1982     Years since quittin.9     Smokeless tobacco: Never   Substance Use Topics     Alcohol use: Not Currently     Comment: sober second time oct 14 , did detox adn rehab, in a sober house 2019     Family History   Problem Relation Age of Onset     Arthritis Mother      Breast Cancer Mother      Depression Mother      Allergies Mother      Migraines Mother      Alcoholism Father      Lymphoma Father         non hodgkins     Other - See Comments Brother         genetic disorder,  of some slow form of ALS     Suicidality Paternal Grandfather          Current Outpatient Medications   Medication Sig Dispense Refill     rivaroxaban ANTICOAGULANT (XARELTO) 10 MG TABS tablet Take 1 tablet (10 mg) by mouth daily with food 90 tablet 3     ARIPiprazole (ABILIFY) 2 MG tablet 2 mg daily (Patient not taking: Reported on 2022)       Allergies   Allergen Reactions     Ragweeds Other (See Comments)     congestion     Recent Labs   Lab Test 10/04/22  1237 21  0911 20  1331 19  1202   A1C 4.9  --  4.8 4.9   *  139* 118* 163*   HDL 56 56 44 57   TRIG 69 59 100 205*   ALT 29 25 28 33   CR 1.04 1.10 1.06 0.98   GFRESTIMATED 84 75 78 87   GFRESTBLACK  --   --  >90 >90   POTASSIUM 4.5 4.2 4.0 4.2   TSH 0.51 0.80 0.71 0.96        Reviewed and updated as needed this visit by clinical staff   Tobacco  Allergies  Meds  Problems             Reviewed and updated as needed this visit by Provider   Tobacco  Allergies  Meds  Problems            Past Medical History:   Diagnosis Date     Abdominal pain, epigastric 11/29/2019     Chronic anticoagulation 12/1/2019     Current severe episode of major depressive disorder without psychotic features, unspecified whether recurrent (H) 11/29/2019     Decreased cardiac ejection fraction 8/22/2021    Echo oct 2022 normal      Dental caries 11/29/2019     Dizziness 11/29/2019     Erectile dysfunction, unspecified erectile dysfunction type 11/29/2019     Essential tremor 12/1/2019     Fatigue, unspecified type 11/29/2019     ZAIDA (generalized anxiety disorder) 11/29/2019     History of chest pain 11/29/2019     History of pulmonary embolism 11/29/2019     History of suicidal ideation 11/29/2019     Memory changes 11/29/2019     Moderate alcohol dependence in early remission (H) 11/29/2019     Palpitations 11/29/2019     Positive FIT (fecal immunochemical test) 11/29/2019     Positive FIT (fecal immunochemical test) 11/29/2019    Colonoscopy 11/2020 normal, labs normal     Seasonal allergic rhinitis, unspecified trigger 11/29/2019     Tinnitus, bilateral 11/29/2019     Weight gain 11/29/2019     Weight gain 11/29/2019      Past Surgical History:   Procedure Laterality Date     COLONOSCOPY N/A 11/12/2020    Procedure: COLONOSCOPY;  Surgeon: Belinda Jorgensen MD;  Location: [facility] OR     CV CORONARY ANGIOGRAM N/A 8/4/2019    Procedure: Coronary Angiogram;  Surgeon: Ambrose Mayfield MD;  Location: [facility] Cath Lab;  Service: Cardiology     CV LEFT HEART CATHETERIZATION WITHOUT  "LEFT VENTRICULOGRAM Left 8/4/2019    Procedure: Left Heart Catheterization Without Left Ventriculogram;  Surgeon: Ambrose Mayfield MD;  Location: [facility] Cath Lab;  Service: Cardiology     TONSILLECTOMY  1984     TONSILLECTOMY       OB History   No obstetric history on file.       Review of Systems   Constitutional: Negative for chills and fever.   HENT: Negative for congestion, ear pain, hearing loss and sore throat.    Eyes: Negative for pain and visual disturbance.   Respiratory: Negative for cough and shortness of breath.    Cardiovascular: Negative for chest pain, palpitations and peripheral edema.   Gastrointestinal: Positive for abdominal pain. Negative for constipation, diarrhea, heartburn, hematochezia and nausea.   Genitourinary: Negative for dysuria, frequency, genital sores, hematuria, impotence, penile discharge and urgency.   Musculoskeletal: Positive for myalgias. Negative for arthralgias and joint swelling.   Skin: Negative for rash.   Neurological: Positive for dizziness and weakness. Negative for headaches and paresthesias.   Psychiatric/Behavioral: Positive for mood changes. The patient is nervous/anxious.      OBJECTIVE:   /78   Pulse 66   Temp 97.7  F (36.5  C) (Oral)   Resp 15   Ht 1.74 m (5' 8.5\")   Wt 89.4 kg (197 lb)   SpO2 97%   BMI 29.52 kg/m      Physical Exam  GENERAL: healthy, alert, no distress and over weight  EYES: Eyes grossly normal to inspection, PERRL and conjunctivae and sclerae normal  HENT: ear canals and TM's normal, nose and mouth without ulcers or lesions, left ala nasal skin looks ulcerated & hyperpigmented & excoriated   NECK: no adenopathy, no asymmetry, masses, or scars and thyroid normal to palpation  RESP: lungs clear to auscultation - no rales, rhonchi or wheezes  CV: regular rate and rhythm, normal S1 S2, no S3 or S4, no murmur, click or rub, no peripheral edema and peripheral pulses strong  ABDOMEN: soft, non tender, no hepatosplenomegaly, no " masses and bowel sounds normal  MS: no gross musculoskeletal defects noted, no edema  SKIN: no suspicious lesions or rashes  NEURO: Normal strength and tone, mentation intact and speech normal  PSYCH: mentation appears normal, affect normal/bright  LYMPH: no cervical, supraclavicular, axillary, or inguinal adenopathy    Diagnostic Test Results:  Labs reviewed in Epic  No results found for this or any previous visit (from the past 24 hour(s)).  No results found for any visits on 12/01/22.    ASSESSMENT/PLAN:       ICD-10-CM    1. Routine history and physical examination of adult  Z00.00 INFLUENZA VACCINE 50-64 OR 18-64 W/EGG ALLERGY (FLUBLOK)     COVID-19 VACCINE BIVALENT BOOSTER 12+ (PFIZER)     CANCELED: HEPATITIS B VACCINE, ADULT, IM      2. Current severe episode of major depressive disorder without psychotic features, unspecified whether recurrent (H)  F32.2       3. History of suicidal ideation  Z86.59       4. ZAIDA (generalized anxiety disorder)  F41.1       5. Moderate alcohol dependence in early remission (H)  F10.21       6. Epigastric pain  R10.13 Adult GI  Referral - Consult Only      7. Dilation of common bile duct  K83.8 Adult GI  Referral - Consult Only      8. Dilated pancreatic duct  K86.89 Adult GI  Referral - Consult Only      9. IPMN (intraductal papillary mucinous neoplasm)  D49.0 Adult GI  Referral - Consult Only      10. Duodenal diverticulum  K57.10 Adult GI  Referral - Consult Only      11. Mesenteric lymphadenopathy  R59.0 Adult GI  Referral - Consult Only      12. Sludge in gallbladder  K82.8       13. Liver cyst  K76.89 Adult GI  Referral - Consult Only      14. Diverticulosis of large intestine without hemorrhage  K57.30 Adult GI  Referral - Consult Only      15. Umbilical hernia without obstruction and without gangrene  K42.9 Adult General Surg Referral      16. Bilateral inguinal hernia without obstruction or  gangrene, recurrence not specified  K40.20 Adult General Surg Referral      17. Chronic fatigue  R53.82 PRIMARY CARE FOLLOW-UP SCHEDULING      18. Decreased cardiac ejection fraction  R93.1     resolved      19. Elevated blood pressure reading without diagnosis of hypertension  R03.0       20. Dizziness  R42       21. Elevated LDL cholesterol level  E78.00       22. Memory difficulties  R41.3       23. History of pulmonary embolism  Z86.711       24. Personal history of DVT (deep vein thrombosis)  Z86.718       25. Chronic anticoagulation  Z79.01       26. Bone island  M89.8X9     right iliac        27. Lesion of skin of nose  L98.9 Adult Dermatology Referral    left ala nasi lesion months,      28. Seborrheic keratoses  L82.1       29. Tinnitus, bilateral  H93.13       30. Seasonal allergic rhinitis, unspecified trigger  J30.2       31. Dental caries  K02.9       32. Erectile dysfunction, unspecified erectile dysfunction type  N52.9       33. Health care maintenance  Z00.00 REVIEW OF HEALTH MAINTENANCE PROTOCOL ORDERS      34. Advanced directives, counseling/discussion  Z71.89       35. Need for influenza vaccination  Z23 INFLUENZA VACCINE 50-64 OR 18-64 W/EGG ALLERGY (FLUBLOK)      36. Need for COVID-19 vaccine  Z23 COVID-19 VACCINE BIVALENT BOOSTER 12+ (PFIZER)      37. Need for hepatitis B vaccination  Z23 CANCELED: HEPATITIS B VACCINE, ADULT, IM      38. Need for shingles vaccine  Z23         Here for a physical & follow up from last visit     MDD/Francesca/ passive SI: under care of therapy and psyche. Had been started Abilify & stopped as didn't like it. No longer on Cymbalta, was supposed to talk to psyche 2 weeks ago bu it got cancelled and has apt now on dec 15 th, thinking now about going back to restarting Lexapro as in retrospect felt better while on it.  Is safe. Though mood and FRANCESCA worse off any meds. Currently no urge to drink alcohol.     Congratulated on sustained sobriety from alcohol since 2019    Long  standing epigastric pain, been taking both Pepcid and Prilosec since oct visit. Symptoms can go away but usually constant. When pain goes away mood is better. Taking Prilosec 20 mg daily & Pepcid 20 mg once day & a Raeann capsule. Lab work recently including lipase & H pylori were negative. did not Ct abdomen and then mri showed benign CBD dilation, dilated pancreatic duct, evidence of pancreatic scarring, suggestion of IPMN, a duodenal diverticulum, and non specific mesenteric lymphadenopathy & a benign right liver cyst & asymptomatic diverticulosis, B/inguinal hernia and small asymptomatic umbilical hernia & possible gall stones. Discussed Chronic use of Prilosec like med's can cause atypical pneumonia, fractures,  C diff colitis, vit B 12 and magnesium deficiency. If can come off would be good. Best way to do it is to start Pepcid 20 mg Over the counter twice a day and then after few weeks on it start tapering off Prilosec until off it completely while continuing Pepcid twice a day for symptoms control. Referred to GI to evaluate epigastric pain, IPMN, duodenal diverticulum & may benefit from an EGD.   Also referred to general surgeon regarding possible gall bladder sludge & asymptomatic B/l inguinal & umbilical hernia which I don't believe are contributing to long standing epigastric pain.     Chronic fatigue, Seen by cardio, Echo & EKG & labs normal, opted no stress test. Has upcoming apt with sleep in new year to evaluate for possible TAYLOR. Suspect mental health playing a role. To continue care with is psychiatrist & therapist.  Prior noted decreased EF with prior PE now looks resolved with recent normal echo  Elevated BP , recheck end of visit was wnl, suspected due to anxiety . Continue to monitor & advised a low salt & low caffeine diet.     Dizziness persists, sometime it is postural, sometimes can occur while standing with no posture changes. Rarely feels like will black out. At times gets up and has to  sit right down again. Feels drinking enough water. Seen by cardio EKG, echo normal. Cardio felt stress test would not add more information. 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 No sign of bleeding. Labs all normal. Will be seeing neuro to evaluate more in the new year. If O answer consider a Holter monitor.     LDL elevated. The 10-year ASCVD risk score (Jerry ROCKWELL, et al., 2019) is: 9.3%. Discussed risk using [place]'s medical model for this risk group. Opted currently to hold off on statin     Subjective memory concerns, Able to do job ok but feels struggles with things sometimes. B 12 and TSH normal.to discuss with neuro in march when has apt. Suspect mental health playing a role.     Hx of PE & DVT & back on Xarelto since stopped in may but now on a lower dose of 10 mg a compromise he made with hematology.    Incidental right iliac bone island noted on ct scan, felt to be benign, is asymptomatic. Monitor for now.    Left nose lesion many months started as injury / pimple which he has picked at often and not been healing.   SK no new changes to skin otherwise.  Referred to dermatology for nose skin lesion & general skin check    B/l tinnitus stable     Allergies not bothering now, as gotten older allergy to ragweed noted got better    Dental caries improved    ED noted better since off the antidepressants    HM reviewed  No ACP on file, honoring choices given to review  Vaccines discussed.   Will do flu and Covid pfizer bivalent vaccine today  Will make an apt to do long overdue shingrex #2 in a few weeks with the MA & a BP check  Desires to hold off Hep B vaccine for now   Labs reviewed from oct& none indicated today. PSA was normal in 8/2021 & can offer at next visit / physical  Will follow up again in march after sees sleep & neuro & offer Hep B then.     Patient has been advised of split billing requirements and indicates understanding: Yes      COUNSELING:   Reviewed preventive health counseling, as  "reflected in patient instructions       Regular exercise       Healthy diet/nutrition       Vision screening       Immunizations    Vaccinated for: Covid-19 and Influenza         Alcohol Use        Colorectal cancer screening       Prostate cancer screening       Osteoporosis prevention/bone health       The 10-year ASCVD risk score (Jerry ROCKWELL, et al., 2019) is: 6.7%    Values used to calculate the score:      Age: 57 years      Sex: Male      Is Non- : No      Diabetic: No      Tobacco smoker: No      Systolic Blood Pressure: 119 mmHg      Is BP treated: No      HDL Cholesterol: 56 mg/dL      Total Cholesterol: 248 mg/dL       Advance Care Planning      BMI:   Estimated body mass index is 29.52 kg/m  as calculated from the following:    Height as of this encounter: 1.74 m (5' 8.5\").    Weight as of this encounter: 89.4 kg (197 lb).   Weight management plan: Discussed healthy diet and exercise guidelines      He reports that he quit smoking about 40 years ago. His smoking use included cigarettes. He started smoking about 44 years ago. He has a 2.00 pack-year smoking history. He has never used smokeless tobacco.        Adriana Solis MD  [facility]      "

## 2022-12-01 NOTE — PATIENT INSTRUCTIONS
See notes    Preventive Health Recommendations  Male Ages 50 - 64    Yearly exam:             See your health care provider every year in order to  o   Review health changes.   o   Discuss preventive care.    o   Review your medicines if your doctor has prescribed any.   Have a cholesterol test every 5 years, or more frequently if you are at risk for high cholesterol/heart disease.   Have a diabetes test (fasting glucose) every three years. If you are at risk for diabetes, you should have this test more often.   Have a colonoscopy at age 50, or have a yearly FIT test (stool test). These exams will check for colon cancer.    Talk with your health care provider about whether or not a prostate cancer screening test (PSA) is right for you.  You should be tested each year for STDs (sexually transmitted diseases), if you re at risk.     Shots: Get a flu shot each year. Get a tetanus shot every 10 years.     Nutrition:  Eat at least 5 servings of fruits and vegetables daily.   Eat whole-grain bread, whole-wheat pasta and brown rice instead of white grains and rice.   Get adequate Calcium and Vitamin D.     Lifestyle  Exercise for at least 150 minutes a week (30 minutes a day, 5 days a week). This will help you control your weight and prevent disease.   Limit alcohol to one drink per day.   No smoking.   Wear sunscreen to prevent skin cancer.   See your dentist every six months for an exam and cleaning.   See your eye doctor every 1 to 2 years.

## 2022-12-05 NOTE — TELEPHONE ENCOUNTER
REFERRAL INFORMATION:    Referring Provider:  Dr. Adriana Solis     Referring Clinic:  [facility]     Reason for Visit/Diagnosis: Gallstones, asymptomatic, Umbilical and inguinal hernia        FUTURE VISIT INFORMATION:    Appointment Date: 12/15/2022    Appointment Time: 9 AM      NOTES RECORD STATUS  DETAILS   OFFICE NOTE from Referring Provider Internal 12/1/2022 Office visit with Dr. Adriana Solis     OFFICE NOTE from Other Specialists N/A    HOSPITAL DISCHARGE SUMMARY/ ED VISITS  N/A    OPERATIVE REPORT N/A    ENDOSCOPY (EGD)  N/A    PERTINENT LABS Internal    PATHOLOGY REPORTS (RELATED) N/A    IMAGING (CT, MRI, US, XR)  Internal MR Abdomen: 11/25/2022  CT Abdomen Pelvis: 10/21/2022

## 2022-12-07 ENCOUNTER — TELEPHONE (OUTPATIENT)
Dept: GASTROENTEROLOGY | Facility: CLINIC | Age: 57
End: 2022-12-07

## 2022-12-07 NOTE — LETTER
December 22, 2022    Nikunj Sinha                              [address]   [facility] [address]    Dear Nikunj,    Our office recently received a referral from Dr. Solis related to your healthcare. We have reviewed your records and imaging and have suggestions on how we would proceed. We have made several attempts to contact you but have been unable to reach you.. Your referral will be closed at this time and we will not make further attempts to contact you.     Your healthcare is important to us. If you wish to follow up on this referral please contact our office at [phone].     Pam Silva RN Care Coordinator

## 2022-12-08 NOTE — TELEPHONE ENCOUNTER
Advanced Endoscopy     Referring provider: Adriana Solis MD    Referred to: [facility]     Provider Requested: none specified    Referral Received: 12/2/22     Records received: [facility]    MRI 11/25/22   IMPRESSION:   1. Common bile duct is prominent but not substantially enlarged at  this time. There is layering sludge and possible layering stones in the distal common bile duct.  2. Mild pancreatic ductal dilation and ductal irregularity may represent sequela of prior acute pancreatitis or chronic pancreatitis. There is a dilated side branch versus small IPMN in the pancreatic body.  3. Periampullary duodenal diverticulum is again seen.  4. Unchanged appearance of abnormal signal in the root of mesenterywith prominent but nonenlarged lymph nodes, nonspecific    CT abd/pelvis 10/21/22  IMPRESSION:  1.   Mildly dilated common bile duct and pancreatic duct with level of  transition distally near a small duodenal diverticulum. There is no convincing solid mass. However, if not previously performed, consider abdominal MRI/MRCP for further evaluation of this area.  2.  Incidental mid-mesenteric stranding with associated prominent mesenteric nodes is non-specific.    Images received: PACs    Evaluation for:   Epigastric pain   - Dilation of common bile duct    - Dilated pancreatic duct   - IPMN (intraductal papillary mucinous neoplasm)    - Duodenal diverticulum    - Mesenteric lymphadenopathy   - Liver cyst   - Diverticulosis of large intestine without hemorrhage     would benefit from maybe an EGD, has epigastric pain and ? IPMN on mri     Clinical History (per RN review):     Office visit on 12/1/22  On 10/24/22 ct showed a benign right liver cyst, CBD dilation, & advised an mri. Also noted duodenal diverticulum, diverticulosis, bone island right iliac that was asymptomatic & B/l inguinal hernia & umbilical hernia that was asymptomatic  Echo showed improvement with normal ef & no valvular concerns.    Met with cardiology on 11/22 who noted normal EKG & echo, advised a statin & felt could do a stress test but would not provide more infor & patient opted not to do.  MRI done 11/25 showed CBD dilation was not of concern, had possible gallstones/ sludge, had evidence of pancreatic scarring, suggestion of IPMN, a duodenal diverticulum and non specific mesenteric lymph nodes.     MD review date:   MD Decision for clinic consultation/Orders:            Referral updates/Patient contacted:

## 2022-12-15 ENCOUNTER — PRE VISIT (OUTPATIENT)
Dept: SURGERY | Facility: CLINIC | Age: 57
End: 2022-12-15

## 2022-12-19 NOTE — TELEPHONE ENCOUNTER
Per Dr. Cowart  Apologies for the delay; finally doing some catchup over the weekend   Yes, this patient should have an ERCP, elective, nonurgent for stone and sludge removal   Unless there are significant comorbidities, or altered anatomy either location with general would be fine     Called to discuss with patient.     Please assist in scheduling:     Procedure/Imaging/Clinic: EUS/ERCP  Physician: Dr. Cowart  Timing: tbd  Procedure length:provider average  Anesthesia:gen  Dx: IPMN and concern for stones v sludge  Tier:2  Location: [facility]     Left message orders not placed yet.    ML

## 2022-12-22 NOTE — TELEPHONE ENCOUNTER
3/8/2023-Called and spoke with patient about any images of his head from the past. Per Patient no external images of his head-MR @ 856am    FUTURE VISIT INFORMATION      FUTURE VISIT INFORMATION:    Date: 3/9/2023    Time: 215pm    Location: [facility]  REFERRAL INFORMATION:    Referring provider:  Dr. Solis     Referring providers clinic:  [facility]     Reason for visit/diagnosis  Memory Concerns, Dizziness     RECORDS REQUESTED FROM:       Clinic name Comments Records Status Imaging Status   Internal Dr. Solis-10/4/2022 Epic No Images

## 2022-12-28 ENCOUNTER — TRANSFERRED RECORDS (OUTPATIENT)
Dept: HEALTH INFORMATION MANAGEMENT | Facility: CLINIC | Age: 57
End: 2022-12-28

## 2023-02-22 ENCOUNTER — TELEPHONE (OUTPATIENT)
Dept: GASTROENTEROLOGY | Facility: CLINIC | Age: 58
End: 2023-02-22
Payer: COMMERCIAL

## 2023-02-22 NOTE — TELEPHONE ENCOUNTER
[facility] Call Center    Phone Message    May a detailed message be left on voicemail: yes     Reason for Call: Other: Patient has referral from Dr. Adriana Solis, dated 12/1/22.  Patient called to schedule consult in GI; however, scheduling protocols listed state to reject scheduling in clinic - patient will have procedure with Dr. Cowart.  Please follow up with patient to have procedure scheduled.     Action Taken: Message routed to:  [facility] Team     Travel Screening: Not Applicable

## 2023-02-22 NOTE — TELEPHONE ENCOUNTER
Returned call to patient, previously tried to reach in December 2022, letter sent 12/22    Plan at that time was:  Procedure/Imaging/Clinic: EUS/ERCP  Physician: Dr. Cowart  Timing: tbd  Procedure length:provider average  Anesthesia:gen  Dx: IPMN and concern for stones v sludge  Tier:2  Location: [facility]    Left message    ML

## 2023-02-24 LAB — PHQ9 SCORE: 17

## 2023-03-09 ENCOUNTER — PRE VISIT (OUTPATIENT)
Dept: NEUROLOGY | Facility: CLINIC | Age: 58
End: 2023-03-09

## 2023-03-09 ENCOUNTER — OFFICE VISIT (OUTPATIENT)
Dept: NEUROLOGY | Facility: CLINIC | Age: 58
End: 2023-03-09
Attending: FAMILY MEDICINE
Payer: COMMERCIAL

## 2023-03-09 VITALS
RESPIRATION RATE: 16 BRPM | OXYGEN SATURATION: 98 % | HEART RATE: 78 BPM | DIASTOLIC BLOOD PRESSURE: 79 MMHG | SYSTOLIC BLOOD PRESSURE: 123 MMHG

## 2023-03-09 DIAGNOSIS — R41.3 MEMORY DIFFICULTIES: ICD-10-CM

## 2023-03-09 DIAGNOSIS — R42 DIZZINESS: ICD-10-CM

## 2023-03-09 PROCEDURE — 99204 OFFICE O/P NEW MOD 45 MIN: CPT | Mod: GC | Performed by: STUDENT IN AN ORGANIZED HEALTH CARE EDUCATION/TRAINING PROGRAM

## 2023-03-09 ASSESSMENT — PAIN SCALES - GENERAL: PAINLEVEL: NO PAIN (0)
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
## 2023-03-09 NOTE — PATIENT INSTRUCTIONS
"For your lightheadedness, I recommend drinking an ice cold glass of water first thing in the morning and wearing compression stockings during the day to help with return of venous blood back up to the heart.   For your memory concerns/brain fog - I am worried that your depression could be causing this (\"pseudodementia\"). I recommend you continue working with your mental health providers to get this under control. Memory problems can also be caused by untreated sleep apnea. I encourage you to reschedule your sleep study. Just to make sure we are not missing a neurological cause (such as a neurodegenerative disorder like early onset Alzheimer's Disease), we would recommend getting a brain MRI. Dr. Gil will call you with the results.   If your memory problems persist even after your depression is under better control, we can consider neuropsychology testing (a 3 hour battery of cognitive tests).   Dr. Gil is leaving the [facility] in July. You will see a different neurologist at the [facility] for your follow-up appointment.        "
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
## 2023-04-02 ENCOUNTER — ANCILLARY PROCEDURE (OUTPATIENT)
Dept: MRI IMAGING | Facility: CLINIC | Age: 58
End: 2023-04-02
Attending: INTERNAL MEDICINE
Payer: COMMERCIAL

## 2023-04-02 DIAGNOSIS — R41.3 MEMORY DIFFICULTIES: ICD-10-CM

## 2023-04-02 PROCEDURE — 70551 MRI BRAIN STEM W/O DYE: CPT | Mod: GC | Performed by: RADIOLOGY

## 2023-04-07 ENCOUNTER — TRANSFERRED RECORDS (OUTPATIENT)
Dept: HEALTH INFORMATION MANAGEMENT | Facility: CLINIC | Age: 58
End: 2023-04-07
Payer: COMMERCIAL

## 2023-04-13 ENCOUNTER — TELEPHONE (OUTPATIENT)
Dept: NEUROLOGY | Facility: CLINIC | Age: 58
End: 2023-04-13
Payer: COMMERCIAL

## 2023-04-13 DIAGNOSIS — R90.89: ICD-10-CM

## 2023-04-13 DIAGNOSIS — R41.3 MEMORY DIFFICULTIES: Primary | ICD-10-CM

## 2023-04-13 DIAGNOSIS — Z86.39 H/O WERNICKE'S ENCEPHALOPATHY: ICD-10-CM

## 2023-04-13 NOTE — TELEPHONE ENCOUNTER
"Telephone Note    I called and spoke with Nikunj earlier today about the results of his MRI brain. I explained that it shows findings consistent with Wernicke's encephalopathy. He may have had this in the past, but has been sober from alcohol for the past 3.5 years, so suspect this is more of a \"scar\" and chronic finding rather than something acute. He also has abnormal signal in the bilateral globus pallidi. Thus, will check serum B1, copper, and ceruloplasmin levels. I have advised him to start taking B vitamin complex daily after the blood draw.     He is scheduled to follow-up with Dr. Kinney in September.     Solomon Gil MD  PGY-4 Neurology Resident   P: [phone]  "

## 2023-04-17 ENCOUNTER — TELEPHONE (OUTPATIENT)
Dept: NEUROLOGY | Facility: CLINIC | Age: 58
End: 2023-04-17
Payer: COMMERCIAL

## 2023-04-17 NOTE — TELEPHONE ENCOUNTER
Called pt and informed him Dr. Gil has ordered neuropsych testing and she would like it done prior to follow up appt with Dr. Kinney in Sept and to call [phone] and he verbally understood.

## 2023-04-21 NOTE — LETTER
December 2, 2019      Nikunj Sinha  [address]   [facility] [address]        Dear ,    We are writing to inform you of your test results.    Results within acceptable limits.  -Hepatitis C antibody screen test shows no signs of a previous hepatitis C infection..     Resulted Orders   CBC with platelets differential   Result Value Ref Range    WBC 8.0 4.0 - 11.0 10e9/L    RBC Count 5.15 4.4 - 5.9 10e12/L    Hemoglobin 16.3 13.3 - 17.7 g/dL    Hematocrit 48.9 40.0 - 53.0 %    MCV 95 78 - 100 fl    MCH 31.7 26.5 - 33.0 pg    MCHC 33.3 31.5 - 36.5 g/dL    RDW 12.7 10.0 - 15.0 %    Platelet Count 195 150 - 450 10e9/L    % Neutrophils 65.7 %    % Lymphocytes 25.1 %    % Monocytes 6.2 %    % Eosinophils 2.5 %    % Basophils 0.5 %    Absolute Neutrophil 5.3 1.6 - 8.3 10e9/L    Absolute Lymphocytes 2.0 0.8 - 5.3 10e9/L    Absolute Monocytes 0.5 0.0 - 1.3 10e9/L    Absolute Eosinophils 0.2 0.0 - 0.7 10e9/L    Absolute Basophils 0.0 0.0 - 0.2 10e9/L    Diff Method Automated Method    Comprehensive metabolic panel   Result Value Ref Range    Sodium 137 133 - 144 mmol/L    Potassium 4.2 3.4 - 5.3 mmol/L    Chloride 106 94 - 109 mmol/L    Carbon Dioxide 28 20 - 32 mmol/L    Anion Gap 3 3 - 14 mmol/L    Glucose 87 70 - 99 mg/dL    Urea Nitrogen 14 7 - 30 mg/dL    Creatinine 0.98 0.66 - 1.25 mg/dL    GFR Estimate 87 >60 mL/min/[1.73_m2]      Comment:      Non  GFR Calc  Starting 12/18/2018, serum creatinine based estimated GFR (eGFR) will be   calculated using the Chronic Kidney Disease Epidemiology Collaboration   (CKD-EPI) equation.      GFR Estimate If Black >90 >60 mL/min/[1.73_m2]      Comment:       GFR Calc  Starting 12/18/2018, serum creatinine based estimated GFR (eGFR) will be   calculated using the Chronic Kidney Disease Epidemiology Collaboration   (CKD-EPI) equation.      Calcium 9.0 8.5 - 10.1 mg/dL    Bilirubin Total 0.4 0.2 - 1.3 mg/dL    Albumin 3.9 3.4 - 5.0  g/dL    Protein Total 7.5 6.8 - 8.8 g/dL    Alkaline Phosphatase 87 40 - 150 U/L    ALT 33 0 - 70 U/L    AST 21 0 - 45 U/L   Hemoglobin A1c   Result Value Ref Range    Hemoglobin A1C 4.9 0 - 5.6 %      Comment:      Normal <5.7% Prediabetes 5.7-6.4%  Diabetes 6.5% or higher - adopted from ADA   consensus guidelines.     Lipid panel reflex to direct LDL Non-fasting   Result Value Ref Range    Cholesterol 261 (H) <200 mg/dL      Comment:      Desirable:       <200 mg/dl    Triglycerides 205 (H) <150 mg/dL      Comment:      Borderline high:  150-199 mg/dl  High:             200-499 mg/dl  Very high:       >499 mg/dl      HDL Cholesterol 57 >39 mg/dL    LDL Cholesterol Calculated 163 (H) <100 mg/dL      Comment:      Above desirable:  100-129 mg/dl  Borderline High:  130-159 mg/dL  High:             160-189 mg/dL  Very high:       >189 mg/dl      Non HDL Cholesterol 204 (H) <130 mg/dL      Comment:      Above Desirable:  130-159 mg/dl  Borderline high:  160-189 mg/dl  High:             190-219 mg/dl  Very high:       >219 mg/dl     TSH with free T4 reflex   Result Value Ref Range    TSH 0.96 0.40 - 4.00 mU/L   HIV Antigen Antibody Combo   Result Value Ref Range    HIV Antigen Antibody Combo Nonreactive NR^Nonreactive          Comment:      HIV-1 p24 Ag & HIV-1/HIV-2 Ab Not Detected   Hepatitis C Screen Reflex to HCV RNA Quant and Genotype   Result Value Ref Range    Hepatitis C Antibody Nonreactive NR^Nonreactive      Comment:      Assay performance characteristics have not been established for newborns,   infants, and children     Vitamin B12   Result Value Ref Range    Vitamin B12 334 193 - 986 pg/mL   Folate   Result Value Ref Range    Folate 14.6 >5.4 ng/mL   Vitamin B1 whole blood   Result Value Ref Range    Vitamin B1 Whole Blood Level 170 70 - 180 nmol/L      Comment:      (Note)  INTERPRETIVE INFORMATION: Vitamin B1, Whole Blood  This assay measures the concentration of thiamine   diphosphate (TDP), the primary  active form of vitamin B1.   Approximately 90 percent of vitamin B1 present in whole   blood is TDP. Thiamine and thiamine monophosphate, which   comprise the remaining 10 percent, are not measured.  Test developed and characteristics determined by [facility]. See Compliance Statement B: [facility]  Performed by [facility],  [address] [place] [id number] [phone]  [email or web address] Memo Hilton MD, Lab. Director       If you have any questions or concerns, please call the clinic at the number listed above.     Sincerely,    Adriana Solis MD/nr             9 (severe pain)

## 2023-04-26 ENCOUNTER — LAB (OUTPATIENT)
Dept: LAB | Facility: CLINIC | Age: 58
End: 2023-04-26
Payer: COMMERCIAL

## 2023-04-26 DIAGNOSIS — Z86.39 H/O WERNICKE'S ENCEPHALOPATHY: ICD-10-CM

## 2023-04-26 DIAGNOSIS — R90.89: ICD-10-CM

## 2023-04-26 PROCEDURE — 84425 ASSAY OF VITAMIN B-1: CPT | Mod: 90 | Performed by: PATHOLOGY

## 2023-04-26 PROCEDURE — 36415 COLL VENOUS BLD VENIPUNCTURE: CPT | Performed by: PATHOLOGY

## 2023-04-26 PROCEDURE — 99000 SPECIMEN HANDLING OFFICE-LAB: CPT | Performed by: PATHOLOGY

## 2023-04-26 PROCEDURE — 82525 ASSAY OF COPPER: CPT | Mod: 90 | Performed by: PATHOLOGY

## 2023-04-26 PROCEDURE — 82390 ASSAY OF CERULOPLASMIN: CPT | Mod: 90 | Performed by: PATHOLOGY

## 2023-04-27 LAB — CERULOPLASMIN SERPL-MCNC: 25 MG/DL (ref 20–60)

## 2023-04-28 ENCOUNTER — TELEPHONE (OUTPATIENT)
Dept: NEUROPSYCHOLOGY | Facility: CLINIC | Age: 58
End: 2023-04-28
Payer: COMMERCIAL

## 2023-04-28 LAB — COPPER SERPL-MCNC: 107.5 UG/DL

## 2023-04-28 NOTE — TELEPHONE ENCOUNTER
Patient Contacted for the patient to call back and schedule the following:    Appointment type: Neuropsych Eval  Provider: Any provider AYDEE, Yoseph,  or ALISSA  Return date: First available  Specialty phone number: [phone]  Additional appointment(s) needed: N/A  Additonal Notes: N/A    Spoke with patient, scheduled for 6/26 at 8am with Dr. Wayne at [facility].     Ulises Diaz on 4/28/2023 at 1:05 PM

## 2023-05-02 LAB — VIT B1 PYROPHOSHATE BLD-SCNC: 164 NMOL/L

## 2023-05-23 DIAGNOSIS — R90.89: Primary | ICD-10-CM

## 2023-05-24 ENCOUNTER — TELEPHONE (OUTPATIENT)
Dept: NEUROLOGY | Facility: CLINIC | Age: 58
End: 2023-05-24
Payer: COMMERCIAL

## 2023-05-24 NOTE — TELEPHONE ENCOUNTER
Called pt and informed him result per Dr. Gil and her order/note as written:  Blood work came back normal, but that I would still like to rule out two more diseases? I have ordered a serum manganese level to make sure we have adequately ruled out manganese toxicity - this test is not urgent and he can get it done any time in the next few weeks. He does not have to fast for this.     The second disease we are trying to rule out is a genetic disease that causes copper to deposit in the liver and brain, called Jordy's Disease. I have placed an order for 24 hour urine collection. He will need to coordinate with one of the [place] labs in obtaining containers for his urine. If the amount of copper in his urine is high, that would be concerning.     Pt stated he did not have any other questions. He stated he was going to contact lab. Pt stated he does use my chart. Informed pt if he has any questions in the interim to please send us a message via my chart and he verbally understood.

## 2023-05-24 NOTE — TELEPHONE ENCOUNTER
Can you let Nikunj know that the blood work came back normal, but that I would still like to rule out two more diseases? I have ordered a serum manganese level to make sure we have adequately ruled out manganese toxicity - this test is not urgent and he can get it done any time in the next few weeks. He does not have to fast for this.     The second disease we are trying to rule out is a genetic disease that causes copper to deposit in the liver and brain, called Jordy's Disease. I have placed an order for 24 hour urine collection. He will need to coordinate with one of the [place] labs in obtaining containers for his urine. If the amount of copper in his urine is high, that would be concerning.     If Rinku still has additional questions that you can't answer, please let me know. I am happy to give hi a call later this week. He does not use [employer].     Solomon

## 2023-07-28 NOTE — TELEPHONE ENCOUNTER
Writer called due to no show today with Dr. Obando, left message for patient to call back to reschedule.    4 = No assist / stand by assistance

## 2023-10-11 ASSESSMENT — SLEEP AND FATIGUE QUESTIONNAIRES
HOW LIKELY ARE YOU TO NOD OFF OR FALL ASLEEP WHILE SITTING AND READING: MODERATE CHANCE OF DOZING
HOW LIKELY ARE YOU TO NOD OFF OR FALL ASLEEP WHILE WATCHING TV: HIGH CHANCE OF DOZING
HOW LIKELY ARE YOU TO NOD OFF OR FALL ASLEEP WHILE SITTING INACTIVE IN A PUBLIC PLACE: SLIGHT CHANCE OF DOZING
HOW LIKELY ARE YOU TO NOD OFF OR FALL ASLEEP WHILE LYING DOWN TO REST IN THE AFTERNOON WHEN CIRCUMSTANCES PERMIT: HIGH CHANCE OF DOZING
HOW LIKELY ARE YOU TO NOD OFF OR FALL ASLEEP WHILE SITTING QUIETLY AFTER LUNCH WITHOUT ALCOHOL: SLIGHT CHANCE OF DOZING
HOW LIKELY ARE YOU TO NOD OFF OR FALL ASLEEP WHILE SITTING AND TALKING TO SOMEONE: WOULD NEVER DOZE
HOW LIKELY ARE YOU TO NOD OFF OR FALL ASLEEP IN A CAR, WHILE STOPPED FOR A FEW MINUTES IN TRAFFIC: WOULD NEVER DOZE
HOW LIKELY ARE YOU TO NOD OFF OR FALL ASLEEP WHEN YOU ARE A PASSENGER IN A CAR FOR AN HOUR WITHOUT A BREAK: WOULD NEVER DOZE

## 2023-10-12 ENCOUNTER — OFFICE VISIT (OUTPATIENT)
Dept: SLEEP MEDICINE | Facility: CLINIC | Age: 58
End: 2023-10-12
Payer: COMMERCIAL

## 2023-10-12 VITALS
WEIGHT: 189.7 LBS | SYSTOLIC BLOOD PRESSURE: 104 MMHG | DIASTOLIC BLOOD PRESSURE: 64 MMHG | BODY MASS INDEX: 28.1 KG/M2 | OXYGEN SATURATION: 96 % | HEART RATE: 81 BPM | RESPIRATION RATE: 16 BRPM | HEIGHT: 69 IN

## 2023-10-12 DIAGNOSIS — R06.89 GASPING FOR BREATH: ICD-10-CM

## 2023-10-12 DIAGNOSIS — Z91.89 RISK FACTORS FOR OBSTRUCTIVE SLEEP APNEA: ICD-10-CM

## 2023-10-12 DIAGNOSIS — G47.10 HYPERSOMNIA: Primary | ICD-10-CM

## 2023-10-12 PROCEDURE — 99204 OFFICE O/P NEW MOD 45 MIN: CPT | Performed by: INTERNAL MEDICINE

## 2023-10-12 RX ORDER — DEXTROMETHORPHAN HYDROBROMIDE, BUPROPION HYDROCHLORIDE 105; 45 MG/1; MG/1
TABLET, MULTILAYER, EXTENDED RELEASE ORAL
COMMUNITY
Start: 2023-09-25 | End: 2024-09-20

## 2023-10-12 NOTE — LETTER
10/12/2023         RE: Nikunj Sinha  [address]  [facility] [address]        Dear Colleague,    Thank you for referring your patient, Nikunj Sinha, to the [facility] [place]. Please see a copy of my visit note below.    Chief complaint: Consultation requested by Adriana Solis MD for evaluation of chronic fatigue    History of Present Illness: 58-year-old gentleman with history of major depression, history of alcohol abuse now sober four years or so, describes daytime fatigue and sleepiness.  He is also waking himself up occasionally with gasping.  This can happen whether he is on his back or his sides.  Its been going on for years.  He usually can return to sleep afterwards.  He denies significant difficulty initiating sleep.  Typical bedtime is around 11 PM.  Rise time is around 6 to 7 AM with an alarm.  He will take naps after work 3 to 4 days a week for 30 to 60 minutes.  Naps tend to be refreshing.  He also drinks excessive caffeine.  He has usually 1 energy drink most days of the week sometimes 2.  He does not have access to energy drink he may have a couple cups of coffee but he prefers the energy drinks consider less hard on his stomach.    Occasionally he is experiences some restless legs that can impact sleep quality.  If he does he will do some squats.  This could happen maybe once a week or twice.  Once he falls asleep he will usually wake up once a night to go to the bathroom.  He occasionally has been experiencing some palpitations and night sweats recently.  Thinks this is associated with a recent new medication.    No history of known bruxism, sleepwalking, sleep talking or dream enactment behavior.    He is being evaluated for some memory issues.  He has had an abnormal MRI.  He also has a history of pulmonary embolism and sounds like it was massive associated with echocardiographic abnormalities that have resolved with recent echo showing normal  function.      He is not currently taking any sleep aids.  No known family history of obstructive sleep apnea.    [place] Sleepiness Scale  Total score - [place]: 10 (10/11/2023 10:51 PM)   (Less than 10 normal)    Insomnia Severity Scale  ADY Total Score: 15  (normal 0-7, mild 8-14, moderate 15-21, severe 22-28)      STOP-BANG  Loud Snore   ?  Excessively Tired/Sleepy   1  Observed apnea   ?  Hypertension   0  BMI> 35 kg/m2   0  Age >50   1  Neck >16 in/40cm   0  Male Gender   1  Total =   3  (0-2 low, 3-4 intermediate, 5-8 high risk of TAYLOR)    Past Medical History:   Diagnosis Date     Abdominal pain, epigastric 11/29/2019     Chronic anticoagulation 12/1/2019     Current severe episode of major depressive disorder without psychotic features, unspecified whether recurrent (H) 11/29/2019     Decreased cardiac ejection fraction 8/22/2021    Echo oct 2022 normal      Dental caries 11/29/2019     Dizziness 11/29/2019     Erectile dysfunction, unspecified erectile dysfunction type 11/29/2019     Essential tremor 12/1/2019     Fatigue, unspecified type 11/29/2019     ZAIDA (generalized anxiety disorder) 11/29/2019     History of chest pain 11/29/2019     History of pulmonary embolism 11/29/2019     History of suicidal ideation 11/29/2019     Memory changes 11/29/2019     Moderate alcohol dependence in early remission (H) 11/29/2019     Palpitations 11/29/2019     Positive FIT (fecal immunochemical test) 11/29/2019     Positive FIT (fecal immunochemical test) 11/29/2019    Colonoscopy 11/2020 normal, labs normal     Seasonal allergic rhinitis, unspecified trigger 11/29/2019     Tinnitus, bilateral 11/29/2019     Weight gain 11/29/2019     Weight gain 11/29/2019       Allergies   Allergen Reactions     Ragweeds Other (See Comments)     congestion       Current Outpatient Medications   Medication     AUVELITY  MG TBCR     rivaroxaban ANTICOAGULANT (XARELTO) 10 MG TABS tablet     sertraline (ZOLOFT) 50 MG tablet     No  "current facility-administered medications for this visit.       Social History     Socioeconomic History     Marital status: Single     Spouse name: Not on file     Number of children: Not on file     Years of education: Not on file     Highest education level: Not on file   Occupational History     Not on file   Tobacco Use     Smoking status: Former     Packs/day: 0.50     Years: 4.00     Additional pack years: 0.00     Total pack years: 2.00     Types: Cigarettes     Start date: 1978     Quit date: 1982     Years since quittin.8     Smokeless tobacco: Never   Substance and Sexual Activity     Alcohol use: Not Currently     Comment: sober second time oct 14 , did detox adn rehab, in a sober house 2019     Drug use: Never     Sexual activity: Not Currently   Other Topics Concern     Not on file   Social History Narrative    2019: moved in 1 week ago to sober house [place], previously living alone, no pets,      Social Determinants of Health     Financial Resource Strain: Not on file   Food Insecurity: Not on file   Transportation Needs: Not on file   Physical Activity: Not on file   Stress: Not on file   Social Connections: Not on file   Interpersonal Safety: Not on file   Housing Stability: Not on file       Family History   Problem Relation Age of Onset     Arthritis Mother      Breast Cancer Mother      Depression Mother      Allergies Mother      Migraines Mother      Alcoholism Father      Lymphoma Father         non hodgkins     Other - See Comments Brother         genetic disorder,  of some slow form of ALS     Suicidality Paternal Grandfather            EXAM:  /64   Pulse 81   Resp 16   Ht 1.74 m (5' 8.5\")   Wt 86 kg (189 lb 11.2 oz)   SpO2 96%   BMI 28.42 kg/m    GENERAL: Alert and no distress  EYES: Eyes grossly normal to inspection.  No obvious scleral/conjunctival abnormalities.  O/P; low drooping soft palate Mallampati 2  RESP: Lungs are clear to " auscultation bilaterally  Cardiac tones regular rate and rhythm S1-S2 normal  Extremities are perfused without edema  SKIN: Visible skin clear.  Wearing a small bandage over his left nares no significant rash, abnormal pigmentation or lesions.  NEURO: Cranial nerves grossly intact.  Mentation and speech appropriate for age.  PSYCH: Mentation appears normal, affect flat, judgement and insight intact, normal speech and appearance well-groomed.         TSH   Date Value Ref Range Status   10/04/2022 0.51 0.40 - 4.00 mU/L Final   07/16/2020 0.71 0.40 - 4.00 mU/L Final       ASSESSMENT:  58-year-old gentleman with waking up with gasping for breath, excessive daytime sleepiness, cognitive issues..  [place] likely underestimates the severity of his symptoms due to excessive caffeine use.  He is likely getting close to adequate sleep time with 7-hour sleep opportunity.  Identifying and treating obstructive sleep apnea could be medically indicated.    PLAN:  We reviewed the pathophysiology of obstructive sleep apnea, indications for treatment, testing options and treatment options.  Proceed with home sleep apnea testing.  I will be contacting him with results via [employer].  In the meantime, ensure adequate sleep opportunity of over 7 hours if possible.  Consider cutting back on caffeine.  Continue to sleep on sides.  See instructions for further details of counseling provided.    50 minutes spent by me on the date of the encounter doing chart review, history and exam, documentation and further activities per the note    Ana Cheung M.D.  Pulmonary/Critical Care/Sleep Medicine    [facility]   [address], [address]   [address] [place] [address]   Appointments: [phone]    The above note was dictated using voice recognition software and may include typographical errors. Please contact the author for any clarifications.            Again, thank you  for allowing me to participate in the care of your patient.        Sincerely,        Ana Cheung MD

## 2023-10-12 NOTE — PATIENT INSTRUCTIONS
"          MY TREATMENT INFORMATION FOR SLEEP APNEA-  Nikunj Sinha    DOCTOR : Ana Cheung MD      Am I having a home sleep study?  --->Watch the video for the device you are using:    -/drop off device-   https://www.[employer].com/watch?v=yGGFBdELGhk      Frequently asked questions:  1. What is Obstructive Sleep Apnea (TAYLOR)? TAYLOR is the most common type of sleep apnea. Apnea means, \"without breath.\"  Apnea is most often caused by narrowing or collapse of the upper airway as muscles relax during sleep.   Almost everyone has occasional apneas. Most people with sleep apnea have had brief interruptions at night frequently for many years.  The severity of sleep apnea is related to how frequent and severe the events are.   2. What are the consequences of TAYLOR? Symptoms include: feeling sleepy during the day, snoring loudly, gasping or stopping of breathing, trouble sleeping, and occasionally morning headaches or heartburn at night.  Sleepiness can be serious and even increase the risk of falling asleep while driving. Other health consequences may include development of high blood pressure and other cardiovascular disease in persons who are susceptible. Untreated TAYLOR  can contribute to heart disease, stroke and diabetes.   3. What are the treatment options? In most situations, sleep apnea is a lifelong disease that must be managed with daily therapy. Medications are not effective for sleep apnea and surgery is generally not considered until other therapies have been tried. Your treatment is your choice . Continuous Positive Airway (CPAP) works right away and is the therapy that is effective in nearly everyone. An oral device to hold your jaw forward is usually the next most reliable option. Other options include postioning devices (to keep you off your back), weight loss, and surgery including a tongue pacing device. There is more detail about some of these options below.  4. Are my sleep studies covered by " insurance? Although we will request verification of coverage, we advise you also check in advance of the study to ensure there is coverage.    Important tips for those choosing CPAP and similar devices  For new devices, sign up for device MITCH to monitor your device for your followup visits  We encourage you to utilize the [employer] mitch or website (myAir web (resmed.com) ) to monitor your therapy progress and share the data with your healthcare team when you discuss your sleep apnea.                                                    Know your equipment:  CPAP is continuous positive airway pressure that prevents obstructive sleep apnea by keeping the throat from collapsing while you are sleeping. In most cases, the device is  smart  and can slowly self-adjusts if your throat collapses and keeps a record every day of how well you are treated-this information is available to you and your care team.  BPAP is bilevel positive airway pressure that keeps your throat open and also assists each breath with a pressure boost to maintain adequate breathing.  Special kinds of BPAP are used in patients who have inadequate breathing from lung or heart disease. In most cases, the device is  smart  and can slowly self-adjusts to assist breathing. Like CPAP, the device keeps a record of how well you are treated.  Your mask is your connection to the device. You get to choose what feels most comfortable and the staff will help to make sure if fits. Here: are some examples of the different masks that are available:       Key points to remember on your journey with sleep apnea:  Sleep study.  PAP devices often need to be adjusted during a sleep study to show that they are effective and adjusted right.  Good tips to remember: Try wearing just the mask during a quiet time during the day so your body adapts to wearing it. A humidifier is recommended for comfort in most cases to prevent drying of your nose and throat. Allergy  medication from your provider may help you if you are having nasal congestion.  Getting settled-in. It takes more than one night for most of us to get used to wearing a mask. Try wearing just the mask during a quiet time during the day so your body adapts to wearing it. A humidifier is recommended for comfort in most cases. Our team will work with you carefully on the first day and will be in contact within 4 days and again at 2 and 4 weeks for advice and remote device adjustments. Your therapy is evaluated by the device each day.   Use it every night. The more you are able to sleep naturally for 7-8 hours, the more likely you will have good sleep and to prevent health risks or symptoms from sleep apnea. Even if you use it 4 hours it helps. Occasionally all of us are unable to use a medical therapy, in sleep apnea, it is not dangerous to miss one night.   Communicate. Call our skilled team on the number provided on the first day if your visit for problems that make it difficult to wear the device. Over 2 out of 3 patients can learn to wear the device long-term with help from our team. Remember to call our team or your sleep providers if you are unable to wear the device as we may have other solutions for those who cannot adapt to mask CPAP therapy. It is recommended that you sleep your sleep provider within the first 3 months and yearly after that if you are not having problems.   Use it for your health. We encourage use of CPAP masks during daytime quiet periods to allow your face and brain to adapt to the sensation of CPAP so that it will be a more natural sensation to awaken to at night or during naps. This can be very useful during the first few weeks or months of adapting to CPAP though it does not help medically to wear CPAP during wakefulness and  should not be used as a strategy just to meet guidelines.  Take care of your equipment. Make sure you clean your mask and tubing using directions every day and that  your filter and mask are replaced as recommended or if they are not working.     BESIDES CPAP, WHAT OTHER THERAPIES ARE THERE?    Positioning Device  Positioning devices are generally used when sleep apnea is mild and only occurs on your back.This example shows a pillow that straps around the waist. It may be appropriate for those whose sleep study shows milder sleep apnea that occurs primarily when lying flat on one's back. Preliminary studies have shown benefit but effectiveness at home may need to be verified by a home sleep test. These devices are generally not covered by medical insurance.  Examples of devices that maintain sleeping on the back to prevent snoring and mild sleep apnea.    Belt type body positioner  http://[employer]mn[employer]/    Electronic reminder  http://nightshifttherapy.com/            Oral Appliance  What is oral appliance therapy?  An oral appliance device fits on your teeth at night like a retainer used after having braces. The device is made by a specialized dentist and requires several visits over 1-2 months before a manufactured device is made to fit your teeth and is adjusted to prevent your sleep apnea. Once an oral device is working properly, snoring should be improved. A home sleep test may be recommended at that time if to determine whether the sleep apnea is adequately treated.       Some things to remember:  -Oral devices are often, but not always, covered by your medical insurance. Be sure to check with your insurance provider.   -If you are referred for oral therapy, you will be given a list of specialized dentists to consider or you may choose to visit the Web site of the American Academy of Dental Sleep Medicine  -Oral devices are less likely to work if you have severe sleep apnea or are extremely overweight.     More detailed information  An oral appliance is a small acrylic device that fits over the upper and lower teeth  (similar to a retainer or a mouth guard). This device  slightly moves jaw forward, which moves the base of the tongue forward, opens the airway, improves breathing for effective treat snoring and obstructive sleep apnea in perhaps 7 out of 10 people .  The best working devices are custom-made by a dental device  after a mold is made of the teeth 1, 2, 3.  When is an oral appliance indicated?  Oral appliance therapy is recommended as a first-line treatment for patients with primary snoring, mild sleep apnea, and for patients with moderate sleep apnea who prefer appliance therapy to use of CPAP4, 5. Severity of sleep apnea is determined by sleep testing and is based on the number of respiratory events per hour of sleep.   How successful is oral appliance therapy?  The success rate of oral appliance therapy in patients with mild sleep apnea is 75-80% while in patients with moderate sleep apnea it is 50-70%. The chance of success in patients with severe sleep apnea is 40-50%. The research also shows that oral appliances have a beneficial effect on the cardiovascular health of TAYLOR patients at the same magnitude as CPAP therapy7.  Oral appliances should be a second-line treatment in cases of severe sleep apnea, but if not completely successful then a combination therapy utilizing CPAP plus oral appliance therapy may be effective. Oral appliances tend to be effective in a broad range of patients although studies show that the patients who have the highest success are females, younger patients, those with milder disease, and less severe obesity. 3, 6.   Finding a dentist that practices dental sleep medicine  Specific training is available through the American Academy of Dental Sleep Medicine for dentists interested in working in the field of sleep. To find a dentist who is educated in the field of sleep and the use of oral appliances, near you, visit the Web site of the American Academy of Dental Sleep Medicine.    References  1. nguyễn Abebe al. Objectively  measured vs self-reported compliance during oral appliance therapy for sleep-disordered breathing. Chest 2013; 144(5): 2479-4036.  2. Donna, et al. Objective measurement of compliance during oral appliance therapy for sleep-disordered breathing. Thorax 2013; 68(1): 91-96.  3. Noelle et al. Mandibular advancement devices in 620 men and women with TAYLOR and snoring: tolerability and predictors of treatment success. Chest 2004; 125: 3839-4380.  4. Sandhya et al. Oral appliances for snoring and TAYLOR: a review. Sleep 2006; 29: 244-262.  5. Ammy et al. Oral appliance treatment for TAYLOR: an update. J Clin Sleep Med 2014; 10(2): 215-227.  6. Rosy et al. Predictors of OSAH treatment outcome. J Dent Res 2007; 86: 5084-4465.      Weight Loss:    Weight loss is a long-term strategy that may improve sleep apnea in some patients.    Weight management is a personal decision and the decision should be based on your interest and the potential benefits.  If you are interested in exploring weight loss strategies, the following discussion covers the impact on weight loss on sleep apnea and the approaches that may be successful.    Being overweight does not necessarily mean you will have health consequences.  Those who have BMI over 35 or over 27 with existing medical conditions carries greater risk.   Weight loss decreases severity of sleep apnea in most people with obesity. For those with mild obesity who have developed snoring with weight gain, even 15-30 pound weight loss can improve and occasionally eliminate sleep apnea.  Structured and life-long dietary and health habits are necessary to lose weight and keep healthier weight levels.     Though there may be significant health benefits from weight loss, long-term weight loss is very difficult to achieve- studies show success with dietary management in less than 10% of people. In addition, substantial weight loss may require years of dietary control and may be  difficult if patients have severe obesity. In these cases, surgical management may be considered.  Finally, older individuals who have tolerated obesity without health complications may be less likely to benefit from weight loss strategies.      Surgery:    Surgery for obstructive sleep apnea is considered generally only when other therapies fail to work. Surgery may be discussed with you if you are having a difficult time tolerating CPAP and or when there is an abnormal structure that requires surgical correction.  Nose and throat surgeries often enlarge the airway to prevent collapse.  Most of these surgeries create pain for 1-2 weeks and up to half of the most common surgeries are not effective throughout life.  You should carefully discuss the benefits and drawbacks to surgery with your sleep provider and surgeon to determine if it is the best solution for you.   More information  Surgery for TAYLOR is directed at areas that are responsible for narrowing or complete obstruction of the airway during sleep.  There are a wide range of procedures available to enlarge and/or stabilize the airway to prevent blockage of breathing in the three major areas where it can occur: the palate, tongue, and nasal regions.  Successful surgical treatment depends on the accurate identification of the factors responsible for obstructive sleep apnea in each person.  A personalized approach is required because there is no single treatment that works well for everyone.  Because of anatomic variation, consultation with an examination by a sleep surgeon is a critical first step in determining what surgical options are best for each patient.  In some cases, examination during sedation may be recommended in order to guide the selection of procedures.  Patients will be counseled about risks and benefits as well as the typical recovery course after surgery. Surgery is typically not a cure for a person s TAYLOR.  However, surgery will often  significantly improve one s TAYLOR severity (termed  success rate ).  Even in the absence of a cure, surgery will decrease the cardiovascular risk associated with OSA7; improve overall quality of life8 (sleepiness, functionality, sleep quality, etc).      Palate Procedures:  Patients with TAYLOR often have narrowing of their airway in the region of their tonsils and uvula.  The goals of palate procedures are to widen the airway in this region as well as to help the tissues resist collapse.  Modern palate procedure techniques focus on tissue conservation and soft tissue rearrangement, rather than tissue removal.  Often the uvula is preserved in this procedure. Residual sleep apnea is common in patient after pharyngoplasty with an average reduction in sleep apnea events of 33%2.      Tongue Procedures:  ExamWhile patients are awake, the muscles that surround the throat are active and keep this region open for breathing. These muscles relax during sleep, allowing the tongue and other structures to collapse and block breathing.  There are several different tongue procedures available.  Selection of a tongue base procedure depends on characteristics seen on physical exam.  Generally, procedures are aimed at removing bulky tissues in this area or preventing the back of the tongue from falling back during sleep.  Success rates for tongue surgery range from 50-62%3.    Hypoglossal Nerve Stimulation:  Hypoglossal nerve stimulation has recently received approval from the [place] Food and Drug Administration for the treatment of obstructive sleep apnea.  This is based on research showing that the system was safe and effective in treating sleep apnea6.  Results showed that the median AHI score decreased 68%, from 29.3 to 9.0. This therapy uses an implant system that senses breathing patterns and delivers mild stimulation to airway muscles, which keeps the airway open during sleep.  The system consists of three fully implanted  components: a small generator (similar in size to a pacemaker), a breathing sensor, and a stimulation lead.  Using a small handheld remote, a patient turns the therapy on before bed and off upon awakening.    Candidates for this device must be greater than 18 years of age, have moderate to severe TAYLOR (AHI between 15-65), BMI less than 35, have tried CPAP/oral appliance for at least 8 weeks without success, and have appropriate upper airway anatomy (determined by a sleep endoscopy performed by Dr. Jesus Suárez).    Hypoglossal Nerve Stimulation Pathway:    The sleep surgeon s office will work with the patient through the insurance prior-authorization process (including communications and appeals).    Nasal Procedures:  Nasal obstruction can interfere with nasal breathing during the day and night.  Studies have shown that relief of nasal obstruction can improve the ability of some patients to tolerate positive airway pressure therapy for obstructive sleep apnea1.  Treatment options include medications such as nasal saline, topical corticosteroid and antihistamine sprays, and oral medications such as antihistamines or decongestants. Non-surgical treatments can include external nasal dilators for selected patients. If these are not successful by themselves, surgery can improve the nasal airway either alone or in combination with these other options.      Combination Procedures:  Combination of surgical procedures and other treatments may be recommended, particularly if patients have more than one area of narrowing or persistent positional disease.  The success rate of combination surgery ranges from 66-80%2,3.    References  Apple WARNER. The Role of the Nose in Snoring and Obstructive Sleep Apnoea: An Update.  Eur Arch Otorhinolaryngol. 2011; 268: 1365-73.   Willy SM; Jonelle JA; Mike JR; Pallanch JF; Farideh CHENEY; Ann COWART; Nahed MARTINEZ. Surgical modifications of the upper airway for obstructive sleep apnea in adults: a  systematic review and meta-analysis. SLEEP 2010;33(10):4915-6740. Justin MALDONADO. Hypopharyngeal surgery in obstructive sleep apnea: an evidence-based medicine review.  Arch Otolaryngol Head Neck Surg. 2006 Feb;132(2):206-13.  Adalid YH1, David Y, Hunter INOCENTE. The efficacy of anatomically based multilevel surgery for obstructive sleep apnea. Otolaryngol Head Neck Surg. 2003 Oct;129(4):327-35.  Kezirian E, Goldberg A. Hypopharyngeal Surgery in Obstructive Sleep Apnea: An Evidence-Based Medicine Review. Arch Otolaryngol Head Neck Surg. 2006 Feb;132(2):206-13.  Guzman VÁZQUEZ et al. Upper-Airway Stimulation for Obstructive Sleep Apnea.  N Engl J Med. 2014 Jan 9;370(2):139-49.  Perry Y et al. Increased Incidence of Cardiovascular Disease in Middle-aged Men with Obstructive Sleep Apnea. Am J Respir Crit Care Med; 2002 166: 159-165  Quevedo EM et al. Studying Life Effects and Effectiveness of Palatopharyngoplasty (SLEEP) study: Subjective Outcomes of Isolated Uvulopalatopharyngoplasty. Otolaryngol Head Neck Surg. 2011; 144: 623-631.        WHAT IF I ONLY HAVE SNORING?    Mandibular advancement devices, lateral sleep positioning, long-term weight loss and treatment of nasal allergies have been shown to improve snoring.  Exercising tongue muscles with a game (https://apps."[employer].Ngt4u.inc/us/mitch/soundly-reduce-snoring/pq8042826624) or stimulating the tongue during the day with a device (https://doi.org/10.3390/prh10692159) have improved snoring in some individuals.    Remember to Drive Safe... Drive Alive     Sleep health profoundly affects your health, mood, and your safety.  Thirty three percent of the population (one in three of us) is not getting enough sleep and many have a sleep disorder. Not getting enough sleep or having an untreated / undertreated sleep condition may make us sleepy without even knowing it. In fact, our driving could be dramatically impaired due to our sleep health. As your provider, here are some things I would like  you to know about driving:     Here are some warning signs for impairment and dangerous drowsy driving:              -Having been awake more than 16 hours               -Looking tired               -Eyelid drooping              -Head nodding (it could be too late at this point)              -Driving for more than 30 minutes     Some things you could do to make the driving safer if you are experiencing some drowsiness:              -Stop driving and rest              -Call for transportation              -Make sure your sleep disorder is adequately treated     Some things that have been shown NOT to work when experiencing drowsiness while driving:              -Turning on the radio              -Opening windows              -Eating any  distracting  /  entertaining  foods (e.g., sunflower seeds, candy, or any other)              -Talking on the phone      Your decision may not only impact your life, but also the life of others. Please, remember to drive safe for yourself and all of us.             Your BMI is Body mass index is 28.42 kg/m .

## 2023-10-12 NOTE — NURSING NOTE
Scheduled HST for 12/4/2023 with follow up via [employer]. Printed AVS.    Rima [place]   Clinic Facilitator   [facility]

## 2023-10-12 NOTE — PROGRESS NOTES
Chief complaint: Consultation requested by Adriana Solis MD for evaluation of chronic fatigue    History of Present Illness: 58-year-old gentleman with history of major depression, history of alcohol abuse now sober four years or so, describes daytime fatigue and sleepiness.  He is also waking himself up occasionally with gasping.  This can happen whether he is on his back or his sides.  Its been going on for years.  He usually can return to sleep afterwards.  He denies significant difficulty initiating sleep.  Typical bedtime is around 11 PM.  Rise time is around 6 to 7 AM with an alarm.  He will take naps after work 3 to 4 days a week for 30 to 60 minutes.  Naps tend to be refreshing.  He also drinks excessive caffeine.  He has usually 1 energy drink most days of the week sometimes 2.  He does not have access to energy drink he may have a couple cups of coffee but he prefers the energy drinks consider less hard on his stomach.    Occasionally he is experiences some restless legs that can impact sleep quality.  If he does he will do some squats.  This could happen maybe once a week or twice.  Once he falls asleep he will usually wake up once a night to go to the bathroom.  He occasionally has been experiencing some palpitations and night sweats recently.  Thinks this is associated with a recent new medication.    No history of known bruxism, sleepwalking, sleep talking or dream enactment behavior.    He is being evaluated for some memory issues.  He has had an abnormal MRI.  He also has a history of pulmonary embolism and sounds like it was massive associated with echocardiographic abnormalities that have resolved with recent echo showing normal function.      He is not currently taking any sleep aids.  No known family history of obstructive sleep apnea.    [place] Sleepiness Scale  Total score - [place]: 10 (10/11/2023 10:51 PM)   (Less than 10 normal)    Insomnia Severity Scale  ADY Total Score: 15  (normal 0-7,  mild 8-14, moderate 15-21, severe 22-28)      STOP-BANG  Loud Snore   ?  Excessively Tired/Sleepy   1  Observed apnea   ?  Hypertension   0  BMI> 35 kg/m2   0  Age >50   1  Neck >16 in/40cm   0  Male Gender   1  Total =   3  (0-2 low, 3-4 intermediate, 5-8 high risk of TAYLOR)    Past Medical History:   Diagnosis Date    Abdominal pain, epigastric 11/29/2019    Chronic anticoagulation 12/1/2019    Current severe episode of major depressive disorder without psychotic features, unspecified whether recurrent (H) 11/29/2019    Decreased cardiac ejection fraction 8/22/2021    Echo oct 2022 normal     Dental caries 11/29/2019    Dizziness 11/29/2019    Erectile dysfunction, unspecified erectile dysfunction type 11/29/2019    Essential tremor 12/1/2019    Fatigue, unspecified type 11/29/2019    ZAIDA (generalized anxiety disorder) 11/29/2019    History of chest pain 11/29/2019    History of pulmonary embolism 11/29/2019    History of suicidal ideation 11/29/2019    Memory changes 11/29/2019    Moderate alcohol dependence in early remission (H) 11/29/2019    Palpitations 11/29/2019    Positive FIT (fecal immunochemical test) 11/29/2019    Positive FIT (fecal immunochemical test) 11/29/2019    Colonoscopy 11/2020 normal, labs normal    Seasonal allergic rhinitis, unspecified trigger 11/29/2019    Tinnitus, bilateral 11/29/2019    Weight gain 11/29/2019    Weight gain 11/29/2019       Allergies   Allergen Reactions    Ragweeds Other (See Comments)     congestion       Current Outpatient Medications   Medication    AUVELITY  MG TBCR    rivaroxaban ANTICOAGULANT (XARELTO) 10 MG TABS tablet    sertraline (ZOLOFT) 50 MG tablet     No current facility-administered medications for this visit.       Social History     Socioeconomic History    Marital status: Single     Spouse name: Not on file    Number of children: Not on file    Years of education: Not on file    Highest education level: Not on file   Occupational History    Not  "on file   Tobacco Use    Smoking status: Former     Packs/day: 0.50     Years: 4.00     Additional pack years: 0.00     Total pack years: 2.00     Types: Cigarettes     Start date: 1978     Quit date: 1982     Years since quittin.8    Smokeless tobacco: Never   Substance and Sexual Activity    Alcohol use: Not Currently     Comment: sober second time oct 14 , did detox adn rehab, in a sober house 2019    Drug use: Never    Sexual activity: Not Currently   Other Topics Concern    Not on file   Social History Narrative    2019: moved in 1 week ago to sober house [place] works, previously living alone, no pets,      Social Determinants of Health     Financial Resource Strain: Not on file   Food Insecurity: Not on file   Transportation Needs: Not on file   Physical Activity: Not on file   Stress: Not on file   Social Connections: Not on file   Interpersonal Safety: Not on file   Housing Stability: Not on file       Family History   Problem Relation Age of Onset    Arthritis Mother     Breast Cancer Mother     Depression Mother     Allergies Mother     Migraines Mother     Alcoholism Father     Lymphoma Father         non hodgkins    Other - See Comments Brother         genetic disorder,  of some slow form of ALS    Suicidality Paternal Grandfather            EXAM:  /64   Pulse 81   Resp 16   Ht 1.74 m (5' 8.5\")   Wt 86 kg (189 lb 11.2 oz)   SpO2 96%   BMI 28.42 kg/m    GENERAL: Alert and no distress  EYES: Eyes grossly normal to inspection.  No obvious scleral/conjunctival abnormalities.  O/P; low drooping soft palate Mallampati 2  RESP: Lungs are clear to auscultation bilaterally  Cardiac tones regular rate and rhythm S1-S2 normal  Extremities are perfused without edema  SKIN: Visible skin clear.  Wearing a small bandage over his left nares no significant rash, abnormal pigmentation or lesions.  NEURO: Cranial nerves grossly intact.  Mentation and speech appropriate for " age.  PSYCH: Mentation appears normal, affect flat, judgement and insight intact, normal speech and appearance well-groomed.         TSH   Date Value Ref Range Status   10/04/2022 0.51 0.40 - 4.00 mU/L Final   07/16/2020 0.71 0.40 - 4.00 mU/L Final       ASSESSMENT:  58-year-old gentleman with waking up with gasping for breath, excessive daytime sleepiness, cognitive issues..  [place] likely underestimates the severity of his symptoms due to excessive caffeine use.  He is likely getting close to adequate sleep time with 7-hour sleep opportunity.  Identifying and treating obstructive sleep apnea could be medically indicated.    PLAN:  We reviewed the pathophysiology of obstructive sleep apnea, indications for treatment, testing options and treatment options.  Proceed with home sleep apnea testing.  I will be contacting him with results via [employer].  In the meantime, ensure adequate sleep opportunity of over 7 hours if possible.  Consider cutting back on caffeine.  Continue to sleep on sides.  See instructions for further details of counseling provided.    50 minutes spent by me on the date of the encounter doing chart review, history and exam, documentation and further activities per the note    Ana Cheung M.D.  Pulmonary/Critical Care/Sleep Medicine    [facility]   Floor 1, [address]   [address] [place] [address]. [place] [address]   Appointments: [phone]    The above note was dictated using voice recognition software and may include typographical errors. Please contact the author for any clarifications.

## 2023-11-01 ENCOUNTER — PATIENT OUTREACH (OUTPATIENT)
Dept: CARE COORDINATION | Facility: CLINIC | Age: 58
End: 2023-11-01
Payer: COMMERCIAL

## 2023-11-10 DIAGNOSIS — Z86.711 HISTORY OF PULMONARY EMBOLISM: ICD-10-CM

## 2023-11-10 DIAGNOSIS — R79.0 LOW FERRITIN: ICD-10-CM

## 2023-11-10 DIAGNOSIS — Z86.718 PERSONAL HISTORY OF DVT (DEEP VEIN THROMBOSIS): ICD-10-CM

## 2023-11-10 DIAGNOSIS — Z79.01 CHRONIC ANTICOAGULATION: ICD-10-CM

## 2023-11-10 NOTE — TELEPHONE ENCOUNTER
Thank you,  Sapna Noriega, Pharmacy Coordinator   [facility]  [phone]

## 2023-11-10 NOTE — TELEPHONE ENCOUNTER
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     They were last evaluated by our team on 10/20/23 with the plan to remain on long term anticoagulation and return to clinic in 1 year.    Prescription updated and refills given.     This message will not be routed to  for scheduling.   Dayna Fang, MSN, RN, PHN -Nurse Clinician, [facility] [phone]

## 2023-11-15 ENCOUNTER — PATIENT OUTREACH (OUTPATIENT)
Dept: CARE COORDINATION | Facility: CLINIC | Age: 58
End: 2023-11-15
Payer: COMMERCIAL

## 2023-12-04 ENCOUNTER — OFFICE VISIT (OUTPATIENT)
Dept: SLEEP MEDICINE | Facility: CLINIC | Age: 58
End: 2023-12-04
Payer: COMMERCIAL

## 2023-12-04 DIAGNOSIS — G47.10 HYPERSOMNIA: ICD-10-CM

## 2023-12-04 DIAGNOSIS — Z91.89 RISK FACTORS FOR OBSTRUCTIVE SLEEP APNEA: ICD-10-CM

## 2023-12-04 DIAGNOSIS — R06.89 GASPING FOR BREATH: ICD-10-CM

## 2023-12-04 NOTE — PROCEDURES
Pt is completing a home sleep test. Pt was instructed on how to put on the Noxturnal T3 device and associated equipment before going to bed and given the opportunity to practice putting it on before leaving the sleep center. Pt was reminded to bring the home sleep test kit back to the center tomorrow, at agreed upon time for download and reporting.   Neck circumference: 38 CM / 15 inches.

## 2023-12-05 ENCOUNTER — DOCUMENTATION ONLY (OUTPATIENT)
Dept: SLEEP MEDICINE | Facility: CLINIC | Age: 58
End: 2023-12-05
Payer: COMMERCIAL

## 2023-12-05 NOTE — PROCEDURES
HST POST-STUDY QUESTIONNAIRE    What time did you go to bed?  11  How long do you think it took to fall asleep?  2 hr  What time did you wake up to start the day?  5 AM  Did you get up during the night at all?  yes  If you woke up, do you remember approximately what time(s)? 2 am  Did you have any difficulty with the equipment?  Yes Just didn't sleep well with it on  Did you us any type of treatment with this study?  None  Was the head of the bed elevated? No  Did you sleep in a recliner?  No  Did you stop using CPAP at least 3 days before this test?  NA  Any other information you'd like us to know? I had a really hard time sleeping so I dont know how well this worked.

## 2023-12-05 NOTE — PROGRESS NOTES
Pt returned HST device. It was downloaded and forwarded data to the clinical specialist for scoring.      
Detail Level: Detailed
Quality 130: Documentation Of Current Medications In The Medical Record: Current Medications Documented

## 2023-12-15 ENCOUNTER — TELEPHONE (OUTPATIENT)
Dept: SLEEP MEDICINE | Facility: CLINIC | Age: 58
End: 2023-12-15
Payer: COMMERCIAL

## 2024-01-15 ENCOUNTER — TELEPHONE (OUTPATIENT)
Dept: SLEEP MEDICINE | Facility: CLINIC | Age: 59
End: 2024-01-15
Payer: COMMERCIAL

## 2024-01-15 NOTE — TELEPHONE ENCOUNTER
Called pt (3rd attempt) and lvm for him to schedule redo of HST due to technical failure of test completed on 12/4.     Sent letter via usps to address on file with the following message:     Padmini Calvin,      We are writing to you from [facility] in effort to contact you about your Home Sleep Study you completed on 12/4/2023. Unfortunately, due to technical error of the device we were not able to capture enough data to analyze your sleep. We would like to schedule you for a redo of this test. Please call me directly at [phone] so we can schedule you for a redo of the sleep test and continue forward in evaluating your sleep health.     Thank you,  ILYA Beal, [facility]  Virtual Care Coordinator  Sleep Therapy Management  [facility]  [address] [place] [id number]  Office: [phone]  Fax:  [phone]  Gender pronouns: she/her  Employed by [facility]

## 2024-02-25 ENCOUNTER — HEALTH MAINTENANCE LETTER (OUTPATIENT)
Age: 59
End: 2024-02-25

## 2024-02-26 ENCOUNTER — OFFICE VISIT (OUTPATIENT)
Dept: FAMILY MEDICINE | Facility: CLINIC | Age: 59
End: 2024-02-26
Payer: COMMERCIAL

## 2024-02-26 DIAGNOSIS — D48.5 NEOPLASM OF UNCERTAIN BEHAVIOR OF SKIN: ICD-10-CM

## 2024-02-26 DIAGNOSIS — D49.2 NEOPLASM OF SKIN: ICD-10-CM

## 2024-02-26 PROCEDURE — 11102 TANGNTL BX SKIN SINGLE LES: CPT | Performed by: PHYSICIAN ASSISTANT

## 2024-02-26 PROCEDURE — 88305 TISSUE EXAM BY PATHOLOGIST: CPT | Performed by: PATHOLOGY

## 2024-02-26 NOTE — LETTER
2/26/2024         RE: Nikunj Sinha  [address]  [facility] [facility] [address]        Dear Colleague,    Thank you for referring your patient, [facility]. Please see a copy of my visit note below.    [facility] Dermatology Note  Encounter Date: Feb 26, 2024  Office Visit      Dermatology Problem List:    #NUB, L nasal ala, S/p Biopsy performed on 2/26/24: Pending results  ____________________________________________    Assessment & Plan:  # Neoplasm of unspecified behavior of the skin (D49.2) on the L nasal ala. The differential diagnosis includes BCC vs other.  - Shave biopsy performed today, see procedure note below.   - Photographed today     Procedures Performed:   - Shave biopsy procedure note, location(s): see above. After discussion of benefits and risks including but not limited to bleeding, infection, scar, incomplete removal, recurrence, and non-diagnostic biopsy, written consent and photographs were obtained. The area was cleaned with isopropyl alcohol. 0.5mL of 1% lidocaine with epinephrine was injected to obtain adequate anesthesia of lesion(s). Shave biopsy at site(s) performed. Hemostasis was achieved with aluminium chloride. Petrolatum ointment and a sterile dressing were applied. The patient tolerated the procedure and no complications were noted. The patient was provided with verbal and written post care instructions.      Follow-up: pending path results    Staff and scribe     Scribe Disclosure:   I, MARYBETH HENRY, am serving as a scribe; to document services personally performed by Cassidy Butts PA-C -based on data collection and the provider's statements to me.     Provider Disclosure:  I agree with above History, Review of Systems, Physical exam and Plan.  I have reviewed the content of the documentation and have edited it as needed. I have personally performed the services documented here and the  documentation accurately represents those services and the decisions I have made.      Electronically signed by:     All risks, benefits and alternatives were discussed with patient.  Patient is in agreement and understands the assessment and plan.  All questions were answered.    Cassidy Butts PA-C, MPAS  [facility]: Phone: [phone], Fax: [phone]  [facility]: Phone: [phone],  Fax: [phone]  [facility]: Phone: [phone], Fax: [phone]  ____________________________________________    CC: Derm Problem (Lesion L nostril present over the past 1-2 years, painful at times)      Reviewed patients past medical history and pertinent chart review prior to patient's visit today.     HPI:  Mr. Nikunj Sinha is a 59 year old male who presents today as a return patient for spot check.     Today patient reported a spot of concern on her L nostril. Lesion has been present for the past 1-2 years. Patient noted that lesion is occasionally painful. Has tried of use of lotion and bandage without relief.     Patient is otherwise feeling well, without additional concerns.    Labs:  N.A    Physical Exam:  Vitals: There were no vitals taken for this visit.  SKIN: Focused examination of face and nose was performed.   - L nasal ala there is a 2x1cm pink, scaly lesion with ulceration on the medial aspect   - No other lesions of concern on areas examined.         Medications:  Current Outpatient Medications   Medication     rivaroxaban ANTICOAGULANT (XARELTO) 10 MG TABS tablet     AUVELITY  MG TBCR     No current facility-administered medications for this visit.      Past Medical/Surgical History:   Patient Active Problem List   Diagnosis     Current severe episode of major depressive disorder without psychotic features, unspecified whether recurrent (H)     Moderate alcohol dependence in early  remission (H)     ZAIDA (generalized anxiety disorder)     History of suicidal ideation     Chronic fatigue     Dizziness     Tinnitus, bilateral     Dental caries     Seasonal allergic rhinitis, unspecified trigger     History of pulmonary embolism     Epigastric pain     Erectile dysfunction, unspecified erectile dysfunction type     Chronic anticoagulation     Personal history of DVT (deep vein thrombosis)     Elevated LDL cholesterol level     Seborrheic keratoses     Need for shingles vaccine     Bilateral inguinal hernia without obstruction or gangrene, recurrence not specified     Umbilical hernia without obstruction and without gangrene     Diverticulosis of large intestine without hemorrhage     Liver cyst     Duodenal diverticulum     IPMN (intraductal papillary mucinous neoplasm)     Dilation of common bile duct     Past Medical History:   Diagnosis Date     Abdominal pain, epigastric 11/29/2019     Chronic anticoagulation 12/01/2019     Current severe episode of major depressive disorder without psychotic features, unspecified whether recurrent (H) 11/29/2019     Decreased cardiac ejection fraction 08/22/2021    Echo oct 2022 normal      Dental caries 11/29/2019     Dizziness 11/29/2019     Erectile dysfunction, unspecified erectile dysfunction type 11/29/2019     Essential tremor 12/01/2019     Fatigue, unspecified type 11/29/2019     ZAIDA (generalized anxiety disorder) 11/29/2019     History of chest pain 11/29/2019     History of pulmonary embolism 11/29/2019     History of suicidal ideation 11/29/2019     Memory changes 11/29/2019     Moderate alcohol dependence in early remission (H) 11/29/2019     Palpitations 11/29/2019     Positive FIT (fecal immunochemical test) 11/29/2019    Colonoscopy 11/2020 normal, labs normal     Seasonal allergic rhinitis, unspecified trigger 11/29/2019     Tinnitus, bilateral 11/29/2019     Weight gain 11/29/2019                        Again, thank you for allowing me to  participate in the care of your patient.        Sincerely,        Cassidy Butts PA-C

## 2024-02-26 NOTE — PATIENT INSTRUCTIONS
Wound Care After a Biopsy    What is a skin biopsy?  A skin biopsy allows the doctor to examine a very small piece of tissue under the microscope to determine the diagnosis and the best treatment for the skin condition. A local anesthetic (numbing medicine) is injected with a very small needle into the skin area to be tested. A small piece of skin is taken from the area. Sometimes a suture (stitch) is used.     What are the risks of a skin biopsy?  I will experience scar, bleeding, swelling, pain, crusting and redness. I may experience incomplete removal or recurrence. Risks of this procedure are excessive bleeding, bruising, infection, nerve damage, numbness, thick (hypertrophic or keloidal) scar and non-diagnostic biopsy.    How should I care for my wound for the first 24 hours?  Keep the wound dry and covered for 24 hours  If it bleeds, hold direct pressure on the area for 15 minutes. If bleeding does not stop, call us or go to the emergency room  Avoid strenuous exercise the first 1-2 days or as your doctor instructs you    How should I care for the wound after 24 hours?  After 24 hours, remove the bandage  You may bathe or shower as normal  If you had a scalp biopsy, you can shampoo as usual and can use shower water to clean the biopsy site daily  Clean the wound once a day with gentle soap and water  Do not scrub, be gentle  Apply white petroleum/Vaseline after cleaning the wound with a cotton swab or a clean finger, and keep the site covered with a Bandaid /bandage. Bandages are not necessary with a scalp biopsy  If you are unable to cover the site with a Bandaid /bandage, re-apply ointment 2-3 times a day to keep the site moist. Moisture will help with healing  Avoid strenuous activity for first 1-2 days  Avoid lakes, rivers, pools, and oceans until the stitches are removed or the site is healed    How do I clean my wound?  Wash hands thoroughly with soap or use hand  before all wound care  Clean  the wound with gentle soap and water  Apply white petroleum/Vaseline  to wound after it is clean  Replace the Bandaid /bandage to keep the wound covered for the first few days or as instructed by your doctor  If you had a scalp biopsy, warm shower water to the area on a daily basis should suffice    What should I use to clean my wound?   Cotton-tipped applicators (Qtips )  White petroleum jelly (Vaseline ). Use a clean new container and use Q-tips to apply.  Bandaids  as needed  Gentle soap     How should I care for my wound long term?  Do not get your wound dirty  Keep up with wound care for one week or until the area is healed.  A small scab will form and fall off by itself when the area is completely healed. The area will be red and will become pink in color as it heals. Sun protection is very important for how your scar will turn out. Sunscreen with an SPF 30 or greater is recommended once the area is healed.  You should have some soreness but it should be mild and slowly go away over several days. Talk to your doctor about using tylenol for pain,    When should I call my doctor?  If you have increased:   Pain or swelling  Pus or drainage (clear or slightly yellow drainage is ok)  Temperature over 100F  Spreading redness or warmth around wound    When will I hear about my results?  The biopsy results can take 2 weeks to come back.  Your results will automatically release to [email or web address] before your provider has even reviewed them.  The clinic will call you with the results, send you a [email or web address] message, or have you schedule a follow-up clinic or phone time to discuss the results.  Contact our clinics if you do not hear from us in 2 weeks.    Who should I call with questions?  [facility]: [phone]  [facility]: [phone]  For urgent needs outside of business hours call the [facility] at [phone] and ask for the dermatology resident on call  Patient Education       Proper skin care from [facility]:    -Eliminate harsh soaps as they strip the natural oils from the skin, often resulting in dry itchy skin ( i.e. Dial, Zest, [place])  -Use mild soaps such as Cetaphil or Dove Sensitive Skin in the shower. You do not need to use soap on arms, legs, and trunk every time you shower unless visibly soiled.   -Avoid hot or cold showers.  -After showering, lightly dry off and apply moisturizing within 2-3 minutes. This will help trap moisture in the skin.   -Aggressive use of a moisturizer at least 1-2 times a day to the entire body (including -Vanicream, Cetaphil, Aquaphor or Cerave) and moisturize hands after every washing.  -We recommend using moisturizers that come in a tub that needs to be scooped out, not a pump. This has more of an oil base. It will hold moisture in your skin much better than a water base moisturizer. The above recommended are non-pore clogging.      Wear a sunscreen with at least SPF 30 on your face, ears, neck and V of the chest daily. Wear sunscreen on other areas of the body if those areas are exposed to the sun throughout the day. Sunscreens can contain physical and/or chemical blockers. Physical blockers are less likely to clog pores, these include zinc oxide and titanium dioxide. Reapply every two hour and after swimming.     Sunscreen examples: https://www.ewg.org/sunscreen/    UV radiation  UVA radiation remains constant throughout the day and throughout the year. It is a longer wavelength than UVB and therefore penetrates deeper into the skin leading to immediate and delayed tanning, photoaging, and skin cancer. 70-80% of UVA and UVB radiation occurs between the hours of 10am-2pm.  UVB radiation  UVB radiation causes the most harmful effects and is more significant during the summer months. However, snow and ice can reflect UVB radiation leading to skin damage during the winter months as well. UVB radiation is  responsible for tanning, burning, inflammation, delayed erythema (pinkness), pigmentation (brown spots), and skin cancer.     I recommend self monthly full body exams and yearly full body exams with a dermatology provider. If you develop a new or changing lesion please follow up for examination. Most skin cancers are pink and scaly or pink and pearly. However, we do see blue/brown/black skin cancers.  Consider the ABCDEs of melanoma when giving yourself your monthly full body exam ( don't forget the groin, buttocks, feet, toes, etc). A-asymmetry, B-borders, C-color, D-diameter, E-elevation or evolving. If you see any of these changes please follow up in clinic. If you cannot see your back I recommend purchasing a hand held mirror to use with a larger wall mirror.       Checking for Skin Cancer  You can find cancer early by checking your skin each month. There are 3 kinds of skin cancer. They are melanoma, basal cell carcinoma, and squamous cell carcinoma. Doing monthly skin checks is the best way to find new marks or skin changes. Follow the instructions below for checking your skin.   The ABCDEs of checking moles for melanoma   Check your moles or growths for signs of melanoma using ABCDE:   Asymmetry: the sides of the mole or growth don t match  Border: the edges are ragged, notched, or blurred  Color: the color within the mole or growth varies  Diameter: the mole or growth is larger than 6 mm (size of a pencil eraser)  Evolving: the size, shape, or color of the mole or growth is changing (evolving is not shown in the images below)    Checking for other types of skin cancer  Basal cell carcinoma or squamous cell carcinoma have symptoms such as:     A spot or mole that looks different from all other marks on your skin  Changes in how an area feels, such as itching, tenderness, or pain  Changes in the skin's surface, such as oozing, bleeding, or scaliness  A sore that does not heal  New swelling or redness beyond  the border of a mole    Who s at risk?  Anyone can get skin cancer. But you are at greater risk if you have:   Fair skin, light-colored hair, or light-colored eyes  Many moles or abnormal moles on your skin  A history of sunburns from sunlight or tanning beds  A family history of skin cancer  A history of exposure to radiation or chemicals  A weakened immune system  If you have had skin cancer in the past, you are at risk for recurring skin cancer.   How to check your skin  Do your monthly skin checkups in front of a full-length mirror. Check all parts of your body, including your:   Head (ears, face, neck, and scalp)  Torso (front, back, and sides)  Arms (tops, undersides, upper, and lower armpits)  Hands (palms, backs, and fingers, including under the nails)  Buttocks and genitals  Legs (front, back, and sides)  Feet (tops, soles, toes, including under the nails, and between toes)  If you have a lot of moles, take digital photos of them each month. Make sure to take photos both up close and from a distance. These can help you see if any moles change over time.   Most skin changes are not cancer. But if you see any changes in your skin, call your doctor right away. Only he or she can diagnose a problem. If you have skin cancer, seeing your doctor can be the first step toward getting the treatment that could save your life.   [employer] [employer] last reviewed this educational content on 4/1/2019 2000-2020 [employer]. [address] [place] [address], [place] [address]. All rights reserved. This information is not intended as a substitute for professional medical care. Always follow your healthcare professional's instructions.         Wound Care After a Biopsy    What is a skin biopsy?  A skin biopsy allows the doctor to examine a very small piece of tissue under the microscope to determine the diagnosis and the best treatment for the skin condition. A local anesthetic (numbing medicine)  is injected with a very small  needle into the skin area to be tested. A small piece of skin is taken from the area. Sometimes a suture (stitch) is used.     What are the risks of a skin biopsy?  I will experience scar, bleeding, swelling, pain, crusting and redness. I may experience incomplete removal or recurrence. Risks of this procedure are excessive bleeding, bruising, infection, nerve damage, numbness, thick (hypertrophic or keloidal) scar and non-diagnostic biopsy.    How should I care for my wound for the first 24 hours?  Keep the wound dry and covered for 24 hours  If it bleeds, hold direct pressure on the area for 15 minutes. If bleeding does not stop then go to the emergency room  Avoid strenuous exercise the first 1-2 days or as your doctor instructs you    How should I care for the wound after 24 hours?  After 24 hours, remove the bandage  You may bathe or shower as normal  If you had a scalp biopsy, you can shampoo as usual and can use shower water to clean the biopsy site daily  Clean the wound twice a day with gentle soap and water  Do not scrub, be gentle  Apply white petroleum/Vaseline after cleaning the wound with a cotton swab or a clean finger, and keep the site covered with a Bandaid /bandage. Bandages are not necessary with a scalp biopsy  If you are unable to cover the site with a Bandaid /bandage, re-apply ointment 2-3 times a day to keep the site moist. Moisture will help with healing  Avoid strenuous activity for first 1-2 days  Avoid lakes, rivers, pools, and oceans until the stitches are removed or the site is healed    How do I clean my wound?  Wash hands thoroughly with soap or use hand  before all wound care  Clean the wound with gentle soap and water  Apply white petroleum/Vaseline  to wound after it is clean  Replace the Bandaid /bandage to keep the wound covered for the first few days or as instructed by your doctor  If you had a scalp biopsy, warm shower water to the area on a daily basis should  suffice    What should I use to clean my wound?   Cotton-tipped applicators (Qtips )  White petroleum jelly (Vaseline ). Use a clean new container and use Q-tips to apply.  Bandaids   as needed  Gentle soap     How should I care for my wound long term?  Do not get your wound dirty  Keep up with wound care for one week or until the area is healed.  A small scab will form and fall off by itself when the area is completely healed. The area will be red and will become pink in color as it heals. Sun protection is very important for how your scar will turn out. Sunscreen with an SPF 30 or greater is recommended once the area is healed.  If you have stitches, stitches need to be removed in 10 days. You may return to our clinic for this or you may have it done locally at your doctor s office.  You should have some soreness but it should be mild and slowly go away over several days. Talk to your doctor about using tylenol for pain,    When should I call my doctor?  If you have increased:   Pain or swelling  Pus or drainage (clear or slightly yellow drainage is ok)  Temperature over 100F  Spreading redness or warmth around wound    When will I hear about my results?  The biopsy results can take 2-3 weeks to come back. The clinic will call you with the results, send you a [employer] message, or have you schedule a follow-up clinic or phone time to discuss the results. Contact our clinics if you do not hear from us in 3 weeks.     Who should I call with questions?  [facility]: [phone]   [facility]: [phone]  For urgent needs outside of business hours call the [facility] at [phone] and ask for the dermatology resident on call

## 2024-02-26 NOTE — PROGRESS NOTES
[facility] Dermatology Note  Encounter Date: Feb 26, 2024  Office Visit      Dermatology Problem List:    #NUB, L nasal ala, S/p Biopsy performed on 2/26/24: Pending results  ____________________________________________    Assessment & Plan:  # Neoplasm of unspecified behavior of the skin (D49.2) on the L nasal ala. The differential diagnosis includes BCC vs other.  - Shave biopsy performed today, see procedure note below.   - Photographed today     Procedures Performed:   - Shave biopsy procedure note, location(s): see above. After discussion of benefits and risks including but not limited to bleeding, infection, scar, incomplete removal, recurrence, and non-diagnostic biopsy, written consent and photographs were obtained. The area was cleaned with isopropyl alcohol. 0.5mL of 1% lidocaine with epinephrine was injected to obtain adequate anesthesia of lesion(s). Shave biopsy at site(s) performed. Hemostasis was achieved with aluminium chloride. Petrolatum ointment and a sterile dressing were applied. The patient tolerated the procedure and no complications were noted. The patient was provided with verbal and written post care instructions.      Follow-up: pending path results    Staff and scribe     Scribe Disclosure:   I, MARYBETH HENRY, am serving as a scribe; to document services personally performed by Cassidy Butts PA-C -based on data collection and the provider's statements to me.     Provider Disclosure:  I agree with above History, Review of Systems, Physical exam and Plan.  I have reviewed the content of the documentation and have edited it as needed. I have personally performed the services documented here and the documentation accurately represents those services and the decisions I have made.      Electronically signed by:     All risks, benefits and alternatives were discussed with patient.  Patient is in agreement and understands the assessment and plan.  All questions were  answered.    Cassidy Butts PA-C, MPAS  [facility] [place]: Phone: [phone], Fax: [phone]  [facility]: Phone: [phone],  Fax: [phone]  [facility] [place]: Phone: [phone], Fax: [phone]  ____________________________________________    CC: Derm Problem (Lesion L nostril present over the past 1-2 years, painful at times)      Reviewed patients past medical history and pertinent chart review prior to patient's visit today.     HPI:  Mr. Nikunj Sinha is a 59 year old male who presents today as a return patient for spot check.     Today patient reported a spot of concern on her L nostril. Lesion has been present for the past 1-2 years. Patient noted that lesion is occasionally painful. Has tried of use of lotion and bandage without relief.     Patient is otherwise feeling well, without additional concerns.    Labs:  N.A    Physical Exam:  Vitals: There were no vitals taken for this visit.  SKIN: Focused examination of face and nose was performed.   - L nasal ala there is a 2x1cm pink, scaly lesion with ulceration on the medial aspect   - No other lesions of concern on areas examined.         Medications:  Current Outpatient Medications   Medication    rivaroxaban ANTICOAGULANT (XARELTO) 10 MG TABS tablet    AUVELITY  MG TBCR     No current facility-administered medications for this visit.      Past Medical/Surgical History:   Patient Active Problem List   Diagnosis    Current severe episode of major depressive disorder without psychotic features, unspecified whether recurrent (H)    Moderate alcohol dependence in early remission (H)    ZAIDA (generalized anxiety disorder)    History of suicidal ideation    Chronic fatigue    Dizziness    Tinnitus, bilateral    Dental caries    Seasonal allergic rhinitis, unspecified trigger    History of pulmonary embolism    Epigastric pain    Erectile  dysfunction, unspecified erectile dysfunction type    Chronic anticoagulation    Personal history of DVT (deep vein thrombosis)    Elevated LDL cholesterol level    Seborrheic keratoses    Need for shingles vaccine    Bilateral inguinal hernia without obstruction or gangrene, recurrence not specified    Umbilical hernia without obstruction and without gangrene    Diverticulosis of large intestine without hemorrhage    Liver cyst    Duodenal diverticulum    IPMN (intraductal papillary mucinous neoplasm)    Dilation of common bile duct     Past Medical History:   Diagnosis Date    Abdominal pain, epigastric 11/29/2019    Chronic anticoagulation 12/01/2019    Current severe episode of major depressive disorder without psychotic features, unspecified whether recurrent (H) 11/29/2019    Decreased cardiac ejection fraction 08/22/2021    Echo oct 2022 normal     Dental caries 11/29/2019    Dizziness 11/29/2019    Erectile dysfunction, unspecified erectile dysfunction type 11/29/2019    Essential tremor 12/01/2019    Fatigue, unspecified type 11/29/2019    ZAIDA (generalized anxiety disorder) 11/29/2019    History of chest pain 11/29/2019    History of pulmonary embolism 11/29/2019    History of suicidal ideation 11/29/2019    Memory changes 11/29/2019    Moderate alcohol dependence in early remission (H) 11/29/2019    Palpitations 11/29/2019    Positive FIT (fecal immunochemical test) 11/29/2019    Colonoscopy 11/2020 normal, labs normal    Seasonal allergic rhinitis, unspecified trigger 11/29/2019    Tinnitus, bilateral 11/29/2019    Weight gain 11/29/2019

## 2024-02-27 LAB
PATH REPORT.COMMENTS IMP SPEC: NORMAL
PATH REPORT.COMMENTS IMP SPEC: NORMAL
PATH REPORT.FINAL DX SPEC: NORMAL
PATH REPORT.GROSS SPEC: NORMAL
PATH REPORT.MICROSCOPIC SPEC OTHER STN: NORMAL
PATH REPORT.RELEVANT HX SPEC: NORMAL

## 2024-02-28 ENCOUNTER — TELEPHONE (OUTPATIENT)
Dept: FAMILY MEDICINE | Facility: CLINIC | Age: 59
End: 2024-02-28
Payer: COMMERCIAL

## 2024-02-28 DIAGNOSIS — C44.311 BASAL CELL CARCINOMA (BCC) OF LEFT ALA NASI: Primary | ICD-10-CM

## 2024-02-28 NOTE — TELEPHONE ENCOUNTER
----- Message from Cassidy Butts PA-C sent at 2/28/2024  7:33 AM CST -----  BCC - patient lives in [place], would like to go to [facility] for MOHS    A(1). Skin, L nasal ala, shave:  - Basal cell carcinoma, superficial and nodular types     We got your biopsy results back regarding the nose lesion. It was in fact a basal cell skin cancer like I thought. Basal Cell skin cancer is the most common type of skin cancer, and usually caused by years of sun exposure. This is a very slow growing skin cancer that rarely spreads elsewhere in the body. If left untreated it will slowly continue to grow over time and may cause issues with deformity or spread to the bone. Complete removal is recommended. 90% of people will develop this type of skin cancer in their lifetime. I will refer you to our surgeons for a procedure called MOHS surgery. One of their nurses will reach out to you to schedule this shortly. I recommended you [facility] the [facility], so someone should be reaching out to you from there. Let me know if you have any other questions.     Cassidy Butts PA-C, [facility]  [facility]: Phone: [phone], Fax: [phone]  [facility]: Phone: [phone],  Fax: [phone]  [facility]: Phone: [phone], Fax: [phone]   Written by Cassidy Butts PA-C on 2/28/2024  7:33 AM CST  Seen by patient Nikunj Sinha on 2/28/2024  7:34 AM

## 2024-02-28 NOTE — TELEPHONE ENCOUNTER
Patient Contact    Attempt # 1    Was call answered?  No.  Left message on voicemail advising will put in referral for derm surg in [facility] and their  will call to schedule.  Pt reviewed results and read message from Cassidy Butts about biopsy results.  Advised to call [phone] with any questions.    Janie HSIEH RN  [facility] [place]  [phone]

## 2024-02-29 ENCOUNTER — TELEPHONE (OUTPATIENT)
Dept: DERMATOLOGY | Facility: CLINIC | Age: 59
End: 2024-02-29
Payer: COMMERCIAL

## 2024-02-29 NOTE — TELEPHONE ENCOUNTER
Called patient to schedule surgery with Dr. Yu    Date of Surgery: 04/18    Surgery type: Mohs    Consult scheduled: Yes    Has patient had mohs with us before? No    Additional comments: pt requested a Thursday Mohs appt.       Jeanne Arboleda on 2/29/2024 at 8:57 AM

## 2024-04-08 ENCOUNTER — OFFICE VISIT (OUTPATIENT)
Dept: DERMATOLOGY | Facility: CLINIC | Age: 59
End: 2024-04-08
Payer: COMMERCIAL

## 2024-04-08 ENCOUNTER — PRE VISIT (OUTPATIENT)
Dept: DERMATOLOGY | Facility: CLINIC | Age: 59
End: 2024-04-08

## 2024-04-08 DIAGNOSIS — C44.311 BASAL CELL CARCINOMA (BCC) OF LEFT SIDE OF NOSE: Primary | ICD-10-CM

## 2024-04-08 PROCEDURE — 99214 OFFICE O/P EST MOD 30 MIN: CPT | Performed by: DERMATOLOGY

## 2024-04-08 RX ORDER — FLUOXETINE 10 MG/1
40 CAPSULE ORAL DAILY
COMMUNITY
Start: 2024-03-05 | End: 2024-09-20

## 2024-04-08 ASSESSMENT — PAIN SCALES - GENERAL: PAINLEVEL: MILD PAIN (3)

## 2024-04-08 NOTE — NURSING NOTE
Chief Complaint   Patient presents with    Derm Problem     Patient is here today for a consult regarding BCC of left nasal ala.     Pam RUIZ RN

## 2024-04-08 NOTE — PROGRESS NOTES
Dermatologic Surgery Consultation Note    Dermatology Problem List:  BCC, left nasal ala, superficial and nodular, s/p shave 2/26/24, MMS 4/18 @ 930a [facility]    CC: No chief complaint on file.      Subjective: Nikunj Sinha is a 59 year old male who presents today for Mohs micrographic surgery consultation for a recent diagnosis of skin cancer.  - Skin cancer(s): BCC  - Location(s): L Nasal Ala  - Referred by Cassidy Butts PA-C  - no other concerns today         Objective: An exam of the left nasal ala was performed today   - 2.5 cm tumor of the ala, apical triangle and medial cheek.  - The nasal lining appears normal    Path report:   Case: [id number]   Collected: 02/26/2024     Final Diagnosis   A(1). Skin, L nasal ala, shave:  - Basal cell carcinoma, superficial and nodular types - (see description)        Assessment and Plan:     1. Plan for Mohs micrographic surgery for skin cancer(s) above:  *Review lab result(s): Dermpath report   - We discussed the nature of the diagnosis/condition above. We discussed the treatment options, including the risks benefits and expectations of these options. We recommend micrographic surgery as the most effective and most tissue sparing option for treatment, and the patient agrees to proceed with this.  The patient is aware of the risks, benefits and expectations of this procedure. The patient will be scheduled for this procedure, if not already done so.  - We anticipate the following closure type: Interpolation flap and likely a cartilage graft    Patient was discussed with and evaluated by attending physician Dr. Jesus Yu.    Staff Involved:   Scribe/Fellow/Staff    Scribe Disclosure:   I, KAYLA GRANT, am serving as a scribe; to document services personally performed by Jesus Yu MD -based on data collection and the provider's statements to me.     Attending Attestation  I attest that the Scribe recorded the interview and exam that I personally performed.  I have reviewed  the note and edited it as necessary.    Jesus Yu M.D.  Professor  Director of Dermatologic Surgery  Department of Dermatology  [facility]

## 2024-04-08 NOTE — LETTER
4/8/2024       RE: Nikunj Sinha  [address]  [place] [facility] [address]     Dear Colleague,    Thank you for referring your patient, Nikunj Sinha, to the [facility] DERMATOLOGIC SURGERY CLINIC [place] at [facility]. Please see a copy of my visit note below.    Dermatologic Surgery Consultation Note    Dermatology Problem List:  BCC, left nasal ala, superficial and nodular, s/p shave 2/26/24, MMS 4/18 @ 930a [facility]    CC: No chief complaint on file.      Subjective: Nikunj Sinha is a 59 year old male who presents today for Mohs micrographic surgery consultation for a recent diagnosis of skin cancer.  - Skin cancer(s): BCC  - Location(s): L Nasal Ala  - Referred by Cassidy Butts PA-C  - no other concerns today         Objective: An exam of the left nasal ala was performed today   - 2.5 cm tumor of the ala, apical triangle and medial cheek.  - The nasal lining appears normal    Path report:   Case: [id number]   Collected: 02/26/2024     Final Diagnosis   A(1). Skin, L nasal ala, shave:  - Basal cell carcinoma, superficial and nodular types - (see description)        Assessment and Plan:     1. Plan for Mohs micrographic surgery for skin cancer(s) above:  *Review lab result(s): Dermpath report   - We discussed the nature of the diagnosis/condition above. We discussed the treatment options, including the risks benefits and expectations of these options. We recommend micrographic surgery as the most effective and most tissue sparing option for treatment, and the patient agrees to proceed with this.  The patient is aware of the risks, benefits and expectations of this procedure. The patient will be scheduled for this procedure, if not already done so.  - We anticipate the following closure type: Interpolation flap and likely a cartilage graft    Patient was discussed with and evaluated by attending physician Dr. Jesus Yu.    Staff Involved:    Scribe/Fellow/Staff    Scribe Disclosure:   I, KAYLA JUANITA, am serving as a scribe; to document services personally performed by Jesus Yu MD -based on data collection and the provider's statements to me.     Attending Attestation  I attest that the Scribe recorded the interview and exam that I personally performed.  I have reviewed the note and edited it as necessary.    Jesus Yu M.D.  Professor  Director of Dermatologic Surgery  Department of Dermatology  [facility]

## 2024-04-18 ENCOUNTER — OFFICE VISIT (OUTPATIENT)
Dept: DERMATOLOGY | Facility: CLINIC | Age: 59
End: 2024-04-18
Payer: COMMERCIAL

## 2024-04-18 VITALS — HEART RATE: 71 BPM | DIASTOLIC BLOOD PRESSURE: 79 MMHG | SYSTOLIC BLOOD PRESSURE: 140 MMHG

## 2024-04-18 DIAGNOSIS — C44.311 BASAL CELL CARCINOMA (BCC) OF LEFT ALA NASI: ICD-10-CM

## 2024-04-18 PROCEDURE — 14302 TIS TRNFR ADDL 30 SQ CM: CPT | Performed by: DERMATOLOGY

## 2024-04-18 PROCEDURE — 15731 FOREHEAD FLAP W/VASC PEDICLE: CPT | Performed by: DERMATOLOGY

## 2024-04-18 PROCEDURE — 17311 MOHS 1 STAGE H/N/HF/G: CPT | Performed by: DERMATOLOGY

## 2024-04-18 PROCEDURE — 14301 TIS TRNFR ANY 30.1-60 SQ CM: CPT | Performed by: DERMATOLOGY

## 2024-04-18 RX ORDER — OXYCODONE AND ACETAMINOPHEN 5; 325 MG/1; MG/1
1 TABLET ORAL EVERY 6 HOURS PRN
Qty: 12 TABLET | Refills: 0 | Status: SHIPPED | OUTPATIENT
Start: 2024-04-18 | End: 2024-04-21

## 2024-04-18 RX ORDER — TRANEXAMIC ACID 100 MG/ML
INJECTION, SOLUTION INTRAVENOUS ONCE
Status: COMPLETED | OUTPATIENT
Start: 2024-04-18 | End: 2024-04-18

## 2024-04-18 RX ORDER — OMEGA-3 FATTY ACIDS/FISH OIL 300-1000MG
200 CAPSULE ORAL EVERY 4 HOURS PRN
COMMUNITY
End: 2024-09-20

## 2024-04-18 RX ORDER — PROMETHAZINE HYDROCHLORIDE 25 MG/1
25 TABLET ORAL EVERY 6 HOURS PRN
Qty: 10 TABLET | Refills: 0 | Status: SHIPPED | OUTPATIENT
Start: 2024-04-18 | End: 2024-09-20

## 2024-04-18 RX ADMIN — TRANEXAMIC ACID: 100 INJECTION, SOLUTION INTRAVENOUS at 12:54

## 2024-04-18 NOTE — PROGRESS NOTES
[facility] Dermatologic Surgery Clinic [place] Procedure Note  Dermatology Problem List:  BCC, left nasal ala, superficial and nodular, s/p shave 2/26/24, s/p MMS 4/18/24    Date of Service:  Apr 18, 2024  Surgery: Mohs micrographic surgery    Case 1  Repair Type: PMFF / Advancement Flap  Repair Size: advancement flap 11.7 x 6.9 cm, PMFF 12x3  Suture Material: 3-0 Vicryl / 4-0 Monocryl / 5-0 Monocryl / 5-0 Fast Absorbing Gut   Tumor Type: BCC - Basal cell carcinoma  Location: L Nasal Ala  Derm-Path Accession #: [id number]     PreOp Size: 3.0 x 2.0 cm  PostOp Size: 4 x 2.7 cm  Mohs Accession #:   Level of Defect: Muscle      Procedure:  We discussed the principles of treatment and most likely complications including scarring, bleeding, infection, swelling, pain, crusting, nerve damage, large wound,  incomplete excision, wound dehiscence,  nerve damage, recurrence, and a second procedure may be recommended to obtain the best cosmetic or functional result.    Informed consent was obtained and the patient underwent the procedure as follows:  The patient was placed supine on the operating table.  The cancer was identified, outlined with a marker, and verified by the patient.  The entire surgical field was prepped with Hibiclens. The surgical site was anesthetized using Lidocaine 1% with epi 1:100,000.       The area of clinically apparent tumor was not debulked. The layer of tissue was then surgically excised using a #15 blade and was then transferred onto a specimen sheet maintaining the orientation of the specimen. Hemostasis was obtained using monopolar electrocoagulation. The wound site was then covered with a dressing while the tissue samples were processed for examination.    The excised tissue was transported to the Mohs histology laboratory maintaining the tissue orientation.  The tissue specimen was relaxed so that the entire surgical margin was in a single horizontal plane for sectioning and  inked for precise mapping.  A precise reference map was drawn to reflect the sectioning of the specimen, colored inking of the margins, and orientation on the patient. The tissue was processed using horizontal sectioning of the base and continuous peripheral margins.  The histopathologic sections were reviewed in conjunction with the reference map.    Total blocks: 2    Total slides:  7    There were no cancer cells visualized on examination, therefore Mohs surgery was complete.    Reconstruction:  Advancement Flap    PROCEDURE:    The nasal wound and paranasal portions of the wound were identified. The course of the ipsilateral supratrochlear artery was identified using doppler ultrasound and marked with a marker. A V-Y advancemnt flap was planned to resurface the paranasal tissues.  The paramedian forehead flap was then planned with a marker, using a template of the wound to be repaired. The wound and planned flap area were infiltrated with Lidocaine 1% with epi 1 : 100,000. The area was then cleansed and prepped with Hibiclens and draped with sterile drapes.  The wound was debeveled and undermined broadly in all directions to the level of fat. Hemostasis was obtained using monopolar electrocoagulation .The advancement flap was incised to the level of fat removing a cone of redundant tissue from left nasal ala. The flap was further undermined in all directions.    Hemostasis was again obtained.  The flap was then advanced into the defect to resurface the medial cheek and upper lip and secured using periosteal tacking sutures.  Redundant areas of tissue were excised using the triangulation technique.  The flap wound edges of both the primary and secondary defects were then approximated with buried dermal sutures and the epidermis was then carefully approximated using 5-0 fast absorbing gut sutures.  The wound was cleansed with saline, and ointment was applied along the wound surface.  ARepair Size:  11.7 x 6.9 cm    Sutures Used:  3-0 Vicryl / 4-0 Monocryl / 5-0 Monocryl / 5-0 Fast Absorbing Gut         PROCEDURES: Paramedian Forehead Flap   The patient was taken to the operative suite and placed   on the surgical suite procedure table.    The flap was incised sharply to the level of periosteum and raised, leaving a thin layer of subcutaneous fat on the distal area to be inset and a pedicle of subcutaneous fat and muscle at the proximal aspect.  Hemostasis was obtained with monopolar electrocoagulation.  The secondary defect on the forehead was repaired primarily using buried dermal sutures, and the epidermis was then carefully approximated using simple running 5-0 fast absorbing gut epidermal sutures. The distal margin of the flap was transposed into the primary defect and secured to the defect using buried dermal sutures. The epidermis was then carefully approximated using simple running 5-0 fast absorbing gut epidermal sutures across the inferior margin of the flap.     The wound was cleansed with saline and Surgicel was wrapped around the pedicle and the wound was dressed with Telfa, gauze and tape.  The patient left the operating suite in stable condition.The patient will return in three weeks for the second stage of repair including division and takedown of the interpolation flap.  Wound care was reviewed verbally and in writing.    Post-Operative Size:   11.7 x 6.9 cm  Sutures Used: 3-0 Vicryl / 4-0 Monocryl / 5-0 Monocryl / 5-0 Fast Absorbing Gut     The attending surgeon was present for the entire procedure and reconstruction and always immediately available.      Staff Involved:   Scribe/Fellow/Staff    Scribe Disclosure:   I, KAYLA GRANT, am serving as a scribe; to document services personally performed by Jesus Yu MD -based on data collection and the provider's statements to me.     Attending attestation:  I personally performed the entire procedure.  I have reviewed the note and edited it as necessary, and  agree with its contents.    Jesus Yu M.D.  Professor  Director of Dermatologic Surgery  Department of Dermatology  [facility]    Dermatology Surgery Clinic  [facility] and [facility] [place] [id number]

## 2024-04-18 NOTE — NURSING NOTE
Drug Administration Record    Prior to injection, verified patient identity using patient's name and date of birth.  Due to injection administration, patient instructed to remain in clinic for 15 minutes  afterwards, and to report any adverse reaction to me immediately.    Drug Name: bupivacaine 0.5%  Dose: 10ml  Route administered: ID  NDC #: [id number]  Amount of waste(mL):0ml  Reason for waste: single use vial    LOT #: [id number]  SITE: see note  : [employer]  EXPIRATION DATE: 7/2026          Drug Administration Record    Prior to injection, verified patient identity using patient's name and date of birth.  Due to injection administration, patient instructed to remain in clinic for 15 minutes  afterwards, and to report any adverse reaction to me immediately.    Drug Name: TXA  Dose: 10ml  Route administered: ID  NDC #: [id number]  Amount of waste(mL): 0ml  Reason for waste: single use vial     LOT #: [id number]  SITE: see note  : [employer]  EXPIRATION DATE: 04/2026

## 2024-04-18 NOTE — LETTER
4/18/2024       RE: Nikunj Sinha  [address]  [place] [facility] [address]     Dear Colleague,    Thank you for referring your patient, Nikunj Sinha, to the [facility] DERMATOLOGIC SURGERY CLINIC [place] at [facility]. Please see a copy of my visit note below.    [facility] Dermatologic Surgery Clinic [place] Procedure Note  Dermatology Problem List:  BCC, left nasal ala, superficial and nodular, s/p shave 2/26/24, s/p MMS 4/18/24    Date of Service:  Apr 18, 2024  Surgery: Mohs micrographic surgery    Case 1  Repair Type: PMFF / Advancement Flap  Repair Size: advancement flap 11.7 x 6.9 cm, PMFF 12x3  Suture Material: 3-0 Vicryl / 4-0 Monocryl / 5-0 Monocryl / 5-0 Fast Absorbing Gut   Tumor Type: BCC - Basal cell carcinoma  Location: L Nasal Ala  Derm-Path Accession #: [id number]     PreOp Size: 3.0 x 2.0 cm  PostOp Size: 4 x 2.7 cm  Mohs Accession #:   Level of Defect: Muscle      Procedure:  We discussed the principles of treatment and most likely complications including scarring, bleeding, infection, swelling, pain, crusting, nerve damage, large wound,  incomplete excision, wound dehiscence,  nerve damage, recurrence, and a second procedure may be recommended to obtain the best cosmetic or functional result.    Informed consent was obtained and the patient underwent the procedure as follows:  The patient was placed supine on the operating table.  The cancer was identified, outlined with a marker, and verified by the patient.  The entire surgical field was prepped with Hibiclens. The surgical site was anesthetized using Lidocaine 1% with epi 1:100,000.       The area of clinically apparent tumor was not debulked. The layer of tissue was then surgically excised using a #15 blade and was then transferred onto a specimen sheet maintaining the orientation of the specimen. Hemostasis was obtained using monopolar electrocoagulation. The  wound site was then covered with a dressing while the tissue samples were processed for examination.    The excised tissue was transported to the [facility] histology laboratory maintaining the tissue orientation.  The tissue specimen was relaxed so that the entire surgical margin was in a single horizontal plane for sectioning and inked for precise mapping.  A precise reference map was drawn to reflect the sectioning of the specimen, colored inking of the margins, and orientation on the patient. The tissue was processed using horizontal sectioning of the base and continuous peripheral margins.  The histopathologic sections were reviewed in conjunction with the reference map.    Total blocks: 2    Total slides:  7    There were no cancer cells visualized on examination, therefore Mohs surgery was complete.    Reconstruction:  Advancement Flap    PROCEDURE:    The nasal wound and paranasal portions of the wound were identified. The course of the ipsilateral supratrochlear artery was identified using doppler ultrasound and marked with a marker. A V-Y advancemnt flap was planned to resurface the paranasal tissues.  The paramedian forehead flap was then planned with a marker, using a template of the wound to be repaired. The wound and planned flap area were infiltrated with Lidocaine 1% with epi 1 : 100,000. The area was then cleansed and prepped with Hibiclens and draped with sterile drapes.  The wound was debeveled and undermined broadly in all directions to the level of fat. Hemostasis was obtained using monopolar electrocoagulation .The advancement flap was incised to the level of fat removing a cone of redundant tissue from left nasal ala. The flap was further undermined in all directions.    Hemostasis was again obtained.  The flap was then advanced into the defect to resurface the medial cheek and upper lip and secured using periosteal tacking sutures.  Redundant areas of tissue were excised using the triangulation  technique.  The flap wound edges of both the primary and secondary defects were then approximated with buried dermal sutures and the epidermis was then carefully approximated using 5-0 fast absorbing gut sutures.  The wound was cleansed with saline, and ointment was applied along the wound surface.  ARepair Size:  11.7 x 6.9 cm   Sutures Used:  3-0 Vicryl / 4-0 Monocryl / 5-0 Monocryl / 5-0 Fast Absorbing Gut         PROCEDURES: Paramedian Forehead Flap   The patient was taken to the operative suite and placed   on the surgical suite procedure table.    The flap was incised sharply to the level of periosteum and raised, leaving a thin layer of subcutaneous fat on the distal area to be inset and a pedicle of subcutaneous fat and muscle at the proximal aspect.  Hemostasis was obtained with monopolar electrocoagulation.  The secondary defect on the forehead was repaired primarily using buried dermal sutures, and the epidermis was then carefully approximated using simple running 5-0 fast absorbing gut epidermal sutures. The distal margin of the flap was transposed into the primary defect and secured to the defect using buried dermal sutures. The epidermis was then carefully approximated using simple running 5-0 fast absorbing gut epidermal sutures across the inferior margin of the flap.     The wound was cleansed with saline and Surgicel was wrapped around the pedicle and the wound was dressed with Telfa, gauze and tape.  The patient left the operating suite in stable condition.The patient will return in three weeks for the second stage of repair including division and takedown of the interpolation flap.  Wound care was reviewed verbally and in writing.    Post-Operative Size:   11.7 x 6.9 cm  Sutures Used: 3-0 Vicryl / 4-0 Monocryl / 5-0 Monocryl / 5-0 Fast Absorbing Gut     The attending surgeon was present for the entire procedure and reconstruction and always immediately available.      Staff Involved:    Scribe/Fellow/Staff    Scribe Disclosure:   I, KAYLA JUANITA, am serving as a scribe; to document services personally performed by Jesus Yu MD -based on data collection and the provider's statements to me.     Attending attestation:  I personally performed the entire procedure.  I have reviewed the note and edited it as necessary, and agree with its contents.    Jesus Yu M.D.  Professor  Director of Dermatologic Surgery  Department of Dermatology  [facility]    Dermatology Surgery Clinic  [place] [id number]

## 2024-04-18 NOTE — PATIENT INSTRUCTIONS
Wound care    I will experience scar, bleeding, swelling, pain, crusting and redness. I may experience incomplete removal or recurrence. Risks are bleeding, bruising, swelling, infection, nerve damage, & a large wound. A second procedure may be recommended to obtain the best cosmetic or functional result.       A three month office visit with your Surgeon is recommended for scar evaluation. Please reach out sooner if you have concerns about you surgical site/wound.    Caring for your skin after surgery    After your surgery, a pressure bandage will be placed over the area that has stitches. This is important to prevent bleeding. Please follow these instructions over the next 1 to 2 weeks. Following this regimen will help to prevent complications as your wound heals.     For the first 48 hours after your surgery:    Leave the pressure dressing on and keep it dry. If it should come loose, you may re-tape it, but do not take it off.  Relax and take it easy. Do not do any vigorous exercise or heavy lifting. This could cause the wound to bleed.  Post-Operative pain is usually mild. If you are able to take tylenol, You may take plain or extra-strength Tylenol (acetaminophen) As directed on the bottle (do not take more than 4,000mg in one day). If you are able to take ibuprofen, you can alternate the tylenol and ibuprofen.   Avoid alcohol as this may increase your tendency to bleed.   You may put an ice pack around the bandaged area for 20 minutes at a time as needed. This may help reduce swelling, bruising, and pain. Make sure the ice pack is waterproof so that the pressure bandage doesn t get wet.  If the wound is on the face try to sleep with your head elevated. Either in a recliner or propped up in bed, this will decrease swelling around the eyes.   You may see a small amount of drainage or blood on your pressure bandage. This is normal. However:  If drainage or bleeding continues or saturates the bandage, you will  "need to apply firm pressure over the bandage with a piece of gauze for 15 minutes.  If bleeding continues after applying pressure for 15 minutes, apply an ice pack to the bandaged area for 15 minutes.  If bleeding still continues, call our office or go to the nearest emergency room.    Remove pressure dressing 48 hours after surgery:    Carefully remove the pressure bandage. If it seems sticky or too difficult to get off, you may need to soak it off in the shower.  After the pressure dressing is removed, you may shower and get the wound wet. However, Do Not let the forceful stream of the shower hit the wound directly.  Follow these wound care and dressing change instructions:   In the shower wash the surgical site last with its own separate wash cloth.  You may allow water to run over the site. Take a clean wash cloth wet with soapy warm water and gently pat the suture site to help remove any crust or drainage.   Do Not rub or scrub the site    After site is clean pat dry and apply a thin layer of Vaseline ointment  over the suture site with a cotton swab or clean finger.   Cover the suture site with Telfa (non-stick) dressing. You may tape a piece of gauze over the Telfa for extra protection if you wish.  Continue wound care at least once a day, twice if you are active or around a contaminated environment.  Continue daily wound care until your surgical site is completely healed.   Dissolving stitches, if you have been told your stitches are dissolving they should dissolve in one to one and a half week.      If you are able to take acetaminophen (\"Tylenol,\" etc.) and ibuprofen (\"Advil\" or \"Motrin,\" etc.), then you may STAGGER these medications by taking 400 mg of ibuprofen (usually two tabs) every 8 hours and 1,000 mg of acetaminophen (e.g., two tabs of extra-strength Tylenol) every 8 hours.    This means, for example, that you could take the followin,000 mg of Tylenol, followed 4 hours later by 400 mg " Ibuprofen, followed 4 hours later with 1,000 mg of Tylenol, followed 4 hours later by 400 mg Ibuprofen, followed 4 hours later with 1,000 mg of Tylenol, and so forth.     Essentially, you can take either 1,000 mg of Tylenol or 400 mg of ibuprofen in alternating fashion EVERY FOUR HOURS.    Do NOT exceed more than 4,000 mg of Tylenol or 3,200 mg of ibuprofen per 24 hours. If you are not able to take Tylenol or ibuprofen as above due to other health issues (or a physician has told you directly that you are not allowed to take one of them, say due to pre-existing severe liver or kidney issues), then disregard the above directions.    Scientific evidence supports that this combination/schedule of pain medications is just as effective, if not more effective, than taking a narcotic pain medicine.       Follow up will be a 3 month scar evaluation either in person or via a telephone visit with you sending in a photo via [email or web address]. Unless you have been told to follow up sooner or if you have concerns and would like to be see sooner. Please call or send us in a [email or web address] message if possible and attach a photo.        What to expect:    The first couple of days your wound may be tender and may bleed slightly when doing wound care.  There may be swelling and bruising around the wound, especially if it is near the eyes. For your comfort, you may apply ice or cold compresses to the bruises after your have removed the pressure bandage.  The area around your wound may be numb for several weeks or even months.  You may experience periodic sharp pain or mild itching around the wound as it heals.   The suture line will look dark for a while but will lighten over time.       When to call us:    You have bleeding that will not stop after applying pressure and ice.  You have pain that is not controlled with Tylenol (acetaminophen.)  You have signs or symptoms of an infection such as:  Fever over 100 degrees Fahrenheit  Redness, warmth or  foul-smelling drainage from the wound  If you have any questions, or are not sure how to take care of the wound.    Phone numbers:    During business hours (M-F 8:00-4:30 p.m.)    [facility]- [phone] (Option 1 appt. Ask the call representative for the Dermatology Surgery Team)    For Dermatology Surgery scheduling please call Jeanne at [phone]    ---------------------------------------------------------  Evenings/Weekends/Holidays    Hospital - [phone] (Ask for the dermatology resident on call. They will connect with the surgery Fellow)  TTY for hearing suelmiah-[phone]  *Ask  to page dermatologist on-call  Emergency Smbl-[phone]  TTY for hearing impaired- [phone]

## 2024-04-18 NOTE — NURSING NOTE
Chief Complaint   Patient presents with    Derm Problem     BCC L nasal ala     Cayla CAMARA, RN-BSN  Dermatology Surgery  [phone]

## 2024-04-19 ENCOUNTER — TELEPHONE (OUTPATIENT)
Dept: DERMATOLOGY | Facility: CLINIC | Age: 59
End: 2024-04-19
Payer: COMMERCIAL

## 2024-04-19 NOTE — TELEPHONE ENCOUNTER
Follow up call attempted & left voicemail following Mohs procedure with Dr. Yu.     Writer encouraged patient to please call [phone] if having any questions or concerns.    Pam RUIZ RN

## 2024-04-24 ENCOUNTER — OFFICE VISIT (OUTPATIENT)
Dept: DERMATOLOGY | Facility: CLINIC | Age: 59
End: 2024-04-24
Payer: COMMERCIAL

## 2024-04-24 DIAGNOSIS — Z48.89 ENCOUNTER FOR POSTOPERATIVE WOUND CHECK: Primary | ICD-10-CM

## 2024-04-24 DIAGNOSIS — Z85.828 HISTORY OF NONMELANOMA SKIN CANCER: ICD-10-CM

## 2024-04-24 PROCEDURE — 99024 POSTOP FOLLOW-UP VISIT: CPT | Performed by: DERMATOLOGY

## 2024-04-24 ASSESSMENT — PAIN SCALES - GENERAL: PAINLEVEL: NO PAIN (0)

## 2024-04-24 NOTE — LETTER
4/24/2024       RE: Nikunj Sinha  [address]  [place] [facility] [address]     Dear Colleague,    Thank you for referring your patient, Nikunj Sinha, to the [facility] DERMATOLOGIC SURGERY CLINIC [place] at [facility]. Please see a copy of my visit note below.    Dermatologic Surgery Post-Op Wound Check     CC: Derm Problem (Patient is here today for a 1 week wound check.)      Dermatology Problem List:  BCC, left nasal ala, superficial and nodular, s/p shave 2/26/24, s/p MMS 4/18/24 (PMFF, AF)     Subjective: Nikunj Sinha is a 59 year old male who presents today for 1 week wound check after MMS with paramedian forehead flap and advancement flap reconstruction was performed for BCC of the left nasal ala on 4/18/24.   - notes scabbing of lower nose  - no other concerns today    Objective: An exam of the left nasal ala was performed today   - The surgical site noted above is clean, dry, and intact. There is no surrounding erythema, purulence, or significant tenderness to palpation. No clinical evidence of infection noted today.          Assessment and Plan:     1. BCC, left nasal ala, superficial and nodular, s/p shave 2/26/24, s/p MMS 4/18/24 (PMFF, AF)   - The patient's surgery site(s) is/are healing very well. No evidence of infection on examination today.  - The patient was told to continue with wound cares until the area(s) is/are no longer crusted.   - The patient should follow up with dermatologic surgery PRN, as well as continue with regular skin exams in general dermatology clinic.    Patient was discussed with and evaluated by attending physician Dr. Jesus Yu.    Staff Involved:   Scribe/Fellow/Staff  Scribe Disclosure:   I, KAYLA GRANT, am serving as a scribe; to document services personally performed by Jesus Yu MD -based on data collection and the provider's statements to me.     Attending Attestation  I attest that the Scribe  recorded the interview and exam that I personally performed.  I have reviewed the note and edited it as necessary.    Jesus Yu M.D.  Professor  Director of Dermatologic Surgery  Department of Dermatology  [facility]

## 2024-04-24 NOTE — NURSING NOTE
Chief Complaint   Patient presents with    Derm Problem     Patient is here today for a 1 week wound check.     Pam RUIZ RN

## 2024-04-24 NOTE — PROGRESS NOTES
Dermatologic Surgery Post-Op Wound Check     CC: Derm Problem (Patient is here today for a 1 week wound check.)      Dermatology Problem List:  BCC, left nasal ala, superficial and nodular, s/p shave 2/26/24, s/p MMS 4/18/24 (PMFF, AF)     Subjective: Nikunj Sinha is a 59 year old male who presents today for 1 week wound check after MMS with paramedian forehead flap and advancement flap reconstruction was performed for BCC of the left nasal ala on 4/18/24.   - notes scabbing of lower nose  - no other concerns today    Objective: An exam of the left nasal ala was performed today   - The surgical site noted above is clean, dry, and intact. There is no surrounding erythema, purulence, or significant tenderness to palpation. No clinical evidence of infection noted today.          Assessment and Plan:     1. BCC, left nasal ala, superficial and nodular, s/p shave 2/26/24, s/p MMS 4/18/24 (PMFF, AF)   - The patient's surgery site(s) is/are healing very well. No evidence of infection on examination today.  - The patient was told to continue with wound cares until the area(s) is/are no longer crusted.   - The patient should follow up with dermatologic surgery PRN, as well as continue with regular skin exams in general dermatology clinic.    Patient was discussed with and evaluated by attending physician Dr. Jesus Yu.    Staff Involved:   Scribe/Fellow/Staff    Scribe Disclosure:   I, KAYLA GRANT, am serving as a scribe; to document services personally performed by Jesus Yu MD -based on data collection and the provider's statements to me.     Attending Attestation  I attest that the Scribe recorded the interview and exam that I personally performed.  I have reviewed the note and edited it as necessary.    Jesus Yu M.D.  Professor  Director of Dermatologic Surgery  Department of Dermatology  [facility]

## 2024-05-13 ENCOUNTER — OFFICE VISIT (OUTPATIENT)
Dept: DERMATOLOGY | Facility: CLINIC | Age: 59
End: 2024-05-13
Payer: COMMERCIAL

## 2024-05-13 ENCOUNTER — TELEPHONE (OUTPATIENT)
Dept: DERMATOLOGY | Facility: CLINIC | Age: 59
End: 2024-05-13

## 2024-05-13 VITALS — DIASTOLIC BLOOD PRESSURE: 73 MMHG | HEART RATE: 82 BPM | SYSTOLIC BLOOD PRESSURE: 122 MMHG

## 2024-05-13 DIAGNOSIS — C44.311 BASAL CELL CARCINOMA (BCC) OF LEFT ALA NASI: Primary | ICD-10-CM

## 2024-05-13 PROCEDURE — 21235 EAR CARTILAGE GRAFT: CPT | Mod: 58 | Performed by: DERMATOLOGY

## 2024-05-13 PROCEDURE — 15630 DELAY FLAP EYE/NOS/EAR/LIP: CPT | Mod: 58 | Performed by: DERMATOLOGY

## 2024-05-13 NOTE — TELEPHONE ENCOUNTER
Follow up completed following excision procedure with Dr. Yu. Patient came into clinic today for assessment

## 2024-05-13 NOTE — LETTER
5/13/2024       RE: Nikunj Sinha  [address]  [place] [facility] [address]     Dear Colleague,    Thank you for referring your patient, Nikunj Sinha, to the [facility] DERMATOLOGIC SURGERY CLINIC [place] at [facility]. Please see a copy of my visit note below.    RECONSTRUCTION STAGE 2 of 3: Paramedian Forehead Flap Takedown     Date: 05/13/24     Dermatology Surgery Clinic  [facility]  [address] [place] [id number]     Surgeon: Jesus Yu MD     Case(s) Specific Information:      Case 1     PROCEDURE:  The patient was taken to the operative suite and placed in a supine position on the operating room table.  The wound on the nose was identified and the planned intermediate procedure was outlined with a marker.  The area was then infiltrated with 1% lidocaine with epinephrine.  The area was then widely prepped in a sterile fashion using Hibiclens and sterile drapes were placed. The flap was sharply incised using a 15 blade with inferior incisions along the alar rim and columella, and superior/lateral incisions along the flap margin. The flap was raised in entirety in the submuscular plane. At this point, hemostasis was obtained with electrocoagulation and the nasal lining and wound bed was sharply debulked and carefully sculpted. Frontalis fibers from the lining portion of the forehead flap were removed in entirety. Curved iris scissors were utilized to create lateral pockets for cartilage graft placement, and 2 focal areas of partial thickness necrosis (Right alar rim, columella) were sharply debrided.      Cartilage Graft harvesting:  After the forehead flap was raised and wound bed prepared and sculpted, attention was turned to the previously prepped and draped ears. Incisions were made along the left antihelix and an appropriately sized cartilage graft was harvested with a posterior  approach from the R conchal bowl and anterior approach from the left antihelix.  Both donor sites were closed with 4.0 Vicryl and 5.0 FAG sutures in standard fashion. Through and through quilting sutures were placed with 5-0 FAG gut to close potential space and decrease probability of hematoma/seroma formation.      Cartilage graft placement:   The cartilage grafts were appropriately shaped and trimmed with iris scissors and inset with 4-0 vicryl sutures, placing a thin strip of cartilage from the L conchal bowl to suppot the nasal valve.Support and tip projection was appropriate after graft placement.      Forehead flap inset:   After structural support was provided, and appropriate hemostasis was obtained, the forehead flap, still attached to its pedicle, was carefully inset with 5.0 monocryl buried vertical mattress sutures. Flap edges were trimmed to fit precisely. Epidermal edges were approximated with 5.0 FAG the external nose, while 5-0 FAG was utilized for the alar rim of the wound. After completion of flap inset, the wound was cleansed and an ointment was liberally applied.  A telfa pressure dressing was placed.        Repair Size: Cartilage graft 3x0.5 cm, Forehead flap delay area 3x2.5      The patient will return in 3 weeks for an evaluation.  Wound care was reviewed verbally and in writing.  The patient left the operative suite in stable condition.      The attending surgeon was present for the entire procedure and always immediately available.    Attending attestation:  I personally performed the entire procedure.  I have reviewed the note and edited it as necessary, and agree with its contents.    Jesus Yu M.D.  Professor  Director of Dermatologic Surgery  Department of Dermatology  [facility]    Dermatology Surgery Clinic  [facility] [place] [id number]

## 2024-05-13 NOTE — PROGRESS NOTES
RECONSTRUCTION STAGE 2 of 3: Paramedian Forehead Flap Takedown     Date: 05/13/24     Dermatology Surgery Clinic  [facility]  [address] [place] [id number]     Surgeon: Jesus Yu MD     Case(s) Specific Information:      Case 1     PROCEDURE:  The patient was taken to the operative suite and placed in a supine position on the operating room table.  The wound on the nose was identified and the planned intermediate procedure was outlined with a marker.  The area was then infiltrated with 1% lidocaine with epinephrine.  The area was then widely prepped in a sterile fashion using Hibiclens and sterile drapes were placed. The flap was sharply incised using a 15 blade with inferior incisions along the alar rim and columella, and superior/lateral incisions along the flap margin. The flap was raised in entirety in the submuscular plane. At this point, hemostasis was obtained with electrocoagulation and the nasal lining and wound bed was sharply debulked and carefully sculpted. Frontalis fibers from the lining portion of the forehead flap were removed in entirety. Curved iris scissors were utilized to create lateral pockets for cartilage graft placement, and 2 focal areas of partial thickness necrosis (Right alar rim, columella) were sharply debrided.      Cartilage Graft harvesting:  After the forehead flap was raised and wound bed prepared and sculpted, attention was turned to the previously prepped and draped ears. Incisions were made along the left antihelix and an appropriately sized cartilage graft was harvested with a posterior approach from the R conchal bowl and anterior approach from the left antihelix.  Both donor sites were closed with 4.0 Vicryl and 5.0 FAG sutures in standard fashion. Through and through quilting sutures were placed with 5-0 FAG gut to close potential space and decrease probability of hematoma/seroma formation.      Cartilage graft  placement:   The cartilage grafts were appropriately shaped and trimmed with iris scissors and inset with 4-0 vicryl sutures, placing a thin strip of cartilage from the L conchal bowl to suppot the nasal valve.Support and tip projection was appropriate after graft placement.      Forehead flap inset:   After structural support was provided, and appropriate hemostasis was obtained, the forehead flap, still attached to its pedicle, was carefully inset with 5.0 monocryl buried vertical mattress sutures. Flap edges were trimmed to fit precisely. Epidermal edges were approximated with 5.0 FAG the external nose, while 5-0 FAG was utilized for the alar rim of the wound. After completion of flap inset, the wound was cleansed and an ointment was liberally applied.  A telfa pressure dressing was placed.        Repair Size: Cartilage graft 3x0.5 cm, Forehead flap delay area 3x2.5      The patient will return in 3 weeks for an evaluation.  Wound care was reviewed verbally and in writing.  The patient left the operative suite in stable condition.      The attending surgeon was present for the entire procedure and always immediately available.    Attending attestation:  I personally performed the entire procedure.  I have reviewed the note and edited it as necessary, and agree with its contents.    Jesus Yu M.D.  Professor  Director of Dermatologic Surgery  Department of Dermatology  [facility]    Dermatology Surgery Clinic  [facility] [place] [id number]

## 2024-05-13 NOTE — NURSING NOTE
Chief Complaint   Patient presents with    Derm Problem     keke CAMARA, RN-BSN  Dermatology Surgery  [phone]

## 2024-05-13 NOTE — PATIENT INSTRUCTIONS
Wound care    I will experience scar, bleeding, swelling, pain, crusting and redness. I may experience incomplete removal or recurrence. Risks are bleeding, bruising, swelling, infection, nerve damage, & a large wound. A second procedure may be recommended to obtain the best cosmetic or functional result.       A three month office visit with your Surgeon is recommended for scar evaluation. Please reach out sooner if you have concerns about you surgical site/wound.    Caring for your skin after surgery    After your surgery, a pressure bandage will be placed over the area that has stitches. This is important to prevent bleeding. Please follow these instructions over the next 1 to 2 weeks. Following this regimen will help to prevent complications as your wound heals.     For the first 48 hours after your surgery:    Leave the pressure dressing on and keep it dry. If it should come loose, you may re-tape it, but do not take it off.  Relax and take it easy. Do not do any vigorous exercise or heavy lifting. This could cause the wound to bleed.  Post-Operative pain is usually mild. If you are able to take tylenol, You may take plain or extra-strength Tylenol (acetaminophen) As directed on the bottle (do not take more than 4,000mg in one day). If you are able to take ibuprofen, you can alternate the tylenol and ibuprofen.   Avoid alcohol as this may increase your tendency to bleed.   You may put an ice pack around the bandaged area for 20 minutes at a time as needed. This may help reduce swelling, bruising, and pain. Make sure the ice pack is waterproof so that the pressure bandage doesn t get wet.  If the wound is on the face try to sleep with your head elevated. Either in a recliner or propped up in bed, this will decrease swelling around the eyes.   You may see a small amount of drainage or blood on your pressure bandage. This is normal. However:  If drainage or bleeding continues or saturates the bandage, you will  "need to apply firm pressure over the bandage with a piece of gauze for 15 minutes.  If bleeding continues after applying pressure for 15 minutes, apply an ice pack to the bandaged area for 15 minutes.  If bleeding still continues, call our office or go to the nearest emergency room.    Remove pressure dressing 48 hours after surgery:    Carefully remove the pressure bandage. If it seems sticky or too difficult to get off, you may need to soak it off in the shower.  After the pressure dressing is removed, you may shower and get the wound wet. However, Do Not let the forceful stream of the shower hit the wound directly.  Follow these wound care and dressing change instructions:   In the shower wash the surgical site last with its own separate wash cloth.  You may allow water to run over the site. Take a clean wash cloth wet with soapy warm water and gently pat the suture site to help remove any crust or drainage.   Do Not rub or scrub the site    After site is clean pat dry and apply a thin layer of Vaseline ointment  over the suture site with a cotton swab or clean finger.   Cover the suture site with Telfa (non-stick) dressing. You may tape a piece of gauze over the Telfa for extra protection if you wish.  Continue wound care at least once a day, twice if you are active or around a contaminated environment.  Continue daily wound care until your surgical site is completely healed.   Dissolving stitches, if you have been told your stitches are dissolving they should dissolve in one to one and a half week.      If you are able to take acetaminophen (\"Tylenol,\" etc.) and ibuprofen (\"Advil\" or \"Motrin,\" etc.), then you may STAGGER these medications by taking 400 mg of ibuprofen (usually two tabs) every 8 hours and 1,000 mg of acetaminophen (e.g., two tabs of extra-strength Tylenol) every 8 hours.    This means, for example, that you could take the followin,000 mg of Tylenol, followed 4 hours later by 400 mg " Ibuprofen, followed 4 hours later with 1,000 mg of Tylenol, followed 4 hours later by 400 mg Ibuprofen, followed 4 hours later with 1,000 mg of Tylenol, and so forth.     Essentially, you can take either 1,000 mg of Tylenol or 400 mg of ibuprofen in alternating fashion EVERY FOUR HOURS.    Do NOT exceed more than 4,000 mg of Tylenol or 3,200 mg of ibuprofen per 24 hours. If you are not able to take Tylenol or ibuprofen as above due to other health issues (or a physician has told you directly that you are not allowed to take one of them, say due to pre-existing severe liver or kidney issues), then disregard the above directions.    Scientific evidence supports that this combination/schedule of pain medications is just as effective, if not more effective, than taking a narcotic pain medicine.       Follow up will be a 3 month scar evaluation either in person or via a telephone visit with you sending in a photo via [employer]. Unless you have been told to follow up sooner or if you have concerns and would like to be see sooner. Please call or send us in a [employer] message if possible and attach a photo.        What to expect:    The first couple of days your wound may be tender and may bleed slightly when doing wound care.  There may be swelling and bruising around the wound, especially if it is near the eyes. For your comfort, you may apply ice or cold compresses to the bruises after your have removed the pressure bandage.  The area around your wound may be numb for several weeks or even months.  You may experience periodic sharp pain or mild itching around the wound as it heals.   The suture line will look dark for a while but will lighten over time.       When to call us:    You have bleeding that will not stop after applying pressure and ice.  You have pain that is not controlled with Tylenol (acetaminophen.)  You have signs or symptoms of an infection such as:  Fever over 100 degrees Fahrenheit  Redness, warmth or  foul-smelling drainage from the wound  If you have any questions, or are not sure how to take care of the wound.    Phone numbers:    During business hours (M-F 8:00-4:30 p.m.)    [facility]- [phone] (Option 1 appt. Ask the call representative for the Dermatology Surgery Team)    For Dermatology Surgery scheduling please call Jeanne at [phone]    ---------------------------------------------------------  Evenings/Weekends/Holidays    Hospital - [phone] (Ask for the dermatology resident on call. They will connect with the surgery Fellow)  TTY for hearing [phone]  *Ask  to page dermatologist on-call  Emergency Bomo-[phone]  TTY for hearing impaired- [phone]

## 2024-05-14 ENCOUNTER — OFFICE VISIT (OUTPATIENT)
Dept: DERMATOLOGY | Facility: CLINIC | Age: 59
End: 2024-05-14
Payer: COMMERCIAL

## 2024-05-14 DIAGNOSIS — T14.8XXA HEMATOMA: ICD-10-CM

## 2024-05-14 DIAGNOSIS — G89.18 POST-OP PAIN: Primary | ICD-10-CM

## 2024-05-14 PROCEDURE — 99207 PR NO CHARGE LOS: CPT | Mod: GC | Performed by: DERMATOLOGY

## 2024-05-14 RX ORDER — TRANEXAMIC ACID 100 MG/ML
INJECTION, SOLUTION INTRAVENOUS ONCE
Status: COMPLETED | OUTPATIENT
Start: 2024-05-14 | End: 2024-05-14

## 2024-05-14 RX ORDER — OXYCODONE AND ACETAMINOPHEN 5; 325 MG/1; MG/1
1 TABLET ORAL EVERY 6 HOURS PRN
Qty: 5 TABLET | Refills: 0 | Status: SHIPPED | OUTPATIENT
Start: 2024-05-14 | End: 2024-05-18

## 2024-05-14 RX ADMIN — TRANEXAMIC ACID: 100 INJECTION, SOLUTION INTRAVENOUS at 13:43

## 2024-05-14 ASSESSMENT — PAIN SCALES - GENERAL: PAINLEVEL: SEVERE PAIN (7)

## 2024-05-14 NOTE — PROGRESS NOTES
Dermatologic Surgery Post-Op Wound Check     CC: Follow Up (Post procedure left ear )      Dermatology Problem List:  BCC, L nasal ala, superficial, nodular, s/p bx 2/26/24, s/p MMS 4/18/24 (PMFF, AF), cartilage graft placement 5/13/24    Subjective: Nikunj Sinha is a 59 year old male who presents today for wound check after Mohs surgery on 4/18/24.   - had a lot of pain in L ear cartilage graft donor site overnight. Wasn't able to sleep much. Noticed a large clot in the area that fell off. Did stop bleeding after that but still having a lot of pain     Objective: An exam of the left nasal ala was performed today   - slow trickle of blood from inferior aspect of linear closure on rim of L conchal bowl at cartilage graft donor site. Significant tenderness to palpation of L pinna and L postauricular. Firm ecchymoses and edema on L postauricular skin.             Assessment and Plan:     # Post-op wound evaluation status post Mohs and PMFF/AF repair of BCC on L nasal ala.   # Hematoma of cartilage graft donor site on L ear  Presentation c/w hematoma given persistent oozing, edema, ecchymoses, and pain. Area cleaned and was numbed with 6 ml of 1% lidocaine with 1:[id number] epinephrine. Sutures on the inferior aspect of the linear closure on the rim of the conchal bowl were removed. Oozing noted at the base of the wound. Hemostasis achieved with electrofulguration. Area was closed with 4-0 monocryl buried vertical mattress sutures and 5-0 fast gut simple running locking epidermal sutures. Basting 5-0 fast gut interrupted sutures were placed to prevent blood re-accumulation. 6 ml of TXA was then injected into the anterior surface of the wound and the postauricular surface. Area was cleaned and sterile pressure dressing was placed.   - oxycodone prescribed given significant post-op pain     RTC 3 weeks for flap division and inset, sooner PRN.     Patient was discussed with and evaluated by attending physician Dr. Louise  Todd.    Rachel Valentine MD  Micrographic Surgery and Dermatologic Oncology (MSDO) Fellow, PGY-5    Staff Physician Comments:   I saw and evaluated the patient with the Mohs Surgery Fellow (Dr. Rachel Valentine) and I agree with the assessment and plan and the above description of the procedure. I was present for the key portions of the procedure and entire exam.    Dani Brooks DO    Department of Dermatology  [facility]: Phone: [phone], Fax:[phone]  [facility]: Phone: [phone], Fax: [phone]

## 2024-05-14 NOTE — LETTER
5/14/2024       RE: Nikunj Sinha  [address]  [place] [facility] [address]     Dear Colleague,    Thank you for referring your patient, Nikunj Sinha, to the [facility] DERMATOLOGIC SURGERY CLINIC [place] at [facility]. Please see a copy of my visit note below.    Dermatologic Surgery Post-Op Wound Check     CC: Follow Up (Post procedure left ear )      Dermatology Problem List:  BCC, L nasal ala, superficial, nodular, s/p bx 2/26/24, s/p MMS 4/18/24 (PMFF, AF), cartilage graft placement 5/13/24    Subjective: Nikunj Sinha is a 59 year old male who presents today for wound check after Mohs surgery on 4/18/24.   - had a lot of pain in L ear cartilage graft donor site overnight. Wasn't able to sleep much. Noticed a large clot in the area that fell off. Did stop bleeding after that but still having a lot of pain     Objective: An exam of the left nasal ala was performed today   - slow trickle of blood from inferior aspect of linear closure on rim of L conchal bowl at cartilage graft donor site. Significant tenderness to palpation of L pinna and L postauricular. Firm ecchymoses and edema on L postauricular skin.             Assessment and Plan:     # Post-op wound evaluation status post Mohs and PMFF/AF repair of BCC on L nasal ala.   # Hematoma of cartilage graft donor site on L ear  Presentation c/w hematoma given persistent oozing, edema, ecchymoses, and pain. Area cleaned and was numbed with 6 ml of 1% lidocaine with 1:128420 epinephrine. Sutures on the inferior aspect of the linear closure on the rim of the conchal bowl were removed. Oozing noted at the base of the wound. Hemostasis achieved with electrofulguration. Area was closed with 4-0 monocryl buried vertical mattress sutures and 5-0 fast gut simple running locking epidermal sutures. Basting 5-0 fast gut interrupted sutures were placed to prevent blood re-accumulation. 6 ml of TXA was then  injected into the anterior surface of the wound and the postauricular surface. Area was cleaned and sterile pressure dressing was placed.   - oxycodone prescribed given significant post-op pain     RTC 3 weeks for flap division and inset, sooner PRN.     Patient was discussed with and evaluated by attending physician Dr. Dani Brooks.    Rachel Valentine MD  Micrographic Surgery and Dermatologic Oncology (MSDO) Fellow, PGY-5    Staff Physician Comments:   I saw and evaluated the patient with the Mohs Surgery Fellow (Dr. Rachel Valentine) and I agree with the assessment and plan and the above description of the procedure. I was present for the key portions of the procedure and entire exam.    Dani Brooks DO    Department of Dermatology  [facility]: Phone: [phone], Fax:[phone]  [facility]: Phone: [phone], Fax: [phone]

## 2024-05-17 ENCOUNTER — MYC MEDICAL ADVICE (OUTPATIENT)
Dept: DERMATOLOGY | Facility: CLINIC | Age: 59
End: 2024-05-17
Payer: COMMERCIAL

## 2024-05-17 DIAGNOSIS — T14.8XXA HEMATOMA: Primary | ICD-10-CM

## 2024-05-17 RX ORDER — DOXYCYCLINE 100 MG/1
100 CAPSULE ORAL 2 TIMES DAILY
Qty: 14 CAPSULE | Refills: 0 | Status: SHIPPED | OUTPATIENT
Start: 2024-05-17 | End: 2024-05-24

## 2024-05-17 RX ORDER — HYDROCODONE BITARTRATE AND ACETAMINOPHEN 5; 325 MG/1; MG/1
1 TABLET ORAL EVERY 4 HOURS PRN
Qty: 6 TABLET | Refills: 0 | Status: SHIPPED | OUTPATIENT
Start: 2024-05-17 | End: 2024-05-20

## 2024-05-17 NOTE — TELEPHONE ENCOUNTER
Documentation from phone call earlier this afternoon.     Spoke with patient on the phone. Pain has been consistent in severity since earlier this week. Denies fevers/ chills.   Denies feeling like the swelling has gotten worse since he was seen on Tuesday. Denies any issues with bleeding since then as well.   Asked patient to send photos of ear so we can check to see if the hematoma has re-accumulated. Says he can send photos later today when he gets home from work.     Rachel Valentine MD

## 2024-05-29 ENCOUNTER — PATIENT OUTREACH (OUTPATIENT)
Dept: CARE COORDINATION | Facility: CLINIC | Age: 59
End: 2024-05-29
Payer: COMMERCIAL

## 2024-06-06 ENCOUNTER — TELEPHONE (OUTPATIENT)
Dept: DERMATOLOGY | Facility: CLINIC | Age: 59
End: 2024-06-06

## 2024-06-06 ENCOUNTER — OFFICE VISIT (OUTPATIENT)
Dept: DERMATOLOGY | Facility: CLINIC | Age: 59
End: 2024-06-06
Payer: COMMERCIAL

## 2024-06-06 VITALS — DIASTOLIC BLOOD PRESSURE: 78 MMHG | SYSTOLIC BLOOD PRESSURE: 116 MMHG | HEART RATE: 59 BPM

## 2024-06-06 DIAGNOSIS — C44.311 BASAL CELL CARCINOMA (BCC) OF LEFT ALA NASI: Primary | ICD-10-CM

## 2024-06-06 PROCEDURE — 14040 TIS TRNFR F/C/C/M/N/A/G/H/F: CPT | Mod: 58 | Performed by: DERMATOLOGY

## 2024-06-06 PROCEDURE — 15630 DELAY FLAP EYE/NOS/EAR/LIP: CPT | Mod: 58 | Performed by: DERMATOLOGY

## 2024-06-06 ASSESSMENT — PAIN SCALES - GENERAL: PAINLEVEL: NO PAIN (0)

## 2024-06-06 NOTE — LETTER
6/6/2024       RE: Nikunj Sinha  [address]  [place] [facility] [address]     Dear Colleague,    Thank you for referring your patient, Nikunj Sinha, to the [facility] DERMATOLOGIC SURGERY CLINIC [place] at [facility]. Please see a copy of my visit note below.    Paramedian Forehead Flap Takedown with V-Y inset of brow      PROCEDURE:  The patient was taken to the operative suite and placed  in a supine position on the operating room table.  The wound on the nose was identified and the planned revision was outlined with a marker.  The area was then infiltrated with 1% lidocaine with epinephrine.  The area was then prepped in a sterile fashion using Chlorhexidine and sterile drapes were placed.  The flap pedicle was divided with the residual brow elevated off the excised area to create a V-Y flap.  That 2x1 cm flap was carefully elevated and hemostasis was achieved. The wound edges were re approximated with 4.0 monocryl and 5.0 FAG sutures.  Appropriate eyebrow position was ensured. After closure of the proximal pedicle/glabellar wound, attention was turned to the nose. The nasal portion of the flap was incised superiorly and at the periphery and raised at the level of subcutaneous fat. Redundant fibrofatty tissue and scar was removed and the bed gently undermined. Hemostasis was obtained with electrocoagulation. After appropriate hemostasis, the wound edges were sutured with buried vertical mattress 5.0 monocryl sutures. Flap edges were trimmed to fit precisely. After completion of flap inset, the wound was cleansed and an ointment was liberally applied.  A telfa pressure dressing was placed.      The patient will return in 6 weeks for evaluation and CO2 laser to cheek flap.  Wound care was reviewed verbally and in writing.  The patient left the operative suite in stable condition.     The attending surgeon was present for the entire procedure and always  immediately available.     Attending attestation:  I personally performed the entire procedure.  I have reviewed the note and edited it as necessary, and agree with its contents.    Jesus Yu M.D.  Professor  Director of Dermatologic Surgery  Department of Dermatology  [facility]    Dermatology Surgery Clinic  [facility] Surgery [place] [id number]

## 2024-06-06 NOTE — PROGRESS NOTES
Paramedian Forehead Flap Takedown with V-Y inset of brow      PROCEDURE:  The patient was taken to the operative suite and placed  in a supine position on the operating room table.  The wound on the nose was identified and the planned revision was outlined with a marker.  The area was then infiltrated with 1% lidocaine with epinephrine.  The area was then prepped in a sterile fashion using Chlorhexidine and sterile drapes were placed.  The flap pedicle was divided with the residual brow elevated off the excised area to create a V-Y flap.  That 2x1 cm flap was carefully elevated and hemostasis was achieved. The wound edges were re approximated with 4.0 monocryl and 5.0 FAG sutures.  Appropriate eyebrow position was ensured. After closure of the proximal pedicle/glabellar wound, attention was turned to the nose. The nasal portion of the flap was incised superiorly and at the periphery and raised at the level of subcutaneous fat. Redundant fibrofatty tissue and scar was removed and the bed gently undermined. Hemostasis was obtained with electrocoagulation. After appropriate hemostasis, the wound edges were sutured with buried vertical mattress 5.0 monocryl sutures. Flap edges were trimmed to fit precisely. After completion of flap inset, the wound was cleansed and an ointment was liberally applied.  A telfa pressure dressing was placed.      The patient will return in 6 weeks for evaluation and CO2 laser to cheek flap.  Wound care was reviewed verbally and in writing.  The patient left the operative suite in stable condition.     The attending surgeon was present for the entire procedure and always immediately available.     Attending attestation:  I personally performed the entire procedure.  I have reviewed the note and edited it as necessary, and agree with its contents.    Jesus Yu M.D.  Professor  Director of Dermatologic Surgery  Department of Dermatology  [facility]    Dermatology Surgery  [facility]  [address] [place] [address]

## 2024-06-06 NOTE — TELEPHONE ENCOUNTER
Follow up call attempted & left voicemail following takedown procedure with Dr. Yu.       Are you having pain?   Are you taking pain medication?   Are you applying ice?    Have you had any noticeable bleeding through the bandage?    Do you have any other concerns?        Please call [phone] if you have any questions or concerns.

## 2024-06-06 NOTE — NURSING NOTE
Chief Complaint   Patient presents with    procedure     Takedown procedure on nose.      Lisa DELGADILLO CMA

## 2024-07-22 ENCOUNTER — OFFICE VISIT (OUTPATIENT)
Dept: DERMATOLOGY | Facility: CLINIC | Age: 59
End: 2024-07-22
Payer: COMMERCIAL

## 2024-07-22 DIAGNOSIS — L90.5 SCAR: ICD-10-CM

## 2024-07-22 DIAGNOSIS — Z85.828 HISTORY OF NONMELANOMA SKIN CANCER: Primary | ICD-10-CM

## 2024-07-22 PROCEDURE — 99207 PR NO CHARGE LOS: CPT | Mod: GC | Performed by: DERMATOLOGY

## 2024-07-22 RX ORDER — LIDOCAINE 40 MG/G
CREAM TOPICAL ONCE
Status: COMPLETED | OUTPATIENT
Start: 2024-07-22 | End: 2024-07-22

## 2024-07-22 RX ADMIN — LIDOCAINE: 40 CREAM TOPICAL at 15:35

## 2024-07-22 NOTE — NURSING NOTE
Drug Administration Record    Prior to injection, verified patient identity using patient's name and date of birth.  Due to injection administration, patient instructed to remain in clinic for 15 minutes  afterwards, and to report any adverse reaction to me immediately.    Drug Name: LMX  Dose: 0.5g  Route administered: topical  NDC #: [id number]  Amount of waste(mL):NA  Reason for waste: single tube    LOT #: [id number]  SITE: forehead, nose, L cheek  : [employer]  EXPIRATION DATE: 09/2025

## 2024-07-22 NOTE — PROGRESS NOTES
CORE Procedure: Medical    Dermatology Problem List:  BCC, L nasal ala, superficial, nodular, s/p bx 2/26/24, s/p MMS 4/18/24 (PMFF, AF), cartilage graft placement 5/13/24    Procedure Date: 07/22/2024  Staff: Jesus Yu MD  Resident/Fellow:  Diogenes Perkins MD    Procedure:   CO2RE, fractional CO2 ablative laser treatment #  1  Approximate treatment area: 36 cm sq  Approximate length of treatment: 25 minutes (with anesthesia)    Anesthesia and Premedication:  Anesthesia (topical): Lidocaine 4% cream     Device Settings:  Diagnosis:   Location: L nasal ala  Mode(class/deep/mid/etc): deep  Frational coverage(%): 5%  Core(mJ): 70 mJ      Description of Procedure:   The nature and purpose of the procedure, associated risks, possible consequences, complications, and alternative methods of treatment were explained in detail including but not limited to redness, swelling, peeling of the skin, eye injury, infection, bleeding, oozing, heat sensation, itching or acne.  Possible outcomes were reviewed including the following: no improvement, slight improvement, skin lightening or darkening. The possibility of permanent scarring was reviewed. Discussion that multiple treatments may be required was completed.     Photo consent was obtained and reviewed, a time out was performed, and informed consent was obtained.  The area was cleansed with Chlorhexidine. Protective eyewear was worn by the patient and all personnel in the treatment room  The patient confirmed the site to be treated. The laser energy output was verified by meter reading. The areas were treated with CO2RE laser as described. The patient tolerated the procedure well and no complications were noted. Aftercare instructions were reviewed. The patient was discharged from the dermatology clinic in good condition.    The patient will follow up with nursing in 48 hours and with MD in 6 weeks for a second session of CO2 laser.     Staff  Involved:  Scribe/Staff/Fellow    I, LISETTE JOHNSON, am serving as a scribe; to document services personally performed by Jesus Yu MD -based on data collection and the provider's statements to me.    Attending attestation:  I was not present for key elements of the procedure but was immediately available for all portions of the procedure.  I reviewed and approved plan/settings.  I have reviewed the note and edited it as necessary.    Jesus Yu M.D.  Professor  Director of Dermatologic Surgery  Department of Dermatology  [facility]    Dermatology Surgery Clinic  [facility] Surgery [place] [id number]

## 2024-07-22 NOTE — LETTER
7/22/2024       RE: Nikunj Sinha  [address]  [place] [facility] [address]     Dear Colleague,    Thank you for referring your patient, Nikunj Sinha, to the [facility] DERMATOLOGIC SURGERY CLINIC [place] at [facility]. Please see a copy of my visit note below.    CORE Procedure: Medical    Dermatology Problem List:  BCC, L nasal ala, superficial, nodular, s/p bx 2/26/24, s/p MMS 4/18/24 (PMFF, AF), cartilage graft placement 5/13/24    Procedure Date: 07/22/2024  Staff: Jesus Yu MD  Resident/Fellow:  Diogenes Perkins MD    Procedure:   CO2RE, fractional CO2 ablative laser treatment #  1  Approximate treatment area: 36 cm sq  Approximate length of treatment: 25 minutes (with anesthesia)    Anesthesia and Premedication:  Anesthesia (topical): Lidocaine 4% cream     Device Settings:  Diagnosis:   Location: L nasal ala  Mode(class/deep/mid/etc): deep  Frational coverage(%): 5%  Core(mJ): 70 mJ      Description of Procedure:   The nature and purpose of the procedure, associated risks, possible consequences, complications, and alternative methods of treatment were explained in detail including but not limited to redness, swelling, peeling of the skin, eye injury, infection, bleeding, oozing, heat sensation, itching or acne.  Possible outcomes were reviewed including the following: no improvement, slight improvement, skin lightening or darkening. The possibility of permanent scarring was reviewed. Discussion that multiple treatments may be required was completed.     Photo consent was obtained and reviewed, a time out was performed, and informed consent was obtained.  The area was cleansed with Chlorhexidine. Protective eyewear was worn by the patient and all personnel in the treatment room  The patient confirmed the site to be treated. The laser energy output was verified by meter reading. The areas were treated with CO2RE laser as described. The patient  tolerated the procedure well and no complications were noted. Aftercare instructions were reviewed. The patient was discharged from the dermatology clinic in good condition.    The patient will follow up with nursing in 48 hours and with MD in 6 weeks for a second session of CO2 laser.     Staff Involved:  Scribe/Staff/Fellow    LISETTE SIMS am serving as a scribe; to document services personally performed by Jesus Yu MD -based on data collection and the provider's statements to me.    Attending attestation:  I was not present for key elements of the procedure but was immediately available for all portions of the procedure.  I reviewed and approved plan/settings.  I have reviewed the note and edited it as necessary.    Jesus Yu M.D.  Professor  Director of Dermatologic Surgery  Department of Dermatology  [facility]    Dermatology Surgery Clinic  [facility] [place] [address]                  Again, thank you for allowing me to participate in the care of your patient.      Sincerely,    Jesus Yu MD

## 2024-07-22 NOTE — NURSING NOTE
Chief Complaint   Patient presents with    RECHECK     Scar miroslava CAMARA RN-BSN  Dermatology Surgery  [phone]    Dermatology Laser Intake Checklist:  History of psoriasis: No  History of recent tan, indoor or outdoor tanning/vacation or other sun exposure: No  History of vitiligo: No  Family history of vitiligo: No  Recent other cosmetic procedure(microderm abrasion/peel/hair removal/facial etc): No  History of HSV: No  Did the patient start valtrex: N/A  For genital laser hair removal patient only: Is there a history of genital warts or condyloma: No  Tattoo in the area to be treated: No  Is patient using hydroquinone: No  Retinoids and other acne medications stopped for 2 weeks: N/A  Has the patient had accutane in the last 6-12 months: N/A  Pregnant or breastfeeding: No  History of skin cancer in area planned for treatment: Yes  History of treatment with gold: No  Changes in medical history: No  Photos obtained: Yes  Does the patient smoke: No  Is the patient on ibuprofen/aspirin/plavix/coumadin/other blood thinner: Yes  If patient is taking narcotic or diazepam(valium)-does patient have : No  There were no vitals taken for this visit.

## 2024-09-03 ENCOUNTER — OFFICE VISIT (OUTPATIENT)
Dept: DERMATOLOGY | Facility: CLINIC | Age: 59
End: 2024-09-03
Payer: COMMERCIAL

## 2024-09-03 DIAGNOSIS — C44.311 BASAL CELL CARCINOMA (BCC) OF LEFT ALA NASI: Primary | ICD-10-CM

## 2024-09-03 PROCEDURE — 99207 PR NO BILLABLE SERVICE THIS VISIT: CPT | Mod: GC | Performed by: DERMATOLOGY

## 2024-09-03 NOTE — NURSING NOTE
Drug Administration Record    Prior to injection, verified patient identity using patient's name and date of birth.  Due to injection administration, patient instructed to remain in clinic for 15 minutes  afterwards, and to report any adverse reaction to me immediately.    Drug Name: lidocaine 4%  Dose: .5grams  Route administered: topical  NDC #: [id number]  Amount of waste(mL):NA  Reason for waste: single tube    LOT #: [id number]  SITE: forehead, nose, L cheek  : [employer]  EXPIRATION DATE: 02/2026

## 2024-09-03 NOTE — PROGRESS NOTES
Laser- VBeam(Pulsed Dye Laser) Procedure Note: {Cosmetic Medical:864209}    Procedure Date: Sep 3, 2024    Attending Staff Surgeon: ***    Resident Surgeon: ***    Assistant: ***    Operating Room Data:     Surgery/Procedure Date:    SAME     Pre-operative Diagnosis:   {DERM-LASER-DGX:764548}  Location: ***  Size(cm2): ***  Number of lesions: ***    Operation/Procedure    Vbeam pulsed dye laser treatment#: ***       Post-operative Diagnosis:  SAME    Laser Settings:  Energy:*** J/cm2  Spot size:{dermlaserspotsize:280445}  Pulse width:  *** mS (0.45 thru 40 mS)  Dynamic cooling spray setting:  *** mS  Dynamic cooling device delay:  *** mS      Anesthesia:  {Anesthesiaderm:784076}    Description of Operation/Procedure:   The nature and purpose of the procedure, associated risks, possible consequences, complications and alternative methods of treatment were explained in detail, this includes but is not limited to hyperpigmentation, hypopigmentation, scarring, bruising, hair loss pain/discomfort, eye injury, and blister.   We reviewed that the outcome could be any of the following: no improvement, slight improvement or change in skin color & texture, the skin might be permanently lighter or darker, and though uncommon, superficial scarring may occur.  Multiple treatments may be recommended.   A photo and operative consent were obtained. Time-out was performed.  The patient was positioned to optimally expose the area treated.  Protective eyewear was worn by the patient and goggles on all personnel in the treatment room.  The patient confirmed the site to be treated. The laser energy output was verified by meter reading.      The clinically evident lesion(s) was/were treated with Annabel Vbeam pulsed dye laser (595 nm) beam as above.  A total of *** pulses were used.  The patient tolerated the procedure well and no complications were noted. Post operative instructions were provided. The total laser operation and  preparation time was *** minutes.      The patient will follow-up in ***.    ***The patient will pay the cosmetic fee today.      *** staffed the patient was present for {[facility] attestation:775251}        Staff Involved:  Scribe/Staff

## 2024-09-03 NOTE — PROGRESS NOTES
CORE Procedure: Medical    Dermatology Problem List:  BCC, L nasal ala, superficial, nodular, s/p bx 2/26/24, s/p MMS 4/18/24 (PMFF, AF), cartilage graft placement 5/13/24    Procedure Date: 09/03/2024  Staff: Jesus Yu MD  Resident/Fellow:  Diogenes Perkins MD    Procedure:   CO2RE, fractional CO2 ablative laser treatment #  1  Approximate treatment area: 36 cm sq  Approximate length of treatment: 25 minutes (with anesthesia)    Anesthesia and Premedication:  Anesthesia (topical): Lidocaine 4% cream     Device Settings:  Diagnosis:   Location: L nasal ala  Mode(class/deep/mid/etc): deep  Frational coverage(%): 5%  Core(mJ): 70 mJ      Description of Procedure:   The nature and purpose of the procedure, associated risks, possible consequences, complications, and alternative methods of treatment were explained in detail including but not limited to redness, swelling, peeling of the skin, eye injury, infection, bleeding, oozing, heat sensation, itching or acne.  Possible outcomes were reviewed including the following: no improvement, slight improvement, skin lightening or darkening. The possibility of permanent scarring was reviewed. Discussion that multiple treatments may be required was completed.     Photo consent was obtained and reviewed, a time out was performed, and informed consent was obtained.  The area was cleansed with Chlorhexidine. Protective eyewear was worn by the patient and all personnel in the treatment room  The patient confirmed the site to be treated. The laser energy output was verified by meter reading. The areas were treated with CO2RE laser as described. The patient tolerated the procedure well and no complications were noted. Aftercare instructions were reviewed. The patient was discharged from the dermatology clinic in good condition.    The patient will follow up with nursing in 48 hours and with MD in 6 weeks for a third session of CO2 laser.     Staff  Involved:  Scribe/Staff/Fellow    Scribe Disclosure:   I, MARYBETH HENRY, am serving as a scribe; to document services personally performed by Jesus Yu MD -based on data collection and the provider's statements to me.     Provider Disclosure:  I agree with above History, Review of Systems, Physical exam and Plan.  I have reviewed the content of the documentation and have edited it as needed. I have personally performed the services documented here and the documentation accurately represents those services and the decisions I have made.      Electronically signed by:    Attending attestation:  I was not present for key elements of the procedure but was immediately available for all portions of the procedure.  I reviewed and approved plan/settings.  I have reviewed the note and edited it as necessary.    Jesus Yu M.D.  Professor  Director of Dermatologic Surgery  Department of Dermatology  [facility]    Dermatology Surgery Clinic  [facility]  [address] [place], [place] [id number]

## 2024-09-03 NOTE — NURSING NOTE
Chief Complaint   Patient presents with    Laser Treatment     CO2-forehead/nose/L cheek     Lexii HARRIS RN  Dermatology Surgery  [phone]      Dermatology Laser Intake Checklist:  History of psoriasis: No  History of recent tan, indoor or outdoor tanning/vacation or other sun exposure: No  History of vitiligo: No  Family history of vitiligo: No  Recent other cosmetic procedure(microderm abrasion/peel/hair removal/facial etc): No  History of HSV: No  Did the patient start valtrex: N/A  For genital laser hair removal patient only: Is there a history of genital warts or condyloma: No  Tattoo in the area to be treated: No  Is patient using hydroquinone: No  Retinoids and other acne medications stopped for 2 weeks: N/A  Has the patient had accutane in the last 6-12 months: N/A  Pregnant or breastfeeding: No  History of skin cancer in area planned for treatment: Yes  History of treatment with gold: No  Changes in medical history: No  Photos obtained: Yes  Does the patient smoke: No  Is the patient on ibuprofen/aspirin/plavix/coumadin/other blood thinner: Yes  If patient is taking narcotic or diazepam(valium)-does patient have : No  There were no vitals taken for this visit.

## 2024-09-03 NOTE — LETTER
9/3/2024       RE: Nikunj Sinha  [address]  [place] [address]     Dear Colleague,    Thank you for referring your patient, Nikunj Sinha, to the [facility] DERMATOLOGIC SURGERY CLINIC [place] at [facility]. Please see a copy of my visit note below.    CORE Procedure: Medical    Dermatology Problem List:  BCC, L nasal ala, superficial, nodular, s/p bx 2/26/24, s/p MMS 4/18/24 (PMFF, AF), cartilage graft placement 5/13/24    Procedure Date: 09/03/2024  Staff: Jesus Yu MD  Resident/Fellow:  Diogenes Perkins MD    Procedure:   CO2RE, fractional CO2 ablative laser treatment #  1  Approximate treatment area: 36 cm sq  Approximate length of treatment: 25 minutes (with anesthesia)    Anesthesia and Premedication:  Anesthesia (topical): Lidocaine 4% cream     Device Settings:  Diagnosis:   Location: L nasal ala  Mode(class/deep/mid/etc): deep  Frational coverage(%): 5%  Core(mJ): 70 mJ      Description of Procedure:   The nature and purpose of the procedure, associated risks, possible consequences, complications, and alternative methods of treatment were explained in detail including but not limited to redness, swelling, peeling of the skin, eye injury, infection, bleeding, oozing, heat sensation, itching or acne.  Possible outcomes were reviewed including the following: no improvement, slight improvement, skin lightening or darkening. The possibility of permanent scarring was reviewed. Discussion that multiple treatments may be required was completed.     Photo consent was obtained and reviewed, a time out was performed, and informed consent was obtained.  The area was cleansed with Chlorhexidine. Protective eyewear was worn by the patient and all personnel in the treatment room  The patient confirmed the site to be treated. The laser energy output was verified by meter reading. The areas were treated with CO2RE laser as described. The patient  tolerated the procedure well and no complications were noted. Aftercare instructions were reviewed. The patient was discharged from the dermatology clinic in good condition.    The patient will follow up with nursing in 48 hours and with MD in 6 weeks for a third session of CO2 laser.     Staff Involved:  Scribe/Staff/Fellow    Scribe Disclosure:   I, MARYBETH HENRY, am serving as a scribe; to document services personally performed by Jesus Yu MD -based on data collection and the provider's statements to me.     Provider Disclosure:  I agree with above History, Review of Systems, Physical exam and Plan.  I have reviewed the content of the documentation and have edited it as needed. I have personally performed the services documented here and the documentation accurately represents those services and the decisions I have made.      Electronically signed by:    Attending attestation:  I was not present for key elements of the procedure but was immediately available for all portions of the procedure.  I reviewed and approved plan/settings.  I have reviewed the note and edited it as necessary.    Jesus Yu M.D.  Professor  Director of Dermatologic Surgery  Department of Dermatology  [facility]    [place] [address]                  Attestation signed by Jesus Yu MD at 9/23/2024  9:48 AM:  Attending attestation:  I was not present for key elements of the procedure but was immediately available for all portions of the procedure.  I reviewed and approved plan/settings.  I have reviewed the note and edited it as necessary.    Jesus Yu M.D.  Professor  Director of Dermatologic Surgery  Department of Dermatology  [facility]    Dermatology Surgery [place] [id number]      Again, thank you for  allowing me to participate in the care of your patient.      Sincerely,    Jesus Yu MD

## 2024-09-20 ENCOUNTER — ORDERS ONLY (AUTO-RELEASED) (OUTPATIENT)
Dept: FAMILY MEDICINE | Facility: CLINIC | Age: 59
End: 2024-09-20

## 2024-09-20 ENCOUNTER — OFFICE VISIT (OUTPATIENT)
Dept: FAMILY MEDICINE | Facility: CLINIC | Age: 59
End: 2024-09-20
Payer: COMMERCIAL

## 2024-09-20 VITALS
TEMPERATURE: 98.1 F | WEIGHT: 170.6 LBS | SYSTOLIC BLOOD PRESSURE: 100 MMHG | DIASTOLIC BLOOD PRESSURE: 72 MMHG | BODY MASS INDEX: 25.27 KG/M2 | HEIGHT: 69 IN | RESPIRATION RATE: 16 BRPM | OXYGEN SATURATION: 99 % | HEART RATE: 74 BPM

## 2024-09-20 DIAGNOSIS — Z13.1 SCREENING FOR DIABETES MELLITUS: ICD-10-CM

## 2024-09-20 DIAGNOSIS — R42 DIZZINESS: ICD-10-CM

## 2024-09-20 DIAGNOSIS — D49.0 IPMN (INTRADUCTAL PAPILLARY MUCINOUS NEOPLASM): ICD-10-CM

## 2024-09-20 DIAGNOSIS — R53.82 CHRONIC FATIGUE: ICD-10-CM

## 2024-09-20 DIAGNOSIS — R10.13 EPIGASTRIC PAIN: Primary | ICD-10-CM

## 2024-09-20 DIAGNOSIS — J30.2 SEASONAL ALLERGIC RHINITIS, UNSPECIFIED TRIGGER: ICD-10-CM

## 2024-09-20 DIAGNOSIS — K57.10 DUODENAL DIVERTICULUM: ICD-10-CM

## 2024-09-20 DIAGNOSIS — R55 POSTURAL DIZZINESS WITH NEAR SYNCOPE: ICD-10-CM

## 2024-09-20 DIAGNOSIS — M89.8X9 BONE ISLAND: ICD-10-CM

## 2024-09-20 DIAGNOSIS — E78.00 ELEVATED LDL CHOLESTEROL LEVEL: ICD-10-CM

## 2024-09-20 DIAGNOSIS — F10.21 MODERATE ALCOHOL DEPENDENCE IN EARLY REMISSION (H): ICD-10-CM

## 2024-09-20 DIAGNOSIS — Z23 NEED FOR SHINGLES VACCINE: ICD-10-CM

## 2024-09-20 DIAGNOSIS — Z79.01 CHRONIC ANTICOAGULATION: ICD-10-CM

## 2024-09-20 DIAGNOSIS — R59.0 MESENTERIC LYMPHADENOPATHY: ICD-10-CM

## 2024-09-20 DIAGNOSIS — A04.8 H. PYLORI INFECTION: ICD-10-CM

## 2024-09-20 DIAGNOSIS — R42 POSTURAL DIZZINESS WITH NEAR SYNCOPE: ICD-10-CM

## 2024-09-20 DIAGNOSIS — Z82.49 FAMILY HISTORY OF ARRHYTHMIA: ICD-10-CM

## 2024-09-20 DIAGNOSIS — Z82.49 FAMILY HISTORY OF ISCHEMIC HEART DISEASE: ICD-10-CM

## 2024-09-20 DIAGNOSIS — R41.3 MEMORY DIFFICULTIES: ICD-10-CM

## 2024-09-20 DIAGNOSIS — R63.4 WEIGHT LOSS: ICD-10-CM

## 2024-09-20 DIAGNOSIS — Z00.00 HEALTH CARE MAINTENANCE: ICD-10-CM

## 2024-09-20 DIAGNOSIS — Z86.711 HISTORY OF PULMONARY EMBOLISM: ICD-10-CM

## 2024-09-20 DIAGNOSIS — F41.1 GAD (GENERALIZED ANXIETY DISORDER): ICD-10-CM

## 2024-09-20 DIAGNOSIS — Z86.718 PERSONAL HISTORY OF DVT (DEEP VEIN THROMBOSIS): ICD-10-CM

## 2024-09-20 DIAGNOSIS — K82.8 SLUDGE IN GALLBLADDER: ICD-10-CM

## 2024-09-20 DIAGNOSIS — H93.13 TINNITUS, BILATERAL: ICD-10-CM

## 2024-09-20 DIAGNOSIS — Z86.59 HISTORY OF SUICIDAL IDEATION: ICD-10-CM

## 2024-09-20 DIAGNOSIS — R90.89 ABNORMAL FINDING ON MRI OF BRAIN: ICD-10-CM

## 2024-09-20 DIAGNOSIS — K83.8 DILATION OF COMMON BILE DUCT: ICD-10-CM

## 2024-09-20 DIAGNOSIS — K02.9 DENTAL CARIES: ICD-10-CM

## 2024-09-20 DIAGNOSIS — K76.89 LIVER CYST: ICD-10-CM

## 2024-09-20 DIAGNOSIS — N52.9 ERECTILE DYSFUNCTION, UNSPECIFIED ERECTILE DYSFUNCTION TYPE: ICD-10-CM

## 2024-09-20 DIAGNOSIS — F32.2 CURRENT SEVERE EPISODE OF MAJOR DEPRESSIVE DISORDER WITHOUT PSYCHOTIC FEATURES, UNSPECIFIED WHETHER RECURRENT (H): ICD-10-CM

## 2024-09-20 DIAGNOSIS — K57.30 DIVERTICULOSIS OF LARGE INTESTINE WITHOUT HEMORRHAGE: ICD-10-CM

## 2024-09-20 DIAGNOSIS — Z85.828 HISTORY OF BASAL CELL CANCER: ICD-10-CM

## 2024-09-20 DIAGNOSIS — K86.89 DILATED PANCREATIC DUCT: ICD-10-CM

## 2024-09-20 DIAGNOSIS — K42.9 UMBILICAL HERNIA WITHOUT OBSTRUCTION AND WITHOUT GANGRENE: ICD-10-CM

## 2024-09-20 DIAGNOSIS — K40.20 BILATERAL INGUINAL HERNIA WITHOUT OBSTRUCTION OR GANGRENE, RECURRENCE NOT SPECIFIED: ICD-10-CM

## 2024-09-20 LAB
ALBUMIN SERPL BCG-MCNC: 4.4 G/DL (ref 3.5–5.2)
ALP SERPL-CCNC: 68 U/L (ref 40–150)
ALT SERPL W P-5'-P-CCNC: 14 U/L (ref 0–70)
ANION GAP SERPL CALCULATED.3IONS-SCNC: 11 MMOL/L (ref 7–15)
AST SERPL W P-5'-P-CCNC: 26 U/L (ref 0–45)
BASOPHILS # BLD AUTO: 0 10E3/UL (ref 0–0.2)
BASOPHILS NFR BLD AUTO: 0 %
BILIRUB SERPL-MCNC: 0.5 MG/DL
BUN SERPL-MCNC: 15.5 MG/DL (ref 8–23)
CALCIUM SERPL-MCNC: 8.9 MG/DL (ref 8.8–10.4)
CHLORIDE SERPL-SCNC: 102 MMOL/L (ref 98–107)
CHOLEST SERPL-MCNC: 245 MG/DL
CREAT SERPL-MCNC: 0.94 MG/DL (ref 0.67–1.17)
CRP SERPL-MCNC: <3 MG/L
EGFRCR SERPLBLD CKD-EPI 2021: >90 ML/MIN/1.73M2
EOSINOPHIL # BLD AUTO: 0.1 10E3/UL (ref 0–0.7)
EOSINOPHIL NFR BLD AUTO: 1 %
ERYTHROCYTE [DISTWIDTH] IN BLOOD BY AUTOMATED COUNT: 12.2 % (ref 10–15)
ERYTHROCYTE [SEDIMENTATION RATE] IN BLOOD BY WESTERGREN METHOD: 4 MM/HR (ref 0–20)
EST. AVERAGE GLUCOSE BLD GHB EST-MCNC: 91 MG/DL
FASTING STATUS PATIENT QL REPORTED: ABNORMAL
FASTING STATUS PATIENT QL REPORTED: NORMAL
FERRITIN SERPL-MCNC: 132 NG/ML (ref 31–409)
GLUCOSE SERPL-MCNC: 88 MG/DL (ref 70–99)
HBA1C MFR BLD: 4.8 % (ref 0–5.6)
HCO3 SERPL-SCNC: 26 MMOL/L (ref 22–29)
HCT VFR BLD AUTO: 43.3 % (ref 40–53)
HDLC SERPL-MCNC: 58 MG/DL
HGB BLD-MCNC: 14.6 G/DL (ref 13.3–17.7)
IMM GRANULOCYTES # BLD: 0 10E3/UL
IMM GRANULOCYTES NFR BLD: 0 %
IRON BINDING CAPACITY (ROCHE): 278 UG/DL (ref 240–430)
IRON SATN MFR SERPL: 43 % (ref 15–46)
IRON SERPL-MCNC: 119 UG/DL (ref 61–157)
LDLC SERPL CALC-MCNC: 178 MG/DL
LYMPHOCYTES # BLD AUTO: 1.9 10E3/UL (ref 0.8–5.3)
LYMPHOCYTES NFR BLD AUTO: 29 %
MCH RBC QN AUTO: 31.3 PG (ref 26.5–33)
MCHC RBC AUTO-ENTMCNC: 33.7 G/DL (ref 31.5–36.5)
MCV RBC AUTO: 93 FL (ref 78–100)
MONOCYTES # BLD AUTO: 0.3 10E3/UL (ref 0–1.3)
MONOCYTES NFR BLD AUTO: 5 %
NEUTROPHILS # BLD AUTO: 4.3 10E3/UL (ref 1.6–8.3)
NEUTROPHILS NFR BLD AUTO: 65 %
NONHDLC SERPL-MCNC: 187 MG/DL
PLATELET # BLD AUTO: 223 10E3/UL (ref 150–450)
POTASSIUM SERPL-SCNC: 4.2 MMOL/L (ref 3.4–5.3)
PROT SERPL-MCNC: 7 G/DL (ref 6.4–8.3)
RBC # BLD AUTO: 4.67 10E6/UL (ref 4.4–5.9)
SODIUM SERPL-SCNC: 139 MMOL/L (ref 135–145)
TRIGL SERPL-MCNC: 45 MG/DL
TSH SERPL DL<=0.005 MIU/L-ACNC: 0.81 UIU/ML (ref 0.3–4.2)
VIT B12 SERPL-MCNC: 779 PG/ML (ref 232–1245)
VIT D+METAB SERPL-MCNC: 26 NG/ML (ref 20–50)
WBC # BLD AUTO: 6.6 10E3/UL (ref 4–11)

## 2024-09-20 PROCEDURE — G2211 COMPLEX E/M VISIT ADD ON: HCPCS | Performed by: FAMILY MEDICINE

## 2024-09-20 PROCEDURE — 96127 BRIEF EMOTIONAL/BEHAV ASSMT: CPT | Performed by: FAMILY MEDICINE

## 2024-09-20 PROCEDURE — 82607 VITAMIN B-12: CPT | Performed by: FAMILY MEDICINE

## 2024-09-20 PROCEDURE — 83550 IRON BINDING TEST: CPT | Performed by: FAMILY MEDICINE

## 2024-09-20 PROCEDURE — 84443 ASSAY THYROID STIM HORMONE: CPT | Performed by: FAMILY MEDICINE

## 2024-09-20 PROCEDURE — 80061 LIPID PANEL: CPT | Performed by: FAMILY MEDICINE

## 2024-09-20 PROCEDURE — 86140 C-REACTIVE PROTEIN: CPT | Performed by: FAMILY MEDICINE

## 2024-09-20 PROCEDURE — 83036 HEMOGLOBIN GLYCOSYLATED A1C: CPT | Performed by: FAMILY MEDICINE

## 2024-09-20 PROCEDURE — 82306 VITAMIN D 25 HYDROXY: CPT | Performed by: FAMILY MEDICINE

## 2024-09-20 PROCEDURE — 86364 TISS TRNSGLTMNASE EA IG CLAS: CPT | Performed by: FAMILY MEDICINE

## 2024-09-20 PROCEDURE — 83540 ASSAY OF IRON: CPT | Performed by: FAMILY MEDICINE

## 2024-09-20 PROCEDURE — 85652 RBC SED RATE AUTOMATED: CPT | Performed by: FAMILY MEDICINE

## 2024-09-20 PROCEDURE — 99215 OFFICE O/P EST HI 40 MIN: CPT | Performed by: FAMILY MEDICINE

## 2024-09-20 PROCEDURE — 82728 ASSAY OF FERRITIN: CPT | Performed by: FAMILY MEDICINE

## 2024-09-20 PROCEDURE — 36415 COLL VENOUS BLD VENIPUNCTURE: CPT | Performed by: FAMILY MEDICINE

## 2024-09-20 PROCEDURE — 85025 COMPLETE CBC W/AUTO DIFF WBC: CPT | Performed by: FAMILY MEDICINE

## 2024-09-20 PROCEDURE — 80053 COMPREHEN METABOLIC PANEL: CPT | Performed by: FAMILY MEDICINE

## 2024-09-20 PROCEDURE — 99417 PROLNG OP E/M EACH 15 MIN: CPT | Performed by: FAMILY MEDICINE

## 2024-09-20 RX ORDER — ESCITALOPRAM OXALATE 5 MG/1
5 TABLET ORAL DAILY
COMMUNITY

## 2024-09-20 RX ORDER — FAMOTIDINE 20 MG/1
20 TABLET, FILM COATED ORAL AT BEDTIME
Qty: 90 TABLET | Refills: 0 | Status: SHIPPED | OUTPATIENT
Start: 2024-09-20

## 2024-09-20 RX ORDER — FLUOXETINE 40 MG/1
40 CAPSULE ORAL DAILY
COMMUNITY

## 2024-09-20 ASSESSMENT — PATIENT HEALTH QUESTIONNAIRE - PHQ9
SUM OF ALL RESPONSES TO PHQ QUESTIONS 1-9: 14
SUM OF ALL RESPONSES TO PHQ QUESTIONS 1-9: 14
10. IF YOU CHECKED OFF ANY PROBLEMS, HOW DIFFICULT HAVE THESE PROBLEMS MADE IT FOR YOU TO DO YOUR WORK, TAKE CARE OF THINGS AT HOME, OR GET ALONG WITH OTHER PEOPLE: VERY DIFFICULT

## 2024-09-20 ASSESSMENT — PAIN SCALES - GENERAL: PAINLEVEL: MILD PAIN (3)

## 2024-09-20 ASSESSMENT — ENCOUNTER SYMPTOMS: FATIGUE: 1

## 2024-09-20 NOTE — LETTER
September 30, 2024      Nikunj Sinha  [address] [facility] [address]        [facility] -     Here are my comments about your recent results:   Testing positive for H Pylori   Continue omeprazole 20 mg daily 2 week as prescribed   In addition take combo med containing bismuth, metronidazole and tetracylcine 4 / day for 10 days together with the omeprazole to eradicate the h pylori   This will likely help stomach symptoms   Once done with this regimen continue on pepcid previously prescribed and encouraged to see GI as discussed as may benefit from an EGD for this and for other reasons discussed at apt.   For additional lab test information, [email or web address] is an excellent reference..        Resulted Orders   Comprehensive metabolic panel   Result Value Ref Range    Sodium 139 135 - 145 mmol/L    Potassium 4.2 3.4 - 5.3 mmol/L    Carbon Dioxide (CO2) 26 22 - 29 mmol/L    Anion Gap 11 7 - 15 mmol/L    Urea Nitrogen 15.5 8.0 - 23.0 mg/dL    Creatinine 0.94 0.67 - 1.17 mg/dL    GFR Estimate >90 >60 mL/min/1.73m2      Comment:      eGFR calculated using 2021 CKD-EPI equation.    Calcium 8.9 8.8 - 10.4 mg/dL      Comment:      Reference intervals for this test were updated on 7/16/2024 to reflect our healthy population more accurately. There may be differences in the flagging of prior results with similar values performed with this method. Those prior results can be interpreted in the context of the updated reference intervals.    Chloride 102 98 - 107 mmol/L    Glucose 88 70 - 99 mg/dL    Alkaline Phosphatase 68 40 - 150 U/L    AST 26 0 - 45 U/L    ALT 14 0 - 70 U/L    Protein Total 7.0 6.4 - 8.3 g/dL    Albumin 4.4 3.5 - 5.2 g/dL    Bilirubin Total 0.5 <=1.2 mg/dL    Patient Fasting > 8hrs? Unknown    TSH with free T4 reflex   Result Value Ref Range    TSH 0.81 0.30 - 4.20 uIU/mL   Lipid panel reflex to direct LDL Fasting   Result Value Ref Range    Cholesterol 245 (H) <200 mg/dL    Triglycerides 45 <150  mg/dL    Direct Measure HDL 58 >=40 mg/dL    LDL Cholesterol Calculated 178 (H) <100 mg/dL    Non HDL Cholesterol 187 (H) <130 mg/dL    Patient Fasting > 8hrs? Unknown     Narrative    Cholesterol  Desirable: < 200 mg/dL  Borderline High: 200 - 239 mg/dL  High: >= 240 mg/dL    Triglycerides  Normal: < 150 mg/dL  Borderline High: 150 - 199 mg/dL  High: 200-499 mg/dL  Very High: >= 500 mg/dL    Direct Measure HDL  Female: >= 50 mg/dL   Male: >= 40 mg/dL    LDL Cholesterol  Desirable: < 100 mg/dL  Above Desirable: 100 - 129 mg/dL   Borderline High: 130 - 159 mg/dL   High:  160 - 189 mg/dL   Very High: >= 190 mg/dL    Non HDL Cholesterol  Desirable: < 130 mg/dL  Above Desirable: 130 - 159 mg/dL  Borderline High: 160 - 189 mg/dL  High: 190 - 219 mg/dL  Very High: >= 220 mg/dL   Hemoglobin A1c   Result Value Ref Range    Estimated Average Glucose 91 <117 mg/dL    Hemoglobin A1C 4.8 0.0 - 5.6 %      Comment:      Normal <5.7%   Prediabetes 5.7-6.4%    Diabetes 6.5% or higher     Note: Adopted from ADA consensus guidelines.   ESR: Erythrocyte sedimentation rate   Result Value Ref Range    Erythrocyte Sedimentation Rate 4 0 - 20 mm/hr   CRP, inflammation   Result Value Ref Range    CRP Inflammation <3.00 <5.00 mg/L   Vitamin D Deficiency   Result Value Ref Range    Vitamin D, Total (25-Hydroxy) 26 20 - 50 ng/mL      Comment:      optimum levels    Narrative    Season, race, dietary intake, and treatment affect the concentration of 25-hydroxy-Vitamin D. Values may decrease during winter months and increase during summer months.    Vitamin D determination is routinely performed by an immunoassay specific for 25 hydroxyvitamin D3.  If an individual is on vitamin D2(ergocalciferol) supplementation, please specify 25 OH vitamin D2 and D3 level determination by LCMSMS test [id number].     Iron and iron binding capacity   Result Value Ref Range    Iron 119 61 - 157 ug/dL    Iron Binding Capacity 278 240 - 430 ug/dL    Iron Sat Index 43  15 - 46 %   Ferritin   Result Value Ref Range    Ferritin 132 31 - 409 ng/mL   Vitamin B12   Result Value Ref Range    Vitamin B12 779 232 - 1,245 pg/mL   Helicobacter pylori Antigen Stool   Result Value Ref Range    Helicobacter pylori Antigen Stool Positive (A) Negative      Comment:      Positive for Helicobacter pylori antigen by enzyme immunoassay. A positive result indicates the presence of H. pylori antigen.   Tissue transglutaminase noreen IgA and IgG   Result Value Ref Range    Tissue Transglutaminase Antibody IgA 0.3 <7.0 U/mL      Comment:      Negative- The tTG-IgA assay has limited utility for patients with decreased levels of IgA. Screening for celiac disease should include IgA testing to rule out selective IgA deficiency and to guide selection and interpretation of serological testing. tTG-IgG testing may be positive in celiac disease patients with IgA deficiency.    Tissue Transglutaminase Antibody IgG <0.6 <7.0 U/mL      Comment:      Negative   CBC with platelets and differential   Result Value Ref Range    WBC Count 6.6 4.0 - 11.0 10e3/uL    RBC Count 4.67 4.40 - 5.90 10e6/uL    Hemoglobin 14.6 13.3 - 17.7 g/dL    Hematocrit 43.3 40.0 - 53.0 %    MCV 93 78 - 100 fL    MCH 31.3 26.5 - 33.0 pg    MCHC 33.7 31.5 - 36.5 g/dL    RDW 12.2 10.0 - 15.0 %    Platelet Count 223 150 - 450 10e3/uL    % Neutrophils 65 %    % Lymphocytes 29 %    % Monocytes 5 %    % Eosinophils 1 %    % Basophils 0 %    % Immature Granulocytes 0 %    Absolute Neutrophils 4.3 1.6 - 8.3 10e3/uL    Absolute Lymphocytes 1.9 0.8 - 5.3 10e3/uL    Absolute Monocytes 0.3 0.0 - 1.3 10e3/uL    Absolute Eosinophils 0.1 0.0 - 0.7 10e3/uL    Absolute Basophils 0.0 0.0 - 0.2 10e3/uL    Absolute Immature Granulocytes 0.0 <=0.4 10e3/uL       If you have any questions or concerns, please call the clinic at the number listed above.       Sincerely,      Adriana Solis MD/emmanuel

## 2024-09-20 NOTE — PROGRESS NOTES
The above note was dictated using voice recognition. Although reviewed after completion, some word and grammatical error may remain .       Assessment & Plan     Epigastric pain  Weight loss  H. pylori infection  Sludge in gallbladder- Comprehensive metabolic panel; Future  Dilation of common bile duct - Comprehensive metabolic panel; Future  - Adult GI  Referral - Consult Only; Future  Dilated pancreatic duct CMP - Adult GI  Referral - Consult Only; Future  IPMN (intraductal papillary mucinous neoplasm) CMP Adult GI  Referral - Consult  Mesenteric lymphadenopathy  Adult GI  Referral - Consult Only; Future  Liver cyst - Comprehensive metabolic panel; Future  Duodenal diverticulum  Adult GI  Referral - Consult Only; Future  Diverticulosis of large intestine without hemorrhage - Adult GI  Referral - Consult  Umbilical hernia without obstruction and without gangrene  Bilateral inguinal hernia without obstruction or gangrene, recurrence not specified  Seen today for ongoing stomach pain, chronic postural dizziness, chronic fatigue. Last seen by this provider in 12/2022   Long standing epigastric pain, previously had been taking both Pepcid and Prilosec & a Raeann capsule. No meds currently. Prior lipase & H pylori were negative. In 2022 a Ct abdomen and then mri showed benign CBD dilation, dilated pancreatic duct, evidence of pancreatic scarring, suggestion of IPMN, a duodenal diverticulum, and non specific mesenteric lymphadenopathy & a benign right liver cyst & asymptomatic diverticulosis, asymptomatic B/l inguinal hernia and small asymptomatic umbilical hernia & possible asymptomatic gall stones. Was referred to GI to evaluate epigastric pain, IPMN, duodenal diverticulum & may benefit from an EGD. Was also referred to general surgeon regarding possible gall bladder sludge & asymptomatic B/l inguinal & umbilical hernia which I didn't believe was contributing to  long standing epigastric pain. Did not get apt's with either and symptoms persist 2 yrs since last seen.Epigastric pain has been occurring daily for years, pushing on upper abdominal area hurts, sometimes hurts more than others, no longer with heartburn. No longer on prilosec & stopped Pepcid as felt it made him tired. When took Pepcid or PPI previously felt it helped. Understands chronic use of Prilosec like med's can cause atypical pneumonia, fractures, C diff colitis, vit B 12 and magnesium deficiency. Reports no blood in stools or black stools but noted wt loss appetite is decreased. In 12/2022 weighed 197 lbs, in oct 2023 was 189 lbs, today is 170 lbs. Labs pending. Agreeable to trying prilosec 20 mg 2 weeks then Pepcid 20 mg bid and seeing GI.   Sludge in gall bladder feels no significant, RUQ abdomen or postprandial pain  Mild dilated CBD and dilated pancreatic duct, IPMN  & mesenteric lymphadenopathy noted at last mri and was advised ERCP with GI but no apt given and will refer again to them. Desires to wait to see GI to repeat MRI.   Asymptomatic Liver cyst   Asymptomatic duodenal diverticulum  Asymptomatic diverticulosis  Asymptomatic tiny umbilical hernia  Asymptomatic B/l inguinal hernia unchanged  Labs show normal CBC, normal ESR, CRP, Liver and gallbladder tests are normal (ALT,AST, Alk phos, bilirubin), kidney function is normal (Cr, GFR), sodium is normal, potassium is normal, calcium is normal, glucose is normal. TSH (thyroid stimulating hormone) level is normal which indicates normal thyroid function. Vitamin B12 result is normal Vitamin D level is low normal encouraged getting 2000 IU daily, Ferritin (iron) level is normal. Iron level is normal. Normal manganese. Testing negative for celiac. Later tested positive for H Pylori. Continued omeprazole 20 mg daily 2 week as prescribed. In addition take pylera, combo med containing bismuth, metronidazole and tetracycline 4 / day for 10 days together  with the omeprazole to eradicate the H pylori. This will likely help stomach symptoms. Once done with this regimen continue on Pepcid previously prescribed and encouraged to see GI as discussed as may benefit from an EGD for this and for other reasons discussed at apt. Will check prostate test at his physical.      Chronic fatigue, previously seen by cardio, Echo & EKG & labs normal, opted no stress test. Prior noted decreased EF with prior PE had resolved with normal echo in 2022. Given persistent symptoms will refer back to cardiology. Seen by sleep, HST done had technical errors in dec 2022 & not redone. Labs currently don't explain. Refer back to sleep.     Postural dizziness, sometimes can occur while standing with no posture changes. Rarely feels like will black out. At times gets up and has to sit right down again. Feels drinking enough water. Seen by cardio previously EKG, echo normal. Cardio felt stress test would not add more information. 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 No sign of bleeding. Previously BP not orthostatic. No associated chest pain or shortness of breath. Can feel pulse in epigastrium.  Fh of hear issues and arrhythmias/ pacemaker. /72, at home 100/60. Tries to drink a lot of water. Never tried compression socks advised by neuro previously.near syncope x 1. No vertigo. Referred back to cardiology & ziopatch ordered. Advised and given compression socks to try.    LDL elevated.which can increase heart disease risk. The 10-year ASCVD risk score (Jerry DK, et al., 2019) is: 5.7%. A diet high in fat and simple carbohydrates, genetics and being overweight can contribute to this. Advised exercising 150 minutes of aerobic exercise per week (30 minutes for 5 days per week or 50 minutes for 3 days per week are options) and eating a low saturated fat/low carbohydrate diet to improve this. HBA1c normal.. Consider doing a ct calcium score to get objective data about risk and need for  starting a statin med.     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     Memory concerns, Able to do job ok but feels struggles with things sometimes. Forgetful of names, no worsening, forgetful of conversation with clients at times and feels has to write everything down. B 12 and TSH previously normal. Seen by neuro in 2023. MRI showed 4/13/23  showed findings consistent with Wernicke's encephalopathy, suspected a scar from prior alcohol use and also had abnormal signal bilateral global pallidum and advised to check B1 copper ceruloplasmin and encouraged to take a B complex.  Lab work done 5/24/2023 showed normal B1 copper and ceruloplasmin and advised manganese and 24 urine copper which did not get done.  Also neuro psych eval set up for 6/26/2023 to Dr. Connors at [facility] but did not make it to the appointment. Referred for neuro follow up and neuro psyche referral.     Sustained sobriety from alcohol since 2019    MDD/ZAIDA/ passive SI: under care of [facility] psyche Morena Spencer . Previously on Abilify & stopped as didn't like it. No longer on Cymbalta, no longer on clonidine which made him tired.  Currently on fluoxetine 40 mg and 5 mg lexapro. Lexapro helps better with anxiety but wipes him out so doing this way. Did also take Ketamine infusion / lozenges a while x 4 last on 9/5/24.  Felt made no difference. Not actively suicidal. Is safe, no longer in therapy.     Hx of BCC Left nose ala s/p mohs in 2/24, reconstruction and now doing laser rx of scar tissue, under care of derm, lesion many months started as injury / pimple which he has picked at often and not been healing. Hx of SK.     Asymptomatic incidental right iliac bone island noted on ct scan in 2022, felt to be benign,     Allergies to ragweed, better as got older, currently not bothersome.    B/l tinnitus stable can hear ok, usually unaware of it until thinks about it      Dental caries improved     ED unchanged on  the [facility] maintenance reviewed    Declines flu shot  Declines COVID vaccines  Encouraged to complete overdue Shingrex # 2 sometime  Labs from today pending   Will return for a physical   - CBC with Platelets & Differential; Future  - Comprehensive metabolic panel; Future  - Helicobacter pylori Antigen Stool; Future  - Tissue transglutaminase noreen IgA and IgG; Future  - Adult GI  Referral - Consult Only; Future  - CBC with Platelets & Differential  - Comprehensive metabolic panel  - Helicobacter pylori Antigen Stool  - Tissue transglutaminase noreen IgA and IgG  - famotidine (PEPCID) 20 MG tablet; Take 1 tablet (20 mg) by mouth at bedtime.  - omeprazole (PRILOSEC) 20 MG DR capsule; Take 1 capsule (20 mg) by mouth daily for 14 days.  - bis subcit-metronidazole-tetracycline (PYLERA) 140-125-125 MG capsule; Take 3 capsules by mouth 4 times daily (with meals and nightly) for 10 days.    Chronic fatigue  Chronic fatigue, previously seen by cardio, Echo & EKG & labs normal, opted no stress test. Prior noted decreased EF with prior PE had resolved with normal echo in 2022. Given persistent symptoms will refer back to cardiology. Seen by sleep, HST done had technical errors in dec 2022 & not redone. Labs currently don't explain. Refer back to sleep.   - CBC with Platelets & Differential; Future  - Comprehensive metabolic panel; Future  - TSH with free T4 reflex; Future  - ESR: Erythrocyte sedimentation rate; Future  - CRP, inflammation; Future  - Vitamin D Deficiency; Future  - Iron and iron binding capacity; Future  - Ferritin; Future  - Vitamin B12; Future  - Tissue transglutaminase noreen IgA and IgG; Future  - Manganese Whole Blood; Future  - Adult Sleep Eval & Management  Referral; Future  - Adult Cardiology Eval  Referral; Future  - Copper urine; Future  - CBC with Platelets & Differential  - Comprehensive metabolic panel  - TSH with free T4 reflex  - ESR: Erythrocyte  sedimentation rate  - CRP, inflammation  - Vitamin D Deficiency  - Iron and iron binding capacity  - Ferritin  - Vitamin B12  - Tissue transglutaminase noreen IgA and IgG  - Copper urine  - ZIO PATCH MAIL OUT; Future  - Manganese Whole Blood    Dizziness  Postural dizziness with near syncope- Compression Sleeve/Stocking Order for DME   Family history of arrhythmia- ZIO PATCH MAIL OUT; Future  Family history of ischemic heart disease- Adult Cardiology Eval  Referral; Future  Postural dizziness, sometimes can occur while standing with no posture changes. Rarely feels like will black out. At times gets up and has to sit right down again. Feels drinking enough water. Seen by cardio previously EKG, echo normal. Cardio felt stress test would not add more information. 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 No sign of bleeding. Previously BP not orthostatic. No associated chest pain or shortness of breath. Can feel pulse in epigastrium.  Fh of hear issues and arrhythmias/ pacemaker. /72, at home 100/60. Tries to drink a lot of water. Never tried compression socks advised by neuro previously.near syncope x 1. No vertigo. Referred back to cardiology & ziopatch ordered.  Advised and given compression socks to try.  - CBC with Platelets & Differential; Future  - Comprehensive metabolic panel; Future  - TSH with free T4 reflex; Future  - ESR: Erythrocyte sedimentation rate; Future  - CRP, inflammation; Future  - Vitamin D Deficiency; Future  - Iron and iron binding capacity; Future  - Ferritin; Future  - Vitamin B12; Future  - Tissue transglutaminase noreen IgA and IgG; Future  - Manganese Whole Blood; Future  - Adult Cardiology Eval  Referral; Future  - Compression Sleeve/Stocking Order for DME - ONLY FOR DME  - CBC with Platelets & Differential  - Comprehensive metabolic panel  - TSH with free T4 reflex  - ESR: Erythrocyte sedimentation rate  - CRP, inflammation  - Vitamin D Deficiency  - Iron and  iron binding capacity  - Ferritin  - Vitamin B12  - Tissue transglutaminase noreen IgA and IgG  - ZIO PATCH MAIL OUT; Future  - Manganese Whole Blood    Elevated LDL cholesterol level  LDL elevated.which can increase heart disease risk. The 10-year ASCVD risk score (Jerry ROCKWELL, et al., 2019) is: 5.7%. A diet high in fat and simple carbohydrates, genetics and being overweight can contribute to this. Advised exercising 150 minutes of aerobic exercise per week (30 minutes for 5 days per week or 50 minutes for 3 days per week are options) and eating a low saturated fat/low carbohydrate diet to improve this. HBA1c normal.. Consider doing a ct calcium score to get objective data about risk and need for starting a statin med.   - TSH with free T4 reflex; Future  - Lipid panel reflex to direct LDL Fasting; Future  - TSH with free T4 reflex  - Lipid panel reflex to direct LDL Fasting    History of pulmonary embolism  Personal history of DVT (deep vein thrombosis)  Chronic anticoagulation  Hx of PE & DVT &  on Xarelto managed by hematology, sees them 1/ yr.   - CBC with Platelets & Differential; Future  - CBC with Platelets & Differential    Memory difficulties  Abnormal finding on MRI of brain- Adult Neurology  Referral; Neuropsychology Refe   Memory concerns, Able to do job ok but feels struggles with things sometimes. Forgetful of names, no worsening, forgetful of conversation with clients at times and feels has to write everything down. B 12 and TSH previously normal. Seen by neuro in 2023. MRI showed 4/13/23  showed findings consistent with Wernicke's encephalopathy, suspected a scar from prior alcohol use and also had abnormal signal bilateral global pallidum and advised to check B1 copper ceruloplasmin and encouraged to take a B complex.  Lab work done 5/24/2023 showed normal B1 copper and ceruloplasmin and advised manganese and 24 urine copper which did not get done.  Also neuro psych eval set up for 6/26/2023 to  Dr. Connors at [place] [facility] but did not make it to the appointment. Referred for neuro follow up and neuro psyche referral.   - Comprehensive metabolic panel; Future  - TSH with free T4 reflex; Future  - Vitamin B12; Future  - Manganese Whole Blood; Future  - Adult Neurology  Referral; Future  - Adult Sleep Eval & Management  Referral; Future  - Comprehensive metabolic panel  - TSH with free T4 reflex  - Vitamin B12  - Adult Neuropsychology  Referral; Future  - Manganese Whole Blood    Moderate alcohol dependence in early remission (H)  Sustained sobriety from alcohol since 2019  - Comprehensive metabolic panel; Future  - TSH with free T4 reflex; Future  - Adult Neurology  Referral; Future  - Comprehensive metabolic panel  - TSH with free T4 reflex    Current severe episode of major depressive disorder without psychotic features, unspecified whether recurrent (H)  History of suicidal ideation  ZAIDA (generalized anxiety disorder)  MDD/ZAIDA/ passive SI: under care of [facility] psyche Morena Spencer . Previously on Abilify & stopped as didn't like it. No longer on Cymbalta, no longer on clonidine which made him tired.  Currently on fluoxetine 40 mg and 5 mg lexapro. Lexapro helps better with anxiety but wipes him out so doing this way. Did also take Ketamine infusion / lozenges a while x 4 last on 9/5/24.  Felt made no difference. Not actively suicidal. Is safe, no longer in therapy.   - TSH with free T4 reflex; Future  - TSH with free T4 reflex    History of basal cell cancer  Hx of BCC Left nose ala s/p mohs in 2/24, reconstruction and now doing laser rx of scar tissue, under care of derm, lesion many months started as injury / pimple which he has picked at often and not been healing. Hx of SK.     Bone island  Asymptomatic incidental right iliac bone island noted on ct scan in 2022, felt to be benign,     Seasonal allergic rhinitis, unspecified trigger  Allergies to ragweed, better  "as got older, currently not bothersome.  - CBC with Platelets & Differential; Future  - CBC with Platelets & Differential    Tinnitus, bilateral  B/l tinnitus stable can hear ok, usually unaware of it until thinks about it     Dental caries  Dental caries improved  - CBC with Platelets & Differential; Future  - CBC with Platelets & Differential    Erectile dysfunction, unspecified erectile dysfunction type  ED unchanged on the antidepressants    Health care maintenance  Health care maintenance reviewed  Declines flu shot  Declines COVID vaccines  Encouraged to complete overdue Shingrex # 2 sometime  Labs from today pending   Will return for a physical   - REVIEW OF HEALTH MAINTENANCE PROTOCOL ORDERS    Need for shingles vaccine  Will do another day    Screening for diabetes mellitus  - Hemoglobin A1c; Future  - Hemoglobin A1c    BMI  Estimated body mass index is 25.19 kg/m  as calculated from the following:    Height as of this encounter: 1.753 m (5' 9\").    Weight as of this encounter: 77.4 kg (170 lb 9.6 oz).   Weight management plan: Discussed healthy diet and exercise guidelines    Depression Screening Follow Up        10/4/2022    12:09 PM   C-SSRS (Brief [place])   Within the last month, have you wished you were dead or wished you could go to sleep and not wake up? No   Within the last month, have you had any actual thoughts of killing yourself? Yes   Within the last month, have you been thinking about how you might do this? No   Within the last month, have you had these thoughts and had some intention of acting on them? No   Within the last month, have you started to work out or worked out the details of how to kill yourself with the intent to carry out this plan? No   Within the last month, have you ever done anything, started to do anything, or prepared to do anything to end your life? No         Follow Up Actions Taken  Referred patient back to mental health provider  Discussed the following ways the " patient can remain in a safe environment:  dispose of old medications  and be around others  Regular exercise  See Patient Instructions  Review of the result(s) of each unique test - diagnostics in epic  Independent interpretation of a test performed by another physician/other qualified health care professional (not separately reported) - neuro, cards etc  Diagnosis or treatment significantly limited by social determinants of health - mentalhealth, gaps in follow up   Ordering of each unique test  Prescription drug management  66 minutes spent by me on the date of the encounter doing chart review, history and exam, documentation and further activities per the note  The longitudinal plan of care for the diagnosis(es)/condition(s) as documented were addressed during this visit. Due to the added complexity in care, I will continue to support Nikunj in the subsequent management and with ongoing continuity of care.      Subjective   Nikunj is a 59 year old, presenting for the following health issues:  Abdominal Pain, Fatigue, and Dizziness        9/20/2024     2:04 PM   Additional Questions   Roomed by Emy Agosto   Accompanied by Self         9/20/2024     2:04 PM   Patient Reported Additional Medications   Patient reports taking the following new medications Lexapro     History of Present Illness       Reason for visit:  Stomach pain, fatigue and dizziness.    He eats 2-3 servings of fruits and vegetables daily.He consumes 0 sweetened beverage(s) daily.He exercises with enough effort to increase his heart rate 20 to 29 minutes per day.  He exercises with enough effort to increase his heart rate 5 days per week. He is missing 2 dose(s) of medications per week.  He is not taking prescribed medications regularly due to side effects and remembering to take.    CURRENTLY   Here for ongoing stomach pain, chronic postural dizziness, chronic fatigue  Last seen by this provider in 12/2022     Long standing epigastric pain,  previously had been taking both Pepcid and Prilosec  & a Raeann capsule. No meds currently. Prior lipase & H pylori were negative. In 2022 a Ct abdomen and then mri showed benign CBD dilation, dilated pancreatic duct, evidence of pancreatic scarring, suggestion of IPMN, a duodenal diverticulum, and non specific mesenteric lymphadenopathy & a benign right liver cyst & asymptomatic diverticulosis, asymptomatic B/l inguinal hernia and small asymptomatic umbilical hernia & possible asymptomatic gall stones. Was referred to GI to evaluate epigastric pain, IPMN, duodenal diverticulum & may benefit from an EGD. Was also referred to general surgeon regarding possible gall bladder sludge & asymptomatic B/l inguinal & umbilical hernia which I didn't believe was contributing to long standing epigastric pain. Did not get apt's with either and symptoms persist 2 yrs since last seen.Epigastric pain has been occurring daily for years, pushing on upper abdominal area hurts, sometimes hurts more than others, no longer with heartburn. No longer on prilosec & stopped Pepcid as felt it made him tired. When took Pepcid or PPI previously felt it helped. Understands chronic use of Prilosec like med's can cause atypical pneumonia, fractures,  C diff colitis, vit B 12 and magnesium deficiency. Reports no blood in stools or black stools but noted wt loss appetite is decreased. In 12/2022 weighed 197 lbs, in oct 2023 was 189 lbs, today is 170 lbs. Labs pending. Agreeable to trying prilosec 20 mg 2 weeks then Pepcid 20 mg bid and seeing GI.     Sludge in gall bladder feels no significant  RUQ abdomen or postprandial pain  Mild dilated CBD and dilated pancreatic duct, IPMN  & mesenteric lymphadenopathy noted at last mri and was advised ERCP with GI but no apt given and will refer again to them. Desires to wait to see GI to repeat MRI.   Asymptomatic Liver cyst   Asymptomatic duodenal diverticulum  Asymptomatic diverticulosis  Asymptomatic tiny  umbilical hernia  Asymptomatic B/l inguinal hernia unchanged     Chronic fatigue, previously seen by cardio, Echo & EKG & labs normal, opted no stress test. Prior noted decreased EF with prior PE had resolved with normal echo in 2022. Given persistent symptoms will refer back to cardiology    Postural dizziness, sometimes can occur while standing with no posture changes. Rarely feels like will black out. At times gets up and has to sit right down again. Feels drinking enough water. Seen by cardio previously EKG, echo normal. Cardio felt stress test would not add more information. 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 No sign of bleeding. Previously BP not orthostatic. No associated chest pain or shortness of breath. Can feel pulse in epigastrium.  Fh of hear issues and arrhythmias/ pacemaker. /72, at home 100/60. Tries to drink a lot of water. Never tried compression socks advised by neuro previously.near syncope x 1. No vertigo. Ok to refer back to cardiology.    LDL elevated.  The 10-year ASCVD risk score (Jerry DK, et al., 2019) is: 5.9%    Values used to calculate the score:      Age: 59 years      Sex: Male      Is Non- : No      Diabetic: No      Tobacco smoker: No      Systolic Blood Pressure: 100 mmHg      Is BP treated: No      HDL Cholesterol: 56 mg/dL      Total Cholesterol: 248 mg/dL    Hx of PE & DVT &  on Xarelto managed by hematology.sees them 1/ yr.     Memory concerns, Able to do job ok but feels struggles with things sometimes. Forgetful of names, no worsening, forgetful of conversation with clients at times and feels has to write everything down. B 12 and TSH previously normal.seen by neuro in 2023. Mri showed 4/13/23  showed findings consistent with Wernicke's encephalopathy suspected a scar from prior alcohol use and also had abnormal signal bilateral global pallidum and advised to check B1 copper ceruloplasmin and encouraged to take a B complex.  Lab work  done 5/24/2023 showed normal B1 copper and ceruloplasmin and advised manganese and 24 urine copper which did not get done.  Also neuro psych eval set up for 6/26/2023 to Dr. Connors at [facility] but did not make it to the appointment. Ok to a new neuro and neuro psyche referral.     Sustained sobriety from alcohol since 2019    MDD/ZAIDA/ passive SI: under care of [facility]ro[facility] psyche Morena Spencer . Previously on Abilify & stopped as didn't like it. No longer on Cymbalta, no longer on clonidine which made him tired.  Currently on fluoxetine 40 mg and 5 mg lexapro. Lexapro helps better with anxiety but wipes him out so doing this way. Did also take Ketamine infusion / lozenges a while x 4 last on 9/5/24.  Felt made no difference. Not actively suicidal. Is safe, no longer in therapy.     Hx of BCC Left nose ala s/p mohs in 2/24, reconstruction and now doing laser rx of scar tissue, under care of derm, lesion many months started as injury / pimple which he has picked at often and not been healing. Hx of SK.     Asymptomatic incidental right iliac bone island noted on ct scan in 2022, felt to be benign,     Allergies to ragweed, better as got older, currently not bothersome.    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 with Xarelto managed by hematology, decreased EF, hx of postural dizziness,chronic fatigue, hx of gaping breathing, excessive day time drowsiness, cognitive issues, seen by sleep 10/2023 advised a HST, hst done dec [id number] had technical issues advsied to repeat did not get done, elevated LDL, chronic tinnitus, resolved  chest pain, history of alcohol abuse and dependence currently in remission, ZAIDA, MDD, history of passive suicidal ideation, prior active thoughts, previously on Wellbutrin 300, Lexapro 20, no longer on gabapentin, propranolol as needed and trazodone as needed, then on Cymbalta & briefly on Abilify, history of memory issues improved off the alcohol, under care of psyche & therapy, seen by neuro mri suggestive of wernikes, additional labs neg, referred for neuropsyhce eval, history of bilateral tinnitus, seasonal allergic rhinitis, resolved essential tremor once he stopped the alcohol, resolved epigastric pain once he stopped the alcohol, Long standing epigastric pain, had taken both Pepcid and Prilosec in past Symptoms intermittent better when mood is better. Taking Prilosec 20 mg daily & Pepcid 20 mg once day & a Ginger capsule. Lab work recently including lipase & H pylori were negative.  mri showed benign CBD dilation, dilated pancreatic duct, evidence of pancreatic scarring, suggestion of IPMN, a duodenal diverticulum, and non specific mesenteric lymphadenopathy & a benign right liver cyst & asymptomatic diverticulosis, B/inguinal hernia and small asymptomatic umbilical hernia & possible gall stones, right iliac bone island, left nose skin lesion, SK,.resolved palpitations, history of dental caries needing dentures, weight gain due to sedentary state, positive test,  history of ADD, ED, SK, on multivitamin and prior tonsillectomy, allergic to ragweed's,     In 2018 was unemployed, from [place] originally where was getting his care at the local clinic there, hx of alcohol dependence in early remission, ZAIDA, MDD, hx of SI, on Wellbutrin, Lexapro, gabapentin & MV, under care of psychiatry at [facility], memory changes, epigastric pain, palpitations, weight gain, hx of chest pain, fatigue, dizziness, ED, improved with time away from alcohol, essential tremor on propranolol, B/l tinnitus, seasonal allergic  "rhinitis, allergic to ragweed, dental caries / abscess, S/p extraction nov 2019, 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 Xarelto under care of hematology, prior tonsillectomy,   Hx of ACS, NSTEMI with elevated troponin, no CAD on angiogram,  but found to have an acute saddle embolism with cor pulmonale & hx of DVT,  in 8/2019 on lifelong anticoagulation now on Xarelto 20 mg daily, ever since under care of hematology, hx of moderate alcohol dependence in early remission with hx of withdrawal & mood disorder, previously on campral ( discontinued secondary to diarrhea), & naltrexone( stopped as ineffective), ZAIDA, MDD, suicidal ideation, on Wellbutrin Xl 150 mg daily, Lexapro 20 mg daily, gabapentin 300 mg bedtime & a multivitamin, previously  also on propranolol, previously on Abilify 5 mg, citalopram 10 mg, hydroxyzine, mirtazapine & trazodone, hx of dental caries, recent dental abscess S/p abx, hx of essential tremor on propranolol 20 mg tid, allergic to ragweed's, [facility],      Seen at [facility] ER 12/26/18 with suicidal ideation. Stabilized , treated ( see notes for details & discharged to [facility]).  Seen in ER at [facility] on 6/12/19 \" for increased depression, hopelessness, and alcohol abuse. Had been discharged from his sober due to smelling like alcohol.   Admitted [facility] on 8/2019: with shortness of breath and 2 days of chest pain and near syncope. He presented to the emergency room on 8/4/2019 after he had a syncopal episode in front of [facility]. He did not recall particular chest pain or shortness of breath. He noted swelling of his leg in various spots but he denied any persistent pain or swelling or redness. No injury. No acute illness prior to that. However he had donated plasma 10 times in 5 weeks prior to that. Upon initial evaluation, d-dimer was elevated at 6.7. PTT was normal at 28, INR is 1.09.  Due to an elevated " troponin he underwent coronary angiogram which demonstrated no significant CAD. Due to an elevated d-dimer underwent CT scan of the chest which showed extensive acute bilateral pulmonary embolism with saddle embolus.  Doppler legs showed Nonocclusive right femoral DVT from upper to mid thigh. Left calf DVT within one of the peroneal veins in both of the posterior tibial veins from upper to low calf. Echocardiogram revealed lower limit of normal left ventricular systolic function of 50% with septal motion compatible with right ventricular overload.  He had cor pulmonale and was started on heparin infusion. No thrombolytics were given due hemodynamic and respiratory stability. After several days of monitoring on IV heparin Mr. Sinha continued to improve and he was transitioned to Xarelto for ongoing anticoagulation therapy. He tolerated Xarelto well. Had no major bleeding except intermittent gum bleeding. Denied family history of venous thromboembolism. He had a brother who passed away from [facility]. He was single. Did not have children. Was advised to continue with indefinite anticoagulation. Hematology thought maybe plasma donation had led to a transient hypercoagulable state. Mr. Sinha was advised not to donate plasma moving forward. An outpatient referral to hematology had been made.  Seen by hematology 9/2019 at [facility]. She reviewed the pathophysiology of venous thromboembolic exam and risks of recurrence. Was advised of possible etiology, he was indicated for long-term anticoagulation due to initial presentation of a life-threatening episode. No prior history of venous thromboembolism or family history of thromboembolism. Was not recommended family/hereditary thrombophilia work-upa repeat CT scan of the chest to know the resolution of blood clots was not needed & only recommended to obtain a CTA if he had signs and symptoms concerning for venous thromboembolism. Was advised strict abstinence from  "alcohol with anticoagulants & to obtain kidney, liver function monitoring at least once a year.   He had age-appropriate cancer screening. He admitted that he had a positive stool test (sounds like FOBT) a couple years prior. It was unclear if it was related to his alcohol use. He was recommended to proceed with colonoscopy, but to wait at least 3-6 months because of this recent pulmonary emboli event, &  recommended interruption of anticoagulation prior to that. He voiced understanding. His previous primary care provider retired.    He was engaged with outpatient CD treatment at [facility] but he had been unable to maintain any sobriety outside of a structured facility. Roughly one month prior to next ER  visit he stopped taking all of his psych medications.      Was in the ER at [facility] on 10/17/2019 for suicidal ideation & alcohol abuse: had increased depressive symptoms, anxiety, and suicidal ideation. He presented to the outpatient clinic for a Rule 25 assessment. He endorsed numerous acute depressive symptoms. He relapsed on alcohol and had been unsuccessful outside of a structured program. He was previously working a good job as a Scoring Director at [employer] \"but I drank myself out of that job.\" He had been evicted from his apartment and found himself homeless. He rated his depression as 8 out of 10 and anxiety as 9 out of 10. He was feeling hopeless with thoughts of suicide and a plan to hang himself. He stated that he hadn't taken any steps towards acting on the thoughts but that \"it's something in my mind.\" He felt somewhat isolated. He had one friend in [place], but no supportive family that were still alive.   He was admitted on a voluntary basis. Agreeable to stay voluntarily to coordinate cares and medication management. Was seen by the hospitalist during course of hospitalization. Started on antibiotics for a dental abscess. He was placed on a CIWA protocol & did not demonstrate signs or symptoms of " complicated alcohol withdrawal. Psychiatric medication management was performed in detail to target signs and symptoms of principal diagnosis & comorbid illnesses. Medication management included Lexapro which was restarted for depression and anxiety as well as Wellbutrin XL, & Gabapentin as needed for anxiety. Propranolol as needed for anxiety. Given Naltrexone as a trial for alcohol use disorder; LFT's were normalizing & Campral was discontinued secondary to GI side- effects. Denied psychosis. Denied suicidal and homicidal ideation. Did have an appropriate crisis and relapse plan. Was future oriented. Denied gun access. Indicated motivation to proceed with MI CD treatment as coordinated by  with intake date on Friday, November 1, 2019.   followed in regards to collecting and reviewing collateral information, coordination of cares and discharge planning. Including, MI CD evaluation completed with recommendation for placement to unit 2700. Due to dental issues, he was discharged to medical respite bed to bridge placement. He was treated for dental abscess with PCN.     Seen 11/29/19 for the first time by this writer. See that note for details. Noted Hx of severe MDD, ZAIDA, hx of passive suicidal thoughts, early remission from alcohol dependence just completed rule 25 , detox & rehab & in sober living. had just moved from [place] to [place]. Had poor social support but was forward thinking  & had apt's for the dentist & psyche & noted had been taking meds & contracted to safety. 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 No hypercoag work up recommended given lifelong anticoagulation indicated & seen by hematology. Note from sept 2019 reviewed.   Reported fatigue, weight gain, dizziness since on new meds for mental health. Denied chest pain or palpitations though noted had had these in  the past. Exam was benign & vitally stable.Did not appear to be in heart failure. Noted his memory changes, abdominal pain, tinnitus, ED thought  related to alcohol abuse & noted symptoms had improved since quit drinking. Advised to try Pepcid OTC for abdominal pain. Declined referral to ENT/ audiology. Seasonal allergic rhinitis was not an issue. Was to consider a GI referral for a scope in future once could come off Xarelto a few days. Noted due for follow up with hematology by feb 2020. Was to sign an KISHOR to get records from [place] where previously doctored. Was given the Tdap. Was negative for Hep C, HIV, had a normal folic acid, vit B12, CMP, TSH, cbc, HB A1C & LDL was elevated.   Seen by [facility] psychiatry on 12/5/19 for ZAIDA, depression and alcohol abuse in remission and noted was taking propranolol 1 a day instead of three times and continued on same meds till reevaluated in 4 weeks.      Seen 12/20/2019 & noted remained sober and symptoms were improved.  Seen by psychiatry at [facility] 1/9/2020 when noted had stopped gabapentin and was continued on Lexapro, Wellbutrin along with using propanolol rarely as needed.  Did see the hematologist 1/23 and told to complete therapeutic course of Xarelto and then continue indefinitely prophylactically due to unprovoked prior DVT and PE.  Advised that it was okay to get dental procedures and colonoscopy after March 1 and he would be low risk to hold the medication 1 to 2 days before and after depending on the procedure risk.  And to follow-up with him in 1 year.       Seen by psychiatry 5/2020 by telephone encounter due to pandemic and continued on Lexapro and propranolol and Wellbutrin but Lexapro increased to 30 mg.  Was doing therapy. Seen by psych 6/2/20 and bupropion increased to 300 and continued on rest of meds.  Was in the ER 6/04/20 dental pain and abscess of tooth #20.  Was drained and put on amoxicillin and advised to see the dentist for root canal.  He  called on 6/9 and medication was changed to Augmentin and he did see the dentist on 6/11/20 but tooth had broken off by then and root canal not indicated as it was too far gone and instead his tooth was pulled  to make dentures as he had quite a few teeth pulled. Seen psychiatry on 7/2/20 and continued on all his meds and trazodone added and noted propanolol made him tired and he only took half tablet prior to an AA meeting.      TE done 7/7/20 noted was doing reasonably well.  Had passive suicidal thoughts but no active plan.  Under care of a psychiatrist and therapist.  He was to see his therapist in person the next day in person for the first time since the pandemic began.  He had still been going to AA and conversing with his psychiatrist via telephone.  Notes from the psychiatrist reviewed.  He was concerned about some skin changes, ear wax and tinnitus.  His abdominal pain and palpitations had resolved since he stopped the alcohol noted he had been sober then 9 months.  He continued to have ED and some fatigue and occasional lightheadedness which he believed was side effects from the medications he took. Was very rarely forgetful not as significant to be of any concern to him. He was planning to get dentures. Was to continue care with his hematologist for hx of unprovoked DVT & PE.  Due for appointment with them in the new year. Advised to work on advanced directives. Due to a prior positive stool test was recommended a colonoscopy & per hematology they said he could go ahead with that we would just have to hold his blood thinner a day before and a day after the scope or depending on what they find & do. He had no symptoms and wanted  to wait to do the colonoscopy once the pandemic situation was  better. Advised  to decrease salt in diet to help tinnitus. Tinnitus thought a result of prior Advil/ alcohol use/ hearing loss  Fatigue as well as lightheadedness and dizziness  thought related to medications but  recommended labs at appointment and if no answer could consider referral to sleep specialist for undiagnosed sleep apnea and/or referral to neurology/cardiology and to  discuss medications further with his psychiatrist. Was to continue to work on diet and weight loss with exercise. Declined Neurology referral.  [place] it was not too serious right at the time, having just rare episodes of occasional fogginess and he felt that was related to his medications.     Seen 7/16/2020 for a physical & chronic issues. Advised self testicular exams monthly. Skin check of moles right inner arm, left mid back and right iliac area all looked like benign seborheic keratosis. Offered referral to derm if remained concerned or they changed. Ear check: showed minimal wax, likely not a cause of tinnitus. Referred to see ENT for tinnitus. Was noted due for a colonoscopy. was to follow up with his hematologist regarding hx of unprovoked PE and DVT and blood thinners. Labs done and advised If normal and remained concerned about fatigue recommended to see sleep to be evaluate for sleep apnea. LDL elevated, ASCVD 7.7% . Diet , exercise, weight loss recommended. Was to recheck in 6 months. Noted remained sober from alcohol. Advised if dizziness persisted or got worse can to see cardio/ neuro. Opted then to defer due to cost and felt was managing ok. Thought was from his psyche meds. Was advised to stay well hydrated. Consider the Shingrix vaccine. With regards to his MDD/ Francesca/ chronic passive suicidal thoughts, had no active plan, was under care of psyche and & his Counsellor & had upcoming apt's with them. Was to continue care with his dentist. Had left jaw swelling from recent tooth extraction and infection & had been started on Augmentin twice a day and tramadol as needed by his dentist.  Understood risk of antibiotics and chronic narcotics.       Seen by [facility] on 9/8/20 & 10/2020 & continued on Wellbutrin 300 mg, propranolol prn, &  trazodone 25 to 50 mg bedtime prn. Had a colonoscopy in nov 2020 which was normal. 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 3/2021 did an e visit for Covid symptoms but Covid was negative.   Seen by psych he 4/2021 & continue don Lexapro 30, trazodone 50, Wellbutrin decreased to 150 & started pregabalin 50 bid and hydroxyzine prn & propranolol held. Seen again by psychalva on 5/2021 & hydroxyzine discontinued as caused drowsiness. Seen by psyche on 7/2021 and pregabalin and propranolol discontinued.      [place]  shows received gabapentin 100 mg #270 on 1/4/19 at [place], then 300 mg # 90  On 2/11/19 in [facility], 100 mg # 60 on 3/6/19 in [facility], then # 180 on 6/20/19, 300 mg # 60 on 10/30/19 & # 30 of 300 mg on 11/21/19.  Tylenol 3 12 tablets on 2/11/2021 and tramadol 50 mg #12 on 2/25/2020. Tramadol in July by dentist # 12. pregabalin 50 mg # 60 on 4/12/21, 5/10/21 & 6/2/21     Seen 8/18/21 for chronic fatigue and dizziness thought multifactorial from meds, mood, and prior PE. Recommended labs, echo to assess cardiac function since last checked in 2019 when had effects of large Pulmonary embolism. Had No evidence of coronary artery disease. EKG looked normal with right heart axis from prior PE. Advised to avoid holding  breath if that made him dizzy. Recommended a sleep Eval to rule out undiagnosed sleep apnea & to see cardiology and neuro to complete work up. Orthostatics today were negative.  Was to continue care with hematology and chronic anticoagulation. See ENT for chronic tinnitus. Noted MDD/ ZAIDA/ passive SI, safe remained 2 yrs nato, in sober housing, to start a new job. Was to continue care with counseling and psyche & go to the ER if suicidal thoughts increased or got worse. [place] ED was related to meds, mood, prior effect of alcohol, SK was stable. Seasonal allergies were not a big issue. Was to continue care with the dentist.  Health care maintenance was reviewed. Advised self testicular check regularly. Recommended the pneumonia vaccine, shingles # 2 & was to do after done with Covid # 2 on 9/3/21 was to return in 3 months for a preventive physical then.  Labs showed normal PSA, elevated LDL, CMP showed mildly low albumin advise recheck in 3 months, TSH was normal iron normal ferritin was low, B12 was normal folate was normal CBC was normal.  Did not return for 3-month follow-up.     Seen 10/4/22, was originally scheduled as a physical.  But due to respiratory symptoms and exposure to positive COVID night before was changed to an office visit.  COVID and strep test done for history of runny nose nasal congestion cough started 10 to 12 days prior improving and reported negative COVID PCR test on day 5 of illness but also newly exposed to positive COVID case day before.  Quarantine till COVID test comes back negative,  if negative recommend rechecking in 5 days as testing could be negative as too early since time of new exposure.  Chronic fatigue felt multifactorial due to mental health, medications, prior noted low ejection fraction of heart, history of pulmonary embolism, undiagnosed sleep apnea etc. Labs done & referred to cardiology and sleep to further evaluate and treat and return in 4 to 6 weeks to follow-up. Recheck echo and to see cardiology for prior noted decreased EF suspected due to pulmonary embolism in the past. History of chronic dizziness & referred to cardiology and neurology. For prior low ferritin and low calcium labs done.   History of elevated LDL & labs done and if cardiovascular risk was elevated to consider statin medication.  History of prior pulmonary embolism and DVT,noted had taken self off Xarelto in May 2022 but had medication at home.  Recommended to return to hematology to discuss risks and if medication needs to be resumed.  Hx of Upper abdominal pain ongoing many years, thought could be due to gastritis,  ulcer, heartburn etc.  Labs including H. pylori ordered as well as a CT abdomen pelvis given duration of symptoms.  Was doing over-the-counter Prilosec as needed with some relief.  Discussed chronic use of this medication could increase risk of C. difficile colitis, fractures, magnesium and B12 deficiency. To try Pepcid 20 mg twice a day over-the-counter instead in the meantime  Noted an elevated blood pressure, felt due to cough& anxiety. Was recommended to eat a low-salt diet and recheck at follow-up and if persisted to consider medication. 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   Reviewed major depression with anxiety and passive suicidal ideation, felt safe under care of psychiatry & had an appointment with them the following day to discuss medications.  Had had changes to meds over time, was to continue with therapy every 2 weeks.  & noted was gainfully employed. Memory concerns felt could be due to prior alcohol use and current mental health. Did check B12 and magnesium levels and referred to see neurology to further evaluate and treat  Did not have time to discuss a lesion on left side of nose that he had had for some time that was not healing. Was to review at next appointment and refer to dermatology if needed  Was to return in 4 to 6 weeks for physical and update flu shot, COVID-vaccine, pneumonia vaccine and shingles etc. Noted was a Former smoker but did not meet criteria for lung CT cancer screening.     Covid & strep was negative, LDL elevated with ASCVD risk of 9.4 % normal lipase, b12, ferritin, calcium, vit d, CMP, TSH, magnesium, iron, HBA1c was normal. H pylori came back normal.   On 10/6/2022 noted hematology PA filled his Xarelto at reduced dose of 10 mg for one year after he discussed with them a compromise to restart meds,  On 10/24/22 ct showed a benign right liver cyst, CBD dilation, & advised an mri. Also noted duodenal diverticulum, diverticulosis, bone island right iliac  that was asymptomatic & B/l inguinal hernia & umbilical hernia that was asymptomatic  Echo showed improvement with normal ef & no valvular concerns.   Met with cardiology on 11/22 who noted normal EKG & echo, advised a statin & felt could do a stress test but would not provide more infor & patient opted not to do.  MRI done 11/25 showed CBD dilation was not of concern, had possible gallstones/ sludge, had evidence of pancreatic scarring, suggestion of IPMN, a duodenal diverticulum and non specific mesenteric lymph nodes.    Seen 12/2022 for a physical & follow up from last visit  MDD/Francesca/ passive SI: under care of therapy and psyche. Had been started Abilify & stopped as didn't like it. No longer on Cymbalta, was supposed to talk to psyche 2 weeks prior but it got cancelled and had next apt on dec 15 th, was thinking about going back to restarting Lexapro as in retrospect felt better while on it.  Was  safe. Though mood and FRANCESCA worse off any meds. Reported no urge to drink alcohol.   Congratulated on sustained sobriety from alcohol since 2019  Reviewed his long standing epigastric pain, had been taking both Pepcid and Prilosec since oct visit. Symptoms would go away but return.  When pain went away mood was better. Taking Prilosec 20 mg daily & Pepcid 20 mg once day & a Raeann capsule. Lab work recently including lipase & H pylori were negative. did  Ct abdomen and then mri showed benign CBD dilation, dilated pancreatic duct, evidence of pancreatic scarring, suggestion of IPMN, a duodenal diverticulum, and non specific mesenteric lymphadenopathy & a benign right liver cyst & asymptomatic diverticulosis, B/inguinal hernia and small asymptomatic umbilical hernia & possible gall stones. Discussed Chronic use of Prilosec like med's could cause atypical pneumonia, fractures,  C diff colitis, vit B 12 and magnesium deficiency. If could come off would be good by slowly tapering off Prilosec while continuing Pepcid twice a  day for symptoms control. Referred to GI to evaluate epigastric pain, IPMN, duodenal diverticulum & could  benefit from an EGD.   Also referred to general surgeon regarding possible gall bladder sludge & asymptomatic B/l inguinal & umbilical hernia which I didn't believe was contributing to long standing epigastric pain.   Chronic fatigue, Seen by cardio, Echo & EKG & labs normal, opted no stress test. Had upcoming apt with sleep in new year to evaluate for possible TAYLOR. Suspected mental health playing a role. To continue care with his psychiatrist & therapist. Prior noted decreased EF with prior PE had resolved with recent normal echo. Elevated BP, recheck end of visit was wnl, suspected due to anxiety. To continue to monitor & advised a low salt & low caffeine diet.   Dizziness persisted, sometimes ws postural, sometimes could occur while standing with no posture changes. Rarely feels like will black out. At times gets up and has to sit right down again. Feels drinking enough water. Seen by cardio EKG, echo normal. Cardio felt stress test would not add more information. 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 No sign of bleeding. Labs normal. To be seeing neuro to evaluate more in the new year. If no answer could consider a Holter monitor.  LDL elevated. The 10-year ASCVD risk score (Jerry DK, et al., 2019) was: 9.3%. Discussed risk using boswell's medical model for this risk group. Opted  to hold off on statin   Subjective memory concerns, Able to do job ok but felt struggled with things sometimes. B 12 and TSH normal.to discuss with neuro in march when had apt. Suspected mental health playing a role.   Hx of PE & DVT & back on Xarelto since stopped in may but since on a lower dose of 10 mg a compromise he made with hematology.  Incidental right iliac bone island noted on ct scan, felt to be benign, was asymptomatic. Left nose lesion many months started as injury / pimple which he had picked at often and not  been healing.   SK no new changes to skin otherwise. Referred to dermatology for nose skin lesion & general skin check. B/l tinnitus stable . Allergies not bothersome now, as gotten older had allergy to ragweed noted got better. Dental caries improved. ED noted better since off the antidepressants  HM reviewed. No ACP on file, honoring choices given to review. Vaccines discussed. Given flu and Covid pfizer bivalent vaccine. To make an apt to do long overdue shingrex #2 in a few weeks with the MA & a BP check Desired to hold off on Hep B vaccine. Labs reviewed from oct PSA was normal in 8/2021 & could offer at next visit / physical. Was to follow up again in march 2023 after saw  sleep & neuro & offer Hep B then.    Was to get an ERCP appointment with Dr. Peralta.  But no appointment made.  Seen by neurology 3/9/2023 for lightheadedness, memory changes, fatigue, short-term memory concerns, MRI brain ordered and to consider neuro psych eval.  For postural dizziness not orthostatic at visit advise compression socks and was to follow-up in 6 months.  Seen by [facility] psychiatry/7/23 for MDD ZAIDA continued on Zoloft started on Trintellix at the time.  MRI 4/13 showed findings consistent with Wernicke's encephalopathy suspected a scar from prior alcohol use and also had abnormal signal bilateral global pallidum and advised to check B1 copper ceruloplasmin and encouraged to take a B complex.  Lab work done 5/24/2023 showed normal B1 copper and ceruloplasmin and advised manganese and 24 urine copper which did not get done.  Also neuro psych eval set up for 6/26/2023 to Dr. Connors at [facility] but did not make it to the appointment.  Seen by sleep 10/12/2023 for gasping breathing, excessive daytime drowsiness, cognitive issues, advised HST and decrease caffeine.  HST done 12/5/2023 unfortunately had technical errors and was advised to redo which did not get done.  Seen by dermatology 2/26/24 and a shave biopsy of left  "Rodo showed basal cell cancer and advise Mohs.  Seen at [facility] in April 2024 for dental infection had Mohs procedure 4/18/2024.  Postop check 4/24.  Reconstruction started 5/13/2024 seen by dermatology 5/15/2024.  Seen by [facility] 5/20/2024 for #31 tooth decay referred to endodontist to preserve tooth on patient request though prognosis was poor.  Given a topical anesthetic on 5/21 for pain.  Seen by [facility] 5/23 for endodontic evaluation noted pulp necrosis and advised symptomatic care with ibuprofen.  Seen by reconstructive Derm 6/6 started with laser treatment of scar laser treatment on 7/22 and 9/13/2024.  [place]  shows received ketamine 3/29/2024, 5/24, 8 7/10/2024 and 9/5/2024 and Percocet 5/325 #12 on 4/18 #5 on 5/14, Norco 5/325 #6 on 5/17/2024      Review of Systems  Constitutional, HEENT, cardiovascular, pulmonary, GI, , musculoskeletal, neuro, skin, endocrine and psych systems are negative, except as otherwise noted.      Objective    /72   Pulse 74   Temp 98.1  F (36.7  C) (Temporal)   Resp 16   Ht 1.753 m (5' 9\")   Wt 77.4 kg (170 lb 9.6 oz)   SpO2 99%   BMI 25.19 kg/m    Body mass index is 25.19 kg/m .  Physical Exam   GENERAL: alert and no distress  EYES: Eyes grossly normal to inspection, PERRL and conjunctivae and sclerae normal  HENT: ear canals and TM's normal, nose and mouth without ulcers or lesions, glasses  NECK: no adenopathy, no asymmetry, masses, or scars  RESP: lungs clear to auscultation - no rales, rhonchi or wheezes  CV: regular rate and rhythm, normal S1 S2, no S3 or S4, no murmur, click or rub, no peripheral edema  ABDOMEN: soft, nontender, no hepatosplenomegaly, no masses and bowel sounds normal  MS: no gross musculoskeletal defects noted, no edema  SKIN: no suspicious lesions or rashes, fading scars forehead and left face  NEURO: Normal strength and tone, mentation intact and speech normal, mildly unsteady gait when first gets up  PSYCH: " mentation appears normal, affect normal/bright    Results for orders placed or performed in visit on 09/20/24   Comprehensive metabolic panel     Status: None   Result Value Ref Range    Sodium 139 135 - 145 mmol/L    Potassium 4.2 3.4 - 5.3 mmol/L    Carbon Dioxide (CO2) 26 22 - 29 mmol/L    Anion Gap 11 7 - 15 mmol/L    Urea Nitrogen 15.5 8.0 - 23.0 mg/dL    Creatinine 0.94 0.67 - 1.17 mg/dL    GFR Estimate >90 >60 mL/min/1.73m2    Calcium 8.9 8.8 - 10.4 mg/dL    Chloride 102 98 - 107 mmol/L    Glucose 88 70 - 99 mg/dL    Alkaline Phosphatase 68 40 - 150 U/L    AST 26 0 - 45 U/L    ALT 14 0 - 70 U/L    Protein Total 7.0 6.4 - 8.3 g/dL    Albumin 4.4 3.5 - 5.2 g/dL    Bilirubin Total 0.5 <=1.2 mg/dL    Patient Fasting > 8hrs? Unknown    TSH with free T4 reflex     Status: Normal   Result Value Ref Range    TSH 0.81 0.30 - 4.20 uIU/mL   Lipid panel reflex to direct LDL Fasting     Status: Abnormal   Result Value Ref Range    Cholesterol 245 (H) <200 mg/dL    Triglycerides 45 <150 mg/dL    Direct Measure HDL 58 >=40 mg/dL    LDL Cholesterol Calculated 178 (H) <100 mg/dL    Non HDL Cholesterol 187 (H) <130 mg/dL    Patient Fasting > 8hrs? Unknown     Narrative    Cholesterol  Desirable: < 200 mg/dL  Borderline High: 200 - 239 mg/dL  High: >= 240 mg/dL    Triglycerides  Normal: < 150 mg/dL  Borderline High: 150 - 199 mg/dL  High: 200-499 mg/dL  Very High: >= 500 mg/dL    Direct Measure HDL  Female: >= 50 mg/dL   Male: >= 40 mg/dL    LDL Cholesterol  Desirable: < 100 mg/dL  Above Desirable: 100 - 129 mg/dL   Borderline High: 130 - 159 mg/dL   High:  160 - 189 mg/dL   Very High: >= 190 mg/dL    Non HDL Cholesterol  Desirable: < 130 mg/dL  Above Desirable: 130 - 159 mg/dL  Borderline High: 160 - 189 mg/dL  High: 190 - 219 mg/dL  Very High: >= 220 mg/dL   Hemoglobin A1c     Status: Normal   Result Value Ref Range    Estimated Average Glucose 91 <117 mg/dL    Hemoglobin A1C 4.8 0.0 - 5.6 %   ESR: Erythrocyte sedimentation  rate     Status: Normal   Result Value Ref Range    Erythrocyte Sedimentation Rate 4 0 - 20 mm/hr   CRP, inflammation     Status: Normal   Result Value Ref Range    CRP Inflammation <3.00 <5.00 mg/L   Vitamin D Deficiency     Status: Normal   Result Value Ref Range    Vitamin D, Total (25-Hydroxy) 26 20 - 50 ng/mL    Narrative    Season, race, dietary intake, and treatment affect the concentration of 25-hydroxy-Vitamin D. Values may decrease during winter months and increase during summer months.    Vitamin D determination is routinely performed by an immunoassay specific for 25 hydroxyvitamin D3.  If an individual is on vitamin D2(ergocalciferol) supplementation, please specify 25 OH vitamin D2 and D3 level determination by LCMSMS test VITD23.     Iron and iron binding capacity     Status: Normal   Result Value Ref Range    Iron 119 61 - 157 ug/dL    Iron Binding Capacity 278 240 - 430 ug/dL    Iron Sat Index 43 15 - 46 %   Ferritin     Status: Normal   Result Value Ref Range    Ferritin 132 31 - 409 ng/mL   Vitamin B12     Status: Normal   Result Value Ref Range    Vitamin B12 779 232 - 1,245 pg/mL   Helicobacter pylori Antigen Stool     Status: Abnormal   Result Value Ref Range    Helicobacter pylori Antigen Stool Positive (A) Negative   Tissue transglutaminase noreen IgA and IgG     Status: Normal   Result Value Ref Range    Tissue Transglutaminase Antibody IgA 0.3 <7.0 U/mL    Tissue Transglutaminase Antibody IgG <0.6 <7.0 U/mL   CBC with platelets and differential     Status: None   Result Value Ref Range    WBC Count 6.6 4.0 - 11.0 10e3/uL    RBC Count 4.67 4.40 - 5.90 10e6/uL    Hemoglobin 14.6 13.3 - 17.7 g/dL    Hematocrit 43.3 40.0 - 53.0 %    MCV 93 78 - 100 fL    MCH 31.3 26.5 - 33.0 pg    MCHC 33.7 31.5 - 36.5 g/dL    RDW 12.2 10.0 - 15.0 %    Platelet Count 223 150 - 450 10e3/uL    % Neutrophils 65 %    % Lymphocytes 29 %    % Monocytes 5 %    % Eosinophils 1 %    % Basophils 0 %    % Immature  Granulocytes 0 %    Absolute Neutrophils 4.3 1.6 - 8.3 10e3/uL    Absolute Lymphocytes 1.9 0.8 - 5.3 10e3/uL    Absolute Monocytes 0.3 0.0 - 1.3 10e3/uL    Absolute Eosinophils 0.1 0.0 - 0.7 10e3/uL    Absolute Basophils 0.0 0.0 - 0.2 10e3/uL    Absolute Immature Granulocytes 0.0 <=0.4 10e3/uL   Manganese Whole Blood     Status: None   Result Value Ref Range    Manganese Whole Bld 16.3 4.2 - 16.5 ug/L   CBC with Platelets & Differential     Status: None    Narrative    The following orders were created for panel order CBC with Platelets & Differential.  Procedure                               Abnormality         Status                     ---------                               -----------         ------                     CBC with platelets and d...[[id number]]                      Final result                 Please view results for these tests on the individual orders.     No results found for this or any previous visit (from the past 24 hour(s)).      Signed Electronically by: Adriana Solis MD

## 2024-09-20 NOTE — RESULT ENCOUNTER NOTE
Hello -    Here are my comments about your recent results:  -Normal red blood cell (hgb) levels, normal white blood cell count and normal platelet levels.  -A1C (diabetic test) is normal and indicates that your blood sugar has been in a normal range the last 3 months.  Normal ESR ( inflammation marker)   For additional lab test information, [email or web address].org is an excellent reference..    Please let us know if you have any questions or concerns.     Regards,  Adriana Solis MD

## 2024-09-21 NOTE — RESULT ENCOUNTER NOTE
Hello -    Here are my comments about your recent results:  The 10-year ASCVD risk score (Jerry ROCKWELL, et al., 2019) is: 5.7%    Values used to calculate the score:      Age: 59 years      Sex: Male      Is Non- : No      Diabetic: No      Tobacco smoker: No      Systolic Blood Pressure: 100 mmHg      Is BP treated: No      HDL Cholesterol: 58 mg/dL      Total Cholesterol: 245 mg/dL  -LDL(bad) cholesterol level is elevated which can increase your heart disease risk.  A diet high in fat and simple carbohydrates, genetics and being overweight can contribute to this. ADVISE: exercising 150 minutes of aerobic exercise per week (30 minutes for 5 days per week or 50 minutes for 3 days per week are options) and eating a low saturated fat/low carbohydrate diet are helpful to improve this. Consider doing a ct calcium score to get objective data about risk and need for starting a statin med.   -Liver and gallbladder tests are normal (ALT,AST, Alk phos, bilirubin), kidney function is normal (Cr, GFR), sodium is normal, potassium is normal, calcium is normal, glucose is normal.  -TSH (thyroid stimulating hormone) level is normal which indicates normal thyroid function.  -Vitamin B12 result is normal  -Vitamin D level is low normal encouraged getting 2000 IU daily   -Ferritin (iron) level is normal.  -Iron level is normal.  Crp ( inflammation marker) is normal  Will see what rest of tests and specialists say  Will check prostate test at your physical  For additional lab test information, [email or web address] is an excellent reference..    Please let us know if you have any questions or concerns.     Regards,  Adriana Solis MD

## 2024-09-23 ENCOUNTER — TELEPHONE (OUTPATIENT)
Dept: FAMILY MEDICINE | Facility: CLINIC | Age: 59
End: 2024-09-23
Payer: COMMERCIAL

## 2024-09-23 ENCOUNTER — TELEPHONE (OUTPATIENT)
Dept: CARDIOLOGY | Facility: CLINIC | Age: 59
End: 2024-09-23
Payer: COMMERCIAL

## 2024-09-23 DIAGNOSIS — R42 DIZZINESS: ICD-10-CM

## 2024-09-23 DIAGNOSIS — R93.1 DECREASED CARDIAC EJECTION FRACTION: Primary | ICD-10-CM

## 2024-09-23 DIAGNOSIS — R53.82 CHRONIC FATIGUE: ICD-10-CM

## 2024-09-23 LAB
TTG IGA SER-ACNC: 0.3 U/ML
TTG IGG SER-ACNC: <0.6 U/ML

## 2024-09-23 NOTE — TELEPHONE ENCOUNTER
Order/Referral Request    Who is requesting: Patient    Orders being requested: HST    Reason service is needed/diagnosis: PT did one on 12/4/2023 and the equipment malfunctioned and then he never re-did the HST. He is now looking to redo it. Last Saw Jonatan: 10/12/2023. If pt needs to meet with another sleep provider again, please contact.     When are orders needed by: ASAP    Has this been discussed with Provider: Yes    Does patient have a preference on a Group/Provider/Facility? [facility]    Does patient have an appointment scheduled?: No    Where to send orders: Place orders within Epic    Could we send this information to you in [facility] or would you prefer to receive a phone call?:   Patient would prefer a phone call   Okay to leave a detailed message?: Yes at Cell number on file:    Telephone Information:   Mobile [phone]

## 2024-09-23 NOTE — TELEPHONE ENCOUNTER
This encounter is being sent to inform the clinic that this patient has a referral from Adriana Solis MD in [facility]  for the diagnoses of     Chronic fatigue [R53.82]  Dizziness [R42]  Postural dizziness with near syncope [R42, R55]  Family history of arrhythmia [Z82.49]  Family history of ischemic heart disease [Z82.49]    and has requested that this patient be seen within 1-2 weels and/or with Cardiology.  Based on the availability of our provider(s), we are unable to accommodate this request.    Were all sites offered this patient?  Yes-pt ok'd time frame    Does scheduling algorithm request to schedule next available?  Patient has been scheduled for the first available opening with Dr. To on 11/21/24.  We have informed the patient that the clinic will review their referral and reach out if a sooner appointment is medically necessary.

## 2024-09-23 NOTE — RESULT ENCOUNTER NOTE
Hello -    Testing negative for celiac    Please let us know if you have any questions or concerns.     Regards,  Adriana Solis MD

## 2024-09-23 NOTE — TELEPHONE ENCOUNTER
----- Message from Mae DELGADILLO sent at 9/23/2024  7:12 AM CDT -----  Can we fwd to appropriate care team?    Thank you,  Mae  ----- Message -----  From: Adriana Solis MD  Sent: 9/23/2024   7:06 AM CDT  To: Mae Joseph    Yes please call him & he can do that in a lab only apt . thanks  ----- Message -----  From: Mae Joseph  Sent: 9/23/2024   7:00 AM CDT  To: Adriana Solis MD    Happy Monday Dr. Solis,    The Manganese Whole Blood was canceled due to improper processing. I have reordered as future. Patient has appt with you 11/19. Would you like Nikunj back before that appt?     Sorry for the inconvenience.     Mae Joseph MLT([facility])  [facility]   [phone]

## 2024-09-24 PROCEDURE — 83785 ASSAY OF MANGANESE: CPT | Mod: 90 | Performed by: FAMILY MEDICINE

## 2024-09-24 PROCEDURE — 87338 HPYLORI STOOL AG IA: CPT | Performed by: FAMILY MEDICINE

## 2024-09-24 PROCEDURE — 82525 ASSAY OF COPPER: CPT | Mod: 90 | Performed by: FAMILY MEDICINE

## 2024-09-24 PROCEDURE — 99000 SPECIMEN HANDLING OFFICE-LAB: CPT | Performed by: FAMILY MEDICINE

## 2024-09-24 PROCEDURE — 36415 COLL VENOUS BLD VENIPUNCTURE: CPT | Performed by: FAMILY MEDICINE

## 2024-09-25 LAB — H PYLORI AG STL QL IA: POSITIVE

## 2024-09-25 RX ORDER — BISMUTH SUBCITRATE POTASSIUM, METRONIDAZOLE, TETRACYCLINE HYDROCHLORIDE 140; 125; 125 MG/1; MG/1; MG/1
3 CAPSULE ORAL
Qty: 120 CAPSULE | Refills: 0 | Status: SHIPPED | OUTPATIENT
Start: 2024-09-25 | End: 2024-10-05

## 2024-09-25 NOTE — RESULT ENCOUNTER NOTE
Hello -    Here are my comments about your recent results:  Testing positive for H Pylori  Continue omeprazole 20 mg daily 2 week as prescribed   In addition take combo med containing bismuth, metronidazole and tetracylcine 4 / day for 10 days together with the omeprazole to eradicate the h pylori  This will likely help stomach symptoms  Once done with this regimen continue on pepcid previously prescribed and encouraged to see GI as discussed as may benefit from an EGD for this and for other reasons discussed at apt.  For additional lab test information, [email or web address] is an excellent reference..    Please let us know if you have any questions or concerns.     Regards,  Adriana Solis MD

## 2024-09-26 LAB — MANGANESE BLD-MCNC: 16.3 UG/L

## 2024-09-26 NOTE — RESULT ENCOUNTER NOTE
Hello -    Here are my comments about your recent results:  Normal manganese     Please let us know if you have any questions or concerns.     Regards,  Adriana Solis MD

## 2024-09-29 PROBLEM — R42 POSTURAL DIZZINESS WITH NEAR SYNCOPE: Status: ACTIVE | Noted: 2019-11-29

## 2024-09-29 PROBLEM — Z82.49 FAMILY HISTORY OF ISCHEMIC HEART DISEASE: Status: ACTIVE | Noted: 2024-09-29

## 2024-09-29 PROBLEM — K82.8 SLUDGE IN GALLBLADDER: Status: ACTIVE | Noted: 2024-09-29

## 2024-09-29 PROBLEM — K86.89 DILATED PANCREATIC DUCT: Status: ACTIVE | Noted: 2024-09-29

## 2024-09-29 PROBLEM — R90.89 ABNORMAL FINDING ON MRI OF BRAIN: Status: ACTIVE | Noted: 2024-09-29

## 2024-09-29 PROBLEM — R41.3 MEMORY DIFFICULTIES: Status: ACTIVE | Noted: 2019-11-29

## 2024-09-29 PROBLEM — R55 POSTURAL DIZZINESS WITH NEAR SYNCOPE: Status: ACTIVE | Noted: 2019-11-29

## 2024-09-29 PROBLEM — Z85.828 HISTORY OF BASAL CELL CANCER: Status: ACTIVE | Noted: 2024-09-29

## 2024-09-29 PROBLEM — M89.8X9 BONE ISLAND: Status: ACTIVE | Noted: 2024-09-29

## 2024-09-29 PROBLEM — R63.4 WEIGHT LOSS: Status: ACTIVE | Noted: 2024-09-29

## 2024-09-29 PROBLEM — Z82.49 FAMILY HISTORY OF ARRHYTHMIA: Status: ACTIVE | Noted: 2024-09-29

## 2024-10-02 ENCOUNTER — MYC MEDICAL ADVICE (OUTPATIENT)
Dept: FAMILY MEDICINE | Facility: CLINIC | Age: 59
End: 2024-10-02
Payer: COMMERCIAL

## 2024-10-02 LAB
COPPER 24H UR-MRATE: 12.9 UG/D
COPPER UR-MCNC: 1.1 UG/DL
COPPER/CREAT UR: 11.1 UG/G CRT
CREAT 24H UR-MRATE: 1163 MG/D
CREAT UR-MCNC: 99 MG/DL

## 2024-10-02 NOTE — RESULT ENCOUNTER NOTE
Hello -    Here are my comments about your recent results:  24-hour copper collection looks normal no concern for Jordy's disease    Please let us know if you have any questions or concerns.     Regards,  Adriana Solis MD

## 2024-10-03 ENCOUNTER — TELEPHONE (OUTPATIENT)
Dept: GASTROENTEROLOGY | Facility: CLINIC | Age: 59
End: 2024-10-03
Payer: COMMERCIAL

## 2024-10-03 NOTE — TELEPHONE ENCOUNTER
REFERRAL INFORMATION:  Referring Provider:      Adriana Solis MD     Referring Clinic:  [facility]   Reason for Visit/Diagnosis:   R10.13 (ICD-10-CM) - Epigastric pain   K82.8 (ICD-10-CM) - Sludge in gallbladder   K83.8 (ICD-10-CM) - Dilation of common bile duct   K86.89 (ICD-10-CM) - Dilated pancreatic duct   D49.0 (ICD-10-CM) - IPMN (intraductal papillary mucinous neoplasm)   K76.89 (ICD-10-CM) - Liver cyst   K57.10 (ICD-10-CM) - Duodenal diverticulum   K57.30 (ICD-10-CM) - Diverticulosis of large intestine without hemorrhage   R59.0 (ICD-10-CM) - Mesenteric lymphadenopathy          FUTURE VISIT INFORMATION:  Appointment Date: 10/10/24  Appointment Time:      NOTES RECORD STATUS  DETAILS   OFFICE NOTE from Referring Provider Internal 9/20/24   OFFICE NOTE from Other Specialists N/A    HOSPITAL DISCHARGE SUMMARY/ ED VISITS  N/A    OPERATIVE REPORT N/A    ENDOSCOPY (EGD)  N/A    PERTINENT LABS Internal    PATHOLOGY REPORTS (RELATED) N/A    IMAGING (CT, MRI, US, XR)  Internal 11/25/22-MR abdomen    10/21/22-CT abdomen pelvis     Records Requested    Facility    Fax:    Outcome

## 2024-10-03 NOTE — TELEPHONE ENCOUNTER
[facility] Call Center    Phone Message    May a detailed message be left on voicemail: Yes    Reason for Call: Other: Patient is currently scheduled on 11/20/2024, as visit type New GI Urgent. This is outside the expected timeline for this referral. Patient has been added to the waitlist.      Action Taken: Message routed to:  Other: GI REFERRAL TRIAGE POOL     Travel Screening: Not Applicable

## 2024-10-09 NOTE — CONFIDENTIAL NOTE
DIAGNOSIS   Epigastric pain   Sludge in gallbladder   Dilation of common bile duct   Dilated pancreatic duct   IPMN (intraductal papillary mucinous neoplasm)   Liver cyst      Appt Date:  10.17.2024    NOTES STATUS DETAILS   OFFICE NOTE from referring provider Internal 09.202.2024 Adriana Solis MD    MEDICATION LIST Internal    IMAGING     COLONOSCOPY (IF AVAILABLE) Internal 11.12.2020   CT OF ABDOMEN Internal 10.21.2022 CT ABDOMEN PELVIS W CONTRAST    MRI OF LIVER Internal 11.25.2022 MR Abd    LABS     COMPLETE METABOLIC PANEL Internal 09.20.2024   COMPLETE BLOOD COUNT (CBC) Internal 09.20.2024

## 2024-10-10 ENCOUNTER — PRE VISIT (OUTPATIENT)
Dept: SURGERY | Facility: CLINIC | Age: 59
End: 2024-10-10

## 2024-10-11 NOTE — TELEPHONE ENCOUNTER
Clinic Navigator sent a [employer] message to inform the Pt that Pt is on the wait list for a sooner appointment. Clinic Navigator will reach out to the Pt via phone call or [employer] when a sooner appointment becomes available.

## 2024-10-14 ENCOUNTER — DOCUMENTATION ONLY (OUTPATIENT)
Dept: GASTROENTEROLOGY | Facility: CLINIC | Age: 59
End: 2024-10-14
Payer: COMMERCIAL

## 2024-10-14 DIAGNOSIS — K76.89 LIVER CYST: Primary | ICD-10-CM

## 2024-10-14 DIAGNOSIS — Z11.59 ENCOUNTER FOR SCREENING FOR OTHER VIRAL DISEASES: ICD-10-CM

## 2024-10-15 ENCOUNTER — TELEPHONE (OUTPATIENT)
Dept: GASTROENTEROLOGY | Facility: CLINIC | Age: 59
End: 2024-10-15
Payer: COMMERCIAL

## 2024-10-15 ENCOUNTER — TELEPHONE (OUTPATIENT)
Dept: FAMILY MEDICINE | Facility: CLINIC | Age: 59
End: 2024-10-15
Payer: COMMERCIAL

## 2024-10-15 DIAGNOSIS — D49.0 IPMN (INTRADUCTAL PAPILLARY MUCINOUS NEOPLASM): ICD-10-CM

## 2024-10-15 DIAGNOSIS — K83.8 DILATION OF COMMON BILE DUCT: ICD-10-CM

## 2024-10-15 DIAGNOSIS — K82.8 SLUDGE IN GALLBLADDER: ICD-10-CM

## 2024-10-15 DIAGNOSIS — K57.10 DUODENAL DIVERTICULUM: ICD-10-CM

## 2024-10-15 DIAGNOSIS — K86.89 DILATED PANCREATIC DUCT: ICD-10-CM

## 2024-10-15 DIAGNOSIS — R10.13 EPIGASTRIC PAIN: ICD-10-CM

## 2024-10-15 DIAGNOSIS — K76.89 LIVER CYST: Primary | ICD-10-CM

## 2024-10-15 NOTE — TELEPHONE ENCOUNTER
Call received from Hepatology department requesting patient complete pended imaging prior to visit.    If cyst is stable there would not be a reason for hepatology to see patient.    Sign if agreeable, thanks!

## 2024-10-15 NOTE — TELEPHONE ENCOUNTER
Patient referred to Hepatology Clinic for liver cyst.  Called referring provider, Dr. Adriana Solis's office.  Let them know patient has not had abdominal imaging in 2 years.  It is recommended patient repeat a MRI/MRCP and if the liver cyst is stable there is nothing to do and thus no need to see hepatology in clinic.     Appointment will be canceled.      Clair SAUNDERS LPN  Hepatology Clinic

## 2024-10-17 ENCOUNTER — PRE VISIT (OUTPATIENT)
Dept: GASTROENTEROLOGY | Facility: CLINIC | Age: 59
End: 2024-10-17

## 2024-10-18 NOTE — TELEPHONE ENCOUNTER
REFERRAL INFORMATION:  Referring Provider:      Adriana Solis MD     Referring Clinic:  [facility]   Reason for Visit/Diagnosis:   R10.13 (ICD-10-CM) - Epigastric pain   K82.8 (ICD-10-CM) - Sludge in gallbladder   K83.8 (ICD-10-CM) - Dilation of common bile duct   K86.89 (ICD-10-CM) - Dilated pancreatic duct   D49.0 (ICD-10-CM) - IPMN (intraductal papillary mucinous neoplasm)   K76.89 (ICD-10-CM) - Liver cyst   K57.10 (ICD-10-CM) - Duodenal diverticulum   K57.30 (ICD-10-CM) - Diverticulosis of large intestine without hemorrhage   R59.0 (ICD-10-CM) - Mesenteric lymphadenopathy        FUTURE VISIT INFORMATION:  Appointment Date: 11/20/24  Appointment Time:      NOTES STATUS DETAILS   OFFICE NOTE from Referring Provider Internal 9/20/24, more in PACS   OFFICE NOTE from Other Specialist N/A    HOSPITAL DISCHARGE SUMMARY/  ED VISITS N/A    OPERATIVE REPORT N/A    MEDICATION LIST Internal         ENDOSCOPY  N/A    COLONOSCOPY Internal 11.12.2020    ERCP N/A    EUS N/A    STOOL TESTING Internal 9/24/24-H.Pylori  11/28/22-H. Pylori   PERTINENT LABS Internal    PATHOLOGY REPORTS (RELATED) N/A    IMAGING (CT, MRI, EGD, MRCP, Small Bowel Follow Through/SBT, MR/CT Enterography) Internal 11/25/22-MR abdomen     10/21/22-CT abdomen pelvis

## 2024-11-15 ENCOUNTER — TELEPHONE (OUTPATIENT)
Dept: NEUROPSYCHOLOGY | Facility: CLINIC | Age: 59
End: 2024-11-15
Payer: COMMERCIAL

## 2024-11-15 NOTE — TELEPHONE ENCOUNTER
Left Voicemail (1st Attempt) and Sent Mychart (1st Attempt) for the patient to call back and schedule the following:    Appointment type: Neuropsych Testing Only  Provider: Vicki  Return date: after 1/21/2025  Specialty phone number: [phone]  Additional appointment(s) needed: NA  Additonal Notes:     Please schedule for a testing appointment the same week as the interview appointment if possible. If not, then the following week      Hortensia Donato on 11/15/2024 at 12:31 PM

## 2024-11-17 ENCOUNTER — ANCILLARY PROCEDURE (OUTPATIENT)
Dept: MRI IMAGING | Facility: CLINIC | Age: 59
End: 2024-11-17
Attending: FAMILY MEDICINE
Payer: COMMERCIAL

## 2024-11-17 DIAGNOSIS — K57.10 DUODENAL DIVERTICULUM: ICD-10-CM

## 2024-11-17 DIAGNOSIS — D49.0 IPMN (INTRADUCTAL PAPILLARY MUCINOUS NEOPLASM): ICD-10-CM

## 2024-11-17 DIAGNOSIS — R10.13 EPIGASTRIC PAIN: ICD-10-CM

## 2024-11-17 DIAGNOSIS — K86.89 DILATED PANCREATIC DUCT: ICD-10-CM

## 2024-11-17 DIAGNOSIS — K83.8 DILATION OF COMMON BILE DUCT: ICD-10-CM

## 2024-11-17 DIAGNOSIS — K76.89 LIVER CYST: ICD-10-CM

## 2024-11-17 DIAGNOSIS — K82.8 SLUDGE IN GALLBLADDER: ICD-10-CM

## 2024-11-17 PROCEDURE — A9585 GADOBUTROL INJECTION: HCPCS | Mod: JW | Performed by: RADIOLOGY

## 2024-11-17 PROCEDURE — 74183 MRI ABD W/O CNTR FLWD CNTR: CPT | Performed by: RADIOLOGY

## 2024-11-17 RX ORDER — GADOBUTROL 604.72 MG/ML
10 INJECTION INTRAVENOUS ONCE
Status: COMPLETED | OUTPATIENT
Start: 2024-11-17 | End: 2024-11-17

## 2024-11-17 RX ADMIN — GADOBUTROL 8 ML: 604.72 INJECTION INTRAVENOUS at 07:49

## 2024-11-17 NOTE — DISCHARGE INSTRUCTIONS
MRI Contrast Discharge Instructions    The IV contrast you received today will pass out of your body in your  urine. This will happen in the next 24 hours. You will not feel this process.  Your urine will not change color.    Drink at least 4 extra glasses of water or juice today (unless your doctor  has restricted your fluids). This reduces the stress on your kidneys.  You may take your regular medicines.    If you are on dialysis: It is best to have dialysis today.    If you have a reaction: Most reactions happen right away. If you have  any new symptoms after leaving the hospital (such as hives or swelling),  call your hospital at the correct number below. Or call your family doctor.  If you have breathing distress or wheezing, call 911.    Special instructions: ***    I have read and understand the above information.    Signature:______________________________________ Date:___________    Staff:__________________________________________ Date:___________     Time:__________    [facility] Departments:    ___[place]: [phone]  ___[facility]: [phone]  ___[place]: [phone] ___[facility]: [phone]  ___[facility]: [phone]  ___[facility]: [phone]  ___[phone]  ___[facility]: [phone]  ___[phone]

## 2024-11-18 NOTE — RESULT ENCOUNTER NOTE
Hello -    Here are my comments about your recent results:  MRI of abdomen showed stable mild dilation main pancreatic duct and a stable 5 mm cyst with no worrisome findings recheck in 18 months.  Also noted increased dilation of the common bile duct now 10 mm previously 6 mm with no identifiable obstructive cause.  No gallstones or inflammation noted.  Simple liver cysts noted.  Spleen and kidneys look normal adrenal glands look normal bowels look normal there is 1 duodenal diverticulum which is like a pocket but no worrisome findings.  Blood vessels look patent.  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  There is a small fat-containing umbilical hernia and some nonspecific findings in the lungs.  Will discuss more at follow-up      Please let us know if you have any questions or concerns.     Regards,  Adriana Solis MD

## 2024-11-19 ENCOUNTER — OFFICE VISIT (OUTPATIENT)
Dept: FAMILY MEDICINE | Facility: CLINIC | Age: 59
End: 2024-11-19
Payer: COMMERCIAL

## 2024-11-19 VITALS
SYSTOLIC BLOOD PRESSURE: 100 MMHG | HEIGHT: 69 IN | DIASTOLIC BLOOD PRESSURE: 70 MMHG | BODY MASS INDEX: 24.84 KG/M2 | WEIGHT: 167.7 LBS | OXYGEN SATURATION: 97 % | RESPIRATION RATE: 19 BRPM | TEMPERATURE: 97.7 F | HEART RATE: 58 BPM

## 2024-11-19 DIAGNOSIS — R42 POSTURAL DIZZINESS WITH NEAR SYNCOPE: ICD-10-CM

## 2024-11-19 DIAGNOSIS — Z79.01 CHRONIC ANTICOAGULATION: ICD-10-CM

## 2024-11-19 DIAGNOSIS — Z12.5 SCREENING FOR PROSTATE CANCER: ICD-10-CM

## 2024-11-19 DIAGNOSIS — R41.3 MEMORY DIFFICULTIES: ICD-10-CM

## 2024-11-19 DIAGNOSIS — R90.89 ABNORMAL FINDING ON MRI OF BRAIN: ICD-10-CM

## 2024-11-19 DIAGNOSIS — Z23 NEED FOR COVID-19 VACCINE: ICD-10-CM

## 2024-11-19 DIAGNOSIS — K86.89 DILATED PANCREATIC DUCT: ICD-10-CM

## 2024-11-19 DIAGNOSIS — R63.4 WEIGHT LOSS: ICD-10-CM

## 2024-11-19 DIAGNOSIS — R55 POSTURAL DIZZINESS WITH NEAR SYNCOPE: ICD-10-CM

## 2024-11-19 DIAGNOSIS — K83.8 DILATION OF COMMON BILE DUCT: ICD-10-CM

## 2024-11-19 DIAGNOSIS — D49.0 IPMN (INTRADUCTAL PAPILLARY MUCINOUS NEOPLASM): ICD-10-CM

## 2024-11-19 DIAGNOSIS — F10.21 MODERATE ALCOHOL DEPENDENCE IN EARLY REMISSION (H): ICD-10-CM

## 2024-11-19 DIAGNOSIS — F41.1 GAD (GENERALIZED ANXIETY DISORDER): ICD-10-CM

## 2024-11-19 DIAGNOSIS — K42.9 UMBILICAL HERNIA WITHOUT OBSTRUCTION AND WITHOUT GANGRENE: ICD-10-CM

## 2024-11-19 DIAGNOSIS — K57.30 DIVERTICULOSIS OF LARGE INTESTINE WITHOUT HEMORRHAGE: ICD-10-CM

## 2024-11-19 DIAGNOSIS — K82.8 SLUDGE IN GALLBLADDER: ICD-10-CM

## 2024-11-19 DIAGNOSIS — Z86.718 PERSONAL HISTORY OF DVT (DEEP VEIN THROMBOSIS): ICD-10-CM

## 2024-11-19 DIAGNOSIS — E78.00 ELEVATED LDL CHOLESTEROL LEVEL: ICD-10-CM

## 2024-11-19 DIAGNOSIS — K40.20 BILATERAL INGUINAL HERNIA WITHOUT OBSTRUCTION OR GANGRENE, RECURRENCE NOT SPECIFIED: ICD-10-CM

## 2024-11-19 DIAGNOSIS — R59.0 MESENTERIC LYMPHADENOPATHY: ICD-10-CM

## 2024-11-19 DIAGNOSIS — Z86.19 HISTORY OF HELICOBACTER PYLORI INFECTION: ICD-10-CM

## 2024-11-19 DIAGNOSIS — M89.8X9 BONE ISLAND: ICD-10-CM

## 2024-11-19 DIAGNOSIS — Z23 NEED FOR IMMUNIZATION AGAINST INFLUENZA: ICD-10-CM

## 2024-11-19 DIAGNOSIS — K76.89 LIVER CYST: ICD-10-CM

## 2024-11-19 DIAGNOSIS — R42 DIZZINESS: ICD-10-CM

## 2024-11-19 DIAGNOSIS — J30.2 SEASONAL ALLERGIC RHINITIS, UNSPECIFIED TRIGGER: ICD-10-CM

## 2024-11-19 DIAGNOSIS — Z00.00 ROUTINE HISTORY AND PHYSICAL EXAMINATION OF ADULT: ICD-10-CM

## 2024-11-19 DIAGNOSIS — Z86.79 HISTORY OF PAROXYSMAL SUPRAVENTRICULAR TACHYCARDIA: ICD-10-CM

## 2024-11-19 DIAGNOSIS — R10.13 EPIGASTRIC PAIN: ICD-10-CM

## 2024-11-19 DIAGNOSIS — Z86.59 HISTORY OF SUICIDAL IDEATION: ICD-10-CM

## 2024-11-19 DIAGNOSIS — F32.2 CURRENT SEVERE EPISODE OF MAJOR DEPRESSIVE DISORDER WITHOUT PSYCHOTIC FEATURES, UNSPECIFIED WHETHER RECURRENT (H): ICD-10-CM

## 2024-11-19 DIAGNOSIS — Z00.00 ROUTINE GENERAL MEDICAL EXAMINATION AT A HEALTH CARE FACILITY: Primary | ICD-10-CM

## 2024-11-19 DIAGNOSIS — K57.10 DUODENAL DIVERTICULUM: ICD-10-CM

## 2024-11-19 DIAGNOSIS — R35.1 NOCTURIA: ICD-10-CM

## 2024-11-19 DIAGNOSIS — Z23 NEED FOR HEPATITIS A AND B VACCINATION: ICD-10-CM

## 2024-11-19 DIAGNOSIS — R53.82 CHRONIC FATIGUE: ICD-10-CM

## 2024-11-19 DIAGNOSIS — Z86.711 HISTORY OF PULMONARY EMBOLISM: ICD-10-CM

## 2024-11-19 DIAGNOSIS — Z82.49 FAMILY HISTORY OF ISCHEMIC HEART DISEASE: ICD-10-CM

## 2024-11-19 DIAGNOSIS — Z82.49 FAMILY HISTORY OF ARRHYTHMIA: ICD-10-CM

## 2024-11-19 DIAGNOSIS — Z85.828 HISTORY OF BASAL CELL CANCER: ICD-10-CM

## 2024-11-19 DIAGNOSIS — Z23 NEED FOR SHINGLES VACCINE: ICD-10-CM

## 2024-11-19 DIAGNOSIS — Z00.00 HEALTH CARE MAINTENANCE: ICD-10-CM

## 2024-11-19 PROBLEM — I82.403 ACUTE EMBOLISM AND THROMBOSIS OF UNSPECIFIED DEEP VEINS OF LOWER EXTREMITY, BILATERAL (H): Status: ACTIVE | Noted: 2024-11-19

## 2024-11-19 PROBLEM — N52.9 ERECTILE DYSFUNCTION, UNSPECIFIED ERECTILE DYSFUNCTION TYPE: Status: RESOLVED | Noted: 2019-11-29 | Resolved: 2024-11-19

## 2024-11-19 PROBLEM — I82.403 ACUTE EMBOLISM AND THROMBOSIS OF UNSPECIFIED DEEP VEINS OF LOWER EXTREMITY, BILATERAL (H): Status: RESOLVED | Noted: 2024-11-19 | Resolved: 2024-11-19

## 2024-11-19 PROCEDURE — G0103 PSA SCREENING: HCPCS | Performed by: FAMILY MEDICINE

## 2024-11-19 PROCEDURE — 90673 RIV3 VACCINE NO PRESERV IM: CPT | Performed by: FAMILY MEDICINE

## 2024-11-19 PROCEDURE — 90472 IMMUNIZATION ADMIN EACH ADD: CPT | Performed by: FAMILY MEDICINE

## 2024-11-19 PROCEDURE — 36415 COLL VENOUS BLD VENIPUNCTURE: CPT | Performed by: FAMILY MEDICINE

## 2024-11-19 PROCEDURE — 90471 IMMUNIZATION ADMIN: CPT | Performed by: FAMILY MEDICINE

## 2024-11-19 PROCEDURE — 99214 OFFICE O/P EST MOD 30 MIN: CPT | Mod: 25 | Performed by: FAMILY MEDICINE

## 2024-11-19 PROCEDURE — 91320 SARSCV2 VAC 30MCG TRS-SUC IM: CPT | Performed by: FAMILY MEDICINE

## 2024-11-19 PROCEDURE — 99396 PREV VISIT EST AGE 40-64: CPT | Mod: 25 | Performed by: FAMILY MEDICINE

## 2024-11-19 PROCEDURE — 90636 HEP A/HEP B VACC ADULT IM: CPT | Performed by: FAMILY MEDICINE

## 2024-11-19 PROCEDURE — 90480 ADMN SARSCOV2 VAC 1/ONLY CMP: CPT | Performed by: FAMILY MEDICINE

## 2024-11-19 RX ORDER — FAMOTIDINE 20 MG/1
20 TABLET, FILM COATED ORAL AT BEDTIME
Qty: 90 TABLET | Refills: 0 | Status: CANCELLED | OUTPATIENT
Start: 2024-11-19

## 2024-11-19 ASSESSMENT — ANXIETY QUESTIONNAIRES
4. TROUBLE RELAXING: MORE THAN HALF THE DAYS
GAD7 TOTAL SCORE: 11
5. BEING SO RESTLESS THAT IT IS HARD TO SIT STILL: NOT AT ALL
IF YOU CHECKED OFF ANY PROBLEMS ON THIS QUESTIONNAIRE, HOW DIFFICULT HAVE THESE PROBLEMS MADE IT FOR YOU TO DO YOUR WORK, TAKE CARE OF THINGS AT HOME, OR GET ALONG WITH OTHER PEOPLE: SOMEWHAT DIFFICULT
3. WORRYING TOO MUCH ABOUT DIFFERENT THINGS: MORE THAN HALF THE DAYS
7. FEELING AFRAID AS IF SOMETHING AWFUL MIGHT HAPPEN: SEVERAL DAYS
8. IF YOU CHECKED OFF ANY PROBLEMS, HOW DIFFICULT HAVE THESE MADE IT FOR YOU TO DO YOUR WORK, TAKE CARE OF THINGS AT HOME, OR GET ALONG WITH OTHER PEOPLE?: SOMEWHAT DIFFICULT
2. NOT BEING ABLE TO STOP OR CONTROL WORRYING: NEARLY EVERY DAY
7. FEELING AFRAID AS IF SOMETHING AWFUL MIGHT HAPPEN: SEVERAL DAYS
GAD7 TOTAL SCORE: 11
6. BECOMING EASILY ANNOYED OR IRRITABLE: SEVERAL DAYS
1. FEELING NERVOUS, ANXIOUS, OR ON EDGE: MORE THAN HALF THE DAYS
GAD7 TOTAL SCORE: 11

## 2024-11-19 ASSESSMENT — PATIENT HEALTH QUESTIONNAIRE - PHQ9
10. IF YOU CHECKED OFF ANY PROBLEMS, HOW DIFFICULT HAVE THESE PROBLEMS MADE IT FOR YOU TO DO YOUR WORK, TAKE CARE OF THINGS AT HOME, OR GET ALONG WITH OTHER PEOPLE: SOMEWHAT DIFFICULT
SUM OF ALL RESPONSES TO PHQ QUESTIONS 1-9: 14
SUM OF ALL RESPONSES TO PHQ QUESTIONS 1-9: 14

## 2024-11-19 ASSESSMENT — PAIN SCALES - GENERAL: PAINLEVEL_OUTOF10: MODERATE PAIN (4)

## 2024-11-19 NOTE — NURSING NOTE
Prior to immunization administration, verified patients identity using patient s name and date of birth. Please see Immunization Activity for additional information.     Screening Questionnaire for Adult Immunization    Are you sick today?   No   Do you have allergies to medications, food, a vaccine component or latex?   Yes   Have you ever had a serious reaction after receiving a vaccination?   No   Do you have a long-term health problem with heart, lung, kidney, or metabolic disease (e.g., diabetes), asthma, a blood disorder, no spleen, complement component deficiency, a cochlear implant, or a spinal fluid leak?  Are you on long-term aspirin therapy?   No   Do you have cancer, leukemia, HIV/AIDS, or any other immune system problem?   No   Do you have a parent, brother, or sister with an immune system problem?   No   In the past 3 months, have you taken medications that affect  your immune system, such as prednisone, other steroids, or anticancer drugs; drugs for the treatment of rheumatoid arthritis, Crohn s disease, or psoriasis; or have you had radiation treatments?   No   Have you had a seizure, or a brain or other nervous system problem?   No   During the past year, have you received a transfusion of blood or blood    products, or been given immune (gamma) globulin or antiviral drug?   No   For women: Are you pregnant or is there a chance you could become       pregnant during the next month?   No   Have you received any vaccinations in the past 4 weeks?   No     Immunization questionnaire was positive for at least one answer.  Notified Will Wright MD.      Patient instructed to remain in clinic for 15 minutes afterwards, and to report any adverse reactions.     Screening performed by Radha Rodriguez MA on 11/19/2024 at 1:57 PM.

## 2024-11-19 NOTE — PROGRESS NOTES
Preventive Care Visit  [facility]  Adriana Solis MD, Family Medicine  Nov 19, 2024  {Provider  Link to SmartSet :661274}    {PROVIDER CHARTING PREFERENCE:913876}    *** -PSA (prostate specific antigen) test is normal.  This indicates a low likelihood of prostate cancer.  ADVISE: rechecking this in 1 year.***    Quinton Calvin is a 59 year old, presenting for the following:  Physical        11/19/2024     1:03 PM   Additional Questions   Roomed by MARIANELA Leon   Accompanied by Self      {ALERT  Recent PHQ-9/EPDS score indicates suicidal ideations, document follow-up within the A/P section of the note :562161}{Provider Documentation  Link to [facility] ([facility]) Flowsheet :122678}    History of Present Illness       Reason for visit:  Check Up    He eats 0-1 servings of fruits and vegetables daily.He consumes 0 sweetened beverage(s) daily.He exercises with enough effort to increase his heart rate 10 to 19 minutes per day.  He exercises with enough effort to increase his heart rate 5 days per week.   He is taking medications regularly.    Health Care Directive  Patient does not have a Health Care Directive: Discussed advance care planning with patient; information given to patient to review.       No data to display                  12/1/2022   Nutrition   Three or more servings of calcium each day? No   Diet: regular (no restrictions)          12/1/2022   Exercise   Frequency of exercise: 2-3 days/week          12/1/2022   Dental   Dentist two times every year? No      Today's PHQ-9 Score:       11/19/2024    12:50 PM   PHQ-9 SCORE   PHQ-9 Total Score MyChart 14 (Moderate depression)   PHQ-9 Total Score 14        Patient-reported         12/1/2022   Substance Use   Alcohol more than 3/day or more than 7/wk Not Applicable        Social History     Tobacco Use    Smoking status: Former     Current packs/day: 0.00     Average packs/day: 0.5 packs/day for 4.0 years (2.0 ttl pk-yrs)      Types: Cigarettes     Start date: 1978     Quit date: 1982     Years since quittin.9    Smokeless tobacco: Never   Vaping Use    Vaping status: Never Used   Substance Use Topics    Alcohol use: Not Currently     Comment: sober second time oct 14 , did detox adn rehab, in a sober house 2019    Drug use: Never     {Provider  If there are gaps in the social history shown above, please follow the link to update and then refresh the note Link to Social and Substance History :034961}  Last PSA:   Prostate Specific Antigen Screen   Date Value Ref Range Status   2021 1.33 0.00 - 4.00 ug/L Final     ASCVD Risk   The 10-year ASCVD risk score (Jerry ROCKWELL, et al., 2019) is: 5.7%    Values used to calculate the score:      Age: 59 years      Sex: Male      Is Non- : No      Diabetic: No      Tobacco smoker: No      Systolic Blood Pressure: 100 mmHg      Is BP treated: No      HDL Cholesterol: 58 mg/dL      Total Cholesterol: 245 mg/dL    {Link to Fracture Risk Assessment Tool (Optional):650964}    {Provider  REQUIRED FOR AWV Use the storyboard to review patient history, after sections have been marked as reviewed, refresh note to capture documentation:550827}   Reviewed and updated as needed this visit by Provider                    {HISTORY OPTIONS (Optional):128532}    BACKGRUND  ***  Seen 24 *** for ongoing stomach pain, chronic postural dizziness, chronic fatigue. Last seen by this provider in 2022   Long standing epigastric pain, previously had been taking both Pepcid and Prilosec & a Ginger capsule. No meds currently. Prior lipase & H pylori were negative. In  a Ct abdomen and then mri showed benign CBD dilation, dilated pancreatic duct, evidence of pancreatic scarring, suggestion of IPMN, a duodenal diverticulum, and non specific mesenteric lymphadenopathy & a benign right liver cyst & asymptomatic diverticulosis, asymptomatic B/l inguinal hernia and  small asymptomatic umbilical hernia & possible asymptomatic gall stones. Was referred to GI to evaluate epigastric pain, IPMN, duodenal diverticulum & may benefit from an EGD. Was also referred to general surgeon regarding possible gall bladder sludge & asymptomatic B/l inguinal & umbilical hernia which I didn't believe was contributing to long standing epigastric pain. Did not get apt's with either and symptoms persist 2 yrs since last seen.Epigastric pain has been occurring daily for years, pushing on upper abdominal area hurts, sometimes hurts more than others, no longer with heartburn. No longer on prilosec & stopped Pepcid as felt it made him tired. When took Pepcid or PPI previously felt it helped. Understands chronic use of Prilosec like med's can cause atypical pneumonia, fractures, C diff colitis, vit B 12 and magnesium deficiency. Reports no blood in stools or black stools but noted wt loss appetite is decreased. In 12/2022 weighed 197 lbs, in oct 2023 was 189 lbs, today is 170 lbs. Labs pending. Agreeable to trying prilosec 20 mg 2 weeks then Pepcid 20 mg bid and seeing GI.   Sludge in gall bladder feels no significant, RUQ abdomen or postprandial pain  Mild dilated CBD and dilated pancreatic duct, IPMN  & mesenteric lymphadenopathy noted at last mri and was advised ERCP with GI but no apt given and will refer again to them. Desires to wait to see GI to repeat MRI.   Asymptomatic Liver cyst   Asymptomatic duodenal diverticulum  Asymptomatic diverticulosis  Asymptomatic tiny umbilical hernia  Asymptomatic B/l inguinal hernia unchanged  Labs show normal CBC, normal ESR, CRP, Liver and gallbladder tests are normal (ALT,AST, Alk phos, bilirubin), kidney function is normal (Cr, GFR), sodium is normal, potassium is normal, calcium is normal, glucose is normal. TSH (thyroid stimulating hormone) level is normal which indicates normal thyroid function. Vitamin B12 result is normal Vitamin D level is low normal  encouraged getting 2000 IU daily, Ferritin (iron) level is normal. Iron level is normal. Normal manganese. Testing negative for celiac. Later tested positive for H Pylori. Continued omeprazole 20 mg daily 2 week as prescribed. In addition take pylera, combo med containing bismuth, metronidazole and tetracycline 4 / day for 10 days together with the omeprazole to eradicate the H pylori. This will likely help stomach symptoms. Once done with this regimen continue on Pepcid previously prescribed and encouraged to see GI as discussed as may benefit from an EGD for this and for other reasons discussed at apt. Will check prostate test at his physical.      Chronic fatigue, previously seen by cardio, Echo & EKG & labs normal, opted no stress test. Prior noted decreased EF with prior PE had resolved with normal echo in 2022. Given persistent symptoms will refer back to cardiology. Seen by sleep, HST done had technical errors in dec 2022 & not redone. Labs currently don't explain. Refer back to sleep.      Postural dizziness, sometimes can occur while standing with no posture changes. Rarely feels like will black out. At times gets up and has to sit right down again. Feels drinking enough water. Seen by cardio previously EKG, echo normal. Cardio felt stress test would not add more information. 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 No sign of bleeding. Previously BP not orthostatic. No associated chest pain or shortness of breath. Can feel pulse in epigastrium.  Fh of hear issues and arrhythmias/ pacemaker. /72, at home 100/60. Tries to drink a lot of water. Never tried compression socks advised by neuro previously.near syncope x 1. No vertigo. Referred back to cardiology & ziopatch ordered. Advised and given compression socks to try.     LDL elevated.which can increase heart disease risk. The 10-year ASCVD risk score (Jerry DK, et al., 2019) is: 5.7%. A diet high in fat and simple carbohydrates, genetics and  being overweight can contribute to this. Advised exercising 150 minutes of aerobic exercise per week (30 minutes for 5 days per week or 50 minutes for 3 days per week are options) and eating a low saturated fat/low carbohydrate diet to improve this. HBA1c normal.. Consider doing a ct calcium score to get objective data about risk and need for starting a statin med.      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      Memory concerns, Able to do job ok but feels struggles with things sometimes. Forgetful of names, no worsening, forgetful of conversation with clients at times and feels has to write everything down. B 12 and TSH previously normal. Seen by neuro in 2023. MRI showed 4/13/23  showed findings consistent with Wernicke's encephalopathy, suspected a scar from prior alcohol use and also had abnormal signal bilateral global pallidum and advised to check B1 copper ceruloplasmin and encouraged to take a B complex.  Lab work done 5/24/2023 showed normal B1 copper and ceruloplasmin and advised manganese and 24 urine copper which did not get done.  Also neuro psych eval set up for 6/26/2023 to Dr. Connors at [place] [facility] but did not make it to the appointment. Referred for neuro follow up and neuro psyche referral.      Sustained sobriety from alcohol since 2019     MDD/ZAIDA/ passive SI: under care of [facility] psyche Morena Spencer . Previously on Abilify & stopped as didn't like it. No longer on Cymbalta, no longer on clonidine which made him tired.  Currently on fluoxetine 40 mg and 5 mg lexapro. Lexapro helps better with anxiety but wipes him out so doing this way. Did also take Ketamine infusion / lozenges a while x 4 last on 9/5/24.  Felt made no difference. Not actively suicidal. Is safe, no longer in therapy.      Hx of BCC Left nose ala s/p mohs in 2/24, reconstruction and now doing laser rx of scar tissue, under care of derm, lesion many months started as injury / pimple which he  has picked at often and not been healing. Hx of SK.      Asymptomatic incidental right iliac bone island noted on ct scan in 2022, felt to be benign,      Allergies to ragweed, better as got older, currently not bothersome.     B/l tinnitus stable can hear ok, usually unaware of it until thinks about it      Dental caries improved     ED unchanged on the antidepressants     Health care maintenance reviewed     Declines flu shot  Declines COVID vaccines  Encouraged to complete overdue Shingrex # 2 sometime  Labs from today pending   Will return for a physical  ***    CURRENTLY   *** here for physical & follow up ***      for ongoing stomach pain, chronic postural dizziness, chronic fatigue. Last seen by this provider in 12/2022   Long standing epigastric pain, previously had been taking both Pepcid and Prilosec & a Raeann capsule. No meds currently. Prior lipase & H pylori were negative. In 2022 a Ct abdomen and then mri showed benign CBD dilation, dilated pancreatic duct, evidence of pancreatic scarring, suggestion of IPMN, a duodenal diverticulum, and non specific mesenteric lymphadenopathy & a benign right liver cyst & asymptomatic diverticulosis, asymptomatic B/l inguinal hernia and small asymptomatic umbilical hernia & possible asymptomatic gall stones. Was referred to GI to evaluate epigastric pain, IPMN, duodenal diverticulum & may benefit from an EGD. Was also referred to general surgeon regarding possible gall bladder sludge & asymptomatic B/l inguinal & umbilical hernia which I didn't believe was contributing to long standing epigastric pain. Did not get apt's with either and symptoms persist 2 yrs since last seen.Epigastric pain has been occurring daily for years, pushing on upper abdominal area hurts, sometimes hurts more than others, no longer with heartburn. No longer on prilosec & stopped Pepcid as felt it made him tired. When took Pepcid or PPI previously felt it helped. Understands chronic use of  Prilosec like med's can cause atypical pneumonia, fractures, C diff colitis, vit B 12 and magnesium deficiency. Reports no blood in stools or black stools but noted wt loss appetite is decreased. In 12/2022 weighed 197 lbs, in oct 2023 was 189 lbs, today is 170 lbs. Labs pending. Agreeable to trying prilosec 20 mg 2 weeks then Pepcid 20 mg bid and seeing GI.   Sludge in gall bladder feels no significant, RUQ abdomen or postprandial pain  Mild dilated CBD and dilated pancreatic duct, IPMN  & mesenteric lymphadenopathy noted at last mri and was advised ERCP with GI but no apt given and will refer again to them. Desires to wait to see GI to repeat MRI.   Asymptomatic Liver cyst   Asymptomatic duodenal diverticulum  Asymptomatic diverticulosis  Asymptomatic tiny umbilical hernia  Asymptomatic B/l inguinal hernia unchanged  Labs show normal CBC, normal ESR, CRP, Liver and gallbladder tests are normal (ALT,AST, Alk phos, bilirubin), kidney function is normal (Cr, GFR), sodium is normal, potassium is normal, calcium is normal, glucose is normal. TSH (thyroid stimulating hormone) level is normal which indicates normal thyroid function. Vitamin B12 result is normal Vitamin D level is low normal encouraged getting 2000 IU daily, Ferritin (iron) level is normal. Iron level is normal. Normal manganese. Testing negative for celiac. Later tested positive for H Pylori. Continued omeprazole 20 mg daily 2 week as prescribed. In addition take pylera, combo med containing bismuth, metronidazole and tetracycline 4 / day for 10 days together with the omeprazole to eradicate the H pylori. This will likely help stomach symptoms. Once done with this regimen continue on Pepcid previously prescribed and encouraged to see GI as discussed as may benefit from an EGD for this and for other reasons discussed at apt. Will check prostate test at his physical.      Chronic fatigue, previously seen by cardio, Echo & EKG & labs normal, opted no stress  test. Prior noted decreased EF with prior PE had resolved with normal echo in 2022. Given persistent symptoms will refer back to cardiology. Seen by sleep, HST done had technical errors in dec 2022 & not redone. Labs currently don't explain. Refer back to sleep.      Postural dizziness, sometimes can occur while standing with no posture changes. Rarely feels like will black out. At times gets up and has to sit right down again. Feels drinking enough water. Seen by cardio previously EKG, echo normal. Cardio felt stress test would not add more information. 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 No sign of bleeding. Previously BP not orthostatic. No associated chest pain or shortness of breath. Can feel pulse in epigastrium.  Fh of hear issues and arrhythmias/ pacemaker. /72, at home 100/60. Tries to drink a lot of water. Never tried compression socks advised by neuro previously.near syncope x 1. No vertigo. Referred back to cardiology & ziopatch ordered. Advised and given compression socks to try.     LDL elevated.which can increase heart disease risk. The 10-year ASCVD risk score (Jerry DK, et al., 2019) is: 5.7%. A diet high in fat and simple carbohydrates, genetics and being overweight can contribute to this. Advised exercising 150 minutes of aerobic exercise per week (30 minutes for 5 days per week or 50 minutes for 3 days per week are options) and eating a low saturated fat/low carbohydrate diet to improve this. HBA1c normal.. Consider doing a ct calcium score to get objective data about risk and need for starting a statin med.      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      Memory concerns, Able to do job ok but feels struggles with things sometimes. Forgetful of names, no worsening, forgetful of conversation with clients at times and feels has to write everything down. B 12 and TSH previously normal. Seen by neuro in 2023. MRI showed 4/13/23  showed findings  consistent with Wernicke's encephalopathy, suspected a scar from prior alcohol use and also had abnormal signal bilateral global pallidum and advised to check B1 copper ceruloplasmin and encouraged to take a B complex.  Lab work done 5/24/2023 showed normal B1 copper and ceruloplasmin and advised manganese and 24 urine copper which did not get done.  Also neuro psych eval set up for 6/26/2023 to Dr. Connors at [place] [facility] but did not make it to the appointment. Referred for neuro follow up and neuro psyche referral.      Sustained sobriety from alcohol since 2019     MDD/ZAIDA/ passive SI: under care of [facility] psyche Morena Spencer . Previously on Abilify & stopped as didn't like it. No longer on Cymbalta, no longer on clonidine which made him tired.  Currently on fluoxetine 40 mg and 5 mg lexapro. Lexapro helps better with anxiety but wipes him out so doing this way. Did also take Ketamine infusion / lozenges a while x 4 last on 9/5/24.  Felt made no difference. Not actively suicidal. Is safe, no longer in therapy.      Hx of BCC Left nose ala s/p mohs in 2/24, reconstruction and now doing laser rx of scar tissue, under care of derm, lesion many months started as injury / pimple which he has picked at often and not been healing. Hx of SK.      Asymptomatic incidental right iliac bone island noted on ct scan in 2022, felt to be benign,      Allergies to ragweed, better as got older, currently not bothersome.     B/l tinnitus stable can hear ok, usually unaware of it until thinks about it      Dental caries improved     ED unchanged on the antidepressants   Health care maintenance reviewed     Declines flu shot  Declines COVID vaccines  Encouraged to complete overdue Shingrex # 2 sometime  ***   CURRENTLY     Here for physical and follow up ***  Health care maintenance reviewed  No aCP   Given honoring choices  Colon due 2030  Jam do flu shot   Will do COVID vaccines  Encouraged to complete overdue Shingrex # 2  sometime will do another time    *** Long standing epigastric pain, previously had been taking both Pepcid and Prilosec & a Raeann capsule. No meds currently. Prior lipase & H pylori were negative. In 2022 a Ct abdomen and then mri showed benign CBD dilation, dilated pancreatic duct, evidence of pancreatic scarring, suggestion of IPMN, a duodenal diverticulum, and non specific mesenteric lymphadenopathy & a benign right liver cyst & asymptomatic diverticulosis, asymptomatic B/l inguinal hernia and small asymptomatic umbilical hernia & possible asymptomatic gall stones. Was referred to GI to evaluate epigastric pain, IPMN, duodenal diverticulum & may benefit from an EGD. Was also referred to general surgeon regarding possible gall bladder sludge & asymptomatic B/l inguinal & umbilical hernia which I didn't believe was contributing to long standing epigastric pain. Did not get apt's with either and symptoms persist 2 yrs since last seen.Epigastric pain has been occurring daily for years, pushing on upper abdominal area hurts, sometimes hurts more than others, no longer with heartburn. No longer on prilosec & stopped Pepcid as felt it made him tired. When took Pepcid or PPI previously felt it helped. Understands chronic use of Prilosec like med's can cause atypical pneumonia, fractures, C diff colitis, vit B 12 and magnesium deficiency. Reports no blood in stools or black stools but noted wt loss appetite is decreased. In 12/2022 weighed 197 lbs, in oct 2023 was 189 lbs, today is 170 lbs. Labs pending.   Agreeable to trying prilosec 20 mg 2 weeks then Pepcid 20 mg bid and seeing GI.   Sludge in gall bladder feels no significant, RUQ abdomen or postprandial pain  Mild dilated CBD and dilated pancreatic duct, IPMN  & mesenteric lymphadenopathy noted at last mri and was advised ERCP with GI but no apt given and will refer again to them. Desires to wait to see GI to repeat MRI.   Asymptomatic Liver cyst   Asymptomatic  duodenal diverticulum  Asymptomatic diverticulosis  Asymptomatic tiny umbilical hernia  Asymptomatic B/l inguinal hernia unchanged  Labs show normal CBC, normal ESR, CRP, Liver and gallbladder tests are normal (ALT,AST, Alk phos, bilirubin), kidney function is normal (Cr, GFR), sodium is normal, potassium is normal, calcium is normal, glucose is normal. TSH (thyroid stimulating hormone) level is normal which indicates normal thyroid function. Vitamin B12 result is normal Vitamin D level is low normal encouraged getting 2000 IU daily, Ferritin (iron) level is normal. Iron level is normal. Normal manganese. Testing negative for celiac. Later tested positive for H Pylori. Continued omeprazole 20 mg daily 2 week as prescribed. In addition take pylera, combo med containing bismuth, metronidazole and tetracycline 4 / day for 10 days together with the omeprazole to eradicate the H pylori. This will likely help stomach symptoms. Once done with this regimen continue on Pepcid previously prescribed and encouraged to see GI as discussed as may benefit from an EGD for this and for other reasons discussed at apt. Will check prostate test at his physical.     No chane in symptoms on completing the hy pro trx  Remains on pepcid 20 mg onc a day , few days ago 2/ day   See GI tomrow as planed  Gnawing pain not stabbing causes aftigue  1/2 tsp baking soda and wtaer helps   Always theer  Never had an egd  Wt loss   Feel sstabili  Down 3 lbs isnce last bogdan in spet    occasional nausea, no vomiting, no diarrhea or constipation, no blood in stools or black stools, no weight loss or night sweats.  Hx of dier left no sig pain     Asy um zion     Chronic fatigue, previously seen by cardio, Echo & EKG & labs normal, opted no stress test. Prior noted decreased EF with prior PE had resolved with normal echo in 2022. Given persistent symptoms will refer back to cardiology. Seen by sleep, HST done had technical errors in dec 2022 & not redone.  Labs currently don't explain. Refer back to sleep.   Ade ochoa done  Echo order   Has svt        Postural dizziness, sometimes can occur while standing with no posture changes. Rarely feels like will black out. At times gets up and has to sit right down again. Feels drinking enough water. Seen by cardio previously EKG, echo normal. Cardio felt stress test would not add more information. 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 No sign of bleeding. Previously BP not orthostatic. No associated chest pain or shortness of breath. Can feel pulse in epigastrium.  Fh of hear issues and arrhythmias/ pacemaker. /72, at home 100/60. Tries to drink a lot of water. Never tried compression socks advised by neuro previously.near syncope x 1. No vertigo. Referred back to cardiology & tkopatch ordered. Advised and given compression socks to try.    Svt dizzy,   Goe sawy on own  To see card in dec     LDL elevated.which can increase heart disease risk. The 10-year ASCVD risk score (Jerry ROCKWELL, et al., 2019) is: 5.7%. A diet high in fat and simple carbohydrates, genetics and being overweight can contribute to this. Advised exercising 150 minutes of aerobic exercise per week (30 minutes for 5 days per week or 50 minutes for 3 days per week are options) and eating a low saturated fat/low carbohydrate diet to improve this. HBA1c normal.. Consider doing a ct calcium score to get objective data about risk and need for starting a statin med.   Wait on statin     Hx of PE & DVT &  on Xarelto managed by hematology, sees them 1/ yr. To set apt with them     Memory concerns, Able to do job ok but feels struggles with things sometimes. Forgetful of names, no worsening, forgetful of conversation with clients at times and feels has to write everything down. B 12 and TSH previously normal. Seen by neuro in 2023. MRI showed 4/13/23  showed findings consistent with Wernicke's encephalopathy, suspected a scar from prior alcohol use and  also had abnormal signal bilateral global pallidum and advised to check B1 copper ceruloplasmin and encouraged to take a B complex.  Lab work done 5/24/2023 showed normal B1 copper and ceruloplasmin and advised manganese and 24 urine copper which did not get done.  Also neuro psych eval set up for 6/26/2023 to Dr. Connors at [place] [facility] but did not make it to the appointment. Referred for neuro follow up and neuro psyche referral.   Man and copper neg  To get nruo psyceh eval and neruo apt in new year  Taking B complex noand then     Sustained sobriety from alcohol since 2019     MDD/ZAIDA/ passive SI: under care of [facility] psyche Morena Spencer . Previously on Abilify & stopped as didn't like it. No longer on Cymbalta, no longer on clonidine which made him tired.  Currently on fluoxetine 40 mg and 5 mg lexapro. Lexapro helps better with anxiety but wipes him out so doing this way. Did also take Ketamine infusion / lozenges a while x 4 last on 9/5/24.  Felt made no difference. Not actively suicidal. Is safe, no longer in therapy.   To se psyche n jan     Hx of BCC Left nose ala s/p mohs in 2/24, reconstruction and now doing laser rx of scar tissue, under care of derm, lesion many months started as injury / pimple which he has picked at often and not been healing. Hx of SK.     To get another lasre in dec     Asymptomatic incidental right iliac bone island noted on ct scan in 2022, felt to be benign,      Allergies to ragweed, better as got older, currently not bothersome.     B/l tinnitus stable can hear ok, usually unaware of it until thinks about it      Dental caries improved     ED unchanged on the antidepressants  No active issue  Put to [facility]     Health care maintenance reviewed    No fever or chills, no headache or dizziness, no vertigi, hx o near syncope, no double or blurry vision, worsneing vision , glasses,  no facial pain, earache, sore throat, runny nose, post nasal drip, no trouble hearing, decreased  "sens of smelling, & tastingno troubel swallowing, no cough , no chest pain, trouble breathing or palpitations, No dysuria, hematuria, frequency, nocturia 2/ day , urgency, some  hesitancy, no incontinence, No pelvic complaints. No leg swellingor joint pain. No rash.        Review of Systems  Constitutional, HEENT, cardiovascular, pulmonary, GI, , musculoskeletal, neuro, skin, endocrine and psych systems are negative, except as otherwise noted.     Objective    Exam  /70 (BP Location: Right arm, Patient Position: Sitting, Cuff Size: Adult Regular)   Pulse 58   Temp 97.7  F (36.5  C) (Temporal)   Resp 19   Ht 1.74 m (5' 8.5\")   Wt 76.1 kg (167 lb 11.2 oz)   SpO2 97%   BMI 25.13 kg/m     Estimated body mass index is 25.13 kg/m  as calculated from the following:    Height as of this encounter: 1.74 m (5' 8.5\").    Weight as of this encounter: 76.1 kg (167 lb 11.2 oz).    Physical Exam  {Exam Choices (Optional):008400}        Signed Electronically by: Adriana Solis MD  " breathing, excessive day time drowsiness, cognitive issues, seen by sleep 10/2023 advised a HST, hst done dec [id number] had technical issues advised to repeat did not get done, elevated LDL, chronic tinnitus, resolved chest pain, history of alcohol abuse and dependence currently in remission, ZAIDA, MDD, history of passive suicidal ideation, prior active thoughts, previously on Wellbutrin 300, Lexapro 20, no longer on gabapentin, propranolol as needed and trazodone as needed, then on Cymbalta & briefly on Abilify, history of memory issues improved off the alcohol, under care of psyche & therapy, seen by neuro mri suggestive of wernikes, additional labs neg, referred for neuropsyhce eval, history of bilateral tinnitus, seasonal allergic rhinitis, resolved essential tremor once he stopped the alcohol, resolved epigastric pain once he stopped the alcohol, Long standing epigastric pain, had taken both Pepcid and Prilosec in past Symptoms intermittent better when mood is better. Taking Prilosec 20 mg daily & Pepcid 20 mg once day & a Ginger capsule. Lab work recently including lipase & H pylori were negative.  mri showed benign CBD dilation, dilated pancreatic duct, evidence of pancreatic scarring, suggestion of IPMN, a duodenal diverticulum, and non specific mesenteric lymphadenopathy & a benign right liver cyst & asymptomatic diverticulosis, B/inguinal hernia and small asymptomatic umbilical hernia & possible gall stones, right iliac bone island, left nose skin lesion, SK,.resolved palpitations, history of dental caries needing dentures, weight gain due to sedentary state, positive test,  history of ADD, ED, SK, on multivitamin and prior tonsillectomy, allergic to ragweed's,     In 2018 was unemployed, from [place] originally where was getting his care at the local clinic there, hx of alcohol dependence in early remission, ZAIDA, MDD, hx of SI, on Wellbutrin, Lexapro, gabapentin & MV, under care of psychiatry at [facility],  "memory changes, epigastric pain, palpitations, weight gain, hx of chest pain, fatigue, dizziness, ED, improved with time away from alcohol, essential tremor on propranolol, B/l tinnitus, seasonal allergic rhinitis, allergic to ragweed, dental caries / abscess, S/p extraction nov 2019, 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 Xarelto under care of hematology, prior tonsillectomy,   Hx of ACS, NSTEMI with elevated troponin, no CAD on angiogram,  but found to have an acute saddle embolism with cor pulmonale & hx of DVT,  in 8/2019 on lifelong anticoagulation now on Xarelto 20 mg daily, ever since under care of hematology, hx of moderate alcohol dependence in early remission with hx of withdrawal & mood disorder, previously on campral ( discontinued secondary to diarrhea), & naltrexone( stopped as ineffective), ZAIDA, MDD, suicidal ideation, on Wellbutrin Xl 150 mg daily, Lexapro 20 mg daily, gabapentin 300 mg bedtime & a multivitamin, previously  also on propranolol, previously on Abilify 5 mg, citalopram 10 mg, hydroxyzine, mirtazapine & trazodone, hx of dental caries, recent dental abscess S/p abx, hx of essential tremor on propranolol 20 mg tid, allergic to ragweed's, no records from [facility] but reviewed care everywhere [facility] records,      Seen at [facility] ER 12/26/18 with suicidal ideation. Stabilized , treated ( see notes for details & discharged to [facility]).  Seen in ER at [facility] on 6/12/19 \" for increased depression, hopelessness, and alcohol abuse. Had been discharged from his sober due to smelling like alcohol.   Admitted [facility] on 8/2019: with shortness of breath and 2 days of chest pain and near syncope. He presented to the emergency room on 8/4/2019 after he had a syncopal episode in front of [employer]. He did not recall particular chest pain or shortness of breath. He noted swelling of his leg in various spots but he denied any persistent pain or swelling or redness. No injury. No " acute illness prior to that. However he had donated plasma 10 times in 5 weeks prior to that. Upon initial evaluation, d-dimer was elevated at 6.7. PTT was normal at 28, INR is 1.09.  Due to an elevated troponin he underwent coronary angiogram which demonstrated no significant CAD. Due to an elevated d-dimer underwent CT scan of the chest which showed extensive acute bilateral pulmonary embolism with saddle embolus.  Doppler legs showed Nonocclusive right femoral DVT from upper to mid thigh. Left calf DVT within one of the peroneal veins in both of the posterior tibial veins from upper to low calf. Echocardiogram revealed lower limit of normal left ventricular systolic function of 50% with septal motion compatible with right ventricular overload.  He had cor pulmonale and was started on heparin infusion. No thrombolytics were given due hemodynamic and respiratory stability. After several days of monitoring on IV heparin Mr. Sinha continued to improve and he was transitioned to Xarelto for ongoing anticoagulation therapy. He tolerated Xarelto well. Had no major bleeding except intermittent gum bleeding. Denied family history of venous thromboembolism. He had a brother who passed away from \[facility] [place] [facility]\"". He was single. Did not have children. Was advised to continue with indefinite anticoagulation. Hematology thought maybe plasma donation had led to a transient hypercoagulable state. Mr. Sinha was advised not to donate plasma moving forward. An outpatient referral to hematology had been made.  Seen by hematology 9/2019 at [facility]. She reviewed the pathophysiology of venous thromboembolic exam and risks of recurrence. Was advised of possible etiology, he was indicated for long-term anticoagulation due to initial presentation of a life-threatening episode. No prior history of venous thromboembolism or family history of thromboembolism. Was not recommended family/hereditary thrombophilia work-upa repeat CT scan of  "the chest to know the resolution of blood clots was not needed & only recommended to obtain a CTA if he had signs and symptoms concerning for venous thromboembolism. Was advised strict abstinence from alcohol with anticoagulants & to obtain kidney, liver function monitoring at least once a year.   He had age-appropriate cancer screening. He admitted that he had a positive stool test (sounds like FOBT) a couple years prior. It was unclear if it was related to his alcohol use. He was recommended to proceed with colonoscopy, but to wait at least 3-6 months because of this recent pulmonary emboli event, &  recommended interruption of anticoagulation prior to that. He voiced understanding. His previous primary care provider retired.    [facility] but he had been unable to maintain any sobriety outside of a structured facility. Roughly one month prior to next ER  visit he stopped taking all of his psych medications.      Was in the ER at [facility] on 10/17/2019 for suicidal ideation & alcohol abuse: had increased depressive symptoms, anxiety, and suicidal ideation. He presented to the outpatient clinic for a Rule 25 assessment. He endorsed numerous acute depressive symptoms. He relapsed on alcohol and had been unsuccessful outside of a structured program. He was previously working a good job as a Scoring Director at [employer] \"but I drank myself out of that job.\" He had been evicted from his apartment and found himself homeless. He rated his depression as 8 out of 10 and anxiety as 9 out of 10. He was feeling hopeless with thoughts of suicide and a plan to hang himself. He stated that he hadn't taken any steps towards acting on the thoughts but that \"it's something in my mind.\" He felt somewhat isolated. He had one friend in [place], but no supportive family that were still alive.   He was admitted on a voluntary basis. Agreeable to stay voluntarily to coordinate cares and " medication management. Was seen by the hospitalist during course of hospitalization. Started on antibiotics for a dental abscess. He was placed on a CIWA protocol & did not demonstrate signs or symptoms of complicated alcohol withdrawal. Psychiatric medication management was performed in detail to target signs and symptoms of principal diagnosis & comorbid illnesses. Medication management included Lexapro which was restarted for depression and anxiety as well as Wellbutrin XL, & Gabapentin as needed for anxiety. Propranolol as needed for anxiety. Given Naltrexone as a trial for alcohol use disorder; LFT's were normalizing & Campral was discontinued secondary to GI side- effects. Denied psychosis. Denied suicidal and homicidal ideation. Did have an appropriate crisis and relapse plan. Was future oriented. Denied gun access. Indicated motivation to proceed with MI CD treatment as coordinated by  with intake date on Friday, November 1, 2019.   followed in regards to collecting and reviewing collateral information, coordination of cares and discharge planning. Including, MI CD evaluation completed with recommendation for placement to unit 2700. Due to dental issues, he was discharged to medical respite bed to bridge placement. He was treated for dental abscess with PCN.     Seen 11/29/19 for the first time by this writer. See that note for details. Noted Hx of severe MDD, ZAIDA, hx of passive suicidal thoughts, early remission from alcohol dependence just completed rule 25 , detox & rehab & in sober living. had just moved from [facility] to [place]. Had poor social support but was forward thinking  & had apt's for the dentist & psyche & noted had been taking meds & contracted to safety. 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 No hypercoag work up recommended given lifelong anticoagulation  indicated & seen by hematology. Note from sept 2019 reviewed.   Reported fatigue, weight gain, dizziness since on new meds for mental health. Denied chest pain or palpitations though noted had had these in the past. Exam was benign & vitally stable.Did not appear to be in heart failure. Noted his memory changes, abdominal pain, tinnitus, ED thought  related to alcohol abuse & noted symptoms had improved since quit drinking. Advised to try Pepcid OTC for abdominal pain. Declined referral to ENT/ audiology. Seasonal allergic rhinitis was not an issue. Was to consider a GI referral for a scope in future once could come off Xarelto a few days. Noted due for follow up with hematology by feb 2020. Was to sign an KISHOR to get records from [place] where previously doctored. Was given the Tdap. Was negative for Hep C, HIV, had a normal folic acid, vit B12, CMP, TSH, cbc, HB A1C & LDL was elevated.   Seen by [facility] psychiatry on 12/5/19 for ZAIDA, depression and alcohol abuse in remission and noted was taking propranolol 1 a day instead of three times and continued on same meds till reevaluated in 4 weeks.      Seen 12/20/2019 & noted remained sober and symptoms were improved.  Seen by psychiatry at [facility] 1/9/2020 when noted had stopped gabapentin and was continued on Lexapro, Wellbutrin along with using propanolol rarely as needed.  Did see the hematologist 1/23 and told to complete therapeutic course of Xarelto and then continue indefinitely prophylactically due to unprovoked prior DVT and PE.  Advised that it was okay to get dental procedures and colonoscopy after March 1 and he would be low risk to hold the medication 1 to 2 days before and after depending on the procedure risk.  And to follow-up with him in 1 year.       Seen by psychiatry 5/2020 by telephone encounter due to pandemic and continued on Lexapro and propranolol and Wellbutrin but Lexapro increased to 30 mg.  Was doing therapy. Seen by psych 6/2/20 and  bupropion increased to 300 and continued on rest of meds.  Was in the ER 6/04/20 dental pain and abscess of tooth #20.  Was drained and put on amoxicillin and advised to see the dentist for root canal.  He called on 6/9 and medication was changed to Augmentin and he did see the dentist on 6/11/20 but tooth had broken off by then and root canal not indicated as it was too far gone and instead his tooth was pulled  to make dentures as he had quite a few teeth pulled. Seen psychiatry on 7/2/20 and continued on all his meds and trazodone added and noted propanolol made him tired and he only took half tablet prior to an AA meeting.      TE done 7/7/20 noted was doing reasonably well.  Had passive suicidal thoughts but no active plan.  Under care of a psychiatrist and therapist.  He was to see his therapist in person the next day in person for the first time since the pandemic began.  He had still been going to AA and conversing with his psychiatrist via telephone.  Notes from the psychiatrist reviewed.  He was concerned about some skin changes, ear wax and tinnitus.  His abdominal pain and palpitations had resolved since he stopped the alcohol noted he had been sober then 9 months.  He continued to have ED and some fatigue and occasional lightheadedness which he believed was side effects from the medications he took. Was very rarely forgetful not as significant to be of any concern to him. He was planning to get dentures. Was to continue care with his hematologist for hx of unprovoked DVT & PE.  Due for appointment with them in the new year. Advised to work on advanced directives. Due to a prior positive stool test was recommended a colonoscopy & per hematology they said he could go ahead with that we would just have to hold his blood thinner a day before and a day after the scope or depending on what they find & do. He had no symptoms and wanted  to wait to do the colonoscopy once the pandemic situation was  better.  Advised  to decrease salt in diet to help tinnitus. Tinnitus thought a result of prior Advil/ alcohol use/ hearing loss  Fatigue as well as lightheadedness and dizziness  thought related to medications but recommended labs at appointment and if no answer could consider referral to sleep specialist for undiagnosed sleep apnea and/or referral to neurology/cardiology and to  discuss medications further with his psychiatrist. Was to continue to work on diet and weight loss with exercise. Declined Neurology referral.  [place] it was not too serious right at the time, having just rare episodes of occasional fogginess and he felt that was related to his medications.     Seen 7/16/2020 for a physical & chronic issues. Advised self testicular exams monthly. Skin check of moles right inner arm, left mid back and right iliac area all looked like benign seborheic keratosis. Offered referral to derm if remained concerned or they changed. Ear check: showed minimal wax, likely not a cause of tinnitus. Referred to see ENT for tinnitus. Was noted due for a colonoscopy. was to follow up with his hematologist regarding hx of unprovoked PE and DVT and blood thinners. Labs done and advised If normal and remained concerned about fatigue recommended to see sleep to be evaluate for sleep apnea. LDL elevated, ASCVD 7.7% . Diet , exercise, weight loss recommended. Was to recheck in 6 months. Noted remained sober from alcohol. Advised if dizziness persisted or got worse can to see cardio/ neuro. Opted then to defer due to cost and felt was managing ok. Thought was from his psyche meds. Was advised to stay well hydrated. Consider the Shingrix vaccine. With regards to his MDD/ Francesca/ chronic passive suicidal thoughts, had no active plan, was under care of psyche and & his Counsellor & had upcoming apt's with them. Was to continue care with his dentist. Had left jaw swelling from recent tooth extraction and infection & had been started on Augmentin  twice a day and tramadol as needed by his dentist.  Understood risk of antibiotics and chronic narcotics.       Seen by Tae lion on 9/8/20 & 10/2020 & continued on Wellbutrin 300 mg, propranolol prn, & trazodone 25 to 50 mg bedtime prn. Had a colonoscopy in nov 2020 which was normal. 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 3/2021 did an e visit for Covid symptoms but Covid was negative.   Seen by psych jordan 4/2021 & continue don Lexapro 30, trazodone 50, Wellbutrin decreased to 150 & started pregabalin 50 bid and hydroxyzine prn & propranolol held. Seen again by amisha on 5/2021 & hydroxyzine discontinued as caused drowsiness. Seen by amisha on 7/2021 and pregabalin and propranolol discontinued.      [place]  shows received gabapentin 100 mg #270 on 1/4/19 at [facility], then 300 mg # 90  On 2/11/19 in [facility], 100 mg # 60 on 3/6/19 in [facility], then # 180 on 6/20/19, 300 mg # 60 on 10/30/19 & # 30 of 300 mg on 11/21/19.  Tylenol 3 12 tablets on 2/11/2021 and tramadol 50 mg #12 on 2/25/2020. Tramadol in July by dentist # 12. pregabalin 50 mg # 60 on 4/12/21, 5/10/21 & 6/2/21     Seen 8/18/21 for chronic fatigue and dizziness thought multifactorial from meds, mood, and prior PE. Recommended labs, echo to assess cardiac function since last checked in 2019 when had effects of large Pulmonary embolism. Had No evidence of coronary artery disease. EKG looked normal with right heart axis from prior PE. Advised to avoid holding  breath if that made him dizzy. Recommended a sleep Eval to rule out undiagnosed sleep apnea & to see cardiology and neuro to complete work up. Orthostatics today were negative.  Was to continue care with hematology and chronic anticoagulation. See ENT for chronic tinnitus. Noted MDD/ ZAIDA/ passive SI, safe remained 2 yrs nato, in sober housing, to start a new job. Was to continue care with counseling and psyche & go to the  ER if suicidal thoughts increased or got worse. [place] ED was related to meds, mood, prior effect of alcohol, SK was stable. Seasonal allergies were not a big issue. Was to continue care with the dentist. Health care maintenance was reviewed. Advised self testicular check regularly. Recommended the pneumonia vaccine, shingles # 2 & was to do after done with Covid # 2 on 9/3/21 was to return in 3 months for a preventive physical then.  Labs showed normal PSA, elevated LDL, CMP showed mildly low albumin advise recheck in 3 months, TSH was normal iron normal ferritin was low, B12 was normal folate was normal CBC was normal.  Did not return for 3-month follow-up.     Seen 10/4/22, was originally scheduled as a physical.  But due to respiratory symptoms and exposure to positive COVID night before was changed to an office visit.  COVID and strep test done for history of runny nose nasal congestion cough started 10 to 12 days prior improving and reported negative COVID PCR test on day 5 of illness but also newly exposed to positive COVID case day before.  Quarantine till COVID test comes back negative,  if negative recommend rechecking in 5 days as testing could be negative as too early since time of new exposure.  Chronic fatigue felt multifactorial due to mental health, medications, prior noted low ejection fraction of heart, history of pulmonary embolism, undiagnosed sleep apnea etc. Labs done & referred to cardiology and sleep to further evaluate and treat and return in 4 to 6 weeks to follow-up. Recheck echo and to see cardiology for prior noted decreased EF suspected due to pulmonary embolism in the past. History of chronic dizziness & referred to cardiology and neurology. For prior low ferritin and low calcium labs done.   History of elevated LDL & labs done and if cardiovascular risk was elevated to consider statin medication.  History of prior pulmonary embolism and DVT,noted had taken self off Xarelto in May 2022  but had medication at home.  Recommended to return to hematology to discuss risks and if medication needs to be resumed.  Hx of Upper abdominal pain ongoing many years, thought could be due to gastritis, ulcer, heartburn etc.  Labs including H. pylori ordered as well as a CT abdomen pelvis given duration of symptoms.  Was doing over-the-counter Prilosec as needed with some relief.  Discussed chronic use of this medication could increase risk of C. difficile colitis, fractures, magnesium and B12 deficiency. To try Pepcid 20 mg twice a day over-the-counter instead in the meantime  Noted an elevated blood pressure, felt due to cough& anxiety. Was recommended to eat a low-salt diet and recheck at follow-up and if persisted to consider medication. 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   Reviewed major depression with anxiety and passive suicidal ideation, felt safe under care of psychiatry & had an appointment with them the following day to discuss medications.  Had had changes to meds over time, was to continue with therapy every 2 weeks.  & noted was gainfully employed. Memory concerns felt could be due to prior alcohol use and current mental health. Did check B12 and magnesium levels and referred to see neurology to further evaluate and treat  Did not have time to discuss a lesion on left side of nose that he had had for some time that was not healing. Was to review at next appointment and refer to dermatology if needed  Was to return in 4 to 6 weeks for physical and update flu shot, COVID-vaccine, pneumonia vaccine and shingles etc. Noted was a Former smoker but did not meet criteria for lung CT cancer screening.     Covid & strep was negative, LDL elevated with ASCVD risk of 9.4 % normal lipase, B12, ferritin, calcium, vit d, CMP, TSH, magnesium, iron, HBA1c was normal. H pylori came back normal.   On 10/6/2022 noted hematology PA filled his Xarelto at reduced dose of 10 mg for one year after he  discussed with them a compromise to restart meds,  On 10/24/22 ct showed a benign right liver cyst, CBD dilation, & advised an mri. Also noted duodenal diverticulum, diverticulosis, bone island right iliac that was asymptomatic & B/l inguinal hernia & umbilical hernia that was asymptomatic  Echo showed improvement with normal ef & no valvular concerns.   Met with cardiology on 11/22 who noted normal EKG & echo, advised a statin & felt could do a stress test but would not provide more infor & patient opted not to do.  MRI done 11/25 showed CBD dilation was not of concern, had possible gallstones/ sludge, had evidence of pancreatic scarring, suggestion of IPMN, a duodenal diverticulum and non specific mesenteric lymph nodes.     Seen 12/2022 for a physical & follow up from last visit  MDD/Francesca/ passive SI: under care of therapy and psyche. Had been started Abilify & stopped as didn't like it. No longer on Cymbalta, was supposed to talk to psyche 2 weeks prior but it got cancelled and had next apt on dec 15 th, was thinking about going back to restarting Lexapro as in retrospect felt better while on it.  Was  safe. Though mood and FRANCESCA worse off any meds. Reported no urge to drink alcohol.   Congratulated on sustained sobriety from alcohol since 2019  Reviewed his long standing epigastric pain, had been taking both Pepcid and Prilosec since oct visit. Symptoms would go away but return.  When pain went away mood was better. Taking Prilosec 20 mg daily & Pepcid 20 mg once day & a Raeann capsule. Lab work recently including lipase & H pylori were negative. did  Ct abdomen and then mri showed benign CBD dilation, dilated pancreatic duct, evidence of pancreatic scarring, suggestion of IPMN, a duodenal diverticulum, and non specific mesenteric lymphadenopathy & a benign right liver cyst & asymptomatic diverticulosis, B/inguinal hernia and small asymptomatic umbilical hernia & possible gall stones. Discussed Chronic use of  Prilosec like med's could cause atypical pneumonia, fractures,  C diff colitis, vit B 12 and magnesium deficiency. If could come off would be good by slowly tapering off Prilosec while continuing Pepcid twice a day for symptoms control. Referred to GI to evaluate epigastric pain, IPMN, duodenal diverticulum & could  benefit from an EGD.   Also referred to general surgeon regarding possible gall bladder sludge & asymptomatic B/l inguinal & umbilical hernia which I didn't believe was contributing to long standing epigastric pain.   Chronic fatigue, Seen by cardio, Echo & EKG & labs normal, opted no stress test. Had upcoming apt with sleep in new year to evaluate for possible TAYLOR. Suspected mental health playing a role. To continue care with his psychiatrist & therapist. Prior noted decreased EF with prior PE had resolved with recent normal echo. Elevated BP, recheck end of visit was wnl, suspected due to anxiety. To continue to monitor & advised a low salt & low caffeine diet.   Dizziness persisted, sometimes ws postural, sometimes could occur while standing with no posture changes. Rarely feels like will black out. At times gets up and has to sit right down again. Feels drinking enough water. Seen by cardio EKG, echo normal. Cardio felt stress test would not add more information. 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 No sign of bleeding. Labs normal. To be seeing neuro to evaluate more in the new year. If no answer could consider a Holter monitor.  LDL elevated. The 10-year ASCVD risk score (Surprise DK, et al., 2019) was: 9.3%. Discussed risk using boswell's medical model for this risk group. Opted  to hold off on statin   Subjective memory concerns, Able to do job ok but felt struggled with things sometimes. B 12 and TSH normal.to discuss with neuro in march when had apt. Suspected mental health playing a role.   Hx of PE & DVT & back on Xarelto since stopped in may but since on a lower dose of 10 mg a  compromise he made with hematology.  Incidental right iliac bone island noted on ct scan, felt to be benign, was asymptomatic. Left nose lesion many months started as injury / pimple which he had picked at often and not been healing.   SK no new changes to skin otherwise. Referred to dermatology for nose skin lesion & general skin check. B/l tinnitus stable . Allergies not bothersome now, as gotten older had allergy to ragweed noted got better. Dental caries improved. ED noted better since off the antidepressants  HM reviewed. No ACP on file, honoring choices given to review. Vaccines discussed. Given flu and Covid pfizer bivalent vaccine. To make an apt to do long overdue Shingrex #2 in a few weeks with the MA & a BP check Desired to hold off on Hep B vaccine. Labs reviewed from oct PSA was normal in 8/2021 & could offer at next visit / physical. Was to follow up again in march 2023 after saw  sleep & neuro & offer Hep B then.     Was to get an ERCP appointment with Dr. Peralta.  But no appointment made.  Seen by neurology 3/9/2023 for lightheadedness, memory changes, fatigue, short-term memory concerns, MRI brain ordered and to consider neuro psych eval.  For postural dizziness not orthostatic at visit advise compression socks and was to follow-up in 6 months.  Seen by [facility] psychiatry/7/23 for MDD ZAIDA continued on Zoloft started on Trintellix at the time.  MRI 4/13 showed findings consistent with Wernicke's encephalopathy suspected a scar from prior alcohol use and also had abnormal signal bilateral global pallidum and advised to check B1 copper ceruloplasmin and encouraged to take a B complex.  Lab work done 5/24/2023 showed normal B1 copper and ceruloplasmin and advised manganese and 24 urine copper which did not get done.  Also neuro psych eval set up for 6/26/2023 to Dr. Connors at [place] but did not make it to the appointment.  Seen by sleep 10/12/2023 for gasping breathing, excessive daytime  drowsiness, cognitive issues, advised HST and decrease caffeine.  HST done 12/5/2023 unfortunately had technical errors and was advised to redo which did not get done.  Seen by dermatology 2/26/24 and a shave biopsy of left AllerNaze showed basal cell cancer and advise Mohs.  Seen at [facility] in April 2024 for dental infection had Mohs procedure 4/18/2024.  Postop check 4/24.  Reconstruction started 5/13/2024 seen by dermatology 5/15/2024.  Seen by [facility] 5/20/2024 for #31 tooth decay referred to endodontist to preserve tooth on patient request though prognosis was poor.  Given a topical anesthetic on 5/21 for pain.  Seen by [facility] 5/23 for endodontic evaluation noted pulp necrosis and advised symptomatic care with ibuprofen.  Seen by reconstructive Derm 6/6 started with laser treatment of scar laser treatment on 7/22 and 9/13/2024.  [place]  shows received ketamine 3/29/2024, 5/24, 8 7/10/2024 and 9/5/2024 and Percocet 5/325 #12 on 4/18 #5 on 5/14, Norco 5/325 #6 on 5/17/2024    Seen 9/20/24 for ongoing stomach pain, chronic postural dizziness, chronic fatigue. Last seen by this provider in 12/2022   Reviewed the long standing epigastric pain, previously had been taking both Pepcid and Prilosec & a Raeann capsule but on no meds a while Prior lipase & H pylori had negative. In 2022 a Ct abdomen and then mri showed benign CBD dilation, dilated pancreatic duct, evidence of pancreatic scarring, suggestion of IPMN, a duodenal diverticulum, and non specific mesenteric lymphadenopathy & a benign right liver cyst & asymptomatic diverticulosis, asymptomatic B/l inguinal hernia and small asymptomatic umbilical hernia & possible asymptomatic gall stones. Was referred to GI to evaluate epigastric pain, IPMN, duodenal diverticulum & may benefit from an EGD. Was also referred to general surgeon regarding possible gall bladder sludge & asymptomatic B/l inguinal & umbilical hernia which I didn't believe was  contributing to long standing epigastric pain. Did not get apt's with either and symptoms persist 2 yrs since last seen.Epigastric pain has been occurring daily for years, pushing on upper abdominal area hurt, reported no longer with heartburn. No longer on Prilosec & stopped Pepcid as felt it made him tired. When took Pepcid or PPI previously felt it helped. Understood chronic use of Prilosec like med's could cause atypical pneumonia, fractures, C diff colitis, vit B 12 and magnesium deficiency. Reported no blood in stools or black stools but noted wt loss & appetite decreased. In 12/2022 weighed 197 lbs, in oct 2023 was 189 lbs, in 9/2024 was 170 lbs. Labs Agreeable to trying Prilosec 20 mg 2 weeks then Pepcid 20 mg bid and seeing GI.   Sludge in gall bladder felt not significant, had a hx of mild dilated CBD and dilated pancreatic duct, IPMN  & mesenteric lymphadenopathy noted at last mri and was advised ERCP with GI but no apt given and referred again to them. Desired to wait to see GI to repeat MRI. Had an asymptomatic Liver cyst, asymptomatic duodenal diverticulum, asymptomatic diverticulosis, asymptomatic tiny umbilical hernia & unchanged asymptomatic B/l inguinal hernia.   Labs came back with normal CBC, normal ESR, CRP, CMP, TSH, Vitamin B12, ferritin & iron . Had normal celiac testing, manganese, & Vitamin D level was low normal & encouraged getting 2000 IU daily. Later tested positive for H Pylori. Continued omeprazole 20 mg daily 2 week as prescribed. In addition  to take pylera, combo med containing bismuth, metronidazole and tetracycline 4 / day for 10 days together with the omeprazole to eradicate the H pylori. Once done with this regimen to continue on Pepcid previously prescribed and encouraged to see GI as discussed as may benefit from an EGD. To check prostate test at his physical.      Chronic fatigue, previously seen by cardio, Echo & EKG & labs normal, opted no stress test. Prior noted  decreased EF with prior PE had resolved with normal echo in 2022. Given persistent symptoms referred back to cardiology. Seen by sleep, HST done had technical errors in dec 2022 & not redone. Labs didn't explain fatigue and referred back to sleep.   Postural dizziness, sometimes could occur while standing with no posture changes. Rarely felt near syncope. At times on getting up had to sit right down again. Felt he was drinking enough water. Seen by cardio previously in 2022 when EKG, echo normal. Cardio felt stress test would not add more information. 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 No sign of bleeding. Previously BP not orthostatic. No associated chest pain or shortness of breath or vertigo. Fh of heart issues and arrhythmias/ pacemaker. /72, at home 100/60. Noted had never tried compression socks previously advised by neuro & encouraged to do so. Referred back to cardiology & ziopatch ordered. Advised and given compression socks to try.     LDL elevated, The 10-year ASCVD risk score (Jerry DK, et al., 2019) is: 5.7%. A diet high in fat and simple carbohydrates, genetics and being overweight can contribute to this. Advised exercising 150 minutes of aerobic exercise per week (30 minutes for 5 days per week or 50 minutes for 3 days per week are options) and eating a low saturated fat/low carbohydrate diet to improve this. HBA1c normal.. Could consider doing a ct calcium score to get objective data about risk and need for starting a statin med.   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     Memory concerns, Able to do job ok but felt struggled  sometimes. Forgetful of names, no worsening, forgetful of conversation with clients at times and felt had to write everything down. B 12 and TSH normal. Seen by neuro in 2023. MRI showed 4/13/23  showed findings consistent with Wernicke's encephalopathy, suspected a scar from prior alcohol use and also had abnormal signal  bilateral global pallidum and advised to check B1 copper ceruloplasmin and encouraged to take a B complex.  Lab work done 5/24/2023 showed normal B1 copper and ceruloplasmin and advised manganese and 24 urine copper which did not get done.  Also neuro psych eval set up for 6/26/2023 to Dr. Connors at [place] [facility] but did not make it to the appointment. In 9/2024 manganese and copper testing came back negative. Was referred back to neuro for follow up and neuro psyche referral.   Noted sustained sobriety from alcohol since 2019  MDD/ZAIDA/ passive SI: under care of [facility] psyche Morena Spencer . Previously on Abilify & had stopped as didn't like it. No longer on Cymbalta, no longer on clonidine which made him tired. Noted was on fluoxetine 40 mg and 5 mg lexapro. Lexapro felt helped with anxiety better but wiped him out so doing this way. Understood risk of serotonin syndrome. No prolonged qt note din past. Did also take Ketamine infusion / lozenges a while x 4 last on 9/5/24.  Felt made no difference. Not actively suicidal. Was safe, no longer in therapy.      Hx of BCC Left nose ala s/p mohs in 2/24, reconstruction and doing laser Rx of scar tissue, under care of derm, lesion many months started as injury / pimple which he had picked at often and not been healing. Hx of SK.   Asymptomatic incidental right iliac bone island noted on ct scan in 2022, felt to be benign,   Allergies to ragweed, better as got older, & no longer bothersome.  B/l tinnitus stable could hear ok, usually unaware of it until thought about it   Dental caries improved  ED unchanged on the antidepressants  Health care maintenance reviewed. Declined flu and COVID vaccine. Encouraged to complete overdue Shingrex # 2. Labs done & was to return for his physical    On 9/20 Zio patch showed 1 V. tach 16 episodes of SVT and advised to follow-up with cardiology.  GI advised getting an MRI prior to scheduling an appointment with them.  MRA abdomen 4/17  showed stable mild dilated pancreatic duct stable 5 mm cyst recheck 18 months 2026, CBD dilated up to 10 mm no gallstone sludge in gallbladder.  Normal spleen kidney bowels.  1 duodenal diverticulum.  Arthritic changes and some incidental findings.    CURRENTLY   Here for physical & follow up  Health care maintenance reviewed  No ACP & Given honoring choices to review  Colon due 2030  Will do flu shot today  Will do COVID vaccine today  Will do Twinrix # 1 today, 2nd in 2 months when returns for apt and 3rd four months after that  Encouraged to complete overdue Shingrex # 2 sometime will do another time  Will check PSA today. Labs form recent sept visit reviewed.    Reviewed the long standing epigastric pain, previously had been taking both Pepcid and Prilosec & a Raeann capsule but on no meds a while Prior lipase & H pylori had negative. In 2022 a Ct abdomen and then mri showed benign CBD dilation, dilated pancreatic duct, evidence of pancreatic scarring, suggestion of IPMN, a duodenal diverticulum, and non specific mesenteric lymphadenopathy & a benign right liver cyst & asymptomatic diverticulosis, asymptomatic B/l inguinal hernia and small asymptomatic umbilical hernia & possible asymptomatic gall stones. Was referred to GI to evaluate epigastric pain, IPMN, duodenal diverticulum & may benefit from an EGD. Was also referred to general surgeon regarding possible gall bladder sludge & asymptomatic B/l inguinal & umbilical hernia which I didn't believe was contributing to long standing epigastric pain. Did not get apt's with either and symptoms persist 2 yrs since last seen.Epigastric pain has been occurring daily for years, pushing on upper abdominal area hurt, reported no longer with heartburn. No longer on Prilosec & stopped Pepcid as felt it made him tired. When took Pepcid or PPI previously felt it helped. Understood chronic use of Prilosec like med's could cause atypical pneumonia, fractures, C diff colitis,  vit B 12 and magnesium deficiency. Reported no blood in stools or black stools but noted wt loss & appetite decreased. In 12/2022 weighed 197 lbs, in oct 2023 was 189 lbs, in 9/2024 was 170 lbs. Labs Agreeable to trying Prilosec 20 mg 2 weeks then Pepcid 20 mg bid and seeing GI.   Sludge in gall bladder felt not significant, had a hx of mild dilated CBD and dilated pancreatic duct, IPMN  & mesenteric lymphadenopathy noted at last mri and was advised ERCP with GI but no apt given and referred again to them. Desired to wait to see GI to repeat MRI. Had an asymptomatic Liver cyst, asymptomatic duodenal diverticulum, asymptomatic diverticulosis, asymptomatic tiny umbilical hernia & unchanged asymptomatic B/l inguinal hernia.   Labs came back with normal CBC, normal ESR, CRP, CMP, TSH, Vitamin B12, ferritin & iron . Had normal celiac testing, manganese, & Vitamin D level was low normal & encouraged getting 2000 IU daily. Later tested positive for H Pylori. Continued omeprazole 20 mg daily 2 week as prescribed. & added pylera, combo med containing bismuth, metronidazole and tetracycline 4 / day for 10 days together with the omeprazole to eradicate the H pylori. Once done with this regimen he was to continue on Pepcid previously prescribed and encouraged to see GI. GI advised getting an MRI prior to scheduling an appointment with them.  MRA abdomen 4/17 showed stable mild dilated pancreatic duct stable 5 mm cyst recheck 18 months 2026, CBD dilated up to 10 mm no gallstone sludge in gallbladder.  Normal spleen kidney bowels.  1 duodenal diverticulum.  No mesenteric lymphadenopathy noted any more. Arthritic changes and some incidental findings.  Reports no change in symptoms on completing the H pylori Rx. Has a persistent   gnawing pain not stabbing in epigastric area that causes fatigue. Only 1/2 tsp baking soda in water helps. Never had an EGD. Wt loss has stabilized. Had lost 27 lbs dec 2022 to sept 2024 and 3 lbs since  sept 2024. Has occasional nausea, no vomiting, no diarrhea or constipation, no blood in stools or black stools, no night sweats. Remains on Pepcid 20 mg once a day & few days ago  increased to 2/ day. Is to see GI tomorrow as planned.      Chronic fatigue, Prior noted decreased EF with prior PE had resolved with normal echo in 2022. Seen by sleep, HST done had technical errors in dec 2022 & not redone. Labs did not explain symptoms & referred back to sleep in sept 2024. Not seen then yet.    Postural dizziness, sometimes can occur while standing with no posture changes. Rarely feels like will black out. At times gets up and has to sit right down again. Feels drinking enough water. Reported near syncope x 1. No vertigo. Never tried compression socks advised by neuro previously. Seen by cardio previously in 2022  EKG, echo then normal. Cardio felt stress test would not add more information at the time. 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 No sign of bleeding. Previously BP not orthostatic. No associated chest pain or shortness of breath.   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 Referred back to cardiology & ziopatch ordered. Advised and given compression socks to try.  On 9/20 Zio patch showed 1 V. tach 16 episodes of SVT and advised to follow-up with cardiology. They have ordered an echo. Labs did not explain & was referred back to sleep. Not seen them yet. Not sure if using compression socks.      LDL elevated & The 10-year ASCVD risk score (Jerry DK, et al., 2019) is: 5.7%. Advised exercising 150 minutes of aerobic exercise per week & eating a low saturated fat/low carbohydrate diet. HBA1c normal.. Could consider doing a ct calcium score to get objective data about risk and need for starting a statin med. Desires to wait to start statin and will discuss more with cards when seen.     Hx of PE & DVT & on Xarelto managed by hematology, not seen in a while & encouraged to set up follow up with  them     Memory concerns, Able to do job ok but felt struggled  sometimes. Forgetful of names, no worsening, forgetful of conversation with clients at times and felt had to write everything down. B 12 and TSH normal. Seen by neuro in 2023. MRI showed 4/13/23  showed findings consistent with Wernicke's encephalopathy, suspected a scar from prior alcohol use and also had abnormal signal bilateral global pallidum and advised to check B1 copper ceruloplasmin and encouraged to take a B complex.  Lab work done 5/24/2023 showed normal B1 copper and ceruloplasmin and advised manganese and 24 urine copper which did not get done.  Also neuro psych eval set up for 6/26/2023 to Dr. Connors at [place] [facility] but did not make it to the appointment. In 9/2024 manganese and copper testing came back negative. Reports taking B complex now and then. Was referred back to neuro for follow up and neuro psyche referral. Has apt in the new year.      Sustained sobriety from alcohol since 2019     MDD/ZAIDA/ passive SI: under care of [facility] psyche Morena Spencer . Previously on Abilify & had stopped as didn't like it. No longer on Cymbalta, no longer on clonidine which made him tired. Noted was on fluoxetine 40 mg and 5 mg lexapro. Lexapro felt helped with anxiety better but wiped him out so doing this way. Understood risk of serotonin syndrome. No prolonged qt note din past. Did also take Ketamine infusion / lozenges a while x 4 last on 9/5/24.  Felt made no difference. Not actively suicidal. PHQ=14, ZAIDA =11, chronic passive SI, is safe, no longer in therapy. To see his psyche again in jan     Hx of BCC Left nose ala s/p mohs in 2/24, reconstruction and now doing laser Rx of scar tissue, under care of derm. To get another laser Rx in dec. Hx of SK.      Asymptomatic incidental right iliac bone island noted on ct scan in 2022, felt to be benign,      Allergies to ragweed, got better as got older, & no longer bothersome.     ED unchanged on  "the antidepressants. No active issue. Resolved to PMH.   Nocturia 2/ day with urgency, some  hesitancy, no incontinence,   Will check PSA today     B/l tinnitus stable can hear ok, usually unaware of it until thinks about it     Worsening vision wears glasses to follow up with eye.,       Dental caries improved    Review of Systems  Constitutional, HEENT, cardiovascular, pulmonary, GI, , musculoskeletal, neuro, skin, endocrine and psych systems are negative, except as otherwise noted.     Objective    Exam  /70 (BP Location: Right arm, Patient Position: Sitting, Cuff Size: Adult Regular)   Pulse 58   Temp 97.7  F (36.5  C) (Temporal)   Resp 19   Ht 1.74 m (5' 8.5\")   Wt 76.1 kg (167 lb 11.2 oz)   SpO2 97%   BMI 25.13 kg/m     Estimated body mass index is 25.13 kg/m  as calculated from the following:    Height as of this encounter: 1.74 m (5' 8.5\").    Weight as of this encounter: 76.1 kg (167 lb 11.2 oz).    Physical Exam  GENERAL: alert and no distress  EYES: Eyes grossly normal to inspection, PERRL and conjunctivae and sclerae normal  HENT: ear canals and TM's normal, nose and mouth without ulcers or lesions  NECK: no adenopathy, no asymmetry, masses, or scars  RESP: lungs clear to auscultation - no rales, rhonchi or wheezes  CV: regular rate and rhythm, normal S1 S2, no S3 or S4, no murmur, click or rub, no peripheral edema  ABDOMEN: soft, non tender, no hepatosplenomegaly, no masses and bowel sounds normal  MS: no gross musculoskeletal defects noted, no edema  SKIN: no suspicious lesions or rashes, midline scar forehead  NEURO: Normal strength and tone, mentation intact and speech normal  PSYCH: mentation appears normal, affect normal/bright    Signed Electronically by: Adriana Solis MD  "

## 2024-11-19 NOTE — PATIENT INSTRUCTIONS
Patient Education   Preventive Care Advice   This is general advice given by our system to help you stay healthy. However, your care team may have specific advice just for you. Please talk to your care team about your preventive care needs.  Nutrition  Eat 5 or more servings of fruits and vegetables each day.  Try wheat bread, brown rice and whole grain pasta (instead of white bread, rice, and pasta).  Get enough calcium and vitamin D. Check the label on foods and aim for 100% of the RDA (recommended daily allowance).  Lifestyle  Exercise at least 150 minutes each week  (30 minutes a day, 5 days a week).  Do muscle strengthening activities 2 days a week. These help control your weight and prevent disease.  No smoking.  Wear sunscreen to prevent skin cancer.  Have a dental exam and cleaning every 6 months.  Yearly exams  See your health care team every year to talk about:  Any changes in your health.  Any medicines your care team has prescribed.  Preventive care, family planning, and ways to prevent chronic diseases.  Shots (vaccines)   HPV shots (up to age 26), if you've never had them before.  Hepatitis B shots (up to age 59), if you've never had them before.  COVID-19 shot: Get this shot when it's due.  Flu shot: Get a flu shot every year.  Tetanus shot: Get a tetanus shot every 10 years.  Pneumococcal, hepatitis A, and RSV shots: Ask your care team if you need these based on your risk.  Shingles shot (for age 50 and up)  General health tests  Diabetes screening:  Starting at age 35, Get screened for diabetes at least every 3 years.  If you are younger than age 35, ask your care team if you should be screened for diabetes.  Cholesterol test: At age 39, start having a cholesterol test every 5 years, or more often if advised.  Bone density scan (DEXA): At age 50, ask your care team if you should have this scan for osteoporosis (brittle bones).  Hepatitis C: Get tested at least once in your life.  STIs (sexually  transmitted infections)  Before age 24: Ask your care team if you should be screened for STIs.  After age 24: Get screened for STIs if you're at risk. You are at risk for STIs (including HIV) if:  You are sexually active with more than one person.  You don't use condoms every time.  You or a partner was diagnosed with a sexually transmitted infection.  If you are at risk for HIV, ask about PrEP medicine to prevent HIV.  Get tested for HIV at least once in your life, whether you are at risk for HIV or not.  Cancer screening tests  Cervical cancer screening: If you have a cervix, begin getting regular cervical cancer screening tests starting at age 21.  Breast cancer scan (mammogram): If you've ever had breasts, begin having regular mammograms starting at age 40. This is a scan to check for breast cancer.  Colon cancer screening: It is important to start screening for colon cancer at age 45.  Have a colonoscopy test every 10 years (or more often if you're at risk) Or, ask your provider about stool tests like a FIT test every year or Cologuard test every 3 years.  To learn more about your testing options, visit:   .  For help making a decision, visit:   https://bit.ly/bx60429.  Prostate cancer screening test: If you have a prostate, ask your care team if a prostate cancer screening test (PSA) at age 55 is right for you.  Lung cancer screening: If you are a current or former smoker ages 50 to 80, ask your care team if ongoing lung cancer screenings are right for you.  For informational purposes only. Not to replace the advice of your health care provider. Copyright   2023 [facility]. All rights reserved. Clinically reviewed by the [facility]. [employer] 351183 - REV 01/24.  Learning About Depression Screening  What is depression screening?  Depression screening is a way to see if you have depression symptoms. It may be done by a doctor or counselor. It's often part of a routine  "checkup. That's because your mental health is just as important as your physical health.  Depression is a mental health condition that affects how you feel, think, and act. You may:  Have less energy.  Lose interest in your daily activities.  Feel sad and grouchy for a long time.  Depression is very common. It affects people of all ages.  Many things can lead to depression. Some people become depressed after they have a stroke or find out they have a major illness like cancer or heart disease. The death of a loved one or a breakup may lead to depression. It can run in families. Most experts believe that a combination of inherited genes and stressful life events can cause it.  What happens during screening?  You may be asked to fill out a form about your depression symptoms. You and the doctor will discuss your answers. The doctor may ask you more questions to learn more about how you think, act, and feel.  What happens after screening?  If you have symptoms of depression, your doctor will talk to you about your options.  Doctors usually treat depression with medicines or counseling. Often, combining the two works best. Many people don't get help because they think that they'll get over the depression on their own. But people with depression may not get better unless they get treatment.  The cause of depression is not well understood. There may be many factors involved. But if you have depression, it's not your fault.  A serious symptom of depression is thinking about death or suicide. If you or someone you care about talks about this or about feeling hopeless, get help right away.  It's important to know that depression can be treated. Medicine, counseling, and self-care may help.  Where can you learn more?  Go to https://www.[employer].net/patiented  Enter T185 in the search box to learn more about \"Learning About Depression Screening.\"  Current as of: June 24, 2023  Content Version: 14.2 2024 [employer]   Care instructions adapted under license by your healthcare professional. If you have questions about a medical condition or this instruction, always ask your healthcare professional. [employer] disclaims any warranty or liability for your use of this information.

## 2024-11-20 ENCOUNTER — OFFICE VISIT (OUTPATIENT)
Dept: GASTROENTEROLOGY | Facility: CLINIC | Age: 59
End: 2024-11-20
Attending: FAMILY MEDICINE
Payer: COMMERCIAL

## 2024-11-20 ENCOUNTER — PRE VISIT (OUTPATIENT)
Dept: GASTROENTEROLOGY | Facility: CLINIC | Age: 59
End: 2024-11-20

## 2024-11-20 ENCOUNTER — TELEPHONE (OUTPATIENT)
Dept: GASTROENTEROLOGY | Facility: CLINIC | Age: 59
End: 2024-11-20

## 2024-11-20 ENCOUNTER — LAB (OUTPATIENT)
Dept: LAB | Facility: CLINIC | Age: 59
End: 2024-11-20
Payer: COMMERCIAL

## 2024-11-20 VITALS
BODY MASS INDEX: 24.96 KG/M2 | DIASTOLIC BLOOD PRESSURE: 77 MMHG | WEIGHT: 168.5 LBS | HEIGHT: 69 IN | OXYGEN SATURATION: 99 % | HEART RATE: 62 BPM | SYSTOLIC BLOOD PRESSURE: 131 MMHG

## 2024-11-20 DIAGNOSIS — R14.0 BLOATING: ICD-10-CM

## 2024-11-20 DIAGNOSIS — K76.89 LIVER CYST: ICD-10-CM

## 2024-11-20 DIAGNOSIS — K86.89 DILATED PANCREATIC DUCT: ICD-10-CM

## 2024-11-20 DIAGNOSIS — R10.13 EPIGASTRIC PAIN: ICD-10-CM

## 2024-11-20 DIAGNOSIS — K83.8 DILATION OF COMMON BILE DUCT: ICD-10-CM

## 2024-11-20 DIAGNOSIS — R63.4 WEIGHT LOSS: ICD-10-CM

## 2024-11-20 DIAGNOSIS — K57.10 DUODENAL DIVERTICULUM: ICD-10-CM

## 2024-11-20 DIAGNOSIS — D49.0 IPMN (INTRADUCTAL PAPILLARY MUCINOUS NEOPLASM): ICD-10-CM

## 2024-11-20 DIAGNOSIS — R10.13 EPIGASTRIC PAIN: Primary | ICD-10-CM

## 2024-11-20 LAB — PSA SERPL DL<=0.01 NG/ML-MCNC: 1.49 NG/ML (ref 0–3.5)

## 2024-11-20 PROCEDURE — 99205 OFFICE O/P NEW HI 60 MIN: CPT | Performed by: PHYSICIAN ASSISTANT

## 2024-11-20 ASSESSMENT — PAIN SCALES - GENERAL: PAINLEVEL_OUTOF10: NO PAIN (0)

## 2024-11-20 NOTE — LETTER
11/20/2024      Nikunj Sinha  [address]  [place] [facility] [address]      Dear Colleague,    Thank you for referring your patient, Nikunj Sinha, to the [facility] [place]. Please see a copy of my visit note below.      GI CLINIC VISIT    CC/REFERRING MD:  Adriana Solis  REASON FOR CONSULTATION: Abd pain     ASSESSMENT/PLAN:  Nikunj Sinha is a 59 year old year old male with PMHx of former heavy ETOH use, MDD/Anxiety, h/o SI (requiring admissions), b/l DVT with saddle anesthesia 2019 (on Rivaroxaban) following with the [facility] GI group for chronic abd pain with new unintentional weight loss of 30# over last 11 months.     #epigastric abd pain x 20 years   2020 Cscope clean. 2024 MR Abd MRCP with below findings - no obstruction or large masses. H.pylori NEG 9/2024. Celiac serologies NEG (no intact total IgA) Possible this could be malignancy/mass, chronic pancreatitis, biliary disease, GERD/gastritis/PUD, transient obstructive process, comorbid functional disease, gastroparesis vs other    -priority EGD with MAC and duodenal biopsy   -obtain fecal elastase, consider supplemental pancreatic enzymes   -avoid gastric irritants     Future consideration  1.meds to follow EGD - PPI, H2B, neuromodulator, botanical    2. RUQ US +/- HIDA  3. NMGES   4. Breath testing (SIBO risk - duodenal diverticulum)     #abnormal findings on MR Abd MRCP W and WO Contrast  Will reach out to advanced endo about potential EUS for CBD dilation to 10 mm (previously 6 mm on 2022 scan), stable and mild 5 mm dilation of main pancreatic duct, stable 5 mm cyst to pancreatic body w/o evidence of worrisome features - all within context of ongoing epigastric abd pain x 20 years and unintentional weight loss of 30# since early 2024. Hepatic function panel WNL  9/2024. We are proceeding with EGD today.     Hepatic cysts - unchanged when compared to 2022 study; largest measuring 3.5 cm, though benign simple cysts. No  follow-up needed.     # b/l DVT with saddle pulmonary embolism (2019)  Per 10/20/22 hematology note, VTE was thought to be unprovoked however on that appt, he reported heavy ETOH use, living out of this car, and sleeping upright in his care routinely - plan at that time was to consider the VTE provoked however given the clot burden/severity, plan was to keep him on daily Xarelto 10 mg indefinitely. Plan was to be reviewed yearly.     Orders Placed This Encounter   Procedures     Elastase Fecal     Adult GI  Referral - Procedure Only     Colorectal cancer screening: done 2020 with 10 year recall     RTC 1-2 weeks after EGD     Thank you for this consultation.  It was a pleasure to participate in the care of this patient; please contact me with any further questions.     Melba Dasilva PA-C    Follow up: As planned above. Today, I personally spent 30 minutes in direct face to face time with the patient, of which greater than 50% of the time was spent in patient education and counseling as described above. Approximately 30 minutes were spent on indirect care associated with the patient's consultation including but not limited to review of: patient medical records to date, clinic visits, hospital records, lab results, imaging studies, procedural documentation, and coordinating care with other providers. The findings from this review are summarized in the above note. All of the above accounted for a cumulative time of 60  minutes and was performed on the date of service.     HPI  Nikunj Sinha is a 59 year old year old male with PMHx of former heavy ETOH use, MDD/Anxiety, h/o SI (requiring admissions), b/l DVT with saddle anesthesia 2019 (on Rivaroxaban) following with the [facility] GI group for abd pain.     1. Epigastric abd pain x 20 years. worse in past several years.   Persistent pain. Gnawing and dull pain. Can have very short periods of no or very minimal pain. Nonradiating.  Worsened with meals (but unclear if  it worsens immediately or with a time delay) . He knows NSAIDs, fried foods, carbonated beverages will make it worse   -Improved - 1/2 tsp baking soda in water, pepcid BID,  -Tried pepto (no relief), prilosec (2wk, unclear if helped)     Found to be H.pylori POS and now treated x 10d. Pain did not improve much afterwards.     -Lost~30# as avoiding eating, it makes pain worse. Reports he was 198 (start of the year) to 165, unintentional. Seems to have stabilized.   -Associated with fatigue and feeling tired  -also reporting nausea w/o emesis, occasional post-prandial bloating, stooling changes  Denies dysphagia, odynphagia.     Bowel Habits   -Once daily BSC type 3, regular for him. No straining. Good evacuation. On toilet for a few min. No blood or black stools. This is is normal stooling pattern w/o changes.   -Cscope 2020 - cecal intubation, good prep. Normal macro colon with 10 year recall (2030)    Family Hx  Mom - unknown stomach issues   No other known family history or GI related malignancy (esophageal, gastric, pancreatic, liver or colon) or family history of IBD/celiac disease.     Social Hx     occasional use of NSAIDs - 2-3x a month for headaches   A B complex vitamin, Vit D, elijah capsulebut otherwise denies use of OTC herbal supplements/weight loss products.      No ETOH. Former heavy drinker. Stopped 5y year ago. May have had pancreatitis but he is unknown.   Former tobacco products. 3 years x 1 ppd   No recreational drug use     PROBLEM LIST  Patient Active Problem List    Diagnosis Date Noted     Abnormal finding on MRI of brain 09/29/2024     Priority: Medium     History of basal cell cancer 09/29/2024     Priority: Medium     left ala nose, s/p mohs , reconstruction and laser rx       Bone island 09/29/2024     Priority: Medium     Family history of arrhythmia 09/29/2024     Priority: Medium     Family history of ischemic heart disease 09/29/2024     Priority: Medium     Dilated pancreatic duct  09/29/2024     Priority: Medium     Sludge in gallbladder 09/29/2024     Priority: Medium     Weight loss 09/29/2024     Priority: Medium     Bilateral inguinal hernia without obstruction or gangrene, recurrence not specified 12/01/2022     Priority: Medium     Umbilical hernia without obstruction and without gangrene 12/01/2022     Priority: Medium     Diverticulosis of large intestine without hemorrhage 12/01/2022     Priority: Medium     Liver cyst 12/01/2022     Priority: Medium     Duodenal diverticulum 12/01/2022     Priority: Medium     IPMN (intraductal papillary mucinous neoplasm) 12/01/2022     Priority: Medium     Dilation of common bile duct 12/01/2022     Priority: Medium     Seborrheic keratoses 07/16/2020     Priority: Medium     Need for shingles vaccine 07/16/2020     Priority: Medium     Elevated LDL cholesterol level 12/20/2019     Priority: Medium     Chronic anticoagulation 12/01/2019     Priority: Medium     Personal history of DVT (deep vein thrombosis) 12/01/2019     Priority: Medium     Current severe episode of major depressive disorder without psychotic features, unspecified whether recurrent (H) 11/29/2019     Priority: Medium     11/29/19:  Unemployed, from [place] originally where was getting his care at the local clinic there, new to me & this clinic, limited records on care everywhere only viewable after he got to the room, with limited apt time.   Hx of ACS, NSTEMI with elevated troponin, no CAD on angiogram,  but found to have a acute saddle embolism with cor pulmonale & hx of dvt,  in 8/2019 on lifelong anticoagulation now on xarelto 20 mg daily, ever since under care of hematology, hx of moderate alcohol dependance in early remission with hx of withdrawal & mood disorder, previously on campral ( discontinued secondary to diarrhea), & naltrexone( stopped as ineffective), ZAIDA, MDD, suicidal ideation, on wellbutrin xl 150 mg daily, lexapro 20 mg daily, gabapentin 300 mg bedtime  "& a multivitamin, also on proranolol, previously on abilify 5 mg, citalopram 10 mg, hydroxyzine, mirtazipine & trazodone, , dental caries, recent dental abscess s/p abx to see the dentist soon, hx of essential tremor on propranolol 20 mg tid, allergic to ragweeds, no records from [facility] but reviewed care everywhere [facility] records,     Seen at [facility] 18 with suicidal ideation. \" after a friend called  worried about pt.'s suicidal ideation. SW met with Pt who appeared [place] and guarded. When SW asked Pt what brought him in he indicated it was a long story. Pt reports that his mother, father and sibling all  within a 10 year time period of various medical issues. 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 Pt reports that he used credit cards to pay for daily expenses. Pt reports that he now has extreme credit card debt. Pt lost his job recently due to his car having mechanical issues and he was missing work. Today Pt was served a 24 hour notice by the [employer] department to leave his apartment. Pt is hopeless and feels that he is \"in too deep\" and nothing will get better. Pt did not appear able to contract for safety. When SW asked what Pt would do if he went home, he shrugged and said \"I don't know\", SW indicated that she was worried Pt would go home and hurt himself because he felt so hopeless Pt shrugged again but did not reply. Pt has no support in the community and is isolated. The friend that called  lives in [place] and Pt has never met her in person but they have talked on the phone for 20 years. SW indicated that she must have been worried enough to figure out how to call the police in [place]. Pt shrugged and said \"I guess\". Pt does not feel an admission will change anything, \"I just don't see what being admitted will do or how it can help\". ISIS and MD discussed their concern over Pt.'s safety and his inability to contract for " "safety along with his state of hopelessness with Pt. He only nodded but agreed to be admitted voluntary in the hopes that the SW on the unit may have some idea's on how to help him. BAL/Breathalyzer completed (date/results): yes Pt was .219 when the police picked him up several hours ago  He does not see a therapist or psychiatrist, however, and although his primary care physician does prescribe him citalopram he has not recently spoken with him or does not sound as if he is disclosed how severe his symptoms are. He does admit to self-medicating with alcohol. When he has stopped drinking in the past he will get rather shaky and a bit nauseous. Otherwise he has not had hallucinations or seizures in the past related to that. When he spoke with his friend today he told him that he thought he should prepared to say goodbye to him, but when I asked why his friend would be concerned with that if this was a daily thought process for him he stated that he does not talk about it every day. He is rather hopeless and he is not sure why he has not done anything yet to harm himself.\" did admit to possibly hanging himself. Initially he was started on the alcohol withdrawal protocol and monitored for a safe detoxification. Was drinking up to a liter per day for the past few weeks prior to admission. Admitted on a voluntary basis. Agreeable to stay voluntarily to coordinate cares medication management. He was admitted to psychiatric unit for safety, stabilization and medication management. Eventually gained insight into the disease process and addiction. Was able to cooperate and participate in programming and educate on the medication as well. Started on campral, abilify, gabapentin, mirtazipine & trazodone.  hydroxyzine, & continued on citalopram. A rule 25 was completed. He spoke of some of his historical challenges including taling care of his brother with ALS and his mother who was dying of metastatic cancer and his ailing " "father. During this time he developed a drinking habit. Additional stressors include being evicted from his apartment. He reports that he was drinking 1.75 L of Vodka per day. He denies suicidal ideations and last felt suicidal about 6 days ago. He stated, Suicide use to be plan A but I no longer feel that way. He has hope for the future. He looks forward to discharge to [facility]. He feels much less hopeless\"    Seen in ER at [facility] on 6/12/19 \" for increased depression, hopelessness, and alcohol abuse. Pt was kicked out of his sober living today after coming home from a therapy appt smelling like alcohol. Pt reports that he has been having a good week and had a really good therapy appt today because he drank before hand and was able to disclose information to his therapist he did not have the courage to do before. Pt returned to his sober house and was confronted about his alcohol abuse, they called the police and sent him here. Pt has been seen in this ED previously for depression and suicidal ideation. Pt is currently denying any Suicidal ideation or plan but also is stating that he has \"fucked up his life beyond repair\" and that \"he doesn't know if he wants to live\". Pt has no family or any support, recently lost his housing, and is struggling to deal with all the loss he has endured in his life. 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 Pt has many regrets in his life and struggling to cope with all the loss. Pt would benefit from a crisis residence\".     In ER [facility] on 10/17/2019 for sucidal ideation & alcohol abuse: \"Nikunj Sinha is a 54 y.o. male being seen by Crisis Social Work regarding increased depressive symptoms, anxiety, and suicidal ideation. The patient presented to the outpatient clinic today for a Rule 25 assessment. He endorsed numerous acute depressive symptoms. The patient states that he was on 2700 for a period of time and " "found the program to be very helpful. He was then discharged to a sober house with a referral for day programming at [facility]. He has continued to relapse on alcohol and has been unsuccessful outside of a structured program. He was previously working a good job as a Scoring Director at [employer] \"but I drank myself out of that job.\" He has now been evicted from his apartment and finds himself homeless. The patient rates his depression as 8 out of 10 and anxiety as 9 out of 10. He is now feeling hopeless with thoughts of suicide and a plan to hang himself. He states that he hasn't taken any steps towards acting on the thoughts but that \"it's something in my mind.\" The patient states that he feels somewhat isolated. He has one friend in [place], but no supportive family that are still alive. The patient's Rule 25 was completed today and this writer verified with Isabella Heath that he is not on her list. She states that once she receives the Rule 25 from the clinic and he is psychiatrically treated he can be considered for 2700. The patient has remained calm, cooperative and pleasant. Patient was sent to the ER from the outpatient addiction clinic. He was here today for a Rule 25 in the outpatient addiction clinic at [facility]. He was sent by his clinician Alexandr after completing a Rule 25 assessment. The patient endorsed numerous depressive symptoms and states that he is having suicidal ideation. The patient does not identify a specific plan but states that he has several and hasn't decided which to act on. The patient's last drink was yesterday at noon and is now observed as having some withdrawal symptoms. Alexandr states that the patient is engaged with outpatient CD treatment at [facility] but he has been unable to maintain any sobriety outside of a structured facility. Roughly one month ago he stopped taking all of his psych medications. Alexandr recommends that the patient go to inpatient mental health for his " suicidal ideation, and if he is not acute enough for that placement that we pursue 2700.  Admitted on a voluntary basis. Agreeable to stay voluntarily to coordinate cares and medication management. Disposition recommended on today's date prior to intake for MI CD treatment. Recommendation by hospitalist to proceed with dental cares. Further efforts to address community concerns prior to proceeding with MI CD treatment. Patient seen by hospitalist during course of hospitalization. Started on antibiotic. Recommendation for evaluation by hospitalist in the community, given lack of provider at the facility. Patient requesting to be seen by dentist prior to MI CD treatment.  Patient placed on a CIWA protocol. Did not demonstrate signs or symptoms of complicated alcohol withdrawal.  Psychiatric medication management performed in detail. Medication management performed to target signs and symptoms of principal diagnosis. Further medication management performed to target comorbid illnesses. Medication management included:    Lexapro: Continued restart of PTA medication for depression and anxiety.    Wellbutrin XL:  Continued restart of of PTA medication for combination therapy.    Gabapentin:  Scheduled medication management for augmentation. Continued restart of PTA medication as needed for anxiety.    Propranolol: Continue restart of PTA medication as needed for anxiety.    Naltrexone: Continued trial for alcohol use disorder; LFTs normalizing. Repeat LFTs scheduled.    Campral: Discontinued PTA medication secondary to GI side effects.  Medication adherent. Tolerating medication management. Progressed with mood and anxiety. Denies psychosis. Denies suicidal and homicidal ideation. Did an appropriate crisis and relapse plan. Future oriented. Denies gun access. Indicated motivation to proceed with MI CD treatment as coordinated by  with intake date on Friday, November 1, 2019.   followed in  "regards to collecting and reviewing collateral information, coordination of cares and discharge planning. Including, MI CD evaluation completed with recommendation for placement to unit 2700. Due to dental issues, patient discharged to medical respite bed and secured on today's date to bridge placement. Discharged to Medical Respite at noon and Inpatient CD treatment on unit 2700 on 11/01 at noon. Further recommendation to refer to psychiatrist in the community after discharge from unit 2700. Cares coordinated with case management.  Treated for dental abscess with pcn.\"    [place]  shows received gabapentin 100 mg #270 on 1/4/19 at [place], then 300 mg # 90  On 2/11/19 in [facility], 100 mg # 60 on 3/6/19 in [facility], then # 180 on 6/20/19, 300 mg # 60 on 10/30/19 & # 30 of 300 mg on 11/21/19    Seen nov 29th, 2019 to establish care as doesnt have a PCP.   Severe MDD/ZAIDA./ hx of passive suicidal thoughts: discharged nov 22nd 7 days ago, currently in sober housing, has [facility] apt with psyche [facility] on 12/5. To also see psyhcology 12/11 but plans to reschedule that at upcoming psyche apt next week as it is in the morning & he needs to go to out patient rx in the am. Has enough meds till sees psyche. Has passive suicidal thoughts. No active plan. Contracts to safety & forwarding thinking about apts etc. Is taking all his meds.     Hx of alcohol abuse, dependance etc, Currently sober, last drink was oct 14th, notes was on naltrexone then stopped as no longer helped. Was on campral before but had to discontinue due to diarrhea. Has done [facility] program twice once beginning of the year and one last week       Moderate alcohol dependence in early remission (H) 11/29/2019     Priority: Medium     ZAIDA (generalized anxiety disorder) 11/29/2019     Priority: Medium     History of suicidal ideation 11/29/2019     Priority: Medium     Chronic fatigue 11/29/2019     Priority: Medium     Postural dizziness with " "near syncope 11/29/2019     Priority: Medium     Tinnitus, bilateral 11/29/2019     Priority: Medium     Memory difficulties 11/29/2019     Priority: Medium     Dental caries 11/29/2019     Priority: Medium     Seasonal allergic rhinitis, unspecified trigger 11/29/2019     Priority: Medium     History of pulmonary embolism 11/29/2019     Priority: Medium     [facility] on 8[facility]: \"hx of depression, anxiety, alcohol abuse disorder (sober x 2 months, He is a recovering alcoholic. He currently resides in sober house since June 2019) who presented with shortness of breath and 2 days of chest pain and near syncope. He presented to the emergency room on 8/4/2019 after he had a syncopal episode in front of [facility]. He did not recall particular chest pain or shortness of breath. He noted swelling of his leg in various spot but he denies any persistent pain or swelling or redness. No injury. No acute illness prior to that. However he donated plasma 10 times in 5 weeks prior to that. Upon initial evaluation, d-dimer was elevated at 6.7. PTT was normal at 28, INR is 1.09. He underwent coronary angiogram and it did not show any obstructive coronary artery disease.Due to an elevated troponin he underwent coronary angiogram which demonstrated no significant CAD. Due to an elevated d-dimer underwent CT scan of the chest which showed extensive acute bilateral pulmonary embolism with saddle embolus.  Doppler legs showed Nonocclusive right femoral DVT from upper to mid thigh. Left calf DVT within one of the peroneal veins in both of the posterior tibial veins from upper to low calf. Echocardiogram revealed lower limit of normal left ventricular systolic function of 50% with septal motion compatible with right ventricular overload.  He had cor pulmonale and was started on heparin infusion. No thrombolytics were given due hemodynamic and respiratory stability. After several days of monitoring on IV heparin Mr. Sinha continued to " "improve and he was transitioned to Xarelto for ongoing anticoagulation therapy. He tolerated Xarelto well. No major bleeding except intermittent gum bleeding. Denies family history of venous thromboembolism. He had a brother who passed away from [facility]. He is single. Does not have children. Was advised to continue with indefinate anticoagulation. Mr. Sinha did donate plasma 2x/week for the last 5 weeks and I wonder if this led to a transient hypercoagulable state. Mr. Sinha was advised not to donate plasma moving forward. An outpatient referral to hematology has been made\".      Seen by hematology 9/2019 a\"t [facility]. She reviewed the pathophysiology of venous thromboembolic exam and risks of recurrence. He is wondering about excessive plasma donation trigger that event and I think it is possible. At this point, regardless of the etiology, he is indicated for long-term anticoagulation due to initial presentation of life-threatening episode. No prior history of venous thromboembolism or family history of thromboembolism. I would not recommend family/hereditary thrombophilia work-up. I also discussed that we do not need to repeat CT scan of the chest to know the resolution of blood clots, however will obtain CTA if he has signs and symptoms concerning for venous thromboembolism. I discussed about strict abstinence from alcohol with anticoagulants. I recommended to obtain kidney, liver function monitoring at least once a year.   I discussed about age-appropriate cancer screening. He admitted that he had positive stool test (sounds like FOBT) a couple years ago. Unclear if it was related to his alcohol use. He was recommended to proceed with colonoscopy, but has not completed yet. I recommended him to complete colonoscopy however, we will need to wait at least 3-6 months because of this recent pulmonary emboli event and I would not recommend interruption of anticoagulation at this point. He voiced " "understanding. I noted that he has a gastroenterology referral. Recommended him to have a primary care provider as His previous primary care provider is retiring/retired. Until he finds a primary care provider, I will continue to manage his anticoagulation. Follow-up with me in about 6 months with labs including hemogram, CMP.\"           Epigastric pain 11/29/2019     Priority: Medium       PERTINENT PAST MEDICAL HISTORY:  Past Medical History:   Diagnosis Date     Abdominal pain, epigastric 11/29/2019     Acute embolism and thrombosis of unspecified deep veins of lower extremity, bilateral (H) 11/19/2024     Chronic anticoagulation 12/01/2019     Current severe episode of major depressive disorder without psychotic features, unspecified whether recurrent (H) 11/29/2019     Decreased cardiac ejection fraction 08/22/2021    Echo oct 2022 normal      Dental caries 11/29/2019     Dizziness 11/29/2019     Erectile dysfunction, unspecified erectile dysfunction type 11/29/2019     Essential tremor 12/01/2019     Fatigue, unspecified type 11/29/2019     ZAIDA (generalized anxiety disorder) 11/29/2019     History of chest pain 11/29/2019     History of pulmonary embolism 11/29/2019     History of suicidal ideation 11/29/2019     Memory changes 11/29/2019     Moderate alcohol dependence in early remission (H) 11/29/2019     Palpitations 11/29/2019     Positive FIT (fecal immunochemical test) 11/29/2019    Colonoscopy 11/2020 normal, labs normal     Seasonal allergic rhinitis, unspecified trigger 11/29/2019     Tinnitus, bilateral 11/29/2019     Weight gain 11/29/2019       PREVIOUS SURGERIES:  Past Surgical History:   Procedure Laterality Date     COLONOSCOPY N/A 11/12/2020    Procedure: COLONOSCOPY;  Surgeon: Belinda Jorgensen MD;  [place] [facility] [place] [facility] [place] CORONARY ANGIOGRAM N/A 08/04/2019    Procedure: Coronary Angiogram;  Surgeon: Ambrose Mayfield MD;  Location: [facility];  Service: Cardiology     " CV LEFT HEART CATHETERIZATION WITHOUT LEFT VENTRICULOGRAM Left 2019    Procedure: Left Heart Catheterization Without Left Ventriculogram;  Surgeon: Ambrose Mayfield MD;  Location: [facility];  Service: Cardiology     TONSILLECTOMY         ALLERGIES:     Allergies   Allergen Reactions     Ragweeds Other (See Comments)     congestion       PERTINENT MEDICATIONS:    Current Outpatient Medications:      escitalopram (LEXAPRO) 5 MG tablet, Take 5 mg by mouth daily. By suzette psyche [place], Disp: , Rfl:      famotidine (PEPCID) 20 MG tablet, Take 1 tablet (20 mg) by mouth at bedtime., Disp: 90 tablet, Rfl: 0     FLUoxetine (PROZAC) 40 MG capsule, Take 40 mg by mouth daily. By szuette psyche [place]ita[place], Disp: , Rfl:      rivaroxaban ANTICOAGULANT (XARELTO) 10 MG TABS tablet, Take 1 tablet (10 mg) by mouth daily with food, Disp: 90 tablet, Rfl: 3    SOCIAL HISTORY:  Social History     Socioeconomic History     Marital status: Single     Spouse name: Not on file     Number of children: Not on file     Years of education: Not on file     Highest education level: Not on file   Occupational History     Not on file   Tobacco Use     Smoking status: Former     Current packs/day: 0.00     Average packs/day: 0.5 packs/day for 4.0 years (2.0 ttl pk-yrs)     Types: Cigarettes     Start date: 1978     Quit date: 1982     Years since quittin.9     Smokeless tobacco: Never   Vaping Use     Vaping status: Never Used   Substance and Sexual Activity     Alcohol use: Not Currently     Comment: sober second time oct 14 , did detox adn rehab, in a sober house 2019     Drug use: Never     Sexual activity: Not Currently   Other Topics Concern     Not on file   Social History Narrative    2019: moved in 1 week ago to sober house [place], previously living alone, no pets,      Social Drivers of Health     Financial Resource Strain: Not on file   Food Insecurity: Not on file  "  Transportation Needs: Not on file   Physical Activity: Not on file   Stress: Not on file   Social Connections: Not on file   Interpersonal Safety: Low Risk  (2024)    Interpersonal Safety      Do you feel physically and emotionally safe where you currently live?: Yes      Within the past 12 months, have you been hit, slapped, kicked or otherwise physically hurt by someone?: No      Within the past 12 months, have you been humiliated or emotionally abused in other ways by your partner or ex-partner?: No   Housing Stability: Not on file       FAMILY HISTORY:  Family History   Problem Relation Age of Onset     Arthritis Mother      Breast Cancer Mother      Depression Mother      Allergies Mother      Migraines Mother      Alcoholism Father      Lymphoma Father         non hodgkins     Neurodegenerative disease Brother         genetic disorder,  of some slow form of ALS     Suicidality Paternal Grandfather        Past/family/social history reviewed and no changes    PHYSICAL EXAMINATION:  Vitals reviewed: /77   Pulse 62   Ht 1.74 m (5' 8.5\")   Wt 76.4 kg (168 lb 8 oz)   SpO2 99%   BMI 25.25 kg/m    Constitutional: aaox3, cooperative, pleasant, not dyspneic/diaphoretic, no acute distress  Eyes: Sclera anicteric/injected  Ears/nose/mouth/throat: hearing intact  Neck: supple, active ROM w/o limitation or pain   CV: No edema  Respiratory: Unlabored breathing  Abd:  Nondistended, nontender, no peritoneal signs, neg Faust's sign  Skin: warm, perfused, no jaundice  Psych: Normal affect  MSK: Normal gait     PERTINENT STUDIES:    Office Visit on 2024   Component Date Value Ref Range Status     Sodium 2024 139  135 - 145 mmol/L Final     Potassium 2024 4.2  3.4 - 5.3 mmol/L Final     Carbon Dioxide (CO2) 2024 26  22 - 29 mmol/L Final     Anion Gap 2024 11  7 - 15 mmol/L Final     Urea Nitrogen 2024 15.5  8.0 - 23.0 mg/dL Final     Creatinine 2024 0.94  0.67 - 1.17 " mg/dL Final     GFR Estimate 09/20/2024 >90  >60 mL/min/1.73m2 Final     Calcium 09/20/2024 8.9  8.8 - 10.4 mg/dL Final     Chloride 09/20/2024 102  98 - 107 mmol/L Final     Glucose 09/20/2024 88  70 - 99 mg/dL Final     Alkaline Phosphatase 09/20/2024 68  40 - 150 U/L Final     AST 09/20/2024 26  0 - 45 U/L Final     ALT 09/20/2024 14  0 - 70 U/L Final     Protein Total 09/20/2024 7.0  6.4 - 8.3 g/dL Final     Albumin 09/20/2024 4.4  3.5 - 5.2 g/dL Final     Bilirubin Total 09/20/2024 0.5  <=1.2 mg/dL Final     Patient Fasting > 8hrs? 09/20/2024 Unknown   Final     TSH 09/20/2024 0.81  0.30 - 4.20 uIU/mL Final     Cholesterol 09/20/2024 245 (H)  <200 mg/dL Final     Triglycerides 09/20/2024 45  <150 mg/dL Final     Direct Measure HDL 09/20/2024 58  >=40 mg/dL Final     LDL Cholesterol Calculated 09/20/2024 178 (H)  <100 mg/dL Final     Non HDL Cholesterol 09/20/2024 187 (H)  <130 mg/dL Final     Patient Fasting > 8hrs? 09/20/2024 Unknown   Final     Estimated Average Glucose 09/20/2024 91  <117 mg/dL Final     Hemoglobin A1C 09/20/2024 4.8  0.0 - 5.6 % Final     Erythrocyte Sedimentation Rate 09/20/2024 4  0 - 20 mm/hr Final     CRP Inflammation 09/20/2024 <3.00  <5.00 mg/L Final     Vitamin D, Total (25-Hydroxy) 09/20/2024 26  20 - 50 ng/mL Final     Iron 09/20/2024 119  61 - 157 ug/dL Final     Iron Binding Capacity 09/20/2024 278  240 - 430 ug/dL Final     Iron Sat Index 09/20/2024 43  15 - 46 % Final     Ferritin 09/20/2024 132  31 - 409 ng/mL Final     Vitamin B12 09/20/2024 779  232 - 1,245 pg/mL Final     Helicobacter pylori Antigen Stool 09/24/2024 Positive (A)  Negative Final     Tissue Transglutaminase Antibody I* 09/20/2024 0.3  <7.0 U/mL Final     Tissue Transglutaminase Antibody I* 09/20/2024 <0.6  <7.0 U/mL Final     Copper Urine/Volume 09/24/2024 1.1  <=3.2 ug/dL Final     Copper 24 h Urine 09/24/2024 12.9  3.0 - 45.0 ug/d Final     Copper Urine ug/g Cr 09/24/2024 11.1  10.0 - 45.0 ug/g CRT Final      Creatinine, Urine per volume 09/24/2024 99  mg/dL Final     Creatinine, Urine per 24hr 09/24/2024 1163  800 - 2100 mg/d Final     WBC Count 09/20/2024 6.6  4.0 - 11.0 10e3/uL Final     RBC Count 09/20/2024 4.67  4.40 - 5.90 10e6/uL Final     Hemoglobin 09/20/2024 14.6  13.3 - 17.7 g/dL Final     Hematocrit 09/20/2024 43.3  40.0 - 53.0 % Final     MCV 09/20/2024 93  78 - 100 fL Final     MCH 09/20/2024 31.3  26.5 - 33.0 pg Final     MCHC 09/20/2024 33.7  31.5 - 36.5 g/dL Final     RDW 09/20/2024 12.2  10.0 - 15.0 % Final     Platelet Count 09/20/2024 223  150 - 450 10e3/uL Final     % Neutrophils 09/20/2024 65  % Final     % Lymphocytes 09/20/2024 29  % Final     % Monocytes 09/20/2024 5  % Final     % Eosinophils 09/20/2024 1  % Final     % Basophils 09/20/2024 0  % Final     % Immature Granulocytes 09/20/2024 0  % Final     Absolute Neutrophils 09/20/2024 4.3  1.6 - 8.3 10e3/uL Final     Absolute Lymphocytes 09/20/2024 1.9  0.8 - 5.3 10e3/uL Final     Absolute Monocytes 09/20/2024 0.3  0.0 - 1.3 10e3/uL Final     Absolute Eosinophils 09/20/2024 0.1  0.0 - 0.7 10e3/uL Final     Absolute Basophils 09/20/2024 0.0  0.0 - 0.2 10e3/uL Final     Absolute Immature Granulocytes 09/20/2024 0.0  <=0.4 10e3/uL Final     Manganese Whole Bld 09/24/2024 16.3  4.2 - 16.5 ug/L Final        Lab Results   Component Value Date    WBC 6.6 09/20/2024    WBC 7.9 10/04/2022    WBC 5.8 08/18/2021    HGB 14.6 09/20/2024    HGB 15.3 10/04/2022    HGB 15.1 08/18/2021     09/20/2024     10/04/2022     08/18/2021    CHOL 245 (H) 09/20/2024    CHOL 248 (H) 10/04/2022    CHOL 207 (H) 08/18/2021    TRIG 45 09/20/2024    TRIG 69 10/04/2022    TRIG 59 08/18/2021    HDL 58 09/20/2024    HDL 56 10/04/2022    HDL 56 08/18/2021    ALT 14 09/20/2024    ALT 29 10/04/2022    ALT 25 08/18/2021    AST 26 09/20/2024    AST 26 10/04/2022    AST 27 08/18/2021     09/20/2024     10/04/2022     08/18/2021    BUN 15.5  09/20/2024    BUN 13 10/04/2022    BUN 12 08/18/2021    CO2 26 09/20/2024    CO2 23 10/04/2022    CO2 23 08/18/2021    TSH 0.81 09/20/2024    TSH 0.51 10/04/2022    TSH 0.80 08/18/2021    INR 1.99 (H) 08/12/2019    INR 1.85 (H) 08/11/2019    INR 1.57 (H) 08/10/2019        Liver Function Studies -   Recent Labs   Lab Test 09/20/24  1423   PROTTOTAL 7.0   ALBUMIN 4.4   BILITOTAL 0.5   ALKPHOS 68   AST 26   ALT 14      MR ABDOMEN MRCP W/O & W CONTRAST     CLINICAL HISTORY: Liver cyst; IPMN (intraductal papillary mucinous  neoplasm); Sludge in gallbladder; Dilation of common bile duct;  Dilated pancreatic duct; Epigastric pain; Duodenal diverticulum     DATE: 11/17/2024 7:55 AM     TECHNIQUE:      Images were acquired with and without intravenous gadolinium contrast  through the upper abdomen. The following MR images were acquired  without intravenous contrast: TrueFISP, multiplanar T2-weighted, axial  T1 in/out of phase, T2-weighted MRCP images, axial diffusion-weighted  and axial apparent diffusion coefficient. T1-weighted images were  obtained before contrast at the multiple time points following  contrast injection. 3-D reformatted images were generated by the  technologist. Contrast dose: 8ml Gadavist     Comparison study: MR dated 11/25/2022.     FINDINGS:     Biliary Tree: Increased dilatation of CBD measuring up to 10 mm,  previously up to 6 mm. No identifiable obstructive process.  Intrahepatic bile ducts are mildly prominent. No cholelithiasis or  pericholecystic inflammation.     Pancreas: Preserved intrinsic T1 signal. Stable mild dilatation of  main pancreatic duct measuring up to 5 mm. Stable 5 mm T2 hyperintense  cystic structure in the pancreatic body (20/34). No worrisome features  identified. No solid pancreatic lesions.     Liver: Noncirrhotic morphology. No significant fat or iron deposition.  No focal solid mass. Unchanged simple hepatic cysts, largest in  segment 4 measuring up to 3.5 cm.    "  Spleen: Normal.     Kidneys: No focal mass. No hydronephrosis.     Adrenal glands: No focal adrenal nodule.     Bowel: Normal caliber bowel loops. Small periampullary duodenal  diverticulum.     Lymph nodes: No adenopathy.     Blood vessels: Patent major abdominal vasculature.     Lung bases: Patchy bibasilar atelectasis.     Bones and soft tissues: Degenerative changes in the visualized spine.     Mesentery and abdominal wall: Small fat-containing umbilical hernia.  Mild nonspecific central mesenteric stranding.     Ascites: Not present.                                                                      IMPRESSION:   1. Stable small cystic lesion in the pancreatic body could represent a  dilated sidebranch/sidebranch IPMN. No worrisome features identified.  Follow-up guidelines as detailed below.  2. Stable mild diffuse dilatation of the pancreatic duct without  obstructive process.  3. Interval mildly increased dilatation of CBD without identifiable  obstructive process. Redemonstrated small periampullary duodenal  diverticulum.     International evidence-based [place] guidelines for the management of  intraductal papillary mucinous neoplasm of the pancreas:   Surveillance is recommended if no high risk stigmata or worrisome  features are present:  Primary imaging modalities recommended are MRI/MRCP and MDCT  Size of largest cyst:   *  Less than 20 mm: Follow-up imaging in 6 months once, then every 18  months if no change. Stop surveillance if stable for 5 years.  *  More than 20 mm but less than 30 mm: Follow-up imaging at 6 months,  12 months, then yearly if no change.   *  More than 30 mm- follow up imaging every 6 months.     GI consultation for surgery/endoscopic ultrasound is recommended for  cysts with \"high-risk stigmata\" of high grade dysplasia or invasive  carcinoma such as:  1. Obstructive jaundice in a patient with cystic lesion of the head of  the pancreas  2. Enhancing mural nodule > 5mm or solid " component  3. Main pancreatic duct >10mm  4. Suspicious or positive results of cytology     If worrisome features are present, GI consultation is recommended.  Worrisome features on imagin. Cyst more than or equal to 30 mm  2. Thickened or enhancing cyst walls  3. Main pancreatic duct > 5mm and < 10mm.  4. Abrupt change in caliber of pancreatic duct with distal atrophy  5. Lymphadenopathy  6. Cyst growth rate > 2.5mm/year       PREVIOUS ENDOSCOPY    Results for orders placed or performed during the hospital encounter of 20   COLONOSCOPY    Collection Time: 20  6:46 AM   Result Value Ref Range    COLONOSCOPY       [facility]  [address] [place], [place] [address]., [place] [address] [phone]     Endoscopy Department  _______________________________________________________________________________  Patient Name: Nikunj Sinha            Procedure Date: 2020 6:46 AM  MRN: [id number]                       Account Number: [id number]  YOB: 1965              Admit Type: Outpatient  Age: 55                               Room: [facility]  Gender: Male                          Note Status: Finalized  Attending MD: Belinda Jorgensen MD  Pause for the Cause: completed  Total Sedation Time: 32 min.            _______________________________________________________________________________     Procedure:           Colonoscopy  Indications:         Positive fecal immunochemical test  Providers:           Belinda Jorgensen MD, Brenda Patel RN  Referring MD:        Adriana Solis Md, MD  Medicines:           Fentanyl 100 micrograms IV, Midazolam 2 mg IV  Complications:       No immediate com plications.  _______________________________________________________________________________  Procedure:           Pre-Anesthesia Assessment:                       - Prior to the procedure, a History and Physical was                        performed, and patient medications and allergies were                         reviewed. The patient is competent. The risks and                        benefits of the procedure and the sedation options and                        risks were discussed with the patient. All questions                        were answered and informed consent was obtained. Patient                        identification and proposed procedure were verified by                        the nurse in the pre-procedure area. Mental Status                        Examination: normal. Airway Examination: normal                        oropharyngeal airway and neck mobility. Respiratory                        Examination: clear to auscultation. CV Examination:                        nor mal. Prophylactic Antibiotics: The patient does not                        require prophylactic antibiotics. Prior Anticoagulants:                        The patient has taken Xarelto (rivaroxaban), last dose                        was 2 days prior to procedure. ASA Grade Assessment: II                        - A patient with mild systemic disease. After reviewing                        the risks and benefits, the patient was deemed in                        satisfactory condition to undergo the procedure. The                        anesthesia plan was to use moderate sedation / analgesia                        (conscious sedation). Immediately prior to                        administration of medications, the patient was                        re-assessed for adequacy to receive sedatives. The heart                        rate, respiratory rate, oxygen saturations, blood                        pressure, adequacy of pulmonary ventilation, and                        response to care wer e monitored throughout the                        procedure. The physical status of the patient was                        re-assessed after the procedure.                       After obtaining informed consent, the colonoscope was                         passed under direct vision. Throughout the procedure,                        the patient's blood pressure, pulse, and oxygen                        saturations were monitored continuously. The Colonoscope                        was introduced through the anus and advanced to the                        cecum, identified by appendiceal orifice and ileocecal                        valve. The colonoscopy was performed without difficulty.                        The patient tolerated the procedure well. The quality of                        the bowel preparation was good.                                                                                   Findings:       The perianal and digital rectal examinations were normal. Pertinent         negatives include normal sphincter tone.       The entire examined colon appeared normal.                                                                                   Impression:          - The entire examined colon is normal.                       - No specimens collected.  Recommendation:      - Discharge patient to home (with escort).                       - Repeat colonoscopy in 10 years for screening purposes.                       - Return to referring physician as previously scheduled.                                                                                     electronically signed by ERMA Jorgensen  _____________________  Belinda Jorgensen MD  11/12/2020 8:50:55 AM  I was physically present for the entire viewing portion of the exam.  __________________________  Signature of teaching physician  Belinda Jorgensen MD  Number of Addenda: 0    Note Initiated On: 11/12/2020 6:46 AM  Scope In:  Scope Out:         Again, thank you for allowing me to participate in the care of your patient.        Sincerely,        Melba Dasilva PA-C

## 2024-11-20 NOTE — PROGRESS NOTES
GI CLINIC VISIT    CC/REFERRING MD:  Adriana Solis  REASON FOR CONSULTATION: Abd pain     ASSESSMENT/PLAN:  Nikunj Sinha is a 59 year old year old male with PMHx of former heavy ETOH use, MDD/Anxiety, h/o SI (requiring admissions), b/l DVT with saddle anesthesia 2019 (on Rivaroxaban) following with the [facility] GI group for chronic abd pain with new unintentional weight loss of 30# over last 11 months.     #epigastric abd pain x 20 years   2020 Cscope clean. 2024 MR Abd MRCP with below findings - no obstruction or large masses. H.pylori NEG 9/2024. Celiac serologies NEG (no intact total IgA) Possible this could be malignancy/mass, chronic pancreatitis, biliary disease, GERD/gastritis/PUD, transient obstructive process, comorbid functional disease, gastroparesis vs other    -priority EGD with MAC and duodenal biopsy   -obtain fecal elastase, consider supplemental pancreatic enzymes   -avoid gastric irritants     Future consideration  1.meds to follow EGD - PPI, H2B, neuromodulator, botanical    2. RUQ US +/- HIDA  3. NMGES   4. Breath testing (SIBO risk - duodenal diverticulum)     #abnormal findings on MR Abd MRCP W and WO Contrast  Will reach out to advanced endo about potential EUS for CBD dilation to 10 mm (previously 6 mm on 2022 scan), stable and mild 5 mm dilation of main pancreatic duct, stable 5 mm cyst to pancreatic body w/o evidence of worrisome features - all within context of ongoing epigastric abd pain x 20 years and unintentional weight loss of 30# since early 2024. Hepatic function panel WNL  9/2024. We are proceeding with EGD today.     Hepatic cysts - unchanged when compared to 2022 study; largest measuring 3.5 cm, though benign simple cysts. No follow-up needed.     # b/l DVT with saddle pulmonary embolism (2019)  Per 10/20/22 hematology note, VTE was thought to be unprovoked however on that appt, he reported heavy ETOH use, living out of this car, and sleeping upright in his care routinely - plan at  that time was to consider the VTE provoked however given the clot burden/severity, plan was to keep him on daily Xarelto 10 mg indefinitely. Plan was to be reviewed yearly.     Orders Placed This Encounter   Procedures    Elastase Fecal    Adult GI  Referral - Procedure Only     Colorectal cancer screening: done 2020 with 10 year recall     RTC 1-2 weeks after EGD     Thank you for this consultation.  It was a pleasure to participate in the care of this patient; please contact me with any further questions.     Melba Dasilva PA-C    Follow up: As planned above. Today, I personally spent 30 minutes in direct face to face time with the patient, of which greater than 50% of the time was spent in patient education and counseling as described above. Approximately 30 minutes were spent on indirect care associated with the patient's consultation including but not limited to review of: patient medical records to date, clinic visits, hospital records, lab results, imaging studies, procedural documentation, and coordinating care with other providers. The findings from this review are summarized in the above note. All of the above accounted for a cumulative time of 60  minutes and was performed on the date of service.     HPI  Nikunj Sinha is a 59 year old year old male with PMHx of former heavy ETOH use, MDD/Anxiety, h/o SI (requiring admissions), b/l DVT with saddle anesthesia 2019 (on Rivaroxaban) following with the [facility] GI group for abd pain.     1. Epigastric abd pain x 20 years. worse in past several years.   Persistent pain. Gnawing and dull pain. Can have very short periods of no or very minimal pain. Nonradiating.  Worsened with meals (but unclear if it worsens immediately or with a time delay) . He knows NSAIDs, fried foods, carbonated beverages will make it worse   -Improved - 1/2 tsp baking soda in water, pepcid BID,  -Tried pepto (no relief), prilosec (2wk, unclear if helped)     Found to be H.pylori POS  and now treated x 10d. Pain did not improve much afterwards.     -Lost~30# as avoiding eating, it makes pain worse. Reports he was 198 (start of the year) to 165, unintentional. Seems to have stabilized.   -Associated with fatigue and feeling tired  -also reporting nausea w/o emesis, occasional post-prandial bloating, stooling changes  Denies dysphagia, odynphagia.     Bowel Habits   -Once daily BSC type 3, regular for him. No straining. Good evacuation. On toilet for a few min. No blood or black stools. This is is normal stooling pattern w/o changes.   -Cscope 2020 - cecal intubation, good prep. Normal macro colon with 10 year recall (2030)    Family Hx  Mom - unknown stomach issues   No other known family history or GI related malignancy (esophageal, gastric, pancreatic, liver or colon) or family history of IBD/celiac disease.     Social Hx     occasional use of NSAIDs - 2-3x a month for headaches   A B complex vitamin, Vit D, elijah capsulebut otherwise denies use of OTC herbal supplements/weight loss products.      No ETOH. Former heavy drinker. Stopped 5y year ago. May have had pancreatitis but he is unknown.   Former tobacco products. 3 years x 1 ppd   No recreational drug use     PROBLEM LIST  Patient Active Problem List    Diagnosis Date Noted    Abnormal finding on MRI of brain 09/29/2024     Priority: Medium    History of basal cell cancer 09/29/2024     Priority: Medium     left ala nose, s/p mohs , reconstruction and laser rx      Bone island 09/29/2024     Priority: Medium    Family history of arrhythmia 09/29/2024     Priority: Medium    Family history of ischemic heart disease 09/29/2024     Priority: Medium    Dilated pancreatic duct 09/29/2024     Priority: Medium    Sludge in gallbladder 09/29/2024     Priority: Medium    Weight loss 09/29/2024     Priority: Medium    Bilateral inguinal hernia without obstruction or gangrene, recurrence not specified 12/01/2022     Priority: Medium    Umbilical  hernia without obstruction and without gangrene 12/01/2022     Priority: Medium    Diverticulosis of large intestine without hemorrhage 12/01/2022     Priority: Medium    Liver cyst 12/01/2022     Priority: Medium    Duodenal diverticulum 12/01/2022     Priority: Medium    IPMN (intraductal papillary mucinous neoplasm) 12/01/2022     Priority: Medium    Dilation of common bile duct 12/01/2022     Priority: Medium    Seborrheic keratoses 07/16/2020     Priority: Medium    Need for shingles vaccine 07/16/2020     Priority: Medium    Elevated LDL cholesterol level 12/20/2019     Priority: Medium    Chronic anticoagulation 12/01/2019     Priority: Medium    Personal history of DVT (deep vein thrombosis) 12/01/2019     Priority: Medium    Current severe episode of major depressive disorder without psychotic features, unspecified whether recurrent (H) 11/29/2019     Priority: Medium     11/29/19:  Unemployed, from [place] originally where was getting his care at the local clinic there, new to me & this clinic, limited records on care everywhere only viewable after he got to the room, with limited apt time.   Hx of ACS, NSTEMI with elevated troponin, no CAD on angiogram,  but found to have a acute saddle embolism with cor pulmonale & hx of dvt,  in 8/2019 on lifelong anticoagulation now on xarelto 20 mg daily, ever since under care of hematology, hx of moderate alcohol dependance in early remission with hx of withdrawal & mood disorder, previously on campral ( discontinued secondary to diarrhea), & naltrexone( stopped as ineffective), ZAIDA, MDD, suicidal ideation, on wellbutrin xl 150 mg daily, lexapro 20 mg daily, gabapentin 300 mg bedtime & a multivitamin, also on proranolol, previously on abilify 5 mg, citalopram 10 mg, hydroxyzine, mirtazipine & trazodone, , dental caries, recent dental abscess s/p abx to see the dentist soon, hx of essential tremor on propranolol 20 mg tid, allergic to ragweeds, no records from  "[facility] but reviewed care everywhere [facility] records,     Seen at [facility] 18 with suicidal ideation. \" after a friend called  worried about pt.'s suicidal ideation. SW met with Pt who appeared [place] and guarded. When SW asked Pt what brought him in he indicated it was a long story. Pt reports that his mother, father and sibling all  within a 10 year time period of various medical issues. 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 Pt reports that he used credit cards to pay for daily expenses. Pt reports that he now has extreme credit card debt. Pt lost his job recently due to his car having mechanical issues and he was missing work. Today Pt was served a 24 hour notice by the [employer] department to leave his apartment. Pt is hopeless and feels that he is \"in too deep\" and nothing will get better. Pt did not appear able to contract for safety. When SW asked what Pt would do if he went home, he shrugged and said \"I don't know\", SW indicated that she was worried Pt would go home and hurt himself because he felt so hopeless Pt shrugged again but did not reply. Pt has no support in the community and is isolated. The friend that called  lives in [place] and Pt has never met her in person but they have talked on the phone for 20 years. SW indicated that she must have been worried enough to figure out how to call the police in [place]. Pt shrugged and said \"I guess\". Pt does not feel an admission will change anything, \"I just don't see what being admitted will do or how it can help\". [facility] and MD discussed their concern over Pt.'s safety and his inability to contract for safety along with his state of hopelessness with Pt. He only nodded but agreed to be admitted voluntary in the hopes that the SW on the unit may have some idea's on how to help him. BAL/Breathalyzer completed (date/results): yes Pt was .219 when the police picked him up several " "hours ago  He does not see a therapist or psychiatrist, however, and although his primary care physician does prescribe him citalopram he has not recently spoken with him or does not sound as if he is disclosed how severe his symptoms are. He does admit to self-medicating with alcohol. When he has stopped drinking in the past he will get rather shaky and a bit nauseous. Otherwise he has not had hallucinations or seizures in the past related to that. When he spoke with his friend today he told him that he thought he should prepared to say goodbye to him, but when I asked why his friend would be concerned with that if this was a daily thought process for him he stated that he does not talk about it every day. He is rather hopeless and he is not sure why he has not done anything yet to harm himself.\" did admit to possibly hanging himself. Initially he was started on the alcohol withdrawal protocol and monitored for a safe detoxification. Was drinking up to a liter per day for the past few weeks prior to admission. Admitted on a voluntary basis. Agreeable to stay voluntarily to coordinate cares medication management. He was admitted to psychiatric unit for safety, stabilization and medication management. Eventually gained insight into the disease process and addiction. Was able to cooperate and participate in programming and educate on the medication as well. Started on campral, abilify, gabapentin, mirtazipine & trazodone.  hydroxyzine, & continued on citalopram. A rule 25 was completed. He spoke of some of his historical challenges including taling care of his brother with ALS and his mother who was dying of metastatic cancer and his ailing father. During this time he developed a drinking habit. Additional stressors include being evicted from his apartment. He reports that he was drinking 1.75 L of Vodka per day. He denies suicidal ideations and last felt suicidal about 6 days ago. He stated, Suicide use to be plan A " "but I no longer feel that way. He has hope for the future. He looks forward to discharge to [facility]. He feels much less hopeless\"    [facility] \" for increased depression, hopelessness, and alcohol abuse. Pt was kicked out of his sober living today after coming home from a therapy appt smelling like alcohol. Pt reports that he has been having a good week and had a really good therapy appt today because he drank before hand and was able to disclose information to his therapist he did not have the courage to do before. Pt returned to his sober house and was confronted about his alcohol abuse, they called the police and sent him here. Pt has been seen in this ED previously for depression and suicidal ideation. Pt is currently denying any Suicidal ideation or plan but also is stating that he has \"fucked up his life beyond repair\" and that \"he doesn't know if he wants to live\". Pt has no family or any support, recently lost his housing, and is struggling to deal with all the loss he has endured in his life. 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 Pt has many regrets in his life and struggling to cope with all the loss. Pt would benefit from a crisis residence\".     In ER [facility] on 10/17/2019 for sucidal ideation & alcohol abuse: \"Nikunj Sinha is a 54 y.o. male being seen by Crisis Social Work regarding increased depressive symptoms, anxiety, and suicidal ideation. The patient presented to the outpatient clinic today for a Rule 25 assessment. He endorsed numerous acute depressive symptoms. The patient states that he was on 2700 for a period of time and found the program to be very helpful. He was then discharged to a sober house with a referral for day programming at [facility]. He has continued to relapse on alcohol and has been unsuccessful outside of a structured program. He was previously working a good job as a Scoring Director at " "[employer] \"but I drank myself out of that job.\" He has now been evicted from his apartment and finds himself homeless. The patient rates his depression as 8 out of 10 and anxiety as 9 out of 10. He is now feeling hopeless with thoughts of suicide and a plan to hang himself. He states that he hasn't taken any steps towards acting on the thoughts but that \"it's something in my mind.\" The patient states that he feels somewhat isolated. He has one friend in [place], but no supportive family that are still alive. The patient's Rule 25 was completed today and this writer verified with Isabella Joe that he is not on her list. She states that once she receives the Rule 25 from the clinic and he is psychiatrically treated he can be considered for 2700. The patient has remained calm, cooperative and pleasant. Patient was sent to the ER from the outpatient addiction clinic. He was here today for a Rule 25 in the outpatient addiction clinic at [facility]. He was sent by his clinician Alexandr after completing a Rule 25 assessment. The patient endorsed numerous depressive symptoms and states that he is having suicidal ideation. The patient does not identify a specific plan but states that he has several and hasn't decided which to act on. The patient's last drink was yesterday at noon and is now observed as having some withdrawal symptoms. Alexandr states that the patient is engaged with outpatient CD treatment at [facility] but he has been unable to maintain any sobriety outside of a structured facility. Roughly one month ago he stopped taking all of his psych medications. Alexandr recommends that the patient go to inpatient mental health for his suicidal ideation, and if he is not acute enough for that placement that we pursue 2700.  Admitted on a voluntary basis. Agreeable to stay voluntarily to coordinate cares and medication management. Disposition recommended on today's date prior to intake for MI CD treatment. Recommendation " by hospitalist to proceed with dental cares. Further efforts to address community concerns prior to proceeding with MI CD treatment. Patient seen by hospitalist during course of hospitalization. Started on antibiotic. Recommendation for evaluation by hospitalist in the community, given lack of provider at the facility. Patient requesting to be seen by dentist prior to MI CD treatment.  Patient placed on a CIWA protocol. Did not demonstrate signs or symptoms of complicated alcohol withdrawal.  Psychiatric medication management performed in detail. Medication management performed to target signs and symptoms of principal diagnosis. Further medication management performed to target comorbid illnesses. Medication management included:    Lexapro: Continued restart of PTA medication for depression and anxiety.    Wellbutrin XL:  Continued restart of of PTA medication for combination therapy.    Gabapentin:  Scheduled medication management for augmentation. Continued restart of PTA medication as needed for anxiety.    Propranolol: Continue restart of PTA medication as needed for anxiety.    Naltrexone: Continued trial for alcohol use disorder; LFTs normalizing. Repeat LFTs scheduled.    Campral: Discontinued PTA medication secondary to GI side effects.  Medication adherent. Tolerating medication management. Progressed with mood and anxiety. Denies psychosis. Denies suicidal and homicidal ideation. Did an appropriate crisis and relapse plan. Future oriented. Denies gun access. Indicated motivation to proceed with MI CD treatment as coordinated by  with intake date on Friday, November 1, 2019.   followed in regards to collecting and reviewing collateral information, coordination of cares and discharge planning. Including, MI CD evaluation completed with recommendation for placement to unit 2700. Due to dental issues, patient discharged to medical respite bed and secured on today's date to bridge  "placement. Discharged to Medical Respite at noon and Inpatient CD treatment on unit 2700 on 11/01 at noon. Further recommendation to refer to psychiatrist in the community after discharge from unit 2700. Cares coordinated with case management.  Treated for dental abscess with pcn.\"    [place]  shows received gabapentin 100 mg #270 on 1/4/19 at [place], then 300 mg # 90  On 2/11/19 in [facility], 100 mg # 60 on 3/6/19 in [facility], then # 180 on 6/20/19, 300 mg # 60 on 10/30/19 & # 30 of 300 mg on 11/21/19    Seen nov 29th, 2019 to establish care as doesnt have a PCP.   Severe MDD/ZAIDA./ hx of passive suicidal thoughts: discharged nov 22nd 7 days ago, currently in sober housing, has leonard apt with psyche [facility] on 12/5. To also see psyhcology 12/11 but plans to reschedule that at upcoming psyche apt next week as it is in the morning & he needs to go to out patient rx in the am. Has enough meds till sees psyche. Has passive suicidal thoughts. No active plan. Contracts to safety & forwarding thinking about apts etc. Is taking all his meds.     Hx of alcohol abuse, dependance etc, Currently sober, last drink was oct 14th, notes was on naltrexone then stopped as no longer helped. Was on campral before but had to discontinue due to diarrhea. Has done [facility] program twice once beginning of the year and one last week      Moderate alcohol dependence in early remission (H) 11/29/2019     Priority: Medium    ZAIDA (generalized anxiety disorder) 11/29/2019     Priority: Medium    History of suicidal ideation 11/29/2019     Priority: Medium    Chronic fatigue 11/29/2019     Priority: Medium    Postural dizziness with near syncope 11/29/2019     Priority: Medium    Tinnitus, bilateral 11/29/2019     Priority: Medium    Memory difficulties 11/29/2019     Priority: Medium    Dental caries 11/29/2019     Priority: Medium    Seasonal allergic rhinitis, unspecified trigger 11/29/2019     Priority: Medium    History of " "pulmonary embolism 11/29/2019     Priority: Medium     [facility]: \"hx of depression, anxiety, alcohol abuse disorder (sober x 2 months, He is a recovering alcoholic. He currently resides in sober house since June 2019) who presented with shortness of breath and 2 days of chest pain and near syncope. He presented to the emergency room on 8/4/2019 after he had a [facility]opal [facility]. He did not recall particular chest pain or shortness of breath. He noted swelling of his leg in various spot but he denies any persistent pain or swelling or redness. No injury. No acute illness prior to that. However he donated plasma 10 times in 5 weeks prior to that. Upon initial evaluation, d-dimer was elevated at 6.7. PTT was normal at 28, INR is 1.09. He underwent coronary angiogram and it did not show any obstructive coronary artery disease.Due to an elevated troponin he underwent coronary angiogram which demonstrated no significant CAD. Due to an elevated d-dimer underwent CT scan of the chest which showed extensive acute bilateral pulmonary embolism with saddle embolus.  Doppler legs showed Nonocclusive right femoral DVT from upper to mid thigh. Left calf DVT within one of the peroneal veins in both of the posterior tibial veins from upper to low calf. Echocardiogram revealed lower limit of normal left ventricular systolic function of 50% with septal motion compatible with right ventricular overload.  He had cor pulmonale and was started on heparin infusion. No thrombolytics were given due hemodynamic and respiratory stability. After several days of monitoring on IV heparin Mr. Sinha continued to improve and he was transitioned to Xarelto for ongoing anticoagulation therapy. He tolerated Xarelto well. No major bleeding except intermittent gum bleeding. Denies family history of venous thromboembolism. He had a brother who passed away from [facility]. He is single. Does not have children. Was advised to " "continue with indefinate anticoagulation. Mr. Sinha did donate plasma 2x/week for the last 5 weeks and I wonder if this led to a transient hypercoagulable state. Mr. Sinha was advised not to donate plasma moving forward. An outpatient referral to hematology has been made\".      Se[facility] by hematology [facility]/2019 [facility]. She reviewed the pathophysiology of venous thromboembolic exam and risks of recurrence. He is wondering about excessive plasma donation trigger that event and I think it is possible. At this point, regardless of the etiology, he is indicated for long-term anticoagulation due to initial presentation of life-threatening episode. No prior history of venous thromboembolism or family history of thromboembolism. I would not recommend family/hereditary thrombophilia work-up. I also discussed that we do not need to repeat CT scan of the chest to know the resolution of blood clots, however will obtain CTA if he has signs and symptoms concerning for venous thromboembolism. I discussed about strict abstinence from alcohol with anticoagulants. I recommended to obtain kidney, liver function monitoring at least once a year.   I discussed about age-appropriate cancer screening. He admitted that he had positive stool test (sounds like FOBT) a couple years ago. Unclear if it was related to his alcohol use. He was recommended to proceed with colonoscopy, but has not completed yet. I recommended him to complete colonoscopy however, we will need to wait at least 3-6 months because of this recent pulmonary emboli event and I would not recommend interruption of anticoagulation at this point. He voiced understanding. I noted that he has a gastroenterology referral. Recommended him to have a primary care provider as His previous primary care provider is retiring/retired. Until he finds a primary care provider, I will continue to manage his anticoagulation. Follow-up with me in about 6 months with labs including " "hemogram, CMP.\"          Epigastric pain 11/29/2019     Priority: Medium       PERTINENT PAST MEDICAL HISTORY:  Past Medical History:   Diagnosis Date    Abdominal pain, epigastric 11/29/2019    Acute embolism and thrombosis of unspecified deep veins of lower extremity, bilateral (H) 11/19/2024    Chronic anticoagulation 12/01/2019    Current severe episode of major depressive disorder without psychotic features, unspecified whether recurrent (H) 11/29/2019    Decreased cardiac ejection fraction 08/22/2021    Echo oct 2022 normal     Dental caries 11/29/2019    Dizziness 11/29/2019    Erectile dysfunction, unspecified erectile dysfunction type 11/29/2019    Essential tremor 12/01/2019    Fatigue, unspecified type 11/29/2019    ZAIDA (generalized anxiety disorder) 11/29/2019    History of chest pain 11/29/2019    History of pulmonary embolism 11/29/2019    History of suicidal ideation 11/29/2019    Memory changes 11/29/2019    Moderate alcohol dependence in early remission (H) 11/29/2019    Palpitations 11/29/2019    Positive FIT (fecal immunochemical test) 11/29/2019    Colonoscopy 11/2020 normal, labs normal    Seasonal allergic rhinitis, unspecified trigger 11/29/2019    Tinnitus, bilateral 11/29/2019    Weight gain 11/29/2019       PREVIOUS SURGERIES:  Past Surgical History:   Procedure Laterality Date    COLONOSCOPY N/A 11/12/2020    Procedure: COLONOSCOPY;  Surgeon: Belinda Jorgensen MD;  [place] [facility] [place]    CV CORONARY ANGIOGRAM N/A 08/04/2019    Procedure: Coronary Angiogram;  Surgeon: Ambrose Mayfield MD;  Location: [facility];  Service: Cardiology    CV LEFT HEART CATHETERIZATION WITHOUT LEFT VENTRICULOGRAM Left 08/04/2019    Procedure: Left Heart Catheterization Without Left Ventriculogram;  Surgeon: Ambrose Mayfield MD;  Location: [facility] Cath Lab;  Service: Cardiology    TONSILLECTOMY  1984       ALLERGIES:     Allergies   Allergen Reactions    Ragweeds Other (See Comments) "     congestion       PERTINENT MEDICATIONS:    Current Outpatient Medications:     escitalopram (LEXAPRO) 5 MG tablet, Take 5 mg by mouth daily. By suzette psyche [place], Disp: , Rfl:     famotidine (PEPCID) 20 MG tablet, Take 1 tablet (20 mg) by mouth at bedtime., Disp: 90 tablet, Rfl: 0    FLUoxetine (PROZAC) 40 MG capsule, Take 40 mg by mouth daily. By suzette psyche [place], Disp: , Rfl:     rivaroxaban ANTICOAGULANT (XARELTO) 10 MG TABS tablet, Take 1 tablet (10 mg) by mouth daily with food, Disp: 90 tablet, Rfl: 3    SOCIAL HISTORY:  Social History     Socioeconomic History    Marital status: Single     Spouse name: Not on file    Number of children: Not on file    Years of education: Not on file    Highest education level: Not on file   Occupational History    Not on file   Tobacco Use    Smoking status: Former     Current packs/day: 0.00     Average packs/day: 0.5 packs/day for 4.0 years (2.0 ttl pk-yrs)     Types: Cigarettes     Start date: 1978     Quit date: 1982     Years since quittin.9    Smokeless tobacco: Never   Vaping Use    Vaping status: Never Used   Substance and Sexual Activity    Alcohol use: Not Currently     Comment: sober second time oct 14 , did detox adn rehab, in a sober house 2019    Drug use: Never    Sexual activity: Not Currently   Other Topics Concern    Not on file   Social History Narrative    2019: moved in 1 week ago to sober house [place], previously living alone, no pets,      Social Drivers of Health     Financial Resource Strain: Not on file   Food Insecurity: Not on file   Transportation Needs: Not on file   Physical Activity: Not on file   Stress: Not on file   Social Connections: Not on file   Interpersonal Safety: Low Risk  (2024)    Interpersonal Safety     Do you feel physically and emotionally safe where you currently live?: Yes     Within the past 12 months, have you been hit, slapped, kicked or otherwise physically hurt by  "someone?: No     Within the past 12 months, have you been humiliated or emotionally abused in other ways by your partner or ex-partner?: No   Housing Stability: Not on file       FAMILY HISTORY:  Family History   Problem Relation Age of Onset    Arthritis Mother     Breast Cancer Mother     Depression Mother     Allergies Mother     Migraines Mother     Alcoholism Father     Lymphoma Father         non hodgkins    Neurodegenerative disease Brother         genetic disorder,  of some slow form of ALS    Suicidality Paternal Grandfather        Past/family/social history reviewed and no changes    PHYSICAL EXAMINATION:  Vitals reviewed: /77   Pulse 62   Ht 1.74 m (5' 8.5\")   Wt 76.4 kg (168 lb 8 oz)   SpO2 99%   BMI 25.25 kg/m    Constitutional: aaox3, cooperative, pleasant, not dyspneic/diaphoretic, no acute distress  Eyes: Sclera anicteric/injected  Ears/nose/mouth/throat: hearing intact  Neck: supple, active ROM w/o limitation or pain   CV: No edema  Respiratory: Unlabored breathing  Abd:  Nondistended, nontender, no peritoneal signs, neg Faust's sign  Skin: warm, perfused, no jaundice  Psych: Normal affect  MSK: Normal gait     PERTINENT STUDIES:    Office Visit on 2024   Component Date Value Ref Range Status    Sodium 2024 139  135 - 145 mmol/L Final    Potassium 2024 4.2  3.4 - 5.3 mmol/L Final    Carbon Dioxide (CO2) 2024 26  22 - 29 mmol/L Final    Anion Gap 2024 11  7 - 15 mmol/L Final    Urea Nitrogen 2024 15.5  8.0 - 23.0 mg/dL Final    Creatinine 2024 0.94  0.67 - 1.17 mg/dL Final    GFR Estimate 2024 >90  >60 mL/min/1.73m2 Final    Calcium 2024 8.9  8.8 - 10.4 mg/dL Final    Chloride 2024 102  98 - 107 mmol/L Final    Glucose 2024 88  70 - 99 mg/dL Final    Alkaline Phosphatase 2024 68  40 - 150 U/L Final    AST 2024 26  0 - 45 U/L Final    ALT 2024 14  0 - 70 U/L Final    Protein Total 2024 7.0  6.4 - " 8.3 g/dL Final    Albumin 09/20/2024 4.4  3.5 - 5.2 g/dL Final    Bilirubin Total 09/20/2024 0.5  <=1.2 mg/dL Final    Patient Fasting > 8hrs? 09/20/2024 Unknown   Final    TSH 09/20/2024 0.81  0.30 - 4.20 uIU/mL Final    Cholesterol 09/20/2024 245 (H)  <200 mg/dL Final    Triglycerides 09/20/2024 45  <150 mg/dL Final    Direct Measure HDL 09/20/2024 58  >=40 mg/dL Final    LDL Cholesterol Calculated 09/20/2024 178 (H)  <100 mg/dL Final    Non HDL Cholesterol 09/20/2024 187 (H)  <130 mg/dL Final    Patient Fasting > 8hrs? 09/20/2024 Unknown   Final    Estimated Average Glucose 09/20/2024 91  <117 mg/dL Final    Hemoglobin A1C 09/20/2024 4.8  0.0 - 5.6 % Final    Erythrocyte Sedimentation Rate 09/20/2024 4  0 - 20 mm/hr Final    CRP Inflammation 09/20/2024 <3.00  <5.00 mg/L Final    Vitamin D, Total (25-Hydroxy) 09/20/2024 26  20 - 50 ng/mL Final    Iron 09/20/2024 119  61 - 157 ug/dL Final    Iron Binding Capacity 09/20/2024 278  240 - 430 ug/dL Final    Iron Sat Index 09/20/2024 43  15 - 46 % Final    Ferritin 09/20/2024 132  31 - 409 ng/mL Final    Vitamin B12 09/20/2024 779  232 - 1,245 pg/mL Final    Helicobacter pylori Antigen Stool 09/24/2024 Positive (A)  Negative Final    Tissue Transglutaminase Antibody I* 09/20/2024 0.3  <7.0 U/mL Final    Tissue Transglutaminase Antibody I* 09/20/2024 <0.6  <7.0 U/mL Final    Copper Urine/Volume 09/24/2024 1.1  <=3.2 ug/dL Final    Copper 24 h Urine 09/24/2024 12.9  3.0 - 45.0 ug/d Final    Copper Urine ug/g Cr 09/24/2024 11.1  10.0 - 45.0 ug/g CRT Final    Creatinine, Urine per volume 09/24/2024 99  mg/dL Final    Creatinine, Urine per 24hr 09/24/2024 1163  800 - 2100 mg/d Final    WBC Count 09/20/2024 6.6  4.0 - 11.0 10e3/uL Final    RBC Count 09/20/2024 4.67  4.40 - 5.90 10e6/uL Final    Hemoglobin 09/20/2024 14.6  13.3 - 17.7 g/dL Final    Hematocrit 09/20/2024 43.3  40.0 - 53.0 % Final    MCV 09/20/2024 93  78 - 100 fL Final    MCH 09/20/2024 31.3  26.5 - 33.0 pg  Final    MCHC 09/20/2024 33.7  31.5 - 36.5 g/dL Final    RDW 09/20/2024 12.2  10.0 - 15.0 % Final    Platelet Count 09/20/2024 223  150 - 450 10e3/uL Final    % Neutrophils 09/20/2024 65  % Final    % Lymphocytes 09/20/2024 29  % Final    % Monocytes 09/20/2024 5  % Final    % Eosinophils 09/20/2024 1  % Final    % Basophils 09/20/2024 0  % Final    % Immature Granulocytes 09/20/2024 0  % Final    Absolute Neutrophils 09/20/2024 4.3  1.6 - 8.3 10e3/uL Final    Absolute Lymphocytes 09/20/2024 1.9  0.8 - 5.3 10e3/uL Final    Absolute Monocytes 09/20/2024 0.3  0.0 - 1.3 10e3/uL Final    Absolute Eosinophils 09/20/2024 0.1  0.0 - 0.7 10e3/uL Final    Absolute Basophils 09/20/2024 0.0  0.0 - 0.2 10e3/uL Final    Absolute Immature Granulocytes 09/20/2024 0.0  <=0.4 10e3/uL Final    Manganese Whole Bld 09/24/2024 16.3  4.2 - 16.5 ug/L Final        Lab Results   Component Value Date    WBC 6.6 09/20/2024    WBC 7.9 10/04/2022    WBC 5.8 08/18/2021    HGB 14.6 09/20/2024    HGB 15.3 10/04/2022    HGB 15.1 08/18/2021     09/20/2024     10/04/2022     08/18/2021    CHOL 245 (H) 09/20/2024    CHOL 248 (H) 10/04/2022    CHOL 207 (H) 08/18/2021    TRIG 45 09/20/2024    TRIG 69 10/04/2022    TRIG 59 08/18/2021    HDL 58 09/20/2024    HDL 56 10/04/2022    HDL 56 08/18/2021    ALT 14 09/20/2024    ALT 29 10/04/2022    ALT 25 08/18/2021    AST 26 09/20/2024    AST 26 10/04/2022    AST 27 08/18/2021     09/20/2024     10/04/2022     08/18/2021    BUN 15.5 09/20/2024    BUN 13 10/04/2022    BUN 12 08/18/2021    CO2 26 09/20/2024    CO2 23 10/04/2022    CO2 23 08/18/2021    TSH 0.81 09/20/2024    TSH 0.51 10/04/2022    TSH 0.80 08/18/2021    INR 1.99 (H) 08/12/2019    INR 1.85 (H) 08/11/2019    INR 1.57 (H) 08/10/2019        Liver Function Studies -   Recent Labs   Lab Test 09/20/24  1423   PROTTOTAL 7.0   ALBUMIN 4.4   BILITOTAL 0.5   ALKPHOS 68   AST 26   ALT 14      MR ABDOMEN MRCP W/O & W  CONTRAST     CLINICAL HISTORY: Liver cyst; IPMN (intraductal papillary mucinous  neoplasm); Sludge in gallbladder; Dilation of common bile duct;  Dilated pancreatic duct; Epigastric pain; Duodenal diverticulum     DATE: 11/17/2024 7:55 AM     TECHNIQUE:      Images were acquired with and without intravenous gadolinium contrast  through the upper abdomen. The following MR images were acquired  without intravenous contrast: TrueFISP, multiplanar T2-weighted, axial  T1 in/out of phase, T2-weighted MRCP images, axial diffusion-weighted  and axial apparent diffusion coefficient. T1-weighted images were  obtained before contrast at the multiple time points following  contrast injection. 3-D reformatted images were generated by the  technologist. Contrast dose: 8ml Gadavist     Comparison study: MR dated 11/25/2022.     FINDINGS:     Biliary Tree: Increased dilatation of CBD measuring up to 10 mm,  previously up to 6 mm. No identifiable obstructive process.  Intrahepatic bile ducts are mildly prominent. No cholelithiasis or  pericholecystic inflammation.     Pancreas: Preserved intrinsic T1 signal. Stable mild dilatation of  main pancreatic duct measuring up to 5 mm. Stable 5 mm T2 hyperintense  cystic structure in the pancreatic body (20/34). No worrisome features  identified. No solid pancreatic lesions.     Liver: Noncirrhotic morphology. No significant fat or iron deposition.  No focal solid mass. Unchanged simple hepatic cysts, largest in  segment 4 measuring up to 3.5 cm.     Spleen: Normal.     Kidneys: No focal mass. No hydronephrosis.     Adrenal glands: No focal adrenal nodule.     Bowel: Normal caliber bowel loops. Small periampullary duodenal  diverticulum.     Lymph nodes: No adenopathy.     Blood vessels: Patent major abdominal vasculature.     Lung bases: Patchy bibasilar atelectasis.     Bones and soft tissues: Degenerative changes in the visualized spine.     Mesentery and abdominal wall: Small  "fat-containing umbilical hernia.  Mild nonspecific central mesenteric stranding.     Ascites: Not present.                                                                      IMPRESSION:   1. Stable small cystic lesion in the pancreatic body could represent a  dilated sidebranch/sidebranch IPMN. No worrisome features identified.  Follow-up guidelines as detailed below.  2. Stable mild diffuse dilatation of the pancreatic duct without  obstructive process.  3. Interval mildly increased dilatation of CBD without identifiable  obstructive process. Redemonstrated small periampullary duodenal  diverticulum.     International evidence-based [place] guidelines for the management of  intraductal papillary mucinous neoplasm of the pancreas:   Surveillance is recommended if no high risk stigmata or worrisome  features are present:  Primary imaging modalities recommended are MRI/MRCP and MDCT  Size of largest cyst:   *  Less than 20 mm: Follow-up imaging in 6 months once, then every 18  months if no change. Stop surveillance if stable for 5 years.  *  More than 20 mm but less than 30 mm: Follow-up imaging at 6 months,  12 months, then yearly if no change.   *  More than 30 mm- follow up imaging every 6 months.     GI consultation for surgery/endoscopic ultrasound is recommended for  cysts with \"high-risk stigmata\" of high grade dysplasia or invasive  carcinoma such as:  1. Obstructive jaundice in a patient with cystic lesion of the head of  the pancreas  2. Enhancing mural nodule > 5mm or solid component  3. Main pancreatic duct >10mm  4. Suspicious or positive results of cytology     If worrisome features are present, GI consultation is recommended.  Worrisome features on imagin. Cyst more than or equal to 30 mm  2. Thickened or enhancing cyst walls  3. Main pancreatic duct > 5mm and < 10mm.  4. Abrupt change in caliber of pancreatic duct with distal atrophy  5. Lymphadenopathy  6. Cyst growth rate > 2.5mm/year   "     PREVIOUS ENDOSCOPY    Results for orders placed or performed during the hospital encounter of 11/12/20   COLONOSCOPY    Collection Time: 11/12/20  6:46 AM   Result Value Ref Range    COLONOSCOPY       [facility]  [address] [place], [place] [address]., [place] [address] [phone]     Endoscopy Department  _______________________________________________________________________________  Patient Name: Nikunj Sinha            Procedure Date: 11/12/2020 6:46 AM  MRN: [id number]                       Account Number: [id number]  YOB: 1965              Admit Type: Outpatient  Age: 55                               Room: [facility]  Gender: Male                          Note Status: Finalized  Attending MD: Belinda Jorgensen MD  Pause for the Cause: completed  Total Sedation Time: 32 min.            _______________________________________________________________________________     Procedure:           Colonoscopy  Indications:         Positive fecal immunochemical test  Providers:           Belinda Jorgensen MD, Brenda Patel RN  Referring MD:        Adriana Solis Md, MD  Medicines:           Fentanyl 100 micrograms IV, Midazolam 2 mg IV  Complications:       No immediate com plications.  _______________________________________________________________________________  Procedure:           Pre-Anesthesia Assessment:                       - Prior to the procedure, a History and Physical was                        performed, and patient medications and allergies were                        reviewed. The patient is competent. The risks and                        benefits of the procedure and the sedation options and                        risks were discussed with the patient. All questions                        were answered and informed consent was obtained. Patient                        identification and proposed procedure were verified by                        the nurse in the pre-procedure area.  Mental Status                        Examination: normal. Airway Examination: normal                        oropharyngeal airway and neck mobility. Respiratory                        Examination: clear to auscultation. CV Examination:                        nor mal. Prophylactic Antibiotics: The patient does not                        require prophylactic antibiotics. Prior Anticoagulants:                        The patient has taken Xarelto (rivaroxaban), last dose                        was 2 days prior to procedure. ASA Grade Assessment: II                        - A patient with mild systemic disease. After reviewing                        the risks and benefits, the patient was deemed in                        satisfactory condition to undergo the procedure. The                        anesthesia plan was to use moderate sedation / analgesia                        (conscious sedation). Immediately prior to                        administration of medications, the patient was                        re-assessed for adequacy to receive sedatives. The heart                        rate, respiratory rate, oxygen saturations, blood                        pressure, adequacy of pulmonary ventilation, and                        response to care wer e monitored throughout the                        procedure. The physical status of the patient was                        re-assessed after the procedure.                       After obtaining informed consent, the colonoscope was                        passed under direct vision. Throughout the procedure,                        the patient's blood pressure, pulse, and oxygen                        saturations were monitored continuously. The Colonoscope                        was introduced through the anus and advanced to the                        cecum, identified by appendiceal orifice and ileocecal                        valve. The colonoscopy was performed without  difficulty.                        The patient tolerated the procedure well. The quality of                        the bowel preparation was good.                                                                                   Findings:       The perianal and digital rectal examinations were normal. Pertinent         negatives include normal sphincter tone.       The entire examined colon appeared normal.                                                                                   Impression:          - The entire examined colon is normal.                       - No specimens collected.  Recommendation:      - Discharge patient to home (with escort).                       - Repeat colonoscopy in 10 years for screening purposes.                       - Return to referring physician as previously scheduled.                                                                                     electronically signed by ERMA Jorgensen  _____________________  Belinda Jorgensen MD  11/12/2020 8:50:55 AM  I was physically present for the entire viewing portion of the exam.  __________________________  Signature of teaching physician  Belinda Jorgensen MD  Number of Addenda: 0    Note Initiated On: 11/12/2020 6:46 AM  Scope In:  Scope Out:

## 2024-11-20 NOTE — RESULT ENCOUNTER NOTE
Hello -    Here are my comments about your recent results:  -PSA (prostate specific antigen) test is normal.  This indicates a low likelihood of prostate cancer.  ADVISE: rechecking this in 1 year.  For additional lab test information, labtestsonline.org is an excellent reference..    Please let us know if you have any questions or concerns.     Regards,  Adriana Solis MD

## 2024-11-20 NOTE — NURSING NOTE
"Chief Complaint   Patient presents with    New Patient       Vitals:    11/20/24 0654   BP: 131/77   Pulse: 62   SpO2: 99%   Weight: 76.4 kg (168 lb 8 oz)   Height: 1.74 m (5' 8.5\")       Body mass index is 25.25 kg/m .    Marcelino David MA    "

## 2024-11-20 NOTE — PATIENT INSTRUCTIONS
It was a pleasure taking care of you today.  I've included a brief summary of our discussion and care plan from today's visit below.  Please review this information with your primary care provider.  _______________________________________________________________________    My recommendations are summarized as follows:    Order priority EGD. Please call to schedule.   Please call RN Care Coordinator Romelia at [phone] with any questions or concerns.    2. Continue to avoid gastric irritants.     3. Checking a stool sample    Please call our clinic or send a SIPphonehart message to us if you have any questions or concerns. MyChart messages are answered by your nurse or doctor typically within 24 hours.  Please allow extra time on weekends and holidays    Return to GI Clinic in 1-2 weeks after EGD months to review your progress.    _______________________________________________________________________    How do I schedule labs, imaging studies, or procedures that were ordered in clinic today?      Labs: To schedule lab appointment at the [facility], use my chart or call [phone]. If you have a [place] lab closer to home where you are regularly seen you can give them a call.      Procedures: If a colonoscopy, upper endoscopy, breath test, esophageal manometry, or pH impedence was ordered today, our endoscopy team will call you to schedule this. If you have not heard from our endoscopy team within a week, please call [phone] option 2 to schedule.      Imaging Studies: If you were scheduled for a CT scan, X-ray, MRI, ultrasound, HIDA scan, EKG or other imaging study, please call [phone] to have this scheduled.      Referral: If a referral to another specialty was ordered, expect a phone call or follow instructions above. If you have not heard from anyone regarding your referral in a week, please call our clinic to check the status.      Who do I call with any questions after my visit?  Please  be in touch if there are any further questions that arise following today's visit.  There are multiple ways to contact your gastroenterology care team.       During business hours, you may reach a Gastroenterology nurse at [phone]     To schedule or reschedule an appointment, please call [phone].      You can always send a secure message through [employer].  [employer] messages are answered by your nurse or doctor typically within 24 hours.  Please allow extra time on weekends and holidays.       For urgent/emergent questions after business hours, you may reach the on-call GI Fellow by contacting the [facility]  at [phone].     How will I get the results of any tests ordered?    You will receive all of your results.  If you have signed up for [employer], any tests ordered at your visit will be available to you after your physician reviews them.  Typically this takes 1-2 weeks.  If there are urgent results that require a change in your care plan, your physician or nurse will call you to discuss the next steps.       What is [employer]?  [employer] is a secure way for you to access all of your healthcare records from the [facility].  It is a web based computer program, so you can sign on to it from any location.  It also allows you to send secure messages to your care team.  I recommend signing up for [employer] access if you have not already done so and are comfortable with using a computer.       How to I schedule a follow-up visit?  If you did not schedule a follow-up visit today, please call [phone] to schedule a follow-up office visit.      Sincerely,    Melba Dasilva PA-C  Gastroenterology

## 2024-11-20 NOTE — TELEPHONE ENCOUNTER
Patient confirmed scheduled appointment:     Date: 1/13/25  Time: 8:30 AM  Visit type: Return GI  Visit mode: In Person  Provider:  FEDERICA Kiran  [place] [facility] [place]    Additional Notes:     Patient confirmed scheduled appointment:     Date: 11/20/24  Time: 1:30 pm  Visit type: Lab - Elastane Fecal   Visit mode: In Person  Provider:  Ordered by Melba Dasilva  [place] [facility] [place]    Additional Notes:

## 2024-11-21 ENCOUNTER — OFFICE VISIT (OUTPATIENT)
Dept: CARDIOLOGY | Facility: CLINIC | Age: 59
End: 2024-11-21
Attending: FAMILY MEDICINE
Payer: COMMERCIAL

## 2024-11-21 VITALS
WEIGHT: 169 LBS | DIASTOLIC BLOOD PRESSURE: 79 MMHG | OXYGEN SATURATION: 98 % | BODY MASS INDEX: 25.32 KG/M2 | HEART RATE: 55 BPM | SYSTOLIC BLOOD PRESSURE: 128 MMHG

## 2024-11-21 DIAGNOSIS — R53.82 CHRONIC FATIGUE: ICD-10-CM

## 2024-11-21 DIAGNOSIS — Z82.49 FAMILY HISTORY OF ARRHYTHMIA: ICD-10-CM

## 2024-11-21 DIAGNOSIS — Z82.49 FAMILY HISTORY OF ISCHEMIC HEART DISEASE: ICD-10-CM

## 2024-11-21 NOTE — PATIENT INSTRUCTIONS
Thank you for coming to the [facility] @ [place]; please note the following instructions:    1. Cardiology follow up as needed        If you have any questions regarding your visit please contact your care team:     Cardiology  Telephone Number   Neena ROBERTSON, RN  Isabel BRAUN, RN  Lisa PAULSON, RN  Melba HARRIS, RMBESSY CAMARA, RMBESSY HERRERA, Visit Facilitator  Luz SARMIENTO, [facility] [phone] (option 1)   For scheduling appts:     [phone] (select option 1)       For the Device Clinic (Pacemakers and ICD's)  RN's :  Christine Peñaloza   During business hours: [phone]    *After business hours:  [phone] (select option 4)      Normal test result notifications will be released via [employer] or mailed within 7 business days.  All other test results, will be communicated via telephone once reviewed by your cardiologist.    If you need a medication refill please contact your pharmacy.  Please allow 3 business days for your refill to be completed.    As always, thank you for trusting us with your health care needs!

## 2024-11-21 NOTE — LETTER
11/21/2024      RE: Nikunj Sinha  [address]  [place] [facility] [address]       Dear Colleague,    Thank you for the opportunity to participate in the care of your patient, Nikunj Sinha, at the [facility] at [facility]. Please see a copy of my visit note below.       SUBJECTIVE:  Nikunj Sinha is a 59 year old male who presents for follow-up.  Chronic fatigue and decreased heart function.  Patient works with the mental health facility.  Do have fatigue for many years.  As part of evaluation wanted to exclude cardiac cause for fatigue.  Patient known to have low EF of 50-55 in the past.  Currently patient is fairly active with no cardiac symptoms.  Cardiac risk factors are remote history of smoking and elevated LDL.  No diabetes or hypertension.  No premature coronary artery disease in family.  Though there is family history of atrial fibrillation according to patient.  He has a history of DVT and is on chronic anticoagulation with Xarelto.  No other cardiac medications.  Patient was admitted in August 2019.  History is that he may donated excessive amount of plasma in a short period of time.  This caused significant dehydration WINSTON and mild elevation of troponin 2.4.  Patient had a coronary angiogram showing no flow-limiting lesions.  Echocardiogram at that time reported as showing an EF of 50% there was evidence for RV dilation and dysfunction.  PA pressure was not estimated.  But at this point there is a clear history of excess alcoholism.  Patient is sober for past 5 years.   Today patient had no new complaints apart from his fatigue.  He stopped donating plasma.  Patient Active Problem List    Diagnosis Date Noted     Abnormal finding on MRI of brain 09/29/2024     Priority: Medium     History of basal cell cancer 09/29/2024     Priority: Medium     left ala nose, s/p mohs , reconstruction and laser rx       Bone island 09/29/2024      Priority: Medium     Family history of arrhythmia 09/29/2024     Priority: Medium     Family history of ischemic heart disease 09/29/2024     Priority: Medium     Dilated pancreatic duct 09/29/2024     Priority: Medium     Sludge in gallbladder 09/29/2024     Priority: Medium     Weight loss 09/29/2024     Priority: Medium     Bilateral inguinal hernia without obstruction or gangrene, recurrence not specified 12/01/2022     Priority: Medium     Umbilical hernia without obstruction and without gangrene 12/01/2022     Priority: Medium     Diverticulosis of large intestine without hemorrhage 12/01/2022     Priority: Medium     Liver cyst 12/01/2022     Priority: Medium     Duodenal diverticulum 12/01/2022     Priority: Medium     IPMN (intraductal papillary mucinous neoplasm) 12/01/2022     Priority: Medium     Dilation of common bile duct 12/01/2022     Priority: Medium     Seborrheic keratoses 07/16/2020     Priority: Medium     Need for shingles vaccine 07/16/2020     Priority: Medium     Elevated LDL cholesterol level 12/20/2019     Priority: Medium     Chronic anticoagulation 12/01/2019     Priority: Medium     Personal history of DVT (deep vein thrombosis) 12/01/2019     Priority: Medium     Current severe episode of major depressive disorder without psychotic features, unspecified whether recurrent (H) 11/29/2019     Priority: Medium     11/29/19:  Unemployed, from [place] originally where was getting his care at the local clinic there, new to me & this clinic, limited records on care everywhere only viewable after he got to the room, with limited apt time.   Hx of ACS, NSTEMI with elevated troponin, no CAD on angiogram,  but found to have a acute saddle embolism with cor pulmonale & hx of dvt,  in 8/2019 on lifelong anticoagulation now on xarelto 20 mg daily, ever since under care of hematology, hx of moderate alcohol dependance in early remission with hx of withdrawal & mood disorder, previously on  "campral ( discontinued secondary to diarrhea), & naltrexone( stopped as ineffective), ZAIDA, MDD, suicidal ideation, on wellbutrin xl 150 mg daily, lexapro 20 mg daily, gabapentin 300 mg bedtime & a multivitamin, also on proranolol, previously on abilify 5 mg, citalopram 10 mg, hydroxyzine, mirtazipine & trazodone, , dental caries, recent dental abscess s/p abx to see the dentist soon, hx of essential tremor on propranolol 20 mg tid, allergic to ragweeds, no records from [facility] but reviewed care everywhere [facility] records,     Seen at [facility] with suicidal ideation. \" after a friend called  worried about pt.'s suicidal ideation. SW met with Pt who appeared [place] and guarded. When SW asked Pt what brought him in he indicated it was a long story. Pt reports that his mother, father and sibling all  within a 10 year time period of various medical issues. 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 Pt reports that he used credit cards to pay for daily expenses. Pt reports that he now has extreme credit card debt. Pt lost his job recently due to his car having mechanical issues and he was missing work. Today Pt was served a 24 hour notice by the [employer] department to leave his apartment. Pt is hopeless and feels that he is \"in too deep\" and nothing will get better. Pt did not appear able to contract for safety. When SW asked what Pt would do if he went home, he shrugged and said \"I don't know\", SW indicated that she was worried Pt would go home and hurt himself because he felt so hopeless Pt shrugged again but did not reply. Pt has no support in the community and is isolated. The friend that called  lives in [place] and Pt has never met her in person but they have talked on the phone for 20 years. SW indicated that she must have been worried enough to figure out how to call the police in [place]. Pt shrugged and said \"I guess\". Pt does not " "feel an admission will change anything, \"I just don't see what being admitted will do or how it can help\". ISIS and MD discussed their concern over Pt.'s safety and his inability to contract for safety along with his state of hopelessness with Pt. He only nodded but agreed to be admitted voluntary in the hopes that the SW on the unit may have some idea's on how to help him. BAL/Breathalyzer completed (date/results): yes Pt was .219 when the police picked him up several hours ago  He does not see a therapist or psychiatrist, however, and although his primary care physician does prescribe him citalopram he has not recently spoken with him or does not sound as if he is disclosed how severe his symptoms are. He does admit to self-medicating with alcohol. When he has stopped drinking in the past he will get rather shaky and a bit nauseous. Otherwise he has not had hallucinations or seizures in the past related to that. When he spoke with his friend today he told him that he thought he should prepared to say goodbye to him, but when I asked why his friend would be concerned with that if this was a daily thought process for him he stated that he does not talk about it every day. He is rather hopeless and he is not sure why he has not done anything yet to harm himself.\" did admit to possibly hanging himself. Initially he was started on the alcohol withdrawal protocol and monitored for a safe detoxification. Was drinking up to a liter per day for the past few weeks prior to admission. Admitted on a voluntary basis. Agreeable to stay voluntarily to coordinate cares medication management. He was admitted to psychiatric unit for safety, stabilization and medication management. Eventually gained insight into the disease process and addiction. Was able to cooperate and participate in programming and educate on the medication as well. Started on campral, abilify, gabapentin, mirtazipine & trazodone.  hydroxyzine, & continued on " "citalopram. A rule 25 was completed. He spoke of some of his historical challenges including taling care of his brother with ALS and his mother who was dying of metastatic cancer and his ailing father. During this time he developed a drinking habit. Additional stressors include being evicted from his apartment. He reports that he was drinking 1.75 L of Vodka per day. He denies suicidal ideations and last felt suicidal about 6 days ago. He stated, Suicide use to be plan A but I no longer feel that way. He has hope for the future. He looks forward to discharge to [facility]. He feels much less hopeless\"    Seen in ER at [facility] on 6/12/19 \" for increased depression, hopelessness, and alcohol abuse. Pt was kicked out of his sober living today after coming home from a therapy appt smelling like alcohol. Pt reports that he has been having a good week and had a really good therapy appt today because he drank before hand and was able to disclose information to his therapist he did not have the courage to do before. Pt returned to his sober house and was confronted about his alcohol abuse, they called the police and sent him here. Pt has been seen in this ED previously for depression and suicidal ideation. Pt is currently denying any Suicidal ideation or plan but also is stating that he has \"fucked up his life beyond repair\" and that \"he doesn't know if he wants to live\". Pt has no family or any support, recently lost his housing, and is struggling to deal with all the loss he has endured in his life. 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 Pt has many regrets in his life and struggling to cope with all the loss. Pt would benefit from a crisis residence\".     In ER [facility] on 10/17/2019 for sucidal ideation & alcohol abuse: \"Nikunj Sinha is a 54 y.o. male being seen by Crisis Social Work regarding increased depressive symptoms, anxiety, and suicidal " "ideation. The patient presented to the outpatient clinic today for a Rule 25 assessment. He endorsed numerous acute depressive symptoms. The patient states that he was on 2700 for a period of time and found the program to be very helpful. He was then discharged to a sober house with a referral for day programming at [facility]. He has continued to relapse on alcohol and has been unsuccessful outside of a structured program. He was previously working a good job as a Scoring Director at [employer] \"but I drank myself out of that job.\" He has now been evicted from his apartment and finds himself homeless. The patient rates his depression as 8 out of 10 and anxiety as 9 out of 10. He is now feeling hopeless with thoughts of suicide and a plan to hang himself. He states that he hasn't taken any steps towards acting on the thoughts but that \"it's something in my mind.\" The patient states that he feels somewhat isolated. He has one friend in [place], but no supportive family that are still alive. The patient's Rule 25 was completed today and this writer verified with Isabella Joe that he is not on her list. She states that once she receives the Rule 25 from the clinic and he is psychiatrically treated he can be considered for 2700. The patient has remained calm, cooperative and pleasant. Patient was sent to the ER from the outpatient addiction clinic. He was here today for a Rule 25 in the outpatient addiction clinic at [facility]. He was sent by his clinician Alexandr after completing a Rule 25 assessment. The patient endorsed numerous depressive symptoms and states that he is having suicidal ideation. The patient does not identify a specific plan but states that he has several and hasn't decided which to act on. The patient's last drink was yesterday at noon and is now observed as having some withdrawal symptoms. Alexandr states that the patient is engaged with outpatient CD treatment at [facility] but he has been unable to " maintain any sobriety outside of a structured facility. Roughly one month ago he stopped taking all of his psych medications. Alexandr recommends that the patient go to inpatient mental health for his suicidal ideation, and if he is not acute enough for that placement that we pursue 2700.  Admitted on a voluntary basis. Agreeable to stay voluntarily to coordinate cares and medication management. Disposition recommended on today's date prior to intake for MI CD treatment. Recommendation by hospitalist to proceed with dental cares. Further efforts to address community concerns prior to proceeding with MI CD treatment. Patient seen by hospitalist during course of hospitalization. Started on antibiotic. Recommendation for evaluation by hospitalist in the community, given lack of provider at the facility. Patient requesting to be seen by dentist prior to MI CD treatment.  Patient placed on a CIWA protocol. Did not demonstrate signs or symptoms of complicated alcohol withdrawal.  Psychiatric medication management performed in detail. Medication management performed to target signs and symptoms of principal diagnosis. Further medication management performed to target comorbid illnesses. Medication management included:    Lexapro: Continued restart of PTA medication for depression and anxiety.    Wellbutrin XL:  Continued restart of of PTA medication for combination therapy.    Gabapentin:  Scheduled medication management for augmentation. Continued restart of PTA medication as needed for anxiety.    Propranolol: Continue restart of PTA medication as needed for anxiety.    Naltrexone: Continued trial for alcohol use disorder; LFTs normalizing. Repeat LFTs scheduled.    Campral: Discontinued PTA medication secondary to GI side effects.  Medication adherent. Tolerating medication management. Progressed with mood and anxiety. Denies psychosis. Denies suicidal and homicidal ideation. Did an appropriate crisis and relapse plan.  "Future oriented. Denies gun access. Indicated motivation to proceed with MI CD treatment as coordinated by  with intake date on Friday, November 1, 2019.   followed in regards to collecting and reviewing collateral information, coordination of cares and discharge planning. Including, MI CD evaluation completed with recommendation for placement to unit 2700. Due to dental issues, patient discharged to medical respite bed and secured on today's date to bridge placement. Discharged to Medical Respite at noon and Inpatient CD treatment on unit 2700 on 11/01 at noon. Further recommendation to refer to psychiatrist in the community after discharge from unit 2700. Cares coordinated with case management.  Treated for dental abscess with pcn.\"    [place]  shows received gabapentin 100 mg #270 on 1/4/19 at [facility], then 300 mg # 90  On 2/11/19 in [facility], 100 mg # 60 on 3/6/19 in [facility], then # 180 on 6/20/19, 300 mg # 60 on 10/30/19 & # 30 of 300 mg on 11/21/19    Seen nov 29th, 2019 to establish care as doesnt have a PCP.   Severe MDD/ZAIDA./ hx of passive suicidal thoughts: discharged nov 22nd 7 days ago, currently in sober housing, has leonard apt with psyche [facility] on 12/5. To also see psyhcology 12/11 but plans to reschedule that at upcoming psyche apt next week as it is in the morning & he needs to go to out patient rx in the am. Has enough meds till sees psyche. Has passive suicidal thoughts. No active plan. Contracts to safety & forwarding thinking about apts etc. Is taking all his meds.     Hx of alcohol abuse, dependance etc, Currently sober, last drink was oct 14th, notes was on naltrexone then stopped as no longer helped. Was on campral before but had to discontinue due to diarrhea. Has done [facility] program twice once beginning of the year and one last week       Moderate alcohol dependence in early remission (H) 11/29/2019     Priority: Medium     ZAIDA (generalized " "anxiety disorder) 11/29/2019     Priority: Medium     History of suicidal ideation 11/29/2019     Priority: Medium     Chronic fatigue 11/29/2019     Priority: Medium     Postural dizziness with near syncope 11/29/2019     Priority: Medium     Tinnitus, bilateral 11/29/2019     Priority: Medium     Memory difficulties 11/29/2019     Priority: Medium     Dental caries 11/29/2019     Priority: Medium     Seasonal allergic rhinitis, unspecified trigger 11/29/2019     Priority: Medium     History of pulmonary embolism 11/29/2019     Priority: Medium     [facility]: \"hx of depression, anxiety, alcohol abuse disorder (sober x 2 months, He is a recovering alcoholic. He currently resides in sober house since June 2019) who presented with shortness of breath and 2 days of chest pain and near syncope. He presented to the emergency room on 8/4/2019 after [facility] He did not recall particular chest pain or shortness of breath. He noted swelling of his leg in various spot but he denies any persistent pain or swelling or redness. No injury. No acute illness prior to that. However he donated plasma 10 times in 5 weeks prior to that. Upon initial evaluation, d-dimer was elevated at 6.7. PTT was normal at 28, INR is 1.09. He underwent coronary angiogram and it did not show any obstructive coronary artery disease.Due to an elevated troponin he underwent coronary angiogram which demonstrated no significant CAD. Due to an elevated d-dimer underwent CT scan of the chest which showed extensive acute bilateral pulmonary embolism with saddle embolus.  Doppler legs showed Nonocclusive right femoral DVT from upper to mid thigh. Left calf DVT within one of the peroneal veins in both of the posterior tibial veins from upper to low calf. Echocardiogram revealed lower limit of normal left ventricular systolic function of 50% with septal motion compatible with right ventricular overload.  He " "had cor pulmonale and was started on heparin infusion. No thrombolytics were given due hemodynamic and respiratory stability. After several days of monitoring on IV heparin Mr. Sinha continued to improve and he was transitioned to Xarelto for ongoing anticoagulation therapy. He tolerated Xarelto well. No major bleeding except intermittent gum bleeding. Denies family history of venous thromboembolism. He had a brother who passed away from [facility] [place]. He is single. Does not have children. Was advised to continue with indefinate anticoagulation. Mr. Sinha did donate plasma 2x/week for the last 5 weeks and I wonder if this led to a transient hypercoagulable state. Mr. Sinha was advised not to donate plasma moving forward. An outpatient referral to hematology has been made\".      Seen by hematology 9/2019 a[facility]. She reviewed the pathophysiology of venous thromboembolic exam and risks of recurrence. He is wondering about excessive plasma donation trigger that event and I think it is possible. At this point, regardless of the etiology, he is indicated for long-term anticoagulation due to initial presentation of life-threatening episode. No prior history of venous thromboembolism or family history of thromboembolism. I would not recommend family/hereditary thrombophilia work-up. I also discussed that we do not need to repeat CT scan of the chest to know the resolution of blood clots, however will obtain CTA if he has signs and symptoms concerning for venous thromboembolism. I discussed about strict abstinence from alcohol with anticoagulants. I recommended to obtain kidney, liver function monitoring at least once a year.   I discussed about age-appropriate cancer screening. He admitted that he had positive stool test (sounds like FOBT) a couple years ago. Unclear if it was related to his alcohol use. He was recommended to proceed with colonoscopy, but has not completed yet. I recommended him to complete " "colonoscopy however, we will need to wait at least 3-6 months because of this recent pulmonary emboli event and I would not recommend interruption of anticoagulation at this point. He voiced understanding. I noted that he has a gastroenterology referral. Recommended him to have a primary care provider as His previous primary care provider is retiring/retired. Until he finds a primary care provider, I will continue to manage his anticoagulation. Follow-up with me in about 6 months with labs including hemogram, CMP.\"           Epigastric pain 11/29/2019     Priority: Medium    .  Current Outpatient Medications   Medication Sig Dispense Refill     escitalopram (LEXAPRO) 5 MG tablet Take 5 mg by mouth daily. By [facility] psyche [facility]       famotidine (PEPCID) 20 MG tablet Take 1 tablet (20 mg) by mouth at bedtime. 90 tablet 0     FLUoxetine (PROZAC) 40 MG capsule Take 40 mg by mouth daily. By suzette psyche [facility]       rivaroxaban ANTICOAGULANT (XARELTO) 10 MG TABS tablet Take 1 tablet (10 mg) by mouth daily with food 90 tablet 3     No current facility-administered medications for this visit.     Past Medical History:   Diagnosis Date     Abdominal pain, epigastric 11/29/2019     Acute embolism and thrombosis of unspecified deep veins of lower extremity, bilateral (H) 11/19/2024     Chronic anticoagulation 12/01/2019     Current severe episode of major depressive disorder without psychotic features, unspecified whether recurrent (H) 11/29/2019     Decreased cardiac ejection fraction 08/22/2021    Echo oct 2022 normal      Dental caries 11/29/2019     Dizziness 11/29/2019     Erectile dysfunction, unspecified erectile dysfunction type 11/29/2019     Essential tremor 12/01/2019     Fatigue, unspecified type 11/29/2019     ZAIDA (generalized anxiety disorder) 11/29/2019     History of chest pain 11/29/2019     History of pulmonary embolism 11/29/2019     History of suicidal ideation 11/29/2019     Memory changes " 2019     Moderate alcohol dependence in early remission (H) 2019     Palpitations 2019     Positive FIT (fecal immunochemical test) 2019    Colonoscopy 2020 normal, labs normal     Seasonal allergic rhinitis, unspecified trigger 2019     Tinnitus, bilateral 2019     Weight gain 2019     Past Surgical History:   Procedure Laterality Date     COLONOSCOPY N/A 2020    Procedure: COLONOSCOPY;  Surgeon: Belinda Jorgensen MD;  Location: [facility] OR     CV CORONARY ANGIOGRAM N/A 2019    Procedure: Coronary Angiogram;  Surgeon: Ambrose Mayfield MD;  Location: [facility] Cath Lab;  Service: Cardiology     CV LEFT HEART CATHETERIZATION WITHOUT LEFT VENTRICULOGRAM Left 2019    Procedure: Left Heart Catheterization Without Left Ventriculogram;  Surgeon: Ambrose Mayfield MD;  Location: [facility] Cath Lab;  Service: Cardiology     TONSILLECTOMY  1984     Allergies   Allergen Reactions     Ragweeds Other (See Comments)     congestion     Social History     Socioeconomic History     Marital status: Single     Spouse name: Not on file     Number of children: Not on file     Years of education: Not on file     Highest education level: Not on file   Occupational History     Not on file   Tobacco Use     Smoking status: Former     Current packs/day: 0.00     Average packs/day: 0.5 packs/day for 4.0 years (2.0 ttl pk-yrs)     Types: Cigarettes     Start date: 1978     Quit date: 1982     Years since quittin.9     Smokeless tobacco: Never   Vaping Use     Vaping status: Never Used   Substance and Sexual Activity     Alcohol use: Not Currently     Comment: sober second time oct 14 , did detox adn rehab, in a sober house 2019     Drug use: Never     Sexual activity: Not Currently   Other Topics Concern     Not on file   Social History Narrative    2019: moved in 1 week ago to sober house [employer] works, previously living alone, no pets,       Social Drivers of Health     Financial Resource Strain: Not on file   Food Insecurity: Not on file   Transportation Needs: Not on file   Physical Activity: Not on file   Stress: Not on file   Social Connections: Not on file   Interpersonal Safety: Low Risk  (2024)    Interpersonal Safety      Do you feel physically and emotionally safe where you currently live?: Yes      Within the past 12 months, have you been hit, slapped, kicked or otherwise physically hurt by someone?: No      Within the past 12 months, have you been humiliated or emotionally abused in other ways by your partner or ex-partner?: No   Housing Stability: Not on file     Family History   Problem Relation Age of Onset     Arthritis Mother      Breast Cancer Mother      Depression Mother      Allergies Mother      Migraines Mother      Alcoholism Father      Lymphoma Father         non hodgkins     Neurodegenerative disease Brother         genetic disorder,  of some slow form of ALS     Suicidality Paternal Grandfather           REVIEW OF SYSTEMS:  General: negative, fever, chills, night sweats  Skin: negative, acne, rash, and scaling  Eyes: negative, double vision, glaucoma, and cataracts  Ears/Nose/Throat: negative, nasal congestion, and purulent rhinorrhea  Respiratory: No shortness of breath, No dyspnea on exertion, No cough, and negative  Cardiovascular: negative, palpitations, tachycardia, irregular heart beat, chest pain, exertional chest pain or pressure, paroxysmal nocturnal dyspnea, and dyspnea on exertion       OBJECTIVE:  Blood pressure 128/79, pulse 55, weight 76.7 kg (169 lb), SpO2 98%.  General Appearance: healthy, alert, active, and no distress  Head: Normocephalic. No masses, lesions, tenderness or abnormalities  Eyes: conjuctiva clear, PERRL, EOM intact  Ears: External ears normal. Canals clear. TM's normal.  Nose: Nares normal  Mouth: normal  Neck: Supple, no cervical adenopathy, no thyromegaly  Lungs: clear to  auscultation  Cardiac: regular rate and rhythm, normal S1 and S2, no murmur       ASSESSMENT/PLAN:  Patient here for follow-up.  2 years ago he was evaluated for decreased heart function and extreme fatigue.  He had fatigue for many years.  His echocardiogram in the past showed an EF of 50 to 55%.  No CHF medications were started.  He did have a prior history of excess alcohol use.  Today patient had no new complaints apart from his fatigue.  Overall he is doing well from cardiac standpoint.  Do have some epigastric pain which is under evaluation by GI.  Recent echocardiogram reviewed.  Normal biventricular function.  No significant valvular abnormalities.  Result was discussed with patient.    As he is doing well with normal cardiac function and not on any cardiac medications patient will return to clinic on a as needed basis.  Total visit duration 20 minutes.  This included face-to-face interview, physical exam, chart review, review of recent echocardiogram and documentation.      Please do not hesitate to contact me if you have any questions/concerns.     Sincerely,     OTILIA Hudson MD

## 2024-11-21 NOTE — PROGRESS NOTES
SUBJECTIVE:  Nikunj Sinha is a 59 year old male who presents for follow-up.  Chronic fatigue and decreased heart function.  Patient works with the mental health facility.  Do have fatigue for many years.  As part of evaluation wanted to exclude cardiac cause for fatigue.  Patient known to have low EF of 50-55 in the past.  Currently patient is fairly active with no cardiac symptoms.  Cardiac risk factors are remote history of smoking and elevated LDL.  No diabetes or hypertension.  No premature coronary artery disease in family.  Though there is family history of atrial fibrillation according to patient.  He has a history of DVT and is on chronic anticoagulation with Xarelto.  No other cardiac medications.  Patient was admitted in August 2019.  History is that he may donated excessive amount of plasma in a short period of time.  This caused significant dehydration WINSTON and mild elevation of troponin 2.4.  Patient had a coronary angiogram showing no flow-limiting lesions.  Echocardiogram at that time reported as showing an EF of 50% there was evidence for RV dilation and dysfunction.  PA pressure was not estimated.  But at this point there is a clear history of excess alcoholism.  Patient is sober for past 5 years.   Today patient had no new complaints apart from his fatigue.  He stopped donating plasma.  Patient Active Problem List    Diagnosis Date Noted    Abnormal finding on MRI of brain 09/29/2024     Priority: Medium    History of basal cell cancer 09/29/2024     Priority: Medium     left ala nose, s/p mohs , reconstruction and laser rx      Bone island 09/29/2024     Priority: Medium    Family history of arrhythmia 09/29/2024     Priority: Medium    Family history of ischemic heart disease 09/29/2024     Priority: Medium    Dilated pancreatic duct 09/29/2024     Priority: Medium    Sludge in gallbladder 09/29/2024     Priority: Medium    Weight loss 09/29/2024     Priority: Medium    Bilateral inguinal  hernia without obstruction or gangrene, recurrence not specified 12/01/2022     Priority: Medium    Umbilical hernia without obstruction and without gangrene 12/01/2022     Priority: Medium    Diverticulosis of large intestine without hemorrhage 12/01/2022     Priority: Medium    Liver cyst 12/01/2022     Priority: Medium    Duodenal diverticulum 12/01/2022     Priority: Medium    IPMN (intraductal papillary mucinous neoplasm) 12/01/2022     Priority: Medium    Dilation of common bile duct 12/01/2022     Priority: Medium    Seborrheic keratoses 07/16/2020     Priority: Medium    Need for shingles vaccine 07/16/2020     Priority: Medium    Elevated LDL cholesterol level 12/20/2019     Priority: Medium    Chronic anticoagulation 12/01/2019     Priority: Medium    Personal history of DVT (deep vein thrombosis) 12/01/2019     Priority: Medium    Current severe episode of major depressive disorder without psychotic features, unspecified whether recurrent (H) 11/29/2019     Priority: Medium     11/29/19:  Unemployed, from [place] originally where was getting his care at the local clinic there, new to me & this clinic, limited records on care everywhere only viewable after he got to the room, with limited apt time.   Hx of ACS, NSTEMI with elevated troponin, no CAD on angiogram,  but found to have a acute saddle embolism with cor pulmonale & hx of dvt,  in 8/2019 on lifelong anticoagulation now on xarelto 20 mg daily, ever since under care of hematology, hx of moderate alcohol dependance in early remission with hx of withdrawal & mood disorder, previously on campral ( discontinued secondary to diarrhea), & naltrexone( stopped as ineffective), ZAIDA, MDD, suicidal ideation, on wellbutrin xl 150 mg daily, lexapro 20 mg daily, gabapentin 300 mg bedtime & a multivitamin, also on proranolol, previously on abilify 5 mg, citalopram 10 mg, hydroxyzine, mirtazipine & trazodone, , dental caries, recent dental abscess s/p abx to  "see the dentist soon, hx of essential tremor on propranolol 20 mg tid, allergic to ragweeds, no records from [facility] but reviewed care everywhere [facility] records,     Seen at [facility] 18 with suicidal ideation. \" after a friend called  worried about pt.'s suicidal ideation. SW met with Pt who appeared [place] and guarded. When SW asked Pt what brought him in he indicated it was a long story. Pt reports that his mother, father and sibling all  within a 10 year time period of various medical issues. 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 Pt reports that he used credit cards to pay for daily expenses. Pt reports that he now has extreme credit card debt. Pt lost his job recently due to his car having mechanical issues and he was missing work. Today Pt was served a 24 hour notice by the [employer] department to leave his apartment. Pt is hopeless and feels that he is \"in too deep\" and nothing will get better. Pt did not appear able to contract for safety. When SW asked what Pt would do if he went home, he shrugged and said \"I don't know\", SW indicated that she was worried Pt would go home and hurt himself because he felt so hopeless Pt shrugged again but did not reply. Pt has no support in the community and is isolated. The friend that called  lives in [place] and Pt has never met her in person but they have talked on the phone for 20 years. SW indicated that she must have been worried enough to figure out how to call the police in [place]. Pt shrugged and said \"I guess\". Pt does not feel an admission will change anything, \"I just don't see what being admitted will do or how it can help\". ISIS and MD discussed their concern over Pt.'s safety and his inability to contract for safety along with his state of hopelessness with Pt. He only nodded but agreed to be admitted voluntary in the hopes that the SW on the unit may have some idea's on how to " "help him. BAL/Breathalyzer completed (date/results): yes Pt was .219 when the police picked him up several hours ago  He does not see a therapist or psychiatrist, however, and although his primary care physician does prescribe him citalopram he has not recently spoken with him or does not sound as if he is disclosed how severe his symptoms are. He does admit to self-medicating with alcohol. When he has stopped drinking in the past he will get rather shaky and a bit nauseous. Otherwise he has not had hallucinations or seizures in the past related to that. When he spoke with his friend today he told him that he thought he should prepared to say goodbye to him, but when I asked why his friend would be concerned with that if this was a daily thought process for him he stated that he does not talk about it every day. He is rather hopeless and he is not sure why he has not done anything yet to harm himself.\" did admit to possibly hanging himself. Initially he was started on the alcohol withdrawal protocol and monitored for a safe detoxification. Was drinking up to a liter per day for the past few weeks prior to admission. Admitted on a voluntary basis. Agreeable to stay voluntarily to coordinate cares medication management. He was admitted to psychiatric unit for safety, stabilization and medication management. Eventually gained insight into the disease process and addiction. Was able to cooperate and participate in programming and educate on the medication as well. Started on campral, abilify, gabapentin, mirtazipine & trazodone.  hydroxyzine, & continued on citalopram. A rule 25 was completed. He spoke of some of his historical challenges including taling care of his brother with ALS and his mother who was dying of metastatic cancer and his ailing father. During this time he developed a drinking habit. Additional stressors include being evicted from his apartment. He reports that he was drinking 1.75 L of Vodka per day. " "He denies suicidal ideations and last felt suicidal about 6 days ago. He stated, Suicide use to be plan A but I no longer feel that way. He has hope for the future. He looks forward to discharge to [facility]. He feels much less hopeless\"    [facility] \" for increased depression, hopelessness, and alcohol abuse. Pt was kicked out of his sober living today after coming home from a therapy appt smelling like alcohol. Pt reports that he has been having a good week and had a really good therapy appt today because he drank before hand and was able to disclose information to his therapist he did not have the courage to do before. Pt returned to his sober house and was confronted about his alcohol abuse, they called the police and sent him here. Pt has been seen in this ED previously for depression and suicidal ideation. Pt is currently denying any Suicidal ideation or plan but also is stating that he has \"fucked up his life beyond repair\" and that \"he doesn't know if he wants to live\". Pt has no family or any support, recently lost his housing, and is struggling to deal with all the loss he has endured in his life. 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 Pt has many regrets in his life and struggling to cope with all the loss. Pt would benefit from a crisis residence\".     In ER [facility]'s on 10/17/2019 for sucidal ideation & alcohol abuse: \"Nikunj Sinha is a 54 y.o. male being seen by Crisis Social Work regarding increased depressive symptoms, anxiety, and suicidal ideation. The patient presented to the outpatient clinic today for a Rule 25 assessment. He endorsed numerous acute depressive symptoms. The patient states that he was on 2700 for a period of time and found the program to be very helpful. He was then discharged to a sober house with a referral for day programming at [facility]. He has continued to relapse on alcohol and has been " "unsuccessful outside of a structured program. He was previously working a good job as a Scoring Director at [employer] \"but I drank myself out of that job.\" He has now been evicted from his apartment and finds himself homeless. The patient rates his depression as 8 out of 10 and anxiety as 9 out of 10. He is now feeling hopeless with thoughts of suicide and a plan to hang himself. He states that he hasn't taken any steps towards acting on the thoughts but that \"it's something in my mind.\" The patient states that he feels somewhat isolated. He has one friend in [place], but no supportive family that are still alive. The patient's Rule 25 was completed today and this writer verified with Isabella Joe that he is not on her list. She states that once she receives the Rule 25 from the clinic and he is psychiatrically treated he can be considered for 2700. The patient has remained calm, cooperative and pleasant. Patient was sent to the ER from the outpatient addiction clinic. He was here today for a Rule 25 in the outpatient addiction clinic at [facility]. He was sent by his clinician Alexandr after completing a Rule 25 assessment. The patient endorsed numerous depressive symptoms and states that he is having suicidal ideation. The patient does not identify a specific plan but states that he has several and hasn't decided which to act on. The patient's last drink was yesterday at noon and is now observed as having some withdrawal symptoms. Alexandr states that the patient is engaged with outpatient CD treatment at [facility] but he has been unable to maintain any sobriety outside of a structured facility. Roughly one month ago he stopped taking all of his psych medications. Alexandr recommends that the patient go to inpatient mental health for his suicidal ideation, and if he is not acute enough for that placement that we pursue 2700.  Admitted on a voluntary basis. Agreeable to stay voluntarily to coordinate cares and " medication management. Disposition recommended on today's date prior to intake for MI CD treatment. Recommendation by hospitalist to proceed with dental cares. Further efforts to address community concerns prior to proceeding with MI CD treatment. Patient seen by hospitalist during course of hospitalization. Started on antibiotic. Recommendation for evaluation by hospitalist in the community, given lack of provider at the facility. Patient requesting to be seen by dentist prior to MI CD treatment.  Patient placed on a CIWA protocol. Did not demonstrate signs or symptoms of complicated alcohol withdrawal.  Psychiatric medication management performed in detail. Medication management performed to target signs and symptoms of principal diagnosis. Further medication management performed to target comorbid illnesses. Medication management included:    Lexapro: Continued restart of PTA medication for depression and anxiety.    Wellbutrin XL:  Continued restart of of PTA medication for combination therapy.    Gabapentin:  Scheduled medication management for augmentation. Continued restart of PTA medication as needed for anxiety.    Propranolol: Continue restart of PTA medication as needed for anxiety.    Naltrexone: Continued trial for alcohol use disorder; LFTs normalizing. Repeat LFTs scheduled.    Campral: Discontinued PTA medication secondary to GI side effects.  Medication adherent. Tolerating medication management. Progressed with mood and anxiety. Denies psychosis. Denies suicidal and homicidal ideation. Did an appropriate crisis and relapse plan. Future oriented. Denies gun access. Indicated motivation to proceed with MI CD treatment as coordinated by  with intake date on Friday, November 1, 2019.   followed in regards to collecting and reviewing collateral information, coordination of cares and discharge planning. Including, MI CD evaluation completed with recommendation for placement  "to unit 2700. Due to dental issues, patient discharged to medical respite bed and secured on today's date to bridge placement. Discharged to Medical Respite at noon and Inpatient CD treatment on unit 2700 on 11/01 at noon. Further recommendation to refer to psychiatrist in the community after discharge from unit 2700. Cares coordinated with case management.  Treated for dental abscess with pcn.\"    [place]  shows received gabapentin 100 mg #270 on 1/4/19 at [facility], then 300 mg # 90  On 2/11/19 in [facility], 100 mg # 60 on 3/6/19 in [facility], then # 180 on 6/20/19, 300 mg # 60 on 10/30/19 & # 30 of 300 mg on 11/21/19    Seen nov 29th, 2019 to establish care as doesnt have a PCP.   Severe MDD/ZAIDA./ hx of passive suicidal thoughts: discharged nov 22nd 7 days ago, currently in sober housing, has [facility] apt with psyche [facility] on 12/5. To also see psyhcology 12/11 but plans to reschedule that at upcoming psyche apt next week as it is in the morning & he needs to go to out patient rx in the am. Has enough meds till sees psyche. Has passive suicidal thoughts. No active plan. Contracts to safety & forwarding thinking about apts etc. Is taking all his meds.     Hx of alcohol abuse, dependance etc, Currently sober, last drink was oct 14th, notes was on naltrexone then stopped as no longer helped. Was on campral before but had to discontinue due to diarrhea. Has done [facility] program twice once beginning of the year and one last week      Moderate alcohol dependence in early remission (H) 11/29/2019     Priority: Medium    ZAIDA (generalized anxiety disorder) 11/29/2019     Priority: Medium    History of suicidal ideation 11/29/2019     Priority: Medium    Chronic fatigue 11/29/2019     Priority: Medium    Postural dizziness with near syncope 11/29/2019     Priority: Medium    Tinnitus, bilateral 11/29/2019     Priority: Medium    Memory difficulties 11/29/2019     Priority: Medium    Dental caries 11/29/2019    " " Priority: Medium    Seasonal allergic rhinitis, unspecified trigger 11/29/2019     Priority: Medium    History of pulmonary embolism 11/29/2019     Priority: Medium     [facility]: \"hx of depression, anxiety, alcohol abuse disorder (sober x 2 months, He is a recovering alcoholic. He currently resides in sober house since June 2019) who presented with shortness of breath and 2 days of chest pain and near syncope. He presented to the emergency room on 8/4/2019 after he had a syncopal episode in front of [facility]. He did not recall particular chest pain or shortness of breath. He noted swelling of his leg in various spot but he denies any persistent pain or swelling or redness. No injury. No acute illness prior to that. However he donated plasma 10 times in 5 weeks prior to that. Upon initial evaluation, d-dimer was elevated at 6.7. PTT was normal at 28, INR is 1.09. He underwent coronary angiogram and it did not show any obstructive coronary artery disease.Due to an elevated troponin he underwent coronary angiogram which demonstrated no significant CAD. Due to an elevated d-dimer underwent CT scan of the chest which showed extensive acute bilateral pulmonary embolism with saddle embolus.  Doppler legs showed Nonocclusive right femoral DVT from upper to mid thigh. Left calf DVT within one of the peroneal veins in both of the posterior tibial veins from upper to low calf. Echocardiogram revealed lower limit of normal left ventricular systolic function of 50% with septal motion compatible with right ventricular overload.  He had cor pulmonale and was started on heparin infusion. No thrombolytics were given due hemodynamic and respiratory stability. After several days of monitoring on IV heparin Mr. Sinha continued to improve and he was transitioned to Xarelto for ongoing anticoagulation therapy. He tolerated Xarelto well. No major bleeding except intermittent gum bleeding. Denies family history of venous " "thromboembolism. He had a brother who passed away from [facility]. He is single. Does not have children. Was advised to continue with indefinate anticoagulation. Mr. Sinha did donate plasma 2x/week for the last 5 weeks and I wonder if this led to a transient hypercoagulable state. Mr. Sinha was advised not to donate plasma moving forward. An outpatient referral to hematology has been made\".      Seen by hematology 9/2019 a\"t [facility]. She reviewed the pathophysiology of venous thromboembolic exam and risks of recurrence. He is wondering about excessive plasma donation trigger that event and I think it is possible. At this point, regardless of the etiology, he is indicated for long-term anticoagulation due to initial presentation of life-threatening episode. No prior history of venous thromboembolism or family history of thromboembolism. I would not recommend family/hereditary thrombophilia work-up. I also discussed that we do not need to repeat CT scan of the chest to know the resolution of blood clots, however will obtain CTA if he has signs and symptoms concerning for venous thromboembolism. I discussed about strict abstinence from alcohol with anticoagulants. I recommended to obtain kidney, liver function monitoring at least once a year.   I discussed about age-appropriate cancer screening. He admitted that he had positive stool test (sounds like FOBT) a couple years ago. Unclear if it was related to his alcohol use. He was recommended to proceed with colonoscopy, but has not completed yet. I recommended him to complete colonoscopy however, we will need to wait at least 3-6 months because of this recent pulmonary emboli event and I would not recommend interruption of anticoagulation at this point. He voiced understanding. I noted that he has a gastroenterology referral. Recommended him to have a primary care provider as His previous primary care provider is retiring/retired. Until he finds a primary care " "provider, I will continue to manage his anticoagulation. Follow-up with me in about 6 months with labs including hemogram, CMP.\"          Epigastric pain 11/29/2019     Priority: Medium    .  Current Outpatient Medications   Medication Sig Dispense Refill    escitalopram (LEXAPRO) 5 MG tablet Take 5 mg by mouth daily. By [facility] psyche [facility]      famotidine (PEPCID) 20 MG tablet Take 1 tablet (20 mg) by mouth at bedtime. 90 tablet 0    FLUoxetine (PROZAC) 40 MG capsule Take 40 mg by mouth daily. By suzette psyche [facility]      rivaroxaban ANTICOAGULANT (XARELTO) 10 MG TABS tablet Take 1 tablet (10 mg) by mouth daily with food 90 tablet 3     No current facility-administered medications for this visit.     Past Medical History:   Diagnosis Date    Abdominal pain, epigastric 11/29/2019    Acute embolism and thrombosis of unspecified deep veins of lower extremity, bilateral (H) 11/19/2024    Chronic anticoagulation 12/01/2019    Current severe episode of major depressive disorder without psychotic features, unspecified whether recurrent (H) 11/29/2019    Decreased cardiac ejection fraction 08/22/2021    Echo oct 2022 normal     Dental caries 11/29/2019    Dizziness 11/29/2019    Erectile dysfunction, unspecified erectile dysfunction type 11/29/2019    Essential tremor 12/01/2019    Fatigue, unspecified type 11/29/2019    ZAIDA (generalized anxiety disorder) 11/29/2019    History of chest pain 11/29/2019    History of pulmonary embolism 11/29/2019    History of suicidal ideation 11/29/2019    Memory changes 11/29/2019    Moderate alcohol dependence in early remission (H) 11/29/2019    Palpitations 11/29/2019    Positive FIT (fecal immunochemical test) 11/29/2019    Colonoscopy 11/2020 normal, labs normal    Seasonal allergic rhinitis, unspecified trigger 11/29/2019    Tinnitus, bilateral 11/29/2019    Weight gain 11/29/2019     Past Surgical History:   Procedure Laterality Date    COLONOSCOPY N/A 11/12/2020    " Procedure: COLONOSCOPY;  Surgeon: Belinda Jorgensen MD;  Location: [facility] OR    CV CORONARY ANGIOGRAM N/A 2019    Procedure: Coronary Angiogram;  Surgeon: Ambrose Mayfield MD;  Location: [facility] Cath Lab;  Service: Cardiology    CV LEFT HEART CATHETERIZATION WITHOUT LEFT VENTRICULOGRAM Left 2019    Procedure: Left Heart Catheterization Without Left Ventriculogram;  Surgeon: Ambrose Mayfield MD;  Location: [facility] Cath Lab;  Service: Cardiology    TONSILLECTOMY  1984     Allergies   Allergen Reactions    Ragweeds Other (See Comments)     congestion     Social History     Socioeconomic History    Marital status: Single     Spouse name: Not on file    Number of children: Not on file    Years of education: Not on file    Highest education level: Not on file   Occupational History    Not on file   Tobacco Use    Smoking status: Former     Current packs/day: 0.00     Average packs/day: 0.5 packs/day for 4.0 years (2.0 ttl pk-yrs)     Types: Cigarettes     Start date: 1978     Quit date: 1982     Years since quittin.9    Smokeless tobacco: Never   Vaping Use    Vaping status: Never Used   Substance and Sexual Activity    Alcohol use: Not Currently     Comment: sober second time oct 14 , did detox adn rehab, in a sober house 2019    Drug use: Never    Sexual activity: Not Currently   Other Topics Concern    Not on file   Social History Narrative    2019: moved in 1 week ago to sober house [place], previously living alone, no pets,      Social Drivers of Health     Financial Resource Strain: Not on file   Food Insecurity: Not on file   Transportation Needs: Not on file   Physical Activity: Not on file   Stress: Not on file   Social Connections: Not on file   Interpersonal Safety: Low Risk  (2024)    Interpersonal Safety     Do you feel physically and emotionally safe where you currently live?: Yes     Within the past 12 months, have you been hit, slapped,  kicked or otherwise physically hurt by someone?: No     Within the past 12 months, have you been humiliated or emotionally abused in other ways by your partner or ex-partner?: No   Housing Stability: Not on file     Family History   Problem Relation Age of Onset    Arthritis Mother     Breast Cancer Mother     Depression Mother     Allergies Mother     Migraines Mother     Alcoholism Father     Lymphoma Father         non hodgkins    Neurodegenerative disease Brother         genetic disorder,  of some slow form of ALS    Suicidality Paternal Grandfather           REVIEW OF SYSTEMS:  General: negative, fever, chills, night sweats  Skin: negative, acne, rash, and scaling  Eyes: negative, double vision, glaucoma, and cataracts  Ears/Nose/Throat: negative, nasal congestion, and purulent rhinorrhea  Respiratory: No shortness of breath, No dyspnea on exertion, No cough, and negative  Cardiovascular: negative, palpitations, tachycardia, irregular heart beat, chest pain, exertional chest pain or pressure, paroxysmal nocturnal dyspnea, and dyspnea on exertion       OBJECTIVE:  Blood pressure 128/79, pulse 55, weight 76.7 kg (169 lb), SpO2 98%.  General Appearance: healthy, alert, active, and no distress  Head: Normocephalic. No masses, lesions, tenderness or abnormalities  Eyes: conjuctiva clear, PERRL, EOM intact  Ears: External ears normal. Canals clear. TM's normal.  Nose: Nares normal  Mouth: normal  Neck: Supple, no cervical adenopathy, no thyromegaly  Lungs: clear to auscultation  Cardiac: regular rate and rhythm, normal S1 and S2, no murmur       ASSESSMENT/PLAN:  Patient here for follow-up.  2 years ago he was evaluated for decreased heart function and extreme fatigue.  He had fatigue for many years.  His echocardiogram in the past showed an EF of 50 to 55%.  No CHF medications were started.  He did have a prior history of excess alcohol use.  Today patient had no new complaints apart from his fatigue.  Overall  he is doing well from cardiac standpoint.  Do have some epigastric pain which is under evaluation by GI.  Recent echocardiogram reviewed.  Normal biventricular function.  No significant valvular abnormalities.  Result was discussed with patient.    As he is doing well with normal cardiac function and not on any cardiac medications patient will return to clinic on a as needed basis.  Total visit duration 20 minutes.  This included face-to-face interview, physical exam, chart review, review of recent echocardiogram and documentation.

## 2024-11-27 ENCOUNTER — MYC MEDICAL ADVICE (OUTPATIENT)
Dept: GASTROENTEROLOGY | Facility: CLINIC | Age: 59
End: 2024-11-27
Payer: COMMERCIAL

## 2024-11-27 DIAGNOSIS — K83.8 COMMON BILE DUCT DILATATION: ICD-10-CM

## 2024-11-27 DIAGNOSIS — R63.4 WEIGHT LOSS: ICD-10-CM

## 2024-11-27 DIAGNOSIS — K86.89 DILATED PANCREATIC DUCT: ICD-10-CM

## 2024-11-27 DIAGNOSIS — R10.13 EPIGASTRIC PAIN: Primary | ICD-10-CM

## 2024-11-27 NOTE — TELEPHONE ENCOUNTER
Discussed case with [facility]. Order EUS for eval of dilated CBD and main PD. Need r/o of   ampullary/periampullary mass. 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   SY

## 2024-12-01 PROBLEM — Z86.79 HISTORY OF PAROXYSMAL SUPRAVENTRICULAR TACHYCARDIA: Status: ACTIVE | Noted: 2024-12-01

## 2024-12-03 ENCOUNTER — PATIENT OUTREACH (OUTPATIENT)
Dept: GASTROENTEROLOGY | Facility: CLINIC | Age: 59
End: 2024-12-03
Payer: COMMERCIAL

## 2024-12-03 ENCOUNTER — TELEPHONE (OUTPATIENT)
Dept: NEUROPSYCHOLOGY | Facility: CLINIC | Age: 59
End: 2024-12-03
Payer: COMMERCIAL

## 2024-12-03 ENCOUNTER — PREP FOR PROCEDURE (OUTPATIENT)
Dept: GASTROENTEROLOGY | Facility: CLINIC | Age: 59
End: 2024-12-03
Payer: COMMERCIAL

## 2024-12-03 ENCOUNTER — TELEPHONE (OUTPATIENT)
Dept: HEMATOLOGY | Facility: CLINIC | Age: 59
End: 2024-12-03
Payer: COMMERCIAL

## 2024-12-03 DIAGNOSIS — Z79.01 CHRONIC ANTICOAGULATION: ICD-10-CM

## 2024-12-03 DIAGNOSIS — R63.4 WEIGHT LOSS: ICD-10-CM

## 2024-12-03 DIAGNOSIS — R93.89 ABNORMAL FINDING ON IMAGING: Primary | ICD-10-CM

## 2024-12-03 DIAGNOSIS — K83.8 COMMON BILE DUCT DILATATION: Primary | ICD-10-CM

## 2024-12-03 DIAGNOSIS — R79.0 LOW FERRITIN: ICD-10-CM

## 2024-12-03 DIAGNOSIS — K83.8 COMMON BILE DUCT DILATION: ICD-10-CM

## 2024-12-03 DIAGNOSIS — Z86.718 PERSONAL HISTORY OF DVT (DEEP VEIN THROMBOSIS): ICD-10-CM

## 2024-12-03 DIAGNOSIS — R10.13 EPIGASTRIC PAIN: ICD-10-CM

## 2024-12-03 DIAGNOSIS — Z86.711 HISTORY OF PULMONARY EMBOLISM: ICD-10-CM

## 2024-12-03 NOTE — TELEPHONE ENCOUNTER
Sent Mychart (1st Attempt) and Left Voicemail (2nd Attempt) for the patient to call back and schedule the following:    Appointment type: NP Testing  Provider: Dr. Cohen  Return date: In the same week or two of the Interview  Specialty phone number: [phone]  Additional appointment(s) needed:   Additonal Notes:     Please schedule Testing Only In Person with Dr. Joseph vargas with in a week or two for the interview appt

## 2024-12-03 NOTE — TELEPHONE ENCOUNTER
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     They were last evaluated by our team on 10/20/22 with the plan to remain on long term anticoagulation and return to clinic in 1 year.    Prescription updated and refills given for 90 day lo refill.     This message will be routed to  for scheduling. Before other refills can be sent, patient needs be scheduled to see Cat for an annual visit prior to other refills being sent.    RN attempted to call patient (left VM) and send a "[employer] message. Will route to  for other outreaches to patient.    Dawna Loera RN, BSN, PCCN  Nurse Clinician    [facility]  [facility] [address], [address], [place] [address]   Office, direct: [phone]  Main office number: [phone]  Pronouns: She, her, hers

## 2024-12-03 NOTE — TELEPHONE ENCOUNTER
Called pt to discuss referral from Melba Dasilva for EGD and EUS. Reviewed by Dr Mcleod :    Xarelto will need held.   I would recommend re-checking LFTs - could be done when he has a pre-op H+P if needed     Procedure ordered: Ind 59 yM former heavy ETOH use w/ epigastric pain x 20 yr and 30# weight loss over 11 mo(now stabilized). Double duct sign on MRCP 2024. Pending EGD.     1015  Pt returned call, in agreement with 12/12 procedure date.     Procedure/Imaging/Clinic: Upper EUS/EGD   Physician: Shani   Timing: Next available    Scope time needed: 20 min  Anesthesia:MAC   Dx: abnormal finding on imaging, common bile duct dilation, epigastric pain, weight loss  Tier:Tier 3  Location: [facility] or OR  OK to schedule while attending: No   Header of letter for pt communication:    Examination using ultrasound from inside the gastrointestinal tract, endoscopy    Comments: referred by Melba Dasilva    Explained OR will call 1-2 days prior to procedure date with arrival time, will need a , someone to stay with them for 24 hours and should stay in town for 24 hours (within 45 min of Hospital) post procedure    Patient needs to get pre-op physical completed. If outside  health system will need physical faxed to number [phone]     If you do not get a preop physical, your procedure could be cancelled, patient voiced understanding*    Preop Plan: office visit, annual general medical exam 11/19/24 to be used    Does patient have any history of gastric bypass/gastric surgery/altered panc/bili anatomy? none    Any recent Covid symptoms or positive covid test? None, advised when to test if symptomatic    Does patient have [employer] insurance?:[employer]    Med Review    Blood thinner -  Xarelto , discussed holding this for 2 days prior to procedure. Pt will stop taking on 12/10 and has held this medication in the past.   ASA - none  Diabetic - none  Any meds by injection or mouth for weight loss or diabetes- none    Patient  Education r/t procedure: monat    A pre-op nurse will call 1-2 days prior to the procedure.    Verbalized understanding of all instructions. All questions answered.     Procedure order placed, message routed to OR      Iraida Love, RN, BSN,   [facility]  Care coordinator

## 2024-12-05 ENCOUNTER — LAB (OUTPATIENT)
Dept: LAB | Facility: CLINIC | Age: 59
End: 2024-12-05
Payer: COMMERCIAL

## 2024-12-05 DIAGNOSIS — K83.8 COMMON BILE DUCT DILATATION: ICD-10-CM

## 2024-12-05 DIAGNOSIS — R10.13 EPIGASTRIC PAIN: ICD-10-CM

## 2024-12-05 DIAGNOSIS — R63.4 WEIGHT LOSS: ICD-10-CM

## 2024-12-05 LAB
ALBUMIN SERPL BCG-MCNC: 4.5 G/DL (ref 3.5–5.2)
ALP SERPL-CCNC: 81 U/L (ref 40–150)
ALT SERPL W P-5'-P-CCNC: 34 U/L (ref 0–70)
AST SERPL W P-5'-P-CCNC: 37 U/L (ref 0–45)
BILIRUB DIRECT SERPL-MCNC: <0.2 MG/DL (ref 0–0.3)
BILIRUB SERPL-MCNC: 0.5 MG/DL
PROT SERPL-MCNC: 7.4 G/DL (ref 6.4–8.3)

## 2024-12-05 PROCEDURE — 82653 EL-1 FECAL QUANTITATIVE: CPT | Performed by: PHYSICIAN ASSISTANT

## 2024-12-05 PROCEDURE — 36415 COLL VENOUS BLD VENIPUNCTURE: CPT | Performed by: PATHOLOGY

## 2024-12-05 PROCEDURE — 99000 SPECIMEN HANDLING OFFICE-LAB: CPT | Performed by: PATHOLOGY

## 2024-12-05 PROCEDURE — 80076 HEPATIC FUNCTION PANEL: CPT | Performed by: PATHOLOGY

## 2024-12-10 ENCOUNTER — TELEPHONE (OUTPATIENT)
Dept: HEMATOLOGY | Facility: CLINIC | Age: 59
End: 2024-12-10
Payer: COMMERCIAL

## 2024-12-10 NOTE — TELEPHONE ENCOUNTER
LVM- pt overdue for follow-up with Cat. Said he will need an appointment before future refills may be granted

## 2024-12-17 NOTE — TELEPHONE ENCOUNTER
RECORDS RECEIVED FROM: Internal    REASON FOR VISIT: Pt previously seen by a resident with Dr. Kinney attending:  R41.3 (ICD-10-CM) - Memory difficulties  R90.89 (ICD-10-CM) - Abnormal finding on MRI of brain  F10.21 (ICD-10-CM) - Moderate alcohol dependence in early remission (H   PROVIDER: Arden Kinney MD   DATE OF APPT: 2/17/25 @ 1:30 pm    NOTES (FOR ALL VISITS) STATUS DETAILS   OFFICE NOTE from referring provider Internal 11/19/24, 9/20/24 Adriana Solis MD @[facility]     OFFICE NOTE from other specialist Internal 4/7/23 (Transferred Recs) Morena Rowland MD @[facility]    3/9/23 Solomon Gil MD @[facility]     MEDICATION LIST Internal    IMAGING  (FOR ALL VISITS)     MRI (HEAD, NECK, SPINE) Internal [facility]  4/2/23 MR Brain

## 2024-12-18 ENCOUNTER — ANESTHESIA EVENT (OUTPATIENT)
Dept: SURGERY | Facility: CLINIC | Age: 59
End: 2024-12-18
Payer: COMMERCIAL

## 2024-12-19 ENCOUNTER — HOSPITAL ENCOUNTER (OUTPATIENT)
Facility: CLINIC | Age: 59
Discharge: HOME | End: 2024-12-19
Attending: INTERNAL MEDICINE | Admitting: INTERNAL MEDICINE
Payer: COMMERCIAL

## 2024-12-19 ENCOUNTER — ANESTHESIA (OUTPATIENT)
Dept: SURGERY | Facility: CLINIC | Age: 59
End: 2024-12-19
Payer: COMMERCIAL

## 2024-12-19 VITALS
HEIGHT: 69 IN | TEMPERATURE: 96.9 F | RESPIRATION RATE: 18 BRPM | HEART RATE: 68 BPM | SYSTOLIC BLOOD PRESSURE: 121 MMHG | BODY MASS INDEX: 25.4 KG/M2 | OXYGEN SATURATION: 96 % | WEIGHT: 171.52 LBS | DIASTOLIC BLOOD PRESSURE: 83 MMHG

## 2024-12-19 PROCEDURE — 272N000001 HC OR GENERAL SUPPLY STERILE: Performed by: INTERNAL MEDICINE

## 2024-12-19 PROCEDURE — 710N000012 HC RECOVERY PHASE 2, PER MINUTE: Performed by: INTERNAL MEDICINE

## 2024-12-19 PROCEDURE — 250N000009 HC RX 250: Performed by: NURSE ANESTHETIST, CERTIFIED REGISTERED

## 2024-12-19 PROCEDURE — 258N000003 HC RX IP 258 OP 636: Performed by: NURSE ANESTHETIST, CERTIFIED REGISTERED

## 2024-12-19 PROCEDURE — 999N000141 HC STATISTIC PRE-PROCEDURE NURSING ASSESSMENT: Performed by: INTERNAL MEDICINE

## 2024-12-19 PROCEDURE — 360N000082 HC SURGERY LEVEL 2 W/ FLUORO, PER MIN: Performed by: INTERNAL MEDICINE

## 2024-12-19 PROCEDURE — 250N000011 HC RX IP 250 OP 636: Performed by: NURSE ANESTHETIST, CERTIFIED REGISTERED

## 2024-12-19 PROCEDURE — 370N000017 HC ANESTHESIA TECHNICAL FEE, PER MIN: Performed by: INTERNAL MEDICINE

## 2024-12-19 PROCEDURE — 88305 TISSUE EXAM BY PATHOLOGIST: CPT | Mod: TC | Performed by: INTERNAL MEDICINE

## 2024-12-19 PROCEDURE — 88305 TISSUE EXAM BY PATHOLOGIST: CPT | Mod: 26 | Performed by: PATHOLOGY

## 2024-12-19 RX ORDER — PROPOFOL 10 MG/ML
INJECTION, EMULSION INTRAVENOUS PRN
Status: DISCONTINUED | OUTPATIENT
Start: 2024-12-19 | End: 2024-12-19

## 2024-12-19 RX ORDER — PROCHLORPERAZINE MALEATE 10 MG
10 TABLET ORAL EVERY 6 HOURS PRN
Status: CANCELLED | OUTPATIENT
Start: 2024-12-19

## 2024-12-19 RX ORDER — SODIUM CHLORIDE, SODIUM LACTATE, POTASSIUM CHLORIDE, CALCIUM CHLORIDE 600; 310; 30; 20 MG/100ML; MG/100ML; MG/100ML; MG/100ML
INJECTION, SOLUTION INTRAVENOUS CONTINUOUS PRN
Status: DISCONTINUED | OUTPATIENT
Start: 2024-12-19 | End: 2024-12-19

## 2024-12-19 RX ORDER — ONDANSETRON 4 MG/1
4 TABLET, ORALLY DISINTEGRATING ORAL EVERY 30 MIN PRN
Status: CANCELLED | OUTPATIENT
Start: 2024-12-19

## 2024-12-19 RX ORDER — NALOXONE HYDROCHLORIDE 0.4 MG/ML
0.1 INJECTION, SOLUTION INTRAMUSCULAR; INTRAVENOUS; SUBCUTANEOUS
Status: CANCELLED | OUTPATIENT
Start: 2024-12-19

## 2024-12-19 RX ORDER — HYDROMORPHONE HYDROCHLORIDE 1 MG/ML
0.4 INJECTION, SOLUTION INTRAMUSCULAR; INTRAVENOUS; SUBCUTANEOUS EVERY 5 MIN PRN
Status: CANCELLED | OUTPATIENT
Start: 2024-12-19

## 2024-12-19 RX ORDER — ONDANSETRON 2 MG/ML
4 INJECTION INTRAMUSCULAR; INTRAVENOUS EVERY 6 HOURS PRN
Status: CANCELLED | OUTPATIENT
Start: 2024-12-19

## 2024-12-19 RX ORDER — ONDANSETRON 2 MG/ML
4 INJECTION INTRAMUSCULAR; INTRAVENOUS EVERY 30 MIN PRN
Status: CANCELLED | OUTPATIENT
Start: 2024-12-19

## 2024-12-19 RX ORDER — LIDOCAINE 40 MG/G
CREAM TOPICAL
Status: DISCONTINUED | OUTPATIENT
Start: 2024-12-19 | End: 2024-12-19 | Stop reason: HOSPADM

## 2024-12-19 RX ORDER — HYDROMORPHONE HYDROCHLORIDE 1 MG/ML
0.2 INJECTION, SOLUTION INTRAMUSCULAR; INTRAVENOUS; SUBCUTANEOUS EVERY 5 MIN PRN
Status: CANCELLED | OUTPATIENT
Start: 2024-12-19

## 2024-12-19 RX ORDER — LIDOCAINE HYDROCHLORIDE 20 MG/ML
INJECTION, SOLUTION INFILTRATION; PERINEURAL PRN
Status: DISCONTINUED | OUTPATIENT
Start: 2024-12-19 | End: 2024-12-19

## 2024-12-19 RX ORDER — SODIUM CHLORIDE, SODIUM LACTATE, POTASSIUM CHLORIDE, CALCIUM CHLORIDE 600; 310; 30; 20 MG/100ML; MG/100ML; MG/100ML; MG/100ML
INJECTION, SOLUTION INTRAVENOUS CONTINUOUS
Status: DISCONTINUED | OUTPATIENT
Start: 2024-12-19 | End: 2024-12-19 | Stop reason: HOSPADM

## 2024-12-19 RX ORDER — EPHEDRINE SULFATE 50 MG/ML
INJECTION, SOLUTION INTRAMUSCULAR; INTRAVENOUS; SUBCUTANEOUS PRN
Status: DISCONTINUED | OUTPATIENT
Start: 2024-12-19 | End: 2024-12-19

## 2024-12-19 RX ORDER — SODIUM CHLORIDE, SODIUM LACTATE, POTASSIUM CHLORIDE, CALCIUM CHLORIDE 600; 310; 30; 20 MG/100ML; MG/100ML; MG/100ML; MG/100ML
INJECTION, SOLUTION INTRAVENOUS CONTINUOUS
Status: CANCELLED | OUTPATIENT
Start: 2024-12-19

## 2024-12-19 RX ORDER — FLUMAZENIL 0.1 MG/ML
0.2 INJECTION, SOLUTION INTRAVENOUS
Status: CANCELLED | OUTPATIENT
Start: 2024-12-19 | End: 2024-12-20

## 2024-12-19 RX ORDER — NALOXONE HYDROCHLORIDE 0.4 MG/ML
0.4 INJECTION, SOLUTION INTRAMUSCULAR; INTRAVENOUS; SUBCUTANEOUS
Status: CANCELLED | OUTPATIENT
Start: 2024-12-19

## 2024-12-19 RX ORDER — OXYCODONE HYDROCHLORIDE 5 MG/1
5 TABLET ORAL
Status: CANCELLED | OUTPATIENT
Start: 2024-12-19

## 2024-12-19 RX ORDER — ONDANSETRON 4 MG/1
4 TABLET, ORALLY DISINTEGRATING ORAL EVERY 6 HOURS PRN
Status: CANCELLED | OUTPATIENT
Start: 2024-12-19

## 2024-12-19 RX ORDER — DEXAMETHASONE SODIUM PHOSPHATE 4 MG/ML
4 INJECTION, SOLUTION INTRA-ARTICULAR; INTRALESIONAL; INTRAMUSCULAR; INTRAVENOUS; SOFT TISSUE
Status: CANCELLED | OUTPATIENT
Start: 2024-12-19

## 2024-12-19 RX ORDER — FENTANYL CITRATE 50 UG/ML
50 INJECTION, SOLUTION INTRAMUSCULAR; INTRAVENOUS EVERY 5 MIN PRN
Status: CANCELLED | OUTPATIENT
Start: 2024-12-19

## 2024-12-19 RX ORDER — NALOXONE HYDROCHLORIDE 0.4 MG/ML
0.2 INJECTION, SOLUTION INTRAMUSCULAR; INTRAVENOUS; SUBCUTANEOUS
Status: CANCELLED | OUTPATIENT
Start: 2024-12-19

## 2024-12-19 RX ORDER — PROPOFOL 10 MG/ML
INJECTION, EMULSION INTRAVENOUS CONTINUOUS PRN
Status: DISCONTINUED | OUTPATIENT
Start: 2024-12-19 | End: 2024-12-19

## 2024-12-19 RX ORDER — GLYCOPYRROLATE 0.2 MG/ML
INJECTION, SOLUTION INTRAMUSCULAR; INTRAVENOUS PRN
Status: DISCONTINUED | OUTPATIENT
Start: 2024-12-19 | End: 2024-12-19

## 2024-12-19 RX ORDER — FENTANYL CITRATE 50 UG/ML
25 INJECTION, SOLUTION INTRAMUSCULAR; INTRAVENOUS EVERY 5 MIN PRN
Status: CANCELLED | OUTPATIENT
Start: 2024-12-19

## 2024-12-19 RX ADMIN — PHENYLEPHRINE HYDROCHLORIDE 100 MCG: 10 INJECTION INTRAVENOUS at 11:22

## 2024-12-19 RX ADMIN — PROPOFOL 150 MCG/KG/MIN: 10 INJECTION, EMULSION INTRAVENOUS at 11:12

## 2024-12-19 RX ADMIN — PROPOFOL 20 MG: 10 INJECTION, EMULSION INTRAVENOUS at 11:33

## 2024-12-19 RX ADMIN — PROPOFOL 20 MG: 10 INJECTION, EMULSION INTRAVENOUS at 11:39

## 2024-12-19 RX ADMIN — SODIUM CHLORIDE, POTASSIUM CHLORIDE, SODIUM LACTATE AND CALCIUM CHLORIDE: 600; 310; 30; 20 INJECTION, SOLUTION INTRAVENOUS at 11:06

## 2024-12-19 RX ADMIN — LIDOCAINE HYDROCHLORIDE 40 MG: 20 INJECTION, SOLUTION INFILTRATION; PERINEURAL at 11:12

## 2024-12-19 RX ADMIN — SODIUM CHLORIDE, POTASSIUM CHLORIDE, SODIUM LACTATE AND CALCIUM CHLORIDE: 600; 310; 30; 20 INJECTION, SOLUTION INTRAVENOUS at 11:53

## 2024-12-19 RX ADMIN — PROPOFOL 20 MG: 10 INJECTION, EMULSION INTRAVENOUS at 11:47

## 2024-12-19 RX ADMIN — GLYCOPYRROLATE 0.2 MG: 0.2 INJECTION, SOLUTION INTRAMUSCULAR; INTRAVENOUS at 11:22

## 2024-12-19 RX ADMIN — EPHEDRINE SULFATE 5 MG: 5 INJECTION INTRAVENOUS at 11:31

## 2024-12-19 ASSESSMENT — ACTIVITIES OF DAILY LIVING (ADL)
ADLS_ACUITY_SCORE: 35
ADLS_ACUITY_SCORE: 36
ADLS_ACUITY_SCORE: 35
ADLS_ACUITY_SCORE: 35

## 2024-12-19 ASSESSMENT — ENCOUNTER SYMPTOMS: DYSRHYTHMIAS: 1

## 2024-12-19 ASSESSMENT — LIFESTYLE VARIABLES: TOBACCO_USE: 1

## 2024-12-19 NOTE — ANESTHESIA POSTPROCEDURE EVALUATION
Patient: Nikunj Sinha    Procedure: Procedure(s):  ENDOSCOPIC ULTRASOUND, ESOPHAGOSCOPY / UPPER GASTROINTESTINAL TRACT (GI)  ESOPHAGOGASTRODUODENOSCOPY, WITH BIOPSY       Anesthesia Type:  MAC    Note:  Disposition: Outpatient   Postop Pain Control: Uneventful            Sign Out: Well controlled pain   PONV: No   Neuro/Psych: Uneventful            Sign Out: Acceptable/Baseline neuro status   Airway/Respiratory: Uneventful            Sign Out: Acceptable/Baseline resp. status   CV/Hemodynamics: Uneventful            Sign Out: Acceptable CV status; No obvious hypovolemia; No obvious fluid overload   Other NRE: NONE   DID A NON-ROUTINE EVENT OCCUR? No           Last vitals:  Vitals Value Taken Time   /83 12/19/24 1230   Temp 36.1  C (96.9  F) 12/19/24 1215   Pulse 68 12/19/24 1245   Resp 18 12/19/24 1245   SpO2 96 % 12/19/24 1245       Electronically Signed By: Gerardo Stone MD  December 19, 2024  1:32 PM

## 2024-12-19 NOTE — BRIEF OP NOTE
[facility]    Brief Operative Note    Pre-operative diagnosis: Abnormal finding on imaging [R93.89]  Common bile duct dilation [K83.8]  Weight loss [R63.4]  Epigastric pain [R10.13]  Post-operative diagnosis Periampullary diverticulum.    Procedure: ENDOSCOPIC ULTRASOUND, ESOPHAGOSCOPY / UPPER GASTROINTESTINAL TRACT (GI), N/A - Esophagus  ESOPHAGOGASTRODUODENOSCOPY, WITH BIOPSY, N/A - Esophagus    Surgeon: Surgeons and Role:     * Dakota Mcleod MD - Primary  Anesthesia: MAC   Estimated Blood Loss: None    Drains: None  Specimens:   ID Type Source Tests Collected by Time Destination   1 : Duodenum Biopsy Small Intestine, Duodenum SURGICAL PATHOLOGY EXAM Dakota Mcleod MD 12/19/2024 11:35 AM      Findings:     Endoscopy was normal. Biopsies from the duodenum were obtained given the weight loss.  There was a 2 cm periampullary diverticulum.  The bile duct was prominent but without an obstructing lesion. The major papilla was normal.  The main pancreatic duct was prominent but the pancreas was otherwise normal.  A single equivocal stone was seen in the neck of the gallbladder.  Ultrasound was otherwise normal.  Complications: None.  Implants: * No implants in log *    JORI Mcleod MD  Professor of Medicine  Division of Gastroenterology, Hepatology and Nutrition  [facility]

## 2024-12-19 NOTE — DISCHARGE INSTRUCTIONS
Contacting your Doctor -   To contact a doctor, call Dr Mcleod at the  GI clinic at [phone] or:  [phone] and ask for the resident on call for Gastroenterology (answered 24 hours a day)   Emergency Department:  [facility]: [phone]  [facility]: [phone] 911 if you are in need of immediate or emergent help

## 2024-12-19 NOTE — ANESTHESIA PREPROCEDURE EVALUATION
Anesthesia Pre-Procedure Evaluation    Patient: Nikunj Sinha   MRN: [id number] : 1965        Procedure : Procedure(s):  ENDOSCOPIC ULTRASOUND, ESOPHAGOSCOPY / UPPER GASTROINTESTINAL TRACT (GI)  ESOPHAGOGASTRODUODENOSCOPY          Past Medical History:   Diagnosis Date    Abdominal pain, epigastric 2019    Acute embolism and thrombosis of unspecified deep veins of lower extremity, bilateral (H) 2024    Antiplatelet or antithrombotic long-term use     Chronic anticoagulation 2019    Current severe episode of major depressive disorder without psychotic features, unspecified whether recurrent (H) 2019    Decreased cardiac ejection fraction 2021    Echo oct 2022 normal     Dental caries 2019    Dizziness 2019    Erectile dysfunction, unspecified erectile dysfunction type 2019    Essential tremor 2019    Fatigue, unspecified type 2019    ZAIDA (generalized anxiety disorder) 2019    History of chest pain 2019    History of pulmonary embolism 2019    History of suicidal ideation 2019    Memory changes 2019    Moderate alcohol dependence in early remission (H) 2019    Palpitations 2019    Positive FIT (fecal immunochemical test) 2019    Colonoscopy 2020 normal, labs normal    Seasonal allergic rhinitis, unspecified trigger 2019    Tinnitus, bilateral 2019    Weight gain 2019      Past Surgical History:   Procedure Laterality Date    COLONOSCOPY N/A 2020    Procedure: COLONOSCOPY;  Surgeon: Belinda Jorgensen MD;  Location: [facility] OR    CV CORONARY ANGIOGRAM N/A 2019    Procedure: Coronary Angiogram;  Surgeon: Ambrose Mayfield MD;  Location: [facility];  Service: Cardiology    CV LEFT HEART CATHETERIZATION WITHOUT LEFT VENTRICULOGRAM Left 2019    Procedure: Left Heart Catheterization Without Left Ventriculogram;  Surgeon: Ambrose Mayfield MD;   Location: [facility] Lab;  Service: Cardiology    TONSILLECTOMY  1984      Allergies   Allergen Reactions    Ragweeds Other (See Comments)     congestion      Social History     Tobacco Use    Smoking status: Former     Current packs/day: 0.00     Average packs/day: 0.5 packs/day for 4.0 years (2.0 ttl pk-yrs)     Types: Cigarettes     Start date: 1978     Quit date: 1982     Years since quittin.9    Smokeless tobacco: Never   Substance Use Topics    Alcohol use: Not Currently     Comment: sober since 10/2019      Wt Readings from Last 1 Encounters:   24 76.7 kg (169 lb)        Anesthesia Evaluation            ROS/MED HX  ENT/Pulmonary:     (+)                tobacco use, Past use,                       Neurologic:       Cardiovascular:     (+)  - -   -  - -   Taking blood thinners                     dysrhythmias, Other,             METS/Exercise Tolerance:     Hematologic:     (+) History of blood clots,               Musculoskeletal:       GI/Hepatic:     (+)             liver disease,       Renal/Genitourinary:       Endo:       Psychiatric/Substance Use:     (+) psychiatric history anxiety, depression and other (comment) alcohol abuse      Infectious Disease:       Malignancy:       Other:               OUTSIDE LABS:  CBC:   Lab Results   Component Value Date    WBC 6.6 2024    WBC 7.9 10/04/2022    HGB 14.6 2024    HGB 15.3 10/04/2022    HCT 43.3 2024    HCT 44.2 10/04/2022     2024     10/04/2022     BMP:   Lab Results   Component Value Date     2024     10/04/2022    POTASSIUM 4.2 2024    POTASSIUM 4.5 10/04/2022    CHLORIDE 102 2024    CHLORIDE 106 10/04/2022    CO2 26 2024    CO2 23 10/04/2022    BUN 15.5 2024    BUN 13 10/04/2022    CR 0.94 2024    CR 1.04 10/04/2022    GLC 88 2024    GLC 93 10/04/2022     COAGS:   Lab Results   Component Value Date    PTT 53 (H) 2019    INR 1.99 (H)  "08/12/2019     POC: No results found for: \"BGM\", \"HCG\", \"HCGS\"  HEPATIC:   Lab Results   Component Value Date    ALBUMIN 4.5 12/05/2024    PROTTOTAL 7.4 12/05/2024    ALT 34 12/05/2024    AST 37 12/05/2024    ALKPHOS 81 12/05/2024    BILITOTAL 0.5 12/05/2024     OTHER:   Lab Results   Component Value Date    A1C 4.8 09/20/2024    TIM 8.9 09/20/2024    MAG 2.1 10/04/2022    LIPASE 40 10/04/2022    TSH 0.81 09/20/2024    CRP 5.3 (H) 11/02/2019    SED 4 09/20/2024       Anesthesia Plan    ASA Status:  3       Anesthesia Type: MAC.     - Reason for MAC: immobility needed, straight local not clinically adequate   Induction: Intravenous, Propofol.   Maintenance: TIVA.        Consents    Anesthesia Plan(s) and associated risks, benefits, and realistic alternatives discussed. Questions answered and patient/representative(s) expressed understanding.     - Discussed: Risks, Benefits and Alternatives for BOTH SEDATION and the PROCEDURE were discussed     - Discussed with:  Patient            Postoperative Care    Pain management: IV analgesics.   PONV prophylaxis: Ondansetron (or other 5HT-3), Background Propofol Infusion     Comments:               Gerardo Stone MD    I have reviewed the pertinent notes and labs in the chart from the past 30 days and (re)examined the patient.  Any updates or changes from those notes are reflected in this note.                         # Overweight: Estimated body mass index is 25.32 kg/m  as calculated from the following:    Height as of 11/20/24: 1.74 m (5' 8.5\").    Weight as of 11/21/24: 76.7 kg (169 lb).             "

## 2024-12-19 NOTE — ANESTHESIA CARE TRANSFER NOTE
Patient: Nikunj Sinha    Procedure: Procedure(s):  ENDOSCOPIC ULTRASOUND, ESOPHAGOSCOPY / UPPER GASTROINTESTINAL TRACT (GI)  ESOPHAGOGASTRODUODENOSCOPY, WITH BIOPSY       Diagnosis: Abnormal finding on imaging [R93.89]  Common bile duct dilation [K83.8]  Weight loss [R63.4]  Epigastric pain [R10.13]  Diagnosis Additional Information: No value filed.    Anesthesia Type:   MAC     Note:    Oropharynx: oropharynx clear of all foreign objects  Level of Consciousness: awake  Oxygen Supplementation: room air    Independent Airway: airway patency satisfactory and stable  Dentition: dentition unchanged  Vital Signs Stable: post-procedure vital signs reviewed and stable  Report to RN Given: handoff report given  Patient transferred to: Phase II    Handoff Report: Identifed the Patient, Identified the Reponsible Provider, Reviewed the pertinent medical history, Discussed the surgical course, Reviewed Intra-OP anesthesia mangement and issues during anesthesia, Set expectations for post-procedure period and Allowed opportunity for questions and acknowledgement of understanding  Vitals:  Vitals Value Taken Time   /65 12/19/24 1216   Temp     Pulse     Resp     SpO2 100 % 12/19/24 1217   Vitals shown include unfiled device data.    Electronically Signed By: ABNER Brown CRNA  December 19, 2024  12:18 PM

## 2024-12-20 LAB
PATH REPORT.COMMENTS IMP SPEC: NORMAL
PATH REPORT.FINAL DX SPEC: NORMAL
PATH REPORT.GROSS SPEC: NORMAL
PATH REPORT.MICROSCOPIC SPEC OTHER STN: NORMAL
PATH REPORT.RELEVANT HX SPEC: NORMAL
PHOTO IMAGE: NORMAL
UPPER EUS: NORMAL

## 2025-01-13 ENCOUNTER — LAB (OUTPATIENT)
Dept: LAB | Facility: CLINIC | Age: 60
End: 2025-01-13
Payer: COMMERCIAL

## 2025-01-13 ENCOUNTER — OFFICE VISIT (OUTPATIENT)
Dept: GASTROENTEROLOGY | Facility: CLINIC | Age: 60
End: 2025-01-13
Attending: PHYSICIAN ASSISTANT
Payer: COMMERCIAL

## 2025-01-13 VITALS
HEART RATE: 75 BPM | BODY MASS INDEX: 23.77 KG/M2 | DIASTOLIC BLOOD PRESSURE: 73 MMHG | SYSTOLIC BLOOD PRESSURE: 112 MMHG | HEIGHT: 70 IN | WEIGHT: 166 LBS | OXYGEN SATURATION: 98 %

## 2025-01-13 DIAGNOSIS — R10.13 DYSPEPSIA: ICD-10-CM

## 2025-01-13 DIAGNOSIS — R10.13 EPIGASTRIC PAIN: ICD-10-CM

## 2025-01-13 DIAGNOSIS — R10.13 DYSPEPSIA: Primary | ICD-10-CM

## 2025-01-13 DIAGNOSIS — K80.20 GALLSTONES: ICD-10-CM

## 2025-01-13 DIAGNOSIS — R14.0 BLOATING: ICD-10-CM

## 2025-01-13 RX ORDER — OMEPRAZOLE 40 MG/1
40 CAPSULE, DELAYED RELEASE ORAL DAILY
Qty: 90 CAPSULE | Refills: 0 | Status: SHIPPED | OUTPATIENT
Start: 2025-01-13

## 2025-01-13 ASSESSMENT — PAIN SCALES - GENERAL: PAINLEVEL_OUTOF10: NO PAIN (0)

## 2025-01-13 NOTE — LETTER
1/13/2025      Nikunj Sinha  [address]  [place] [address]      Dear Colleague,    Thank you for referring your patient, Nikunj Sinha, to the [facility] [place]. Please see a copy of my visit note below.      GI CLINIC VISIT    ASSESSMENT/PLAN:  Nikunj Sinha is a 59 year old year old male with PMHx of former heavy ETOH use, MDD/Anxiety, h/o SI (requiring admissions), b/l DVT with saddle pulmonary embolism 2019 (on Rivaroxaban) following with the [facility] GI group for chronic abd pain h/o unintentional weight loss of 30# over last year (now stabilized). He underwent an EGD/EUS with Dr Mcleod 12/2024 and is here to review the results.     #epigastric abd pain x 20 years   2020 Cscope clean with 10 year recall and no FHX of CRC. 2024 MR Abd MRCP with below findings - no obstruction or large masses. H.pylori NEG 9/2024. Celiac serologies NEG (no intact total IgA). Fecal elastase WNL.Given chronicity, pain is thought driven by functional disease with ddx to include biliary disease, NERD, transient obstructive process, , gastroparesis vs other.     -Underwent EGD/EUS that was unremarkable, requested biopsies not taken.   -order h.pylori, once submitted, start daily PPI for functional dyspepsia . Omeprazole x 12 wk   --If neuromodulator is considered, will coordinate with external psych - [facility] - Dr Morena Dunaway - [phone]  -order breath test to eval for SIBO (risk - duodenal diverticulum)    Future consideration  1.meds to follow - H2B, neuromodulator, botanical    2. RUQ US +/- HIDA  3. NMGES     #CBD dilation to 10 mm  Recent EGD/EUS that was unremarkable (2024), slight prominence thought due to diverticulum. Liver enzymes have been normal 9/2024  -No further work up indicated at this time.     #gallstone  Noted to GB neck on EUS - we reviewed s/sx of biliary colic    # b/l DVT with saddle pulmonary embolism (2019)  Per 10/20/22 hematology note, VTE was thought  to be unprovoked however on that appt, he reported heavy ETOH use, living out of this car, and sleeping upright in his care routinely - plan at that time was to consider the VTE provoked however given the clot burden/severity, plan was to keep him on daily Xarelto 10 mg indefinitely. Plan was to be reviewed yearly.     No orders of the defined types were placed in this encounter.    Colorectal cancer screening: done 2020 with 10 year recall     RTC - 10 wk or sooner PRN     Thank you for this consultation.  It was a pleasure to participate in the care of this patient; please contact me with any further questions.     Melba Dasilva PA-C    Follow up: As planned above. Today, I personally spent 36 minutes in direct face to face time with the patient, of which greater than 50% of the time was spent in patient education and counseling as described above. Approximately 10 minutes were spent on indirect care associated with the patient's consultation including but not limited to review of: patient medical records to date, clinic visits, hospital records, lab results, imaging studies, procedural documentation, and coordinating care with other providers. The findings from this review are summarized in the above note. All of the above accounted for a cumulative time of 46  minutes and was performed on the date of service.     HPI  Nikunj Sinha is a 59 year old year old male with PMHx of former heavy ETOH use, MDD/Anxiety, h/o SI (requiring admissions), b/l DVT with saddle anesthesia 2019 (on Rivaroxaban) following [email or web address] GI group for abd pain.     2024 appt with me   1. Epigastric abd pain x 20 years. worse in past several years.   Persistent pain. Gnawing and dull pain. Can have very short periods of no or very minimal pain. Nonradiating.  Worsened with meals (but unclear if it worsens immediately or with a time delay) . He knows NSAIDs, fried foods, carbonated beverages will make it worse   -Improved - 1/2 tsp baking  "soda in water, pepcid BID  -Tried pepto (no relief), prilosec (2wk, unclear if helped)     Found to be H.pylori POS and now treated x 10d. Pain did not improve much afterwards.     -Lost~30# as avoiding eating, it makes pain worse. Reports he was 198 (start of the year) to 165, unintentional. Seems to have stabilized.   -Associated with fatigue and feeling tired  -also reporting nausea w/o emesis, occasional post-prandial bloating, stooling changes  Denies dysphagia, odynphagia.     Bowel Habits   -Once daily BSC type 3, regular for him. No straining. Good evacuation. On toilet for a few min. No blood or black stools. This is is normal stooling pattern w/o changes.   -Cscope 2020 - cecal intubation, good prep. Normal macro colon with 10 year recall (2030)    Today 1/13/2025  Eating gluten, serologies were negative. No fhx of celiac disease.   Mother with similar abd pain, chronic of unknown etiology   Had EUS / EGD with Dr Mcleod - no trouble with recent procedure.   -some voice hoarseness afterwards but otherwise OK   Abd pain continues to same degree as previously described. See below. No progression.    \"Persistent pain. Gnawing and dull pain. Can have very short periods of no or very minimal pain. Nonradiating.  Worsened with meals (but unclear if it worsens immediately or with a time delay) . He knows NSAIDs, fried foods, carbonated beverages will make it worse   -Improved - 1/2 tsp baking soda in water, pepcid BID  -Tried pepto (no relief), prilosec (2wk, unclear if helped)\"  -pressing on abd makes it hurt more but denies changes in pain with positional changes  Weight - lowest weight since adulthood. Down from 200/190s. Stabilized 166# without further loss, per patient.   1-2 meals, finishing meals; normal for him, typically avoids breakfast  Reports depressed appetite   Does have dentures (upper/lower - well fitting; used when eating)   Occasional nausea w/o emesis.   No oral pain/dental pain, dysphagia, " odynphagia, bloody or black stools.   BM - one BSC type 3-4 daily. No straining with good evacuation. bloody or black stools.   -tried Gas-X feels it helped cut down on bloat sensation, no SE on med  No drug use. Prior heavy ETOH use. Fecal elastase WNL. EUS with normal appearing pancreas.   Anxiety/depression - stable. Lexapro 5 mg and and fluoxetine 40 mg    [facility] - Dr Morena Dunaway - [phone]  -previously on Zoloft, Effexor, Cymbalta, Wellbutrin. May have been on Remeron years ago (short period of time)  -never been on TCAs   H.pylori - bismuth, flagyl, tetracycline. 3 caps daily four times daily meals x 10 days  No other questions or concerns today    Wt Readings from Last 10 Encounters:   01/13/25 75.3 kg (166 lb)   12/19/24 77.8 kg (171 lb 8.3 oz)   11/21/24 76.7 kg (169 lb)   11/20/24 76.4 kg (168 lb 8 oz)   11/19/24 76.1 kg (167 lb 11.2 oz)   09/20/24 77.4 kg (170 lb 9.6 oz)   10/12/23 86 kg (189 lb 11.2 oz)   12/01/22 89.4 kg (197 lb)   11/22/22 89.2 kg (196 lb 11.2 oz)   10/20/22 88 kg (194 lb)     Family Hx  Mom - unknown stomach issues   No other known family history or GI related malignancy (esophageal, gastric, pancreatic, liver or colon) or family history of IBD/celiac disease.     Social Hx     Occasional use of NSAIDs - 2-3x a month for headaches   A B complex vitamin, Vit D, elijah capsulebut otherwise denies use of OTC herbal supplements/weight loss products.      No ETOH. Former heavy drinker. Stopped 5y year ago. May have had pancreatitis but he is unknown.   Former tobacco products. 3 years x 1 ppd   No recreational drug use     PROBLEM LIST  Patient Active Problem List    Diagnosis Date Noted     History of paroxysmal supraventricular tachycardia 12/01/2024     Priority: Medium     Abnormal finding on MRI of brain 09/29/2024     Priority: Medium     History of basal cell cancer 09/29/2024     Priority: Medium     left ala nose, s/p mohs , reconstruction and laser rx       Bone island  09/29/2024     Priority: Medium     Family history of arrhythmia 09/29/2024     Priority: Medium     Family history of ischemic heart disease 09/29/2024     Priority: Medium     Dilated pancreatic duct 09/29/2024     Priority: Medium     Sludge in gallbladder 09/29/2024     Priority: Medium     Weight loss 09/29/2024     Priority: Medium     Bilateral inguinal hernia without obstruction or gangrene, recurrence not specified 12/01/2022     Priority: Medium     Umbilical hernia without obstruction and without gangrene 12/01/2022     Priority: Medium     Diverticulosis of large intestine without hemorrhage 12/01/2022     Priority: Medium     Liver cyst 12/01/2022     Priority: Medium     Duodenal diverticulum 12/01/2022     Priority: Medium     IPMN (intraductal papillary mucinous neoplasm) 12/01/2022     Priority: Medium     Dilation of common bile duct 12/01/2022     Priority: Medium     Seborrheic keratoses 07/16/2020     Priority: Medium     Need for shingles vaccine 07/16/2020     Priority: Medium     Elevated LDL cholesterol level 12/20/2019     Priority: Medium     Chronic anticoagulation 12/01/2019     Priority: Medium     Personal history of DVT (deep vein thrombosis) 12/01/2019     Priority: Medium     Current severe episode of major depressive disorder without psychotic features, unspecified whether recurrent (H) 11/29/2019     Priority: Medium     11/29/19:  Unemployed, from [place] originally where was getting his care at the local clinic there, new to me & this clinic, limited records on care everywhere only viewable after he got to the room, with limited apt time.   Hx of ACS, NSTEMI with elevated troponin, no CAD on angiogram,  but found to have a acute saddle embolism with cor pulmonale & hx of dvt,  in 8/2019 on lifelong anticoagulation now on xarelto 20 mg daily, ever since under care of hematology, hx of moderate alcohol dependance in early remission with hx of withdrawal & mood disorder,  "previously on campral ( discontinued secondary to diarrhea), & naltrexone( stopped as ineffective), ZAIDA, MDD, suicidal ideation, on wellbutrin xl 150 mg daily, lexapro 20 mg daily, gabapentin 300 mg bedtime & a multivitamin, also on proranolol, previously on abilify 5 mg, citalopram 10 mg, hydroxyzine, mirtazipine & trazodone, , dental caries, recent dental abscess s/p abx to see the dentist soon, hx of essential tremor on propranolol 20 mg tid, allergic to ragweeds, no records from [facility] but reviewed care everywhere [facility] records,     Seen at [facility] 18 with suicidal ideation. \" after a friend called  worried about pt.'s suicidal ideation. SW met with Pt who appeared [place] and guarded. When SW asked Pt what brought him in he indicated it was a long story. Pt reports that his mother, father and sibling all  within a 10 year time period of various medical issues. 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 Pt reports that he used credit cards to pay for daily expenses. Pt reports that he now has extreme credit card debt. Pt lost his job recently due to his car having mechanical issues and he was missing work. Today Pt was served a 24 hour notice by the [employer] department to leave his apartment. Pt is hopeless and feels that he is \"in too deep\" and nothing will get better. Pt did not appear able to contract for safety. When SW asked what Pt would do if he went home, he shrugged and said \"I don't know\", SW indicated that she was worried Pt would go home and hurt himself because he felt so hopeless Pt shrugged again but did not reply. Pt has no support in the community and is isolated. The friend that called  lives in [place] and Pt has never met her in person but they have talked on the phone for 20 years. SW indicated that she must have been worried enough to figure out how to call the police in [place]. Pt shrugged and said \"I " "guess\". Pt does not feel an admission will change anything, \"I just don't see what being admitted will do or how it can help\". ISIS and MD discussed their concern over Pt.'s safety and his inability to contract for safety along with his state of hopelessness with Pt. He only nodded but agreed to be admitted voluntary in the hopes that the SW on the unit may have some idea's on how to help him. BAL/Breathalyzer completed (date/results): yes Pt was .219 when the police picked him up several hours ago  He does not see a therapist or psychiatrist, however, and although his primary care physician does prescribe him citalopram he has not recently spoken with him or does not sound as if he is disclosed how severe his symptoms are. He does admit to self-medicating with alcohol. When he has stopped drinking in the past he will get rather shaky and a bit nauseous. Otherwise he has not had hallucinations or seizures in the past related to that. When he spoke with his friend today he told him that he thought he should prepared to say goodbye to him, but when I asked why his friend would be concerned with that if this was a daily thought process for him he stated that he does not talk about it every day. He is rather hopeless and he is not sure why he has not done anything yet to harm himself.\" did admit to possibly hanging himself. Initially he was started on the alcohol withdrawal protocol and monitored for a safe detoxification. Was drinking up to a liter per day for the past few weeks prior to admission. Admitted on a voluntary basis. Agreeable to stay voluntarily to coordinate cares medication management. He was admitted to psychiatric unit for safety, stabilization and medication management. Eventually gained insight into the disease process and addiction. Was able to cooperate and participate in programming and educate on the medication as well. Started on campral, abilify, gabapentin, mirtazipine & trazodone.  hydroxyzine, " "& continued on citalopram. A rule 25 was completed. He spoke of some of his historical challenges including taling care of his brother with ALS and his mother who was dying of metastatic cancer and his ailing father. During this time he developed a drinking habit. Additional stressors include being evicted from his apartment. He reports that he was drinking 1.75 L of Vodka per day. He denies suicidal ideations and last felt suicidal about 6 days ago. He stated, Suicide use to be plan A but I no longer feel that way. He has hope for the future. He looks forward to discharge to [facility]. He feels much less hopeless\"    Seen in ER at [facility] on 6/12/19 \" for increased depression, hopelessness, and alcohol abuse. Pt was kicked out of his sober living today after coming home from a therapy appt smelling like alcohol. Pt reports that he has been having a good week and had a really good therapy appt today because he drank before hand and was able to disclose information to his therapist he did not have the courage to do before. Pt returned to his sober house and was confronted about his alcohol abuse, they called the police and sent him here. Pt has been seen in this ED previously for depression and suicidal ideation. Pt is currently denying any Suicidal ideation or plan but also is stating that he has \"fucked up his life beyond repair\" and that \"he doesn't know if he wants to live\". Pt has no family or any support, recently lost his housing, and is struggling to deal with all the loss he has endured in his life. 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 Pt has many regrets in his life and struggling to cope with all the loss. Pt would benefit from a crisis residence\".     In ER [facility] on 10/17/2019 for sucidal ideation & alcohol abuse: \"Nikunj Sinha is a 54 y.o. male being seen by Crisis Social Work regarding increased depressive symptoms, anxiety, and " "suicidal ideation. The patient presented to the outpatient clinic today for a Rule 25 assessment. He endorsed numerous acute depressive symptoms. The patient states that he was on 2700 for a period of time and found the program to be very helpful. He was then discharged to a sober house with a referral for day programming at [facility]. He has continued to relapse on alcohol and has been unsuccessful outside of a structured program. He was previously working a good job as a Scoring Director at [employer] \"but I drank myself out of that job.\" He has now been evicted from his apartment and finds himself homeless. The patient rates his depression as 8 out of 10 and anxiety as 9 out of 10. He is now feeling hopeless with thoughts of suicide and a plan to hang himself. He states that he hasn't taken any steps towards acting on the thoughts but that \"it's something in my mind.\" The patient states that he feels somewhat isolated. He has one friend in [place], but no supportive family that are still alive. The patient's Rule 25 was completed today and this writer verified with Isabella Joe that he is not on her list. She states that once she receives the Rule 25 from the clinic and he is psychiatrically treated he can be considered for 2700. The patient has remained calm, cooperative and pleasant. Patient was sent to the ER from the outpatient addiction clinic. He was here today for a Rule 25 in the outpatient addiction clinic at [facility]. He was sent by his clinician Alexandr after completing a Rule 25 assessment. The patient endorsed numerous depressive symptoms and states that he is having suicidal ideation. The patient does not identify a specific plan but states that he has several and hasn't decided which to act on. The patient's last drink was yesterday at noon and is now observed as having some withdrawal symptoms. Alexandr states that the patient is engaged with outpatient CD treatment at [facility] but he has been unable " to maintain any sobriety outside of a structured facility. Roughly one month ago he stopped taking all of his psych medications. Alexandr recommends that the patient go to inpatient mental health for his suicidal ideation, and if he is not acute enough for that placement that we pursue 2700.  Admitted on a voluntary basis. Agreeable to stay voluntarily to coordinate cares and medication management. Disposition recommended on today's date prior to intake for MI CD treatment. Recommendation by hospitalist to proceed with dental cares. Further efforts to address community concerns prior to proceeding with MI CD treatment. Patient seen by hospitalist during course of hospitalization. Started on antibiotic. Recommendation for evaluation by hospitalist in the community, given lack of provider at the facility. Patient requesting to be seen by dentist prior to MI CD treatment.  Patient placed on a CIWA protocol. Did not demonstrate signs or symptoms of complicated alcohol withdrawal.  Psychiatric medication management performed in detail. Medication management performed to target signs and symptoms of principal diagnosis. Further medication management performed to target comorbid illnesses. Medication management included:    Lexapro: Continued restart of PTA medication for depression and anxiety.    Wellbutrin XL:  Continued restart of of PTA medication for combination therapy.    Gabapentin:  Scheduled medication management for augmentation. Continued restart of PTA medication as needed for anxiety.    Propranolol: Continue restart of PTA medication as needed for anxiety.    Naltrexone: Continued trial for alcohol use disorder; LFTs normalizing. Repeat LFTs scheduled.    Campral: Discontinued PTA medication secondary to GI side effects.  Medication adherent. Tolerating medication management. Progressed with mood and anxiety. Denies psychosis. Denies suicidal and homicidal ideation. Did an appropriate crisis and relapse  "plan. Future oriented. Denies gun access. Indicated motivation to proceed with MI CD treatment as coordinated by  with intake date on Friday, November 1, 2019.   followed in regards to collecting and reviewing collateral information, coordination of cares and discharge planning. Including, MI CD evaluation completed with recommendation for placement to unit 2700. Due to dental issues, patient discharged to medical respite bed and secured on today's date to bridge placement. Discharged to Medical Respite at noon and Inpatient CD treatment on unit 2700 on 11/01 at noon. Further recommendation to refer to psychiatrist in the community after discharge from unit 2700. Cares coordinated with case management.  Treated for dental abscess with pcn.\"    [place]  shows received gabapentin 100 mg #270 on 1/4/19 at [place], then 300 mg # 90  On 2/11/19 in [facility], 100 mg # 60 on 3/6/19 in [facility], then # 180 on 6/20/19, 300 mg # 60 on 10/30/19 & # 30 of 300 mg on 11/21/19    Seen nov 29th, 2019 to establish care as doesnt have a PCP.   Severe MDD/ZAIDA./ hx of passive suicidal thoughts: discharged nov 22nd 7 days ago, currently in sober housing, has [facility] apt with psyche [facility] on 12/5. To also see psyhcology 12/11 but plans to reschedule that at upcoming psyche apt next week as it is in the morning & he needs to go to out patient rx in the am. Has enough meds till sees psyche. Has passive suicidal thoughts. No active plan. Contracts to safety & forwarding thinking about apts etc. Is taking all his meds.     Hx of alcohol abuse, dependance etc, Currently sober, last drink was oct 14th, notes was on naltrexone then stopped as no longer helped. Was on campral before but had to discontinue due to diarrhea. Has done [facility] program twice once beginning of the year and one last week       Moderate alcohol dependence in early remission (H) 11/29/2019     Priority: Medium     ZAIDA " "(generalized anxiety disorder) 11/29/2019     Priority: Medium     History of suicidal ideation 11/29/2019     Priority: Medium     Chronic fatigue 11/29/2019     Priority: Medium     Postural dizziness with near syncope 11/29/2019     Priority: Medium     Tinnitus, bilateral 11/29/2019     Priority: Medium     Memory difficulties 11/29/2019     Priority: Medium     Dental caries 11/29/2019     Priority: Medium     Seasonal allergic rhinitis, unspecified trigger 11/29/2019     Priority: Medium     History of pulmonary embolism 11/29/2019     Priority: Medium     [facility] on [facility]: \"hx of depression, anxiety, alcohol abuse disorder (sober x 2 months, He is a recovering alcoholic. He currently resides in sober house since June 2019) who presented with shortness of breath and 2 days of chest pain and near syncope. He presented to the emergency room on 8/4/2019 after he had a syncopal episode in front of [facility]. He did not recall particular chest pain or shortness of breath. He noted swelling of his leg in various spot but he denies any persistent pain or swelling or redness. No injury. No acute illness prior to that. However he donated plasma 10 times in 5 weeks prior to that. Upon initial evaluation, d-dimer was elevated at 6.7. PTT was normal at 28, INR is 1.09. He underwent coronary angiogram and it did not show any obstructive coronary artery disease.Due to an elevated troponin he underwent coronary angiogram which demonstrated no significant CAD. Due to an elevated d-dimer underwent CT scan of the chest which showed extensive acute bilateral pulmonary embolism with saddle embolus.  Doppler legs showed Nonocclusive right femoral DVT from upper to mid thigh. Left calf DVT within one of the peroneal veins in both of the posterior tibial veins from upper to low calf. Echocardiogram revealed lower limit of normal left ventricular systolic function of 50% with septal motion compatible with right ventricular " "overload.  He had cor pulmonale and was started on heparin infusion. No thrombolytics were given due hemodynamic and respiratory stability. After several days of monitoring on IV heparin Mr. Sinha continued to improve and he was transitioned to Xarelto for ongoing anticoagulation therapy. He tolerated Xarelto well. No major bleeding except intermittent gum bleeding. Denies family history of venous thromboembolism. He had a brother who passed away from [facility]. He is single. Does not have children. Was advised to continue with indefinate anticoagulation. Mr. Sinha did donate plasma 2x/week for the last 5 weeks and I wonder if this led to a transient hypercoagulable state. Mr. Sinha was advised not to donate plasma moving forward. An outpatient referral to hematology has been made\".      Seen by hematology 9/2019 a[facility]. She reviewed the pathophysiology of venous thromboembolic exam and risks of recurrence. He is wondering about excessive plasma donation trigger that event and I think it is possible. At this point, regardless of the etiology, he is indicated for long-term anticoagulation due to initial presentation of life-threatening episode. No prior history of venous thromboembolism or family history of thromboembolism. I would not recommend family/hereditary thrombophilia work-up. I also discussed that we do not need to repeat CT scan of the chest to know the resolution of blood clots, however will obtain CTA if he has signs and symptoms concerning for venous thromboembolism. I discussed about strict abstinence from alcohol with anticoagulants. I recommended to obtain kidney, liver function monitoring at least once a year.   I discussed about age-appropriate cancer screening. He admitted that he had positive stool test (sounds like FOBT) a couple years ago. Unclear if it was related to his alcohol use. He was recommended to proceed with colonoscopy, but has not completed yet. I recommended him to " "complete colonoscopy however, we will need to wait at least 3-6 months because of this recent pulmonary emboli event and I would not recommend interruption of anticoagulation at this point. He voiced understanding. I noted that he has a gastroenterology referral. Recommended him to have a primary care provider as His previous primary care provider is retiring/retired. Until he finds a primary care provider, I will continue to manage his anticoagulation. Follow-up with me in about 6 months with labs including hemogram, CMP.\"           Epigastric pain 11/29/2019     Priority: Medium       PERTINENT PAST MEDICAL HISTORY:  Past Medical History:   Diagnosis Date     Abdominal pain, epigastric 11/29/2019     Acute embolism and thrombosis of unspecified deep veins of lower extremity, bilateral (H) 11/19/2024     Antiplatelet or antithrombotic long-term use      Chronic anticoagulation 12/01/2019     Current severe episode of major depressive disorder without psychotic features, unspecified whether recurrent (H) 11/29/2019     Decreased cardiac ejection fraction 08/22/2021    Echo oct 2022 normal      Dental caries 11/29/2019     Dizziness 11/29/2019     Erectile dysfunction, unspecified erectile dysfunction type 11/29/2019     Essential tremor 12/01/2019     Fatigue, unspecified type 11/29/2019     ZAIDA (generalized anxiety disorder) 11/29/2019     History of chest pain 11/29/2019     History of pulmonary embolism 11/29/2019     History of suicidal ideation 11/29/2019     Memory changes 11/29/2019     Moderate alcohol dependence in early remission (H) 11/29/2019     Palpitations 11/29/2019     Positive FIT (fecal immunochemical test) 11/29/2019    Colonoscopy 11/2020 normal, labs normal     Seasonal allergic rhinitis, unspecified trigger 11/29/2019     Tinnitus, bilateral 11/29/2019     Weight gain 11/29/2019       PREVIOUS SURGERIES:  Past Surgical History:   Procedure Laterality Date     COLONOSCOPY N/A 11/12/2020    " Procedure: COLONOSCOPY;  Surgeon: Belinda Jorgensen MD;  Location: [facility] OR     CV CORONARY ANGIOGRAM N/A 08/04/2019    Procedure: Coronary Angiogram;  Surgeon: Ambrose Mayfield MD;  Location: [facility] Cath Lab;  Service: Cardiology     CV LEFT HEART CATHETERIZATION WITHOUT LEFT VENTRICULOGRAM Left 08/04/2019    Procedure: Left Heart Catheterization Without Left Ventriculogram;  Surgeon: Ambrose Mayfield MD;  Location: [facility] Cath Lab;  Service: Cardiology     ENDOSCOPIC ULTRASOUND UPPER GASTROINTESTINAL TRACT (GI) N/A 12/19/2024    Procedure: ENDOSCOPIC ULTRASOUND, ESOPHAGOSCOPY / UPPER GASTROINTESTINAL TRACT (GI);  Surgeon: Dakota Mcleod MD;  Location: [facility] OR     ESOPHAGOSCOPY, GASTROSCOPY, DUODENOSCOPY (EGD), COMBINED N/A 12/19/2024    Procedure: ESOPHAGOGASTRODUODENOSCOPY, WITH BIOPSY;  Surgeon: Dakota Mcleod MD;  Location: [facility] OR     TONSILLECTOMY  [facility]       ALLERGIES:     Allergies   Allergen Reactions     Ragweeds Other (See Comments)     congestion       PERTINENT MEDICATIONS:    Current Outpatient Medications:      escitalopram (LEXAPRO) 5 MG tablet, Take 5 mg by mouth at bedtime. By suzette psyche [place], Disp: , Rfl:      famotidine (PEPCID) 20 MG tablet, Take 1 tablet (20 mg) by mouth at bedtime., Disp: 90 tablet, Rfl: 0     FLUoxetine (PROZAC) 40 MG capsule, Take 40 mg by mouth at bedtime. By suzette psyche [place], Disp: , Rfl:      rivaroxaban ANTICOAGULANT (XARELTO) 10 MG TABS tablet, Take 1 tablet (10 mg) by mouth daily with food. (Patient taking differently: Take 10 mg by mouth at bedtime.), Disp: 90 tablet, Rfl: 0    SOCIAL HISTORY:  Social History     Socioeconomic History     Marital status: Single     Spouse name: Not on file     Number of children: Not on file     Years of education: Not on file     Highest education level: Not on file   Occupational History     Not on file   Tobacco Use     Smoking status: Former     Current packs/day: 0.00      "Average packs/day: 0.5 packs/day for 4.0 years (2.0 ttl pk-yrs)     Types: Cigarettes     Start date: 1978     Quit date: 1982     Years since quittin.0     Smokeless tobacco: Never   Vaping Use     Vaping status: Never Used   Substance and Sexual Activity     Alcohol use: Not Currently     Comment: sober since 10/2019     Drug use: Not Currently     Comment: CBD     Sexual activity: Not Currently   Other Topics Concern     Not on file   Social History Narrative    2019: moved in 1 week ago to sober house [employer] works, previously living alone, no pets,      Social Drivers of Health     Financial Resource Strain: Not on file   Food Insecurity: Not on file   Transportation Needs: Not on file   Physical Activity: Not on file   Stress: Not on file   Social Connections: Not on file   Interpersonal Safety: Low Risk  (2024)    Interpersonal Safety      Do you feel physically and emotionally safe where you currently live?: Yes      Within the past 12 months, have you been hit, slapped, kicked or otherwise physically hurt by someone?: No      Within the past 12 months, have you been humiliated or emotionally abused in other ways by your partner or ex-partner?: No   Housing Stability: Not on file       FAMILY HISTORY:  Family History   Problem Relation Age of Onset     Arthritis Mother      Breast Cancer Mother      Depression Mother      Allergies Mother      Migraines Mother      Alcoholism Father      Lymphoma Father         non hodgkins     Neurodegenerative disease Brother         genetic disorder,  of some slow form of ALS     Suicidality Paternal Grandfather        Past/family/social history reviewed and no changes    PHYSICAL EXAMINATION:  Vitals reviewed: /73   Pulse 75   Ht 1.778 m (5' 10\")   Wt 75.3 kg (166 lb)   SpO2 98%   BMI 23.82 kg/m    Constitutional: aaox3, cooperative, pleasant, not dyspneic/diaphoretic, no acute distress  Eyes: Sclera " anicteric/injected  Ears/nose/mouth/throat: hearing intact  Neck: supple, active ROM w/o limitation or pain   Respiratory: Unlabored breathing  Abd:  Nondistended, non-tender but reported some discomfort to epigastric region, no peritoneal signs, neg Faust's sign,. NEG Carnett's sign.   Skin: warm, perfused, no jaundice  Psych: Normal affect  MSK: Normal gait     PERTINENT STUDIES:    Office Visit on 09/20/2024   Component Date Value Ref Range Status     Sodium 09/20/2024 139  135 - 145 mmol/L Final     Potassium 09/20/2024 4.2  3.4 - 5.3 mmol/L Final     Carbon Dioxide (CO2) 09/20/2024 26  22 - 29 mmol/L Final     Anion Gap 09/20/2024 11  7 - 15 mmol/L Final     Urea Nitrogen 09/20/2024 15.5  8.0 - 23.0 mg/dL Final     Creatinine 09/20/2024 0.94  0.67 - 1.17 mg/dL Final     GFR Estimate 09/20/2024 >90  >60 mL/min/1.73m2 Final     Calcium 09/20/2024 8.9  8.8 - 10.4 mg/dL Final     Chloride 09/20/2024 102  98 - 107 mmol/L Final     Glucose 09/20/2024 88  70 - 99 mg/dL Final     Alkaline Phosphatase 09/20/2024 68  40 - 150 U/L Final     AST 09/20/2024 26  0 - 45 U/L Final     ALT 09/20/2024 14  0 - 70 U/L Final     Protein Total 09/20/2024 7.0  6.4 - 8.3 g/dL Final     Albumin 09/20/2024 4.4  3.5 - 5.2 g/dL Final     Bilirubin Total 09/20/2024 0.5  <=1.2 mg/dL Final     Patient Fasting > 8hrs? 09/20/2024 Unknown   Final     TSH 09/20/2024 0.81  0.30 - 4.20 uIU/mL Final     Cholesterol 09/20/2024 245 (H)  <200 mg/dL Final     Triglycerides 09/20/2024 45  <150 mg/dL Final     Direct Measure HDL 09/20/2024 58  >=40 mg/dL Final     LDL Cholesterol Calculated 09/20/2024 178 (H)  <100 mg/dL Final     Non HDL Cholesterol 09/20/2024 187 (H)  <130 mg/dL Final     Patient Fasting > 8hrs? 09/20/2024 Unknown   Final     Estimated Average Glucose 09/20/2024 91  <117 mg/dL Final     Hemoglobin A1C 09/20/2024 4.8  0.0 - 5.6 % Final     Erythrocyte Sedimentation Rate 09/20/2024 4  0 - 20 mm/hr Final     CRP Inflammation 09/20/2024  <3.00  <5.00 mg/L Final     Vitamin D, Total (25-Hydroxy) 09/20/2024 26  20 - 50 ng/mL Final     Iron 09/20/2024 119  61 - 157 ug/dL Final     Iron Binding Capacity 09/20/2024 278  240 - 430 ug/dL Final     Iron Sat Index 09/20/2024 43  15 - 46 % Final     Ferritin 09/20/2024 132  31 - 409 ng/mL Final     Vitamin B12 09/20/2024 779  232 - 1,245 pg/mL Final     Helicobacter pylori Antigen Stool 09/24/2024 Positive (A)  Negative Final     Tissue Transglutaminase Antibody I* 09/20/2024 0.3  <7.0 U/mL Final     Tissue Transglutaminase Antibody I* 09/20/2024 <0.6  <7.0 U/mL Final     Copper Urine/Volume 09/24/2024 1.1  <=3.2 ug/dL Final     Copper 24 h Urine 09/24/2024 12.9  3.0 - 45.0 ug/d Final     Copper Urine ug/g Cr 09/24/2024 11.1  10.0 - 45.0 ug/g CRT Final     Creatinine, Urine per volume 09/24/2024 99  mg/dL Final     Creatinine, Urine per 24hr 09/24/2024 1163  800 - 2100 mg/d Final     WBC Count 09/20/2024 6.6  4.0 - 11.0 10e3/uL Final     RBC Count 09/20/2024 4.67  4.40 - 5.90 10e6/uL Final     Hemoglobin 09/20/2024 14.6  13.3 - 17.7 g/dL Final     Hematocrit 09/20/2024 43.3  40.0 - 53.0 % Final     MCV 09/20/2024 93  78 - 100 fL Final     MCH 09/20/2024 31.3  26.5 - 33.0 pg Final     MCHC 09/20/2024 33.7  31.5 - 36.5 g/dL Final     RDW 09/20/2024 12.2  10.0 - 15.0 % Final     Platelet Count 09/20/2024 223  150 - 450 10e3/uL Final     % Neutrophils 09/20/2024 65  % Final     % Lymphocytes 09/20/2024 29  % Final     % Monocytes 09/20/2024 5  % Final     % Eosinophils 09/20/2024 1  % Final     % Basophils 09/20/2024 0  % Final     % Immature Granulocytes 09/20/2024 0  % Final     Absolute Neutrophils 09/20/2024 4.3  1.6 - 8.3 10e3/uL Final     Absolute Lymphocytes 09/20/2024 1.9  0.8 - 5.3 10e3/uL Final     Absolute Monocytes 09/20/2024 0.3  0.0 - 1.3 10e3/uL Final     Absolute Eosinophils 09/20/2024 0.1  0.0 - 0.7 10e3/uL Final     Absolute Basophils 09/20/2024 0.0  0.0 - 0.2 10e3/uL Final     Absolute Immature  Granulocytes 09/20/2024 0.0  <=0.4 10e3/uL Final     Manganese Whole Bld 09/24/2024 16.3  4.2 - 16.5 ug/L Final        Lab Results   Component Value Date    WBC 6.6 09/20/2024    WBC 7.9 10/04/2022    WBC 5.8 08/18/2021    HGB 14.6 09/20/2024    HGB 15.3 10/04/2022    HGB 15.1 08/18/2021     09/20/2024     10/04/2022     08/18/2021    CHOL 245 (H) 09/20/2024    CHOL 248 (H) 10/04/2022    CHOL 207 (H) 08/18/2021    TRIG 45 09/20/2024    TRIG 69 10/04/2022    TRIG 59 08/18/2021    HDL 58 09/20/2024    HDL 56 10/04/2022    HDL 56 08/18/2021    ALT 34 12/05/2024    ALT 14 09/20/2024    ALT 29 10/04/2022    AST 37 12/05/2024    AST 26 09/20/2024    AST 26 10/04/2022     09/20/2024     10/04/2022     08/18/2021    BUN 15.5 09/20/2024    BUN 13 10/04/2022    BUN 12 08/18/2021    CO2 26 09/20/2024    CO2 23 10/04/2022    CO2 23 08/18/2021    TSH 0.81 09/20/2024    TSH 0.51 10/04/2022    TSH 0.80 08/18/2021    INR 1.99 (H) 08/12/2019    INR 1.85 (H) 08/11/2019    INR 1.57 (H) 08/10/2019        Liver Function Studies -   Recent Labs   Lab Test 09/20/24  1423   PROTTOTAL 7.0   ALBUMIN 4.4   BILITOTAL 0.5   ALKPHOS 68   AST 26   ALT 14      MR ABDOMEN MRCP W/O & W CONTRAST     CLINICAL HISTORY: Liver cyst; IPMN (intraductal papillary mucinous  neoplasm); Sludge in gallbladder; Dilation of common bile duct;  Dilated pancreatic duct; Epigastric pain; Duodenal diverticulum     DATE: 11/17/2024 7:55 AM     TECHNIQUE:      Images were acquired with and without intravenous gadolinium contrast  through the upper abdomen. The following MR images were acquired  without intravenous contrast: TrueFISP, multiplanar T2-weighted, axial  T1 in/out of phase, T2-weighted MRCP images, axial diffusion-weighted  and axial apparent diffusion coefficient. T1-weighted images were  obtained before contrast at the multiple time points following  contrast injection. 3-D reformatted images were generated by  the  technologist. Contrast dose: 8ml Gadavist     Comparison study: MR dated 11/25/2022.     FINDINGS:     Biliary Tree: Increased dilatation of CBD measuring up to 10 mm,  previously up to 6 mm. No identifiable obstructive process.  Intrahepatic bile ducts are mildly prominent. No cholelithiasis or  pericholecystic inflammation.     Pancreas: Preserved intrinsic T1 signal. Stable mild dilatation of  main pancreatic duct measuring up to 5 mm. Stable 5 mm T2 hyperintense  cystic structure in the pancreatic body (20/34). No worrisome features  identified. No solid pancreatic lesions.     Liver: Noncirrhotic morphology. No significant fat or iron deposition.  No focal solid mass. Unchanged simple hepatic cysts, largest in  segment 4 measuring up to 3.5 cm.     Spleen: Normal.     Kidneys: No focal mass. No hydronephrosis.     Adrenal glands: No focal adrenal nodule.     Bowel: Normal caliber bowel loops. Small periampullary duodenal  diverticulum.     Lymph nodes: No adenopathy.     Blood vessels: Patent major abdominal vasculature.     Lung bases: Patchy bibasilar atelectasis.     Bones and soft tissues: Degenerative changes in the visualized spine.     Mesentery and abdominal wall: Small fat-containing umbilical hernia.  Mild nonspecific central mesenteric stranding.     Ascites: Not present.                                                                      IMPRESSION:   1. Stable small cystic lesion in the pancreatic body could represent a  dilated sidebranch/sidebranch IPMN. No worrisome features identified.  Follow-up guidelines as detailed below.  2. Stable mild diffuse dilatation of the pancreatic duct without  obstructive process.  3. Interval mildly increased dilatation of CBD without identifiable  obstructive process. Redemonstrated small periampullary duodenal  diverticulum.     International evidence-based [place] guidelines for the management of  intraductal papillary mucinous neoplasm of the pancreas:  "  Surveillance is recommended if no high risk stigmata or worrisome  features are present:  Primary imaging modalities recommended are MRI/MRCP and MDCT  Size of largest cyst:   *  Less than 20 mm: Follow-up imaging in 6 months once, then every 18  months if no change. Stop surveillance if stable for 5 years.  *  More than 20 mm but less than 30 mm: Follow-up imaging at 6 months,  12 months, then yearly if no change.   *  More than 30 mm- follow up imaging every 6 months.     GI consultation for surgery/endoscopic ultrasound is recommended for  cysts with \"high-risk stigmata\" of high grade dysplasia or invasive  carcinoma such as:  1. Obstructive jaundice in a patient with cystic lesion of the head of  the pancreas  2. Enhancing mural nodule > 5mm or solid component  3. Main pancreatic duct >10mm  4. Suspicious or positive results of cytology     If worrisome features are present, GI consultation is recommended.  Worrisome features on imagin. Cyst more than or equal to 30 mm  2. Thickened or enhancing cyst walls  3. Main pancreatic duct > 5mm and < 10mm.  4. Abrupt change in caliber of pancreatic duct with distal atrophy  5. Lymphadenopathy  6. Cyst growth rate > 2.5mm/year       PREVIOUS ENDOSCOPY    Results for orders placed or performed during the hospital encounter of 20   COLONOSCOPY    Collection Time: 20  6:46 AM   Result Value Ref Range    COLONOSCOPY       [facility]  [address] [place] [address]., [place] [address] [phone]     Endoscopy Department  _______________________________________________________________________________  Patient Name: Nikunj Sinha            Procedure Date: 2020 6:46 AM  MRN: [id number]                       Account Number: [id number]  YOB: 1965              Admit Type: Outpatient  Age: 55                               Room: [facility]  Gender: Male                          Note Status: Finalized  Attending MD: Belinda Jorgensen MD  Pause " for the Cause: completed  Total Sedation Time: 32 min.            _______________________________________________________________________________     Procedure:           Colonoscopy  Indications:         Positive fecal immunochemical test  Providers:           Belinda Jorgensen MD, Brenda Patel RN  Referring MD:        Adriana Solis Md, MD  Medicines:           Fentanyl 100 micrograms IV, Midazolam 2 mg IV  Complications:       No immediate com plications.  _______________________________________________________________________________  Procedure:           Pre-Anesthesia Assessment:                       - Prior to the procedure, a History and Physical was                        performed, and patient medications and allergies were                        reviewed. The patient is competent. The risks and                        benefits of the procedure and the sedation options and                        risks were discussed with the patient. All questions                        were answered and informed consent was obtained. Patient                        identification and proposed procedure were verified by                        the nurse in the pre-procedure area. Mental Status                        Examination: normal. Airway Examination: normal                        oropharyngeal airway and neck mobility. Respiratory                        Examination: clear to auscultation. CV Examination:                        nor mal. Prophylactic Antibiotics: The patient does not                        require prophylactic antibiotics. Prior Anticoagulants:                        The patient has taken Xarelto (rivaroxaban), last dose                        was 2 days prior to procedure. ASA Grade Assessment: II                        - A patient with mild systemic disease. After reviewing                        the risks and benefits, the patient was deemed in                        satisfactory condition to  undergo the procedure. The                        anesthesia plan was to use moderate sedation / analgesia                        (conscious sedation). Immediately prior to                        administration of medications, the patient was                        re-assessed for adequacy to receive sedatives. The heart                        rate, respiratory rate, oxygen saturations, blood                        pressure, adequacy of pulmonary ventilation, and                        response to care wer e monitored throughout the                        procedure. The physical status of the patient was                        re-assessed after the procedure.                       After obtaining informed consent, the colonoscope was                        passed under direct vision. Throughout the procedure,                        the patient's blood pressure, pulse, and oxygen                        saturations were monitored continuously. The Colonoscope                        was introduced through the anus and advanced to the                        cecum, identified by appendiceal orifice and ileocecal                        valve. The colonoscopy was performed without difficulty.                        The patient tolerated the procedure well. The quality of                        the bowel preparation was good.                                                                                   Findings:       The perianal and digital rectal examinations were normal. Pertinent         negatives include normal sphincter tone.       The entire examined colon appeared normal.                                                                                   Impression:          - The entire examined colon is normal.                       - No specimens collected.  Recommendation:      - Discharge patient to home (with escort).                       - Repeat colonoscopy in 10 years for screening purposes.                        - Return to referring physician as previously scheduled.                                                                                     electronically signed by ERMA Jorgensen  _____________________  Belinda Jorgensen MD  11/12/2020 8:50:55 AM  I was physically present for the entire viewing portion of the exam.  __________________________  Signature of teaching physician  Belinda Jorgensen MD  Number of Addenda: 0    Note Initiated On: 11/12/2020 6:46 AM  Scope In:  Scope Out:       Upper EUS 12/19/2024 10:57 AM [address] [place] [address] [phone]     Endoscopy Department  _______________________________________________________________________________  Patient Name: Nikunj Sinha            Procedure Date: 12/19/2024 10:57 AM  MRN: [id number]                       Account Number: [id number]  YOB: 1965              Admit Type: Outpatient  Age: 59                               Room: [place] [address]  Gender: Male                          Note Status: Finalized  Attending MD: VINCENZO PLAZA MD,  Pause for the Cause: completed  Total Sedation Time:                    _______________________________________________________________________________     Procedure:             Upper EUS  Indications:           Common bile duct dilation (acquired) seen on MRI,                         Dilated pancreatic duct on MRI, Epigastric abdominal                         pain, Weight loss  Providers:             VINCENZO PLAZA MD, MICHAEL COLLADO, RN  Referring MD:          NICOLETTE JARAMILLO  Medicines:             Monitored Anesthesia Care  Complications:         No immediate complications.  _______________________________________________________________________________  Procedure:             Pre-Anesthesia Assessment:                         - Prior to the procedure, a History and Physical was                         performed, and patient  medications, allergies and                         sensitivities were reviewed. The patient's tolerance                         of previous anesthesia was reviewed.                         - The risks and benefits of the procedure and the                         sedation options and risks were discussed with the                         patient. All questions were answered and informed                         consent was obtained.                         After obtaining informed consent, the endoscope was                         passed under direct vision. Throughout the procedure,                         the patient's blood pressure, pulse, and oxygen                         saturations were monitored continuously. The EUS                         Linear was introduced through the mouth, and advanced                         to the third part of duodenum. The Endoscope was                         introduced through the mouth, and advanced to the                         third part of duodenum. After obtaining informed                         consent, the endoscope was passed under direct vision.                         Throughout the procedure, the patient's blood                         pressure, pulse, and oxygen saturations were monitored                         continuously.The upper GI endoscopy was accomplished                         without difficulty. The patient tolerated the                         procedure well.                                                                                   Findings:       ENDOSCOPIC FINDING: :       The examined esophagus was endoscopically normal.       The entire examined stomach was endoscopically normal.       The examined duodenum was endoscopically normal.       Normal mucosa was found in the entire duodenum. Biopsies were taken with       a cold forceps for histology.       ENDOSONOGRAPHIC FINDING: :       There was a 2 cm periampullary diverticulum. This was  filled with water       to improve visualization.       The bile duct was prominent but without an obstructing lesion. It       measured 8 mm in diameter.       The major papilla was endoscopically and sonographically normal and seen       on the rim of the diverticulum.       The main pancreatic duct was prominent in the genu, measuring 3.3 mm but       the pancreas was otherwise normal. The duct measured 1.6 mm at the major       papilla.       A single equivocal non-shadowing stone was seen in the neck of the       gallbladder measuring 5 x 3 mm in diameter.       Incidental note was made of a 4 x 3 mm simple liver cyst in the left       lobe.       Ultrasound was otherwise normal.                                                                                   Impression:            - Normal esophagus.                         - Normal stomach.                         - Normal examined duodenum.                         - Normal mucosa was found in the entire examined                         duodenum. Biopsied for celiac disease given the recent                         weight loss                         - Periampullary diverticulum. This likely accounts for                         the slightly prominent bile duct. LFTs have been                         normal and this is not thought to be of clinical                         significance                         - Normal pancreas.                         - Equivocal single 5 x 3 mm non-shadowing stone in the                         neck of the gallbladder. Not seen on recent MRI.                         Presuming duodenal biopsies are normal, no potential                         etiology for the weight loss was identified. The                         possible stone does not seem consistent with the pain                         description as reported today.  Recommendation:        - Discharge patient to home.                         - Return to GI clinic with  eMlba Dasilva to discuss                         further evaluation and management.                                                                                     Electronically signed by Dr. Miles BAILEY DUODENUM, BIOPSY:  - Unremarkable duodenal mucosa  - No evidence of celiac disease      Again, thank you for allowing me to participate in the care of your patient.        Sincerely,        Melba Dasilva PA-C    Electronically signed

## 2025-01-13 NOTE — PATIENT INSTRUCTIONS
It was a pleasure taking care of you today.  I've included a brief summary of our discussion and care plan from today's visit below.  Please review this information with your primary care provider.  _______________________________________________________________________    My recommendations are summarized as follows:    Speak with primary about eval of non-GI sources for weight loss   Order h.pylori stool test - once you leave the sample, OK to start daily omeprazole. Take 30-60 min before first meal of the day. Use daily for 12 weeks. We will see each other 10-12 weeks   Breath test for SIBO ordered     Please call our clinic or send a [employer] message to us if you have any questions or concerns. [employer] messages are answered by your nurse or doctor typically within 24 hours.  Please allow extra time on weekends and holidays    Return to GI Clinic in 10 wk or sooner PRN to review your progress.    _______________________________________________________________________    How do I schedule labs, imaging studies, or procedures that were ordered in clinic today?      Labs: To schedule lab appointment at the [facility], use my chart or call [phone]. If you have a [facility] lab closer to home where you are regularly seen you can give them a call.      Procedures: If a colonoscopy, upper endoscopy, breath test, esophageal manometry, or pH impedence was ordered today, our endoscopy team will call you to schedule this. If you have not heard from our endoscopy team within a week, please call [phone] option 2 to schedule.      Imaging Studies: If you were scheduled for a CT scan, X-ray, MRI, ultrasound, HIDA scan, EKG or other imaging study, please call [phone] to have this scheduled.      Referral: If a referral to another specialty was ordered, expect a phone call or follow instructions above. If you have not heard from anyone regarding your referral in a week, please call our clinic to check  the status.      Who do I call with any questions after my visit?  Please be in touch if there are any further questions that arise following today's visit.  There are multiple ways to contact your gastroenterology care team.       During business hours, you may reach a Gastroenterology nurse at [phone]     To schedule or reschedule an appointment, please call [phone].      You can always send a secure message through [email or web address].  [employer] messages are answered by your nurse or doctor typically within 24 hours.  Please allow extra time on weekends and holidays.       For urgent/emergent questions after business hours, you may reach the on-call GI Fellow by contacting the [facility]  at [phone].     How will I get the results of any tests ordered?    You will receive all of your results.  If you have signed up for [employer], any tests ordered at your visit will be available to you after your physician reviews them.  Typically this takes 1-2 weeks.  If there are urgent results that require a change in your care plan, your physician or nurse will call you to discuss the next steps.       What is [employer]?  [employer] is a secure way for you to access all of your healthcare records from the [facility].  It is a web based computer program, so you can sign on to it from any location.  It also allows you to send secure messages to your care team.  I recommend signing up for [employer] access if you have not already done so and are comfortable with using a computer.       How to I schedule a follow-up visit?  If you did not schedule a follow-up visit today, please call [phone] to schedule a follow-up office visit.      Sincerely,    Melba Dasilva PA-C  Gastroenterology

## 2025-01-13 NOTE — NURSING NOTE
"Chief Complaint   Patient presents with    Follow Up       Vitals:    01/13/25 0836   BP: 112/73   Pulse: 75   SpO2: 98%   Weight: 75.3 kg (166 lb)   Height: 1.778 m (5' 10\")       Body mass index is 23.82 kg/m .    Edil Farrell MA       "

## 2025-01-13 NOTE — PROGRESS NOTES
GI CLINIC VISIT    ASSESSMENT/PLAN:  Nikunj Sihna is a 59 year old year old male with PMHx of former heavy ETOH use, MDD/Anxiety, h/o SI (requiring admissions), b/l DVT with saddle pulmonary embolism 2019 (on Rivaroxaban) following with the [facility] GI group for chronic abd pain h/o unintentional weight loss of 30# over last year (now stabilized). He underwent an EGD/EUS with Dr Mcleod 12/2024 and is here to review the results.     #epigastric abd pain x 20 years   2020 Cscope clean with 10 year recall and no FHX of CRC. 2024 MR Abd MRCP with below findings - no obstruction or large masses. H.pylori NEG 9/2024. Celiac serologies NEG (no intact total IgA). Fecal elastase WNL.Given chronicity, pain is thought driven by functional disease with ddx to include biliary disease, NERD, transient obstructive process, , gastroparesis vs other.     -Underwent EGD/EUS that was unremarkable, requested biopsies not taken.   -order h.pylori, once submitted, start daily PPI for functional dyspepsia . Omeprazole x 12 wk   --If neuromodulator is considered, will coordinate with external psych - [facility] - Dr Morena Dunaway - [phone]  -order breath test to eval for SIBO (risk - duodenal diverticulum)  -asked he make an appt with primary for further evaluation of weight loss     Future consideration  1.meds to follow - H2B, neuromodulator, botanical    2. RUQ US +/- HIDA  3. NMGES     #CBD dilation to 10 mm  #mild diffuse dilation of main PD w/o obstruction   Recent EGD/EUS that was unremarkable (2024), slight prominence thought due to diverticulum. Liver enzymes have been normal 9/2024  -No further work up indicated at this time.     #[facility]  Noted to GB neck on EUS - we reviewed s/sx of biliary colic    # b/l DVT with saddle pulmonary embolism (2019)  Per 10/20/22 hematology note, VTE was thought to be unprovoked however on that appt, he reported heavy ETOH use, living out of this car, and sleeping upright in his care  routinely - plan at that time was to consider the VTE provoked however given the clot burden/severity, plan was to keep him on daily Xarelto 10 mg indefinitely. Plan was to be reviewed yearly.     No orders of the defined types were placed in this encounter.    Colorectal cancer screening: done 2020 with 10 year recall     RTC - 10 wk or sooner PRN     Thank you for this consultation.  It was a pleasure to participate in the care of this patient; please contact me with any further questions.     Melba Dasilva PA-C    Follow up: As planned above. Today, I personally spent 36 minutes in direct face to face time with the patient, of which greater than 50% of the time was spent in patient education and counseling as described above. Approximately 10 minutes were spent on indirect care associated with the patient's consultation including but not limited to review of: patient medical records to date, clinic visits, hospital records, lab results, imaging studies, procedural documentation, and coordinating care with other providers. The findings from this review are summarized in the above note. All of the above accounted for a cumulative time of 46  minutes and was performed on the date of service.     HPI  Nikunj Sinha is a 59 year old year old male with PMHx of former heavy ETOH use, MDD/Anxiety, h/o SI (requiring admissions), b/l DVT with saddle anesthesia 2019 (on Rivaroxaban) [facility] for abd pain.     2024 appt with me   1. Epigastric abd pain x 20 years. worse in past several years.   Persistent pain. Gnawing and dull pain. Can have very short periods of no or very minimal pain. Nonradiating.  Worsened with meals (but unclear if it worsens immediately or with a time delay) . He knows NSAIDs, fried foods, carbonated beverages will make it worse   -Improved - 1/2 tsp baking soda in water, pepcid BID  -Tried pepto (no relief), prilosec (2wk, unclear if helped)     Found to be H.pylori POS and now  "treated x 10d. Pain did not improve much afterwards.     -Lost~30# as avoiding eating, it makes pain worse. Reports he was 198 (start of the year) to 165, unintentional. Seems to have stabilized.   -Associated with fatigue and feeling tired  -also reporting nausea w/o emesis, occasional post-prandial bloating, stooling changes  Denies dysphagia, odynphagia.     Bowel Habits   -Once daily BSC type 3, regular for him. No straining. Good evacuation. On toilet for a few min. No blood or black stools. This is is normal stooling pattern w/o changes.   -Cscope 2020 - cecal intubation, good prep. Normal macro colon with 10 year recall (2030)    Today 1/13/2025  Eating gluten, serologies were negative. No fhx of celiac disease.   Mother with similar abd pain, chronic of unknown etiology   Had EUS / EGD with Dr Mcleod - no trouble with recent procedure.   -some voice hoarseness afterwards but otherwise OK   Abd pain continues to same degree as previously described. See below. No progression.    \"Persistent pain. Gnawing and dull pain. Can have very short periods of no or very minimal pain. Nonradiating.  Worsened with meals (but unclear if it worsens immediately or with a time delay) . He knows NSAIDs, fried foods, carbonated beverages will make it worse   -Improved - 1/2 tsp baking soda in water, pepcid BID  -Tried pepto (no relief), prilosec (2wk, unclear if helped)\"  -pressing on abd makes it hurt more but denies changes in pain with positional changes  Weight - lowest weight since adulthood. Down from 200/190s. Stabilized 166# without further loss, per patient.   1-2 meals, finishing meals; normal for him, typically avoids breakfast  Reports depressed appetite   Does have dentures (upper/lower - well fitting; used when eating)   Occasional nausea w/o emesis.   No oral pain/dental pain, dysphagia, odynphagia, bloody or black stools.   BM - one BSC type 3-4 daily. No straining with good evacuation. bloody or black stools. "   -tried Gas-X feels it helped cut down on bloat sensation, no SE on med  No drug use. Prior heavy ETOH use. Fecal elastase WNL. EUS with normal appearing pancreas.   Anxiety/depression - stable. Lexapro 5 mg and and fluoxetine 40 mg    [facility] - Dr Morena Dunaway - [phone]  -previously on Zoloft, Effexor, Cymbalta, Wellbutrin. May have been on Remeron years ago (short period of time)  -never been on TCAs   H.pylori - bismuth, flagyl, tetracycline. 3 caps daily four times daily meals x 10 days  No other questions or concerns today    Wt Readings from Last 10 Encounters:   01/13/25 75.3 kg (166 lb)   12/19/24 77.8 kg (171 lb 8.3 oz)   11/21/24 76.7 kg (169 lb)   11/20/24 76.4 kg (168 lb 8 oz)   11/19/24 76.1 kg (167 lb 11.2 oz)   09/20/24 77.4 kg (170 lb 9.6 oz)   10/12/23 86 kg (189 lb 11.2 oz)   12/01/22 89.4 kg (197 lb)   11/22/22 89.2 kg (196 lb 11.2 oz)   10/20/22 88 kg (194 lb)     Family Hx  Mom - unknown stomach issues   No other known family history or GI related malignancy (esophageal, gastric, pancreatic, liver or colon) or family history of IBD/celiac disease.     Social Hx     Occasional use of NSAIDs - 2-3x a month for headaches   A B complex vitamin, Vit D, elijah capsulebut otherwise denies use of OTC herbal supplements/weight loss products.      No ETOH. Former heavy drinker. Stopped 5y year ago. May have had pancreatitis but he is unknown.   Former tobacco products. 3 years x 1 ppd   No recreational drug use     PROBLEM LIST  Patient Active Problem List    Diagnosis Date Noted    History of paroxysmal supraventricular tachycardia 12/01/2024     Priority: Medium    Abnormal finding on MRI of brain 09/29/2024     Priority: Medium    History of basal cell cancer 09/29/2024     Priority: Medium     left ala nose, s/p mohs , reconstruction and laser rx      Bone island 09/29/2024     Priority: Medium    Family history of arrhythmia 09/29/2024     Priority: Medium    Family history of ischemic heart  disease 09/29/2024     Priority: Medium    Dilated pancreatic duct 09/29/2024     Priority: Medium    Sludge in gallbladder 09/29/2024     Priority: Medium    Weight loss 09/29/2024     Priority: Medium    Bilateral inguinal hernia without obstruction or gangrene, recurrence not specified 12/01/2022     Priority: Medium    Umbilical hernia without obstruction and without gangrene 12/01/2022     Priority: Medium    Diverticulosis of large intestine without hemorrhage 12/01/2022     Priority: Medium    Liver cyst 12/01/2022     Priority: Medium    Duodenal diverticulum 12/01/2022     Priority: Medium    IPMN (intraductal papillary mucinous neoplasm) 12/01/2022     Priority: Medium    Dilation of common bile duct 12/01/2022     Priority: Medium    Seborrheic keratoses 07/16/2020     Priority: Medium    Need for shingles vaccine 07/16/2020     Priority: Medium    Elevated LDL cholesterol level 12/20/2019     Priority: Medium    Chronic anticoagulation 12/01/2019     Priority: Medium    Personal history of DVT (deep vein thrombosis) 12/01/2019     Priority: Medium    Current severe episode of major depressive disorder without psychotic features, unspecified whether recurrent (H) 11/29/2019     Priority: Medium     11/29/19:  Unemployed, from [place] originally where was getting his care at the local clinic there, new to me & this clinic, limited records on care everywhere only viewable after he got to the room, with limited apt time.   Hx of ACS, NSTEMI with elevated troponin, no CAD on angiogram,  but found to have a acute saddle embolism with cor pulmonale & hx of dvt,  in 8/2019 on lifelong anticoagulation now on xarelto 20 mg daily, ever since under care of hematology, hx of moderate alcohol dependance in early remission with hx of withdrawal & mood disorder, previously on campral ( discontinued secondary to diarrhea), & naltrexone( stopped as ineffective), ZAIDA, MDD, suicidal ideation, on wellbutrin xl 150 mg  "daily, lexapro 20 mg daily, gabapentin 300 mg bedtime & a multivitamin, also on proranolol, previously on abilify 5 mg, citalopram 10 mg, hydroxyzine, mirtazipine & trazodone, , dental caries, recent dental abscess s/p abx to see the dentist soon, hx of essential tremor on propranolol 20 mg tid, allergic to ragweeds, no records from [facility] but reviewed care everywhere [facility] records,     Seen at [facility] 18 with suicidal ideation. \" after a friend called  worried about pt.'s suicidal ideation. SW met with Pt who appeared [place] and guarded. When SW asked Pt what brought him in he indicated it was a long story. Pt reports that his mother, father and sibling all  within a 10 year time period of various medical issues. 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 Pt reports that he used credit cards to pay for daily expenses. Pt reports that he now has extreme credit card debt. Pt lost his job recently due to his car having mechanical issues and he was missing work. Today Pt was served a 24 hour notice by the [employer] department to leave his apartment. Pt is hopeless and feels that he is \"in too deep\" and nothing will get better. Pt did not appear able to contract for safety. When [facility] asked what Pt would do if he went home, he shrugged and said \"I don't know\", SW indicated that she was worried Pt would go home and hurt himself because he felt so hopeless Pt shrugged again but did not reply. Pt has no support in the community and is isolated. The friend that called  lives in [place] and Pt has never met her in person but they have talked on the phone for 20 years. SW indicated that she must have been worried enough to figure out how to call the police in [place]. Pt shrugged and said \"I guess\". Pt does not feel an admission will change anything, \"I just don't see what being admitted will do or how it can help\". [facility] and MD discussed their " "concern over Pt.'s safety and his inability to contract for safety along with his state of hopelessness with Pt. He only nodded but agreed to be admitted voluntary in the hopes that the SW on the unit may have some idea's on how to help him. BAL/Breathalyzer completed (date/results): yes Pt was .219 when the police picked him up several hours ago  He does not see a therapist or psychiatrist, however, and although his primary care physician does prescribe him citalopram he has not recently spoken with him or does not sound as if he is disclosed how severe his symptoms are. He does admit to self-medicating with alcohol. When he has stopped drinking in the past he will get rather shaky and a bit nauseous. Otherwise he has not had hallucinations or seizures in the past related to that. When he spoke with his friend today he told him that he thought he should prepared to say goodbye to him, but when I asked why his friend would be concerned with that if this was a daily thought process for him he stated that he does not talk about it every day. He is rather hopeless and he is not sure why he has not done anything yet to harm himself.\" did admit to possibly hanging himself. Initially he was started on the alcohol withdrawal protocol and monitored for a safe detoxification. Was drinking up to a liter per day for the past few weeks prior to admission. Admitted on a voluntary basis. Agreeable to stay voluntarily to coordinate cares medication management. He was admitted to psychiatric unit for safety, stabilization and medication management. Eventually gained insight into the disease process and addiction. Was able to cooperate and participate in programming and educate on the medication as well. Started on campral, abilify, gabapentin, mirtazipine & trazodone.  hydroxyzine, & continued on citalopram. A rule 25 was completed. He spoke of some of his historical challenges including taling care of his brother with ALS and his " "mother who was dying of metastatic cancer and his ailing father. During this time he developed a drinking habit. Additional stressors include being evicted from his apartment. He reports that he was drinking 1.75 L of Vodka per day. He denies suicidal ideations and last felt suicidal about 6 days ago. He stated, Suicide use to be plan A but I no longer feel that way. He has hope for the future. He looks forward to discharge to [facility]. He feels much less hopeless\"    Seen in ER at [facility] on 6/12/19 \" for increased depression, hopelessness, and alcohol abuse. Pt was kicked out of his sober living today after coming home from a therapy appt smelling like alcohol. Pt reports that he has been having a good week and had a really good therapy appt today because he drank before hand and was able to disclose information to his therapist he did not have the courage to do before. Pt returned to his sober house and was confronted about his alcohol abuse, they called the police and sent him here. Pt has been seen in this ED previously for depression and suicidal ideation. Pt is currently denying any Suicidal ideation or plan but also is stating that he has \"fucked up his life beyond repair\" and that \"he doesn't know if he wants to live\". Pt has no family or any support, recently lost his housing, and is struggling to deal with all the loss he has endured in his life. 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 Pt has many regrets in his life and struggling to cope with all the loss. Pt would benefit from a crisis residence\".     In ER [facility] on 10/17/2019 for sucidal ideation & alcohol abuse: \"Nikunj Sinha is a 54 y.o. male being seen by Crisis Social Work regarding increased depressive symptoms, anxiety, and suicidal ideation. The patient presented to the outpatient clinic today for a Rule 25 assessment. He endorsed numerous acute depressive symptoms. The " "patient states that he was on 2700 for a period of time and found the program to be very helpful. He was then discharged to a sober house with a referral for day programming at [facility]. He has continued to relapse on alcohol and has been unsuccessful outside of a structured program. He was previously working a good job as a Scoring Director at [employer] \"but I drank myself out of that job.\" He has now been evicted from his apartment and finds himself homeless. The patient rates his depression as 8 out of 10 and anxiety as 9 out of 10. He is now feeling hopeless with thoughts of suicide and a plan to hang himself. He states that he hasn't taken any steps towards acting on the thoughts but that \"it's something in my mind.\" The patient states that he feels somewhat isolated. He has one friend in [place], but no supportive family that are still alive. The patient's Rule 25 was completed today and this writer verified with Isabella Joe that he is not on her list. She states that once she receives the Rule 25 from the clinic and he is psychiatrically treated he can be considered for 2700. The patient has remained calm, cooperative and pleasant. Patient was sent to the ER from the outpatient addiction clinic. He was here today for a Rule 25 in the outpatient addiction clinic at [facility]. He was sent by his clinician Alexandr after completing a Rule 25 assessment. The patient endorsed numerous depressive symptoms and states that he is having suicidal ideation. The patient does not identify a specific plan but states that he has several and hasn't decided which to act on. The patient's last drink was yesterday at noon and is now observed as having some withdrawal symptoms. Alexandr states that the patient is engaged with outpatient CD treatment at [facility] but he has been unable to maintain any sobriety outside of a structured facility. Roughly one month ago he stopped taking all of his psych medications. Alexandr recommends " that the patient go to inpatient mental health for his suicidal ideation, and if he is not acute enough for that placement that we pursue 2700.  Admitted on a voluntary basis. Agreeable to stay voluntarily to coordinate cares and medication management. Disposition recommended on today's date prior to intake for MI CD treatment. Recommendation by hospitalist to proceed with dental cares. Further efforts to address community concerns prior to proceeding with MI CD treatment. Patient seen by hospitalist during course of hospitalization. Started on antibiotic. Recommendation for evaluation by hospitalist in the community, given lack of provider at the facility. Patient requesting to be seen by dentist prior to MI CD treatment.  Patient placed on a CIWA protocol. Did not demonstrate signs or symptoms of complicated alcohol withdrawal.  Psychiatric medication management performed in detail. Medication management performed to target signs and symptoms of principal diagnosis. Further medication management performed to target comorbid illnesses. Medication management included:    Lexapro: Continued restart of PTA medication for depression and anxiety.    Wellbutrin XL:  Continued restart of of PTA medication for combination therapy.    Gabapentin:  Scheduled medication management for augmentation. Continued restart of PTA medication as needed for anxiety.    Propranolol: Continue restart of PTA medication as needed for anxiety.    Naltrexone: Continued trial for alcohol use disorder; LFTs normalizing. Repeat LFTs scheduled.    Campral: Discontinued PTA medication secondary to GI side effects.  Medication adherent. Tolerating medication management. Progressed with mood and anxiety. Denies psychosis. Denies suicidal and homicidal ideation. Did an appropriate crisis and relapse plan. Future oriented. Denies gun access. Indicated motivation to proceed with MI CD treatment as coordinated by  with intake date on  "Friday, November 1, 2019.   followed in regards to collecting and reviewing collateral information, coordination of cares and discharge planning. Including, MI CD evaluation completed with recommendation for placement to unit 2700. Due to dental issues, patient discharged to medical respite bed and secured on today's date to bridge placement. Discharged to Medical Respite at noon and Inpatient CD treatment on unit 2700 on 11/01 at noon. Further recommendation to refer to psychiatrist in the community after discharge from unit 2700. Cares coordinated with case management.  Treated for dental abscess with pcn.\"    [place]  shows received gabapentin 100 mg #270 on 1/4/19 at [place], then 300 mg # 90  On 2/11/19 in [facility], 100 mg # 60 on 3/6/19 in [facility], then # 180 on 6/20/19, 300 mg # 60 on 10/30/19 & # 30 of 300 mg on 11/21/19    Seen nov 29th, 2019 to establish care as doesnt have a PCP.   Severe MDD/ZAIDA./ hx of passive suicidal thoughts: discharged nov 22nd 7 days ago, currently in sober housing, has [facility] apt with psyche [facility] on 12/5. To also see psyhcology 12/11 but plans to reschedule that at upcoming psyche apt next week as it is in the morning & he needs to go to out patient rx in the am. Has enough meds till sees psyche. Has passive suicidal thoughts. No active plan. Contracts to safety & forwarding thinking about apts etc. Is taking all his meds.     Hx of alcohol abuse, dependance etc, Currently sober, last drink was oct 14th, notes was on naltrexone then stopped as no longer helped. Was on campral before but had to discontinue due to diarrhea. Has done [facility] program twice once beginning of the year and one last week      Moderate alcohol dependence in early remission (H) 11/29/2019     Priority: Medium    ZAIDA (generalized anxiety disorder) 11/29/2019     Priority: Medium    History of suicidal ideation 11/29/2019     Priority: Medium    Chronic fatigue 11/29/2019 " "    Priority: Medium    Postural dizziness with near syncope 11/29/2019     Priority: Medium    Tinnitus, bilateral 11/29/2019     Priority: Medium    Memory difficulties 11/29/2019     Priority: Medium    Dental caries 11/29/2019     Priority: Medium    Seasonal allergic rhinitis, unspecified trigger 11/29/2019     Priority: Medium    History of pulmonary embolism 11/29/2019     Priority: Medium     [facility]: \"hx of depression, anxiety, alcohol abuse disorder (sober x 2 months, He is a recovering alcoholic. He currently resides in sober house since June 2019) who presented with shortness of breath and 2 days of chest pain and near syncope. He presented to the emergency room on 8/4/2019 after he had a syncopal episode in front of [facility]. He did not recall particular chest pain or shortness of breath. He noted swelling of his leg in various spot but he denies any persistent pain or swelling or redness. No injury. No acute illness prior to that. However he donated plasma 10 times in 5 weeks prior to that. Upon initial evaluation, d-dimer was elevated at 6.7. PTT was normal at 28, INR is 1.09. He underwent coronary angiogram and it did not show any obstructive coronary artery disease.Due to an elevated troponin he underwent coronary angiogram which demonstrated no significant CAD. Due to an elevated d-dimer underwent CT scan of the chest which showed extensive acute bilateral pulmonary embolism with saddle embolus.  Doppler legs showed Nonocclusive right femoral DVT from upper to mid thigh. Left calf DVT within one of the peroneal veins in both of the posterior tibial veins from upper to low calf. Echocardiogram revealed lower limit of normal left ventricular systolic function of 50% with septal motion compatible with right ventricular overload.  He had cor pulmonale and was started on heparin infusion. No thrombolytics were given due hemodynamic and respiratory stability. After several days of " "monitoring on IV heparin Mr. Sinha continued to improve and he was transitioned to Xarelto for ongoing anticoagulation therapy. He tolerated Xarelto well. No major bleeding except intermittent gum bleeding. Denies family history of venous thromboembolism. He had a brother who passed away from [facility]. He is single. Does not have children. Was advised to continue with indefinate anticoagulation. Mr. Sinha did donate plasma 2x/week for the last 5 weeks and I wonder if this led to a transient hypercoagulable state. Mr. Sinha was advised not to donate plasma moving forward. An outpatient referral to hematology has been made\".      Seen by hematology 9/2019 [facility]. She reviewed the pathophysiology of venous thromboembolic exam and risks of recurrence. He is wondering about excessive plasma donation trigger that event and I think it is possible. At this point, regardless of the etiology, he is indicated for long-term anticoagulation due to initial presentation of life-threatening episode. No prior history of venous thromboembolism or family history of thromboembolism. I would not recommend family/hereditary thrombophilia work-up. I also discussed that we do not need to repeat CT scan of the chest to know the resolution of blood clots, however will obtain CTA if he has signs and symptoms concerning for venous thromboembolism. I discussed about strict abstinence from alcohol with anticoagulants. I recommended to obtain kidney, liver function monitoring at least once a year.   I discussed about age-appropriate cancer screening. He admitted that he had positive stool test (sounds like FOBT) a couple years ago. Unclear if it was related to his alcohol use. He was recommended to proceed with colonoscopy, but has not completed yet. I recommended him to complete colonoscopy however, we will need to wait at least 3-6 months because of this recent pulmonary emboli event and I would not recommend interruption of " "anticoagulation at this point. He voiced understanding. I noted that he has a gastroenterology referral. Recommended him to have a primary care provider as His previous primary care provider is retiring/retired. Until he finds a primary care provider, I will continue to manage his anticoagulation. Follow-up with me in about 6 months with labs including hemogram, CMP.\"          Epigastric pain 11/29/2019     Priority: Medium       PERTINENT PAST MEDICAL HISTORY:  Past Medical History:   Diagnosis Date    Abdominal pain, epigastric 11/29/2019    Acute embolism and thrombosis of unspecified deep veins of lower extremity, bilateral (H) 11/19/2024    Antiplatelet or antithrombotic long-term use     Chronic anticoagulation 12/01/2019    Current severe episode of major depressive disorder without psychotic features, unspecified whether recurrent (H) 11/29/2019    Decreased cardiac ejection fraction 08/22/2021    Echo oct 2022 normal     Dental caries 11/29/2019    Dizziness 11/29/2019    Erectile dysfunction, unspecified erectile dysfunction type 11/29/2019    Essential tremor 12/01/2019    Fatigue, unspecified type 11/29/2019    ZAIDA (generalized anxiety disorder) 11/29/2019    History of chest pain 11/29/2019    History of pulmonary embolism 11/29/2019    History of suicidal ideation 11/29/2019    Memory changes 11/29/2019    Moderate alcohol dependence in early remission (H) 11/29/2019    Palpitations 11/29/2019    Positive FIT (fecal immunochemical test) 11/29/2019    Colonoscopy 11/2020 normal, labs normal    Seasonal allergic rhinitis, unspecified trigger 11/29/2019    Tinnitus, bilateral 11/29/2019    Weight gain 11/29/2019       PREVIOUS SURGERIES:  Past Surgical History:   Procedure Laterality Date    COLONOSCOPY N/A 11/12/2020    Procedure: COLONOSCOPY;  Surgeon: Belinda Jorgensen MD;  Location: [facility] OR    CV CORONARY ANGIOGRAM N/A 08/04/2019    Procedure: Coronary Angiogram;  Surgeon: Chaparro" MD Ambrose;  Location: [facility] Cath Lab;  Service: Cardiology    CV LEFT HEART CATHETERIZATION WITHOUT LEFT VENTRICULOGRAM Left 2019    Procedure: Left Heart Catheterization Without Left Ventriculogram;  Surgeon: Ambrose Mayfield MD;  Location: [facility] Cath Lab;  Service: Cardiology    ENDOSCOPIC ULTRASOUND UPPER GASTROINTESTINAL TRACT (GI) N/A 2024    Procedure: ENDOSCOPIC ULTRASOUND, ESOPHAGOSCOPY / UPPER GASTROINTESTINAL TRACT (GI);  Surgeon: Dakota Mcleod MD;  [facility]    ESOPHAGOSCOPY, GASTROSCOPY, DUODENOSCOPY (EGD), COMBINED N/A 2024    Procedure: ESOPHAGOGASTRODUODENOSCOPY, WITH BIOPSY;  Surgeon: Dakota Mcleod MD;  Location: [facility] OR    TONSILLECTOMY         ALLERGIES:     Allergies   Allergen Reactions    Ragweeds Other (See Comments)     congestion       PERTINENT MEDICATIONS:    Current Outpatient Medications:     escitalopram (LEXAPRO) 5 MG tablet, Take 5 mg by mouth at bedtime. By suzette psychalva [place], Disp: , Rfl:     famotidine (PEPCID) 20 MG tablet, Take 1 tablet (20 mg) by mouth at bedtime., Disp: 90 tablet, Rfl: 0    FLUoxetine (PROZAC) 40 MG capsule, Take 40 mg by mouth at bedtime. By suzette psyche [place], Disp: , Rfl:     rivaroxaban ANTICOAGULANT (XARELTO) 10 MG TABS tablet, Take 1 tablet (10 mg) by mouth daily with food. (Patient taking differently: Take 10 mg by mouth at bedtime.), Disp: 90 tablet, Rfl: 0    SOCIAL HISTORY:  Social History     Socioeconomic History    Marital status: Single     Spouse name: Not on file    Number of children: Not on file    Years of education: Not on file    Highest education level: Not on file   Occupational History    Not on file   Tobacco Use    Smoking status: Former     Current packs/day: 0.00     Average packs/day: 0.5 packs/day for 4.0 years (2.0 ttl pk-yrs)     Types: Cigarettes     Start date: 1978     Quit date: 1982     Years since quittin.0    Smokeless tobacco: Never   Vaping  "Use    Vaping status: Never Used   Substance and Sexual Activity    Alcohol use: Not Currently     Comment: sober since 10/2019    Drug use: Not Currently     Comment: CBD    Sexual activity: Not Currently   Other Topics Concern    Not on file   Social History Narrative    2019: moved in 1 week ago to sober house [place], previously living alone, no pets,      Social Drivers of Health     Financial Resource Strain: Not on file   Food Insecurity: Not on file   Transportation Needs: Not on file   Physical Activity: Not on file   Stress: Not on file   Social Connections: Not on file   Interpersonal Safety: Low Risk  (2024)    Interpersonal Safety     Do you feel physically and emotionally safe where you currently live?: Yes     Within the past 12 months, have you been hit, slapped, kicked or otherwise physically hurt by someone?: No     Within the past 12 months, have you been humiliated or emotionally abused in other ways by your partner or ex-partner?: No   Housing Stability: Not on file       FAMILY HISTORY:  Family History   Problem Relation Age of Onset    Arthritis Mother     Breast Cancer Mother     Depression Mother     Allergies Mother     Migraines Mother     Alcoholism Father     Lymphoma Father         non hodgkins    Neurodegenerative disease Brother         genetic disorder,  of some slow form of ALS    Suicidality Paternal Grandfather        Past/family/social history reviewed and no changes    PHYSICAL EXAMINATION:  Vitals reviewed: /73   Pulse 75   Ht 1.778 m (5' 10\")   Wt 75.3 kg (166 lb)   SpO2 98%   BMI 23.82 kg/m    Constitutional: aaox3, cooperative, pleasant, not dyspneic/diaphoretic, no acute distress  Eyes: Sclera anicteric/injected  Ears/nose/mouth/throat: hearing intact  Neck: supple, active ROM w/o limitation or pain   Respiratory: Unlabored breathing  Abd:  Nondistended, non-tender but reported some discomfort to epigastric region, no peritoneal signs, " neg Faust's sign,. NEG Carnett's sign.   Skin: warm, perfused, no jaundice  Psych: Normal affect  MSK: Normal gait     PERTINENT STUDIES:    Office Visit on 09/20/2024   Component Date Value Ref Range Status    Sodium 09/20/2024 139  135 - 145 mmol/L Final    Potassium 09/20/2024 4.2  3.4 - 5.3 mmol/L Final    Carbon Dioxide (CO2) 09/20/2024 26  22 - 29 mmol/L Final    Anion Gap 09/20/2024 11  7 - 15 mmol/L Final    Urea Nitrogen 09/20/2024 15.5  8.0 - 23.0 mg/dL Final    Creatinine 09/20/2024 0.94  0.67 - 1.17 mg/dL Final    GFR Estimate 09/20/2024 >90  >60 mL/min/1.73m2 Final    Calcium 09/20/2024 8.9  8.8 - 10.4 mg/dL Final    Chloride 09/20/2024 102  98 - 107 mmol/L Final    Glucose 09/20/2024 88  70 - 99 mg/dL Final    Alkaline Phosphatase 09/20/2024 68  40 - 150 U/L Final    AST 09/20/2024 26  0 - 45 U/L Final    ALT 09/20/2024 14  0 - 70 U/L Final    Protein Total 09/20/2024 7.0  6.4 - 8.3 g/dL Final    Albumin 09/20/2024 4.4  3.5 - 5.2 g/dL Final    Bilirubin Total 09/20/2024 0.5  <=1.2 mg/dL Final    Patient Fasting > 8hrs? 09/20/2024 Unknown   Final    TSH 09/20/2024 0.81  0.30 - 4.20 uIU/mL Final    Cholesterol 09/20/2024 245 (H)  <200 mg/dL Final    Triglycerides 09/20/2024 45  <150 mg/dL Final    Direct Measure HDL 09/20/2024 58  >=40 mg/dL Final    LDL Cholesterol Calculated 09/20/2024 178 (H)  <100 mg/dL Final    Non HDL Cholesterol 09/20/2024 187 (H)  <130 mg/dL Final    Patient Fasting > 8hrs? 09/20/2024 Unknown   Final    Estimated Average Glucose 09/20/2024 91  <117 mg/dL Final    Hemoglobin A1C 09/20/2024 4.8  0.0 - 5.6 % Final    Erythrocyte Sedimentation Rate 09/20/2024 4  0 - 20 mm/hr Final    CRP Inflammation 09/20/2024 <3.00  <5.00 mg/L Final    Vitamin D, Total (25-Hydroxy) 09/20/2024 26  20 - 50 ng/mL Final    Iron 09/20/2024 119  61 - 157 ug/dL Final    Iron Binding Capacity 09/20/2024 278  240 - 430 ug/dL Final    Iron Sat Index 09/20/2024 43  15 - 46 % Final    Ferritin 09/20/2024 132   31 - 409 ng/mL Final    Vitamin B12 09/20/2024 779  232 - 1,245 pg/mL Final    Helicobacter pylori Antigen Stool 09/24/2024 Positive (A)  Negative Final    Tissue Transglutaminase Antibody I* 09/20/2024 0.3  <7.0 U/mL Final    Tissue Transglutaminase Antibody I* 09/20/2024 <0.6  <7.0 U/mL Final    Copper Urine/Volume 09/24/2024 1.1  <=3.2 ug/dL Final    Copper 24 h Urine 09/24/2024 12.9  3.0 - 45.0 ug/d Final    Copper Urine ug/g Cr 09/24/2024 11.1  10.0 - 45.0 ug/g CRT Final    Creatinine, Urine per volume 09/24/2024 99  mg/dL Final    Creatinine, Urine per 24hr 09/24/2024 1163  800 - 2100 mg/d Final    WBC Count 09/20/2024 6.6  4.0 - 11.0 10e3/uL Final    RBC Count 09/20/2024 4.67  4.40 - 5.90 10e6/uL Final    Hemoglobin 09/20/2024 14.6  13.3 - 17.7 g/dL Final    Hematocrit 09/20/2024 43.3  40.0 - 53.0 % Final    MCV 09/20/2024 93  78 - 100 fL Final    MCH 09/20/2024 31.3  26.5 - 33.0 pg Final    MCHC 09/20/2024 33.7  31.5 - 36.5 g/dL Final    RDW 09/20/2024 12.2  10.0 - 15.0 % Final    Platelet Count 09/20/2024 223  150 - 450 10e3/uL Final    % Neutrophils 09/20/2024 65  % Final    % Lymphocytes 09/20/2024 29  % Final    % Monocytes 09/20/2024 5  % Final    % Eosinophils 09/20/2024 1  % Final    % Basophils 09/20/2024 0  % Final    % Immature Granulocytes 09/20/2024 0  % Final    Absolute Neutrophils 09/20/2024 4.3  1.6 - 8.3 10e3/uL Final    Absolute Lymphocytes 09/20/2024 1.9  0.8 - 5.3 10e3/uL Final    Absolute Monocytes 09/20/2024 0.3  0.0 - 1.3 10e3/uL Final    Absolute Eosinophils 09/20/2024 0.1  0.0 - 0.7 10e3/uL Final    Absolute Basophils 09/20/2024 0.0  0.0 - 0.2 10e3/uL Final    Absolute Immature Granulocytes 09/20/2024 0.0  <=0.4 10e3/uL Final    Manganese Whole Bld 09/24/2024 16.3  4.2 - 16.5 ug/L Final        Lab Results   Component Value Date    WBC 6.6 09/20/2024    WBC 7.9 10/04/2022    WBC 5.8 08/18/2021    HGB 14.6 09/20/2024    HGB 15.3 10/04/2022    HGB 15.1 08/18/2021     09/20/2024      10/04/2022     08/18/2021    CHOL 245 (H) 09/20/2024    CHOL 248 (H) 10/04/2022    CHOL 207 (H) 08/18/2021    TRIG 45 09/20/2024    TRIG 69 10/04/2022    TRIG 59 08/18/2021    HDL 58 09/20/2024    HDL 56 10/04/2022    HDL 56 08/18/2021    ALT 34 12/05/2024    ALT 14 09/20/2024    ALT 29 10/04/2022    AST 37 12/05/2024    AST 26 09/20/2024    AST 26 10/04/2022     09/20/2024     10/04/2022     08/18/2021    BUN 15.5 09/20/2024    BUN 13 10/04/2022    BUN 12 08/18/2021    CO2 26 09/20/2024    CO2 23 10/04/2022    CO2 23 08/18/2021    TSH 0.81 09/20/2024    TSH 0.51 10/04/2022    TSH 0.80 08/18/2021    INR 1.99 (H) 08/12/2019    INR 1.85 (H) 08/11/2019    INR 1.57 (H) 08/10/2019        Liver Function Studies -   Recent Labs   Lab Test 09/20/24  1423   PROTTOTAL 7.0   ALBUMIN 4.4   BILITOTAL 0.5   ALKPHOS 68   AST 26   ALT 14      MR ABDOMEN MRCP W/O & W CONTRAST     CLINICAL HISTORY: Liver cyst; IPMN (intraductal papillary mucinous  neoplasm); Sludge in gallbladder; Dilation of common bile duct;  Dilated pancreatic duct; Epigastric pain; Duodenal diverticulum     DATE: 11/17/2024 7:55 AM     TECHNIQUE:      Images were acquired with and without intravenous gadolinium contrast  through the upper abdomen. The following MR images were acquired  without intravenous contrast: TrueFISP, multiplanar T2-weighted, axial  T1 in/out of phase, T2-weighted MRCP images, axial diffusion-weighted  and axial apparent diffusion coefficient. T1-weighted images were  obtained before contrast at the multiple time points following  contrast injection. 3-D reformatted images were generated by the  technologist. Contrast dose: 8ml Gadavist     Comparison study: MR dated 11/25/2022.     FINDINGS:     Biliary Tree: Increased dilatation of CBD measuring up to 10 mm,  previously up to 6 mm. No identifiable obstructive process.  Intrahepatic bile ducts are mildly prominent. No cholelithiasis or  pericholecystic  inflammation.     Pancreas: Preserved intrinsic T1 signal. Stable mild dilatation of  main pancreatic duct measuring up to 5 mm. Stable 5 mm T2 hyperintense  cystic structure in the pancreatic body (20/34). No worrisome features  identified. No solid pancreatic lesions.     Liver: Noncirrhotic morphology. No significant fat or iron deposition.  No focal solid mass. Unchanged simple hepatic cysts, largest in  segment 4 measuring up to 3.5 cm.     Spleen: Normal.     Kidneys: No focal mass. No hydronephrosis.     Adrenal glands: No focal adrenal nodule.     Bowel: Normal caliber bowel loops. Small periampullary duodenal  diverticulum.     Lymph nodes: No adenopathy.     Blood vessels: Patent major abdominal vasculature.     Lung bases: Patchy bibasilar atelectasis.     Bones and soft tissues: Degenerative changes in the visualized spine.     Mesentery and abdominal wall: Small fat-containing umbilical hernia.  Mild nonspecific central mesenteric stranding.     Ascites: Not present.                                                                      IMPRESSION:   1. Stable small cystic lesion in the pancreatic body could represent a  dilated sidebranch/sidebranch IPMN. No worrisome features identified.  Follow-up guidelines as detailed below.  2. Stable mild diffuse dilatation of the pancreatic duct without  obstructive process.  3. Interval mildly increased dilatation of CBD without identifiable  obstructive process. Redemonstrated small periampullary duodenal  diverticulum.     International evidence-based [place] guidelines for the management of  intraductal papillary mucinous neoplasm of the pancreas:   Surveillance is recommended if no high risk stigmata or worrisome  features are present:  Primary imaging modalities recommended are MRI/MRCP and MDCT  Size of largest cyst:   *  Less than 20 mm: Follow-up imaging in 6 months once, then every 18  months if no change. Stop surveillance if stable for 5 years.  *  More  "than 20 mm but less than 30 mm: Follow-up imaging at 6 months,  12 months, then yearly if no change.   *  More than 30 mm- follow up imaging every 6 months.     GI consultation for surgery/endoscopic ultrasound is recommended for  cysts with \"high-risk stigmata\" of high grade dysplasia or invasive  carcinoma such as:  1. Obstructive jaundice in a patient with cystic lesion of the head of  the pancreas  2. Enhancing mural nodule > 5mm or solid component  3. Main pancreatic duct >10mm  4. Suspicious or positive results of cytology     If worrisome features are present, GI consultation is recommended.  Worrisome features on imagin. Cyst more than or equal to 30 mm  2. Thickened or enhancing cyst walls  3. Main pancreatic duct > 5mm and < 10mm.  4. Abrupt change in caliber of pancreatic duct with distal atrophy  5. Lymphadenopathy  6. Cyst growth rate > 2.5mm/year       PREVIOUS ENDOSCOPY    Results for orders placed or performed during the hospital encounter of 20   COLONOSCOPY    Collection Time: 20  6:46 AM   Result Value Ref Range    COLONOSCOPY       [facility]  [address] [place] [address]., [place] [address] [phone]     Endoscopy Department  _______________________________________________________________________________  Patient Name: Nikunj Sinha            Procedure Date: 2020 6:46 AM  MRN: [id number]                       Account Number: [id number]  YOB: 1965              Admit Type: Outpatient  Age: 55                               Room: [facility]  Gender: Male                          Note Status: Finalized  Attending MD: Belinda Jorgensen MD  Pause for the Cause: completed  Total Sedation Time: 32 min.            _______________________________________________________________________________     Procedure:           Colonoscopy  Indications:         Positive fecal immunochemical test  Providers:           Belinda Jorgensen MD, Brenda Patel RN  Referring " MD:        Adriana Solis Md, MD  Medicines:           Fentanyl 100 micrograms IV, Midazolam 2 mg IV  Complications:       No immediate com plications.  _______________________________________________________________________________  Procedure:           Pre-Anesthesia Assessment:                       - Prior to the procedure, a History and Physical was                        performed, and patient medications and allergies were                        reviewed. The patient is competent. The risks and                        benefits of the procedure and the sedation options and                        risks were discussed with the patient. All questions                        were answered and informed consent was obtained. Patient                        identification and proposed procedure were verified by                        the nurse in the pre-procedure area. Mental Status                        Examination: normal. Airway Examination: normal                        oropharyngeal airway and neck mobility. Respiratory                        Examination: clear to auscultation. CV Examination:                        nor mal. Prophylactic Antibiotics: The patient does not                        require prophylactic antibiotics. Prior Anticoagulants:                        The patient has taken Xarelto (rivaroxaban), last dose                        was 2 days prior to procedure. ASA Grade Assessment: II                        - A patient with mild systemic disease. After reviewing                        the risks and benefits, the patient was deemed in                        satisfactory condition to undergo the procedure. The                        anesthesia plan was to use moderate sedation / analgesia                        (conscious sedation). Immediately prior to                        administration of medications, the patient was                        re-assessed for adequacy to receive sedatives. The heart                         rate, respiratory rate, oxygen saturations, blood                        pressure, adequacy of pulmonary ventilation, and                        response to care wer e monitored throughout the                        procedure. The physical status of the patient was                        re-assessed after the procedure.                       After obtaining informed consent, the colonoscope was                        passed under direct vision. Throughout the procedure,                        the patient's blood pressure, pulse, and oxygen                        saturations were monitored continuously. The Colonoscope                        was introduced through the anus and advanced to the                        cecum, identified by appendiceal orifice and ileocecal                        valve. The colonoscopy was performed without difficulty.                        The patient tolerated the procedure well. The quality of                        the bowel preparation was good.                                                                                   Findings:       The perianal and digital rectal examinations were normal. Pertinent         negatives include normal sphincter tone.       The entire examined colon appeared normal.                                                                                   Impression:          - The entire examined colon is normal.                       - No specimens collected.  Recommendation:      - Discharge patient to home (with escort).                       - Repeat colonoscopy in 10 years for screening purposes.                       - Return to referring physician as previously scheduled.                                                                                     electronically signed by ERMA Jorgensen  _____________________  Belinda Jorgensen MD  11/12/2020 8:50:55 AM  I was physically present for the entire viewing portion of the  exam.  __________________________  Signature of teaching physician  Belinda Jorgensen MD  Number of Addenda: 0    Note Initiated On: 11/12/2020 6:46 AM  Scope In:  Scope Out:       Upper EUS 12/19/2024 10:57 AM [facility]   [address] [place] [address] [phone]     Endoscopy Department  _______________________________________________________________________________  Patient Name: Nikunj Sinha            Procedure Date: 12/19/2024 10:57 AM  MRN: [id number]                       Account Number: [id number]  YOB: 1965              Admit Type: Outpatient  Age: 59                               Room: [place] [address]  Gender: Male                          Note Status: Finalized  Attending MD: VINCENZO PLAZA MD,  Pause for the Cause: completed  Total Sedation Time:                    _______________________________________________________________________________     Procedure:             Upper EUS  Indications:           Common bile duct dilation (acquired) seen on MRI,                         Dilated pancreatic duct on MRI, Epigastric abdominal                         pain, Weight loss  Providers:             VINCENZO PLAZA MD, MICHAEL COLLADO, RN  Referring MD:          NICOLETTE JARAMILLO  Medicines:             Monitored Anesthesia Care  Complications:         No immediate complications.  _______________________________________________________________________________  Procedure:             Pre-Anesthesia Assessment:                         - Prior to the procedure, a History and Physical was                         performed, and patient medications, allergies and                         sensitivities were reviewed. The patient's tolerance                         of previous anesthesia was reviewed.                         - The risks and benefits of the procedure and the                         sedation options and risks were discussed with the                          patient. All questions were answered and informed                         consent was obtained.                         After obtaining informed consent, the endoscope was                         passed under direct vision. Throughout the procedure,                         the patient's blood pressure, pulse, and oxygen                         saturations were monitored continuously. The EUS                         Linear was introduced through the mouth, and advanced                         to the third part of duodenum. The Endoscope was                         introduced through the mouth, and advanced to the                         third part of duodenum. After obtaining informed                         consent, the endoscope was passed under direct vision.                         Throughout the procedure, the patient's blood                         pressure, pulse, and oxygen saturations were monitored                         continuously.The upper GI endoscopy was accomplished                         without difficulty. The patient tolerated the                         procedure well.                                                                                   Findings:       ENDOSCOPIC FINDING: :       The examined esophagus was endoscopically normal.       The entire examined stomach was endoscopically normal.       The examined duodenum was endoscopically normal.       Normal mucosa was found in the entire duodenum. Biopsies were taken with       a cold forceps for histology.       ENDOSONOGRAPHIC FINDING: :       There was a 2 cm periampullary diverticulum. This was filled with water       to improve visualization.       The bile duct was prominent but without an obstructing lesion. It       measured 8 mm in diameter.       The major papilla was endoscopically and sonographically normal and seen       on the rim of the diverticulum.       The main pancreatic duct was prominent in the genu,  measuring 3.3 mm but       the pancreas was otherwise normal. The duct measured 1.6 mm at the major       papilla.       A single equivocal non-shadowing stone was seen in the neck of the       gallbladder measuring 5 x 3 mm in diameter.       Incidental note was made of a 4 x 3 mm simple liver cyst in the left       lobe.       Ultrasound was otherwise normal.                                                                                   Impression:            - Normal esophagus.                         - Normal stomach.                         - Normal examined duodenum.                         - Normal mucosa was found in the entire examined                         duodenum. Biopsied for celiac disease given the recent                         weight loss                         - Periampullary diverticulum. This likely accounts for                         the slightly prominent bile duct. LFTs have been                         normal and this is not thought to be of clinical                         significance                         - Normal pancreas.                         - Equivocal single 5 x 3 mm non-shadowing stone in the                         neck of the gallbladder. Not seen on recent MRI.                         Presuming duodenal biopsies are normal, no potential                         etiology for the weight loss was identified. The                         possible stone does not seem consistent with the pain                         description as reported today.  Recommendation:        - Discharge patient to home.                         - Return to GI clinic with Melba Dasilva to discuss                         further evaluation and management.                                                                                     Electronically signed by Dr. Miles BAILEY DUODENUM, BIOPSY:  - Unremarkable duodenal mucosa  - No evidence of celiac disease

## 2025-01-16 DIAGNOSIS — R10.13 EPIGASTRIC PAIN: ICD-10-CM

## 2025-01-16 RX ORDER — FAMOTIDINE 20 MG/1
20 TABLET, FILM COATED ORAL AT BEDTIME
Qty: 90 TABLET | Refills: 0 | Status: SHIPPED | OUTPATIENT
Start: 2025-01-16

## 2025-01-20 PROCEDURE — 99000 SPECIMEN HANDLING OFFICE-LAB: CPT | Performed by: PATHOLOGY

## 2025-01-20 PROCEDURE — 87338 HPYLORI STOOL AG IA: CPT | Performed by: PHYSICIAN ASSISTANT

## 2025-01-20 ASSESSMENT — ANXIETY QUESTIONNAIRES
1. FEELING NERVOUS, ANXIOUS, OR ON EDGE: MORE THAN HALF THE DAYS
IF YOU CHECKED OFF ANY PROBLEMS ON THIS QUESTIONNAIRE, HOW DIFFICULT HAVE THESE PROBLEMS MADE IT FOR YOU TO DO YOUR WORK, TAKE CARE OF THINGS AT HOME, OR GET ALONG WITH OTHER PEOPLE: SOMEWHAT DIFFICULT
6. BECOMING EASILY ANNOYED OR IRRITABLE: SEVERAL DAYS
5. BEING SO RESTLESS THAT IT IS HARD TO SIT STILL: NOT AT ALL
3. WORRYING TOO MUCH ABOUT DIFFERENT THINGS: NEARLY EVERY DAY
GAD7 TOTAL SCORE: 13
2. NOT BEING ABLE TO STOP OR CONTROL WORRYING: NEARLY EVERY DAY
4. TROUBLE RELAXING: MORE THAN HALF THE DAYS
7. FEELING AFRAID AS IF SOMETHING AWFUL MIGHT HAPPEN: MORE THAN HALF THE DAYS

## 2025-01-21 ENCOUNTER — VIRTUAL VISIT (OUTPATIENT)
Dept: NEUROPSYCHOLOGY | Facility: CLINIC | Age: 60
End: 2025-01-21
Attending: FAMILY MEDICINE
Payer: COMMERCIAL

## 2025-01-21 DIAGNOSIS — F41.1 GAD (GENERALIZED ANXIETY DISORDER): ICD-10-CM

## 2025-01-21 DIAGNOSIS — F32.0 CURRENT MILD EPISODE OF MAJOR DEPRESSIVE DISORDER, UNSPECIFIED WHETHER RECURRENT: Primary | ICD-10-CM

## 2025-01-21 DIAGNOSIS — R41.3 MEMORY DIFFICULTIES: ICD-10-CM

## 2025-01-21 DIAGNOSIS — F10.91 ALCOHOL USE DISORDER IN REMISSION: ICD-10-CM

## 2025-01-21 LAB — H PYLORI AG STL QL IA: NEGATIVE

## 2025-01-21 PROCEDURE — 99207 PR PSYCHIATRIC DIAGNOSTIC EVALUATION: CPT | Mod: 95
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
## 2025-01-29 NOTE — PROGRESS NOTES
NAME  Nikunj Sinha    MRN  [id number]      [birth year]     AGE  59     SEX  Male     HANDEDNESS Right     EDUCATION 16     ESCOBEDO  25     PROVIDER       [employer]       STATION  OP            ORIENTATION      Time  0     Personal Info.     Place      Presidents             WAIS-IV       Digit Span RDS 10            DCT       E-Score  10            WRAT READING   Blue   SS  132     %ile  98     Grade Equivalence >12.9            WAIS-IV         Raw ScS    Digit Span  30 12    Similarities 34 16    Block Design 55 15    Coding  71 12           TRAIL MAKING TEST       Time Errors ScS T   A 23 0 11 55   B 47 0 13 61           HVLT   1     Raw T    Trial 1  9     Trial 2  12     Trial 3  12     Learning  3     Total Recall 33 62    Delayed Recall 12 61    Percent Retention 100% 55    True Positives 12     False Positives 0     Disc. Index  12 60           LASHELL-O COMPLEX FIGURE       Raw T %ile   Time to Copy 127  >16   Copy  35  >16           WMS-IV LOGICAL MEMORY YA     Raw ScS/%ile    LM I  25 10    LM II  25 12    Recognition 24 26-50            NAB NAMING  1   Raw 31 /31     z 0.33      T 52              KLEVER   H - Short   Raw 30      %ile 86             CLOCK DRAWING      Command  Normal     Copy  Normal            BVMT-Form 1 BVMT   #REF!     Raw T/%ile    Trial 1  3     Trial 2  7     Trial 3  8     Learning  5     Total Recall  18 41    Delayed Recall 8 47    Percent Retention 100% >16    Recognition Hits 5     Recognition F.P 0     Disc. Index  5 11-16    Copy Trial  0            DKEFS C-W Interference BVMT   #REF!     Raw SS    Color Naming   26 12    Word Reading 24 9    Inhibition  38 15    Inhibition Switching 48 13                  DKEFS Verbal Fluency BVMT   #REF!     Raw SS    Letter Fluency 49 14    Category Fluency 46 13    Switch. Flu. Total Correct 15 12    Switching Accuracy 14 12           BDI-II       Raw  25     Interpretation Moderate            ZAIDA-7       Raw  9     Interpretation  Mild

## 2025-02-17 ENCOUNTER — PRE VISIT (OUTPATIENT)
Dept: NEUROLOGY | Facility: CLINIC | Age: 60
End: 2025-02-17

## 2025-03-04 ENCOUNTER — TELEPHONE (OUTPATIENT)
Dept: GASTROENTEROLOGY | Facility: CLINIC | Age: 60
End: 2025-03-04
Payer: COMMERCIAL

## 2025-03-04 NOTE — TELEPHONE ENCOUNTER
Non-Invasive GI Procedure Scheduling Questionnaire    Scheduling Reminders:  Add-on within 1 week require clinic approval (Manometry & HBT)  Manometry requires an in-person interrupter (not needed for HBT)      Have you had any respiratory illness or flu-like symptoms in the last 10 days?  No    Are you active on MyChart?   Yes    What insurance is in the chart?  Other:  HP    Ordering/Referring Provider: Melba Dasilva PA-C in [facility]     (If ordering provider performs procedure, schedule with ordering provider unless otherwise instructed. )    (Females) Are you currently pregnant? No - Okay to continue scheduling.    Have you ever had or are you awaiting a heart or lung transplant? No - Schedule next available.    Do you need assistance transferring? No - Continue scheduling.    Do you take acid reflux medication or proton-pump inhibitors (PPI)? Yes - Advise patients to discontinue acid suppression medications 2-4 weeks prior to the exam/procedure. ( Scheduled for more than 14 days out.)    Patient Reminders:  Please inform patients to review instructions sent via [employer] or letter two weeks before procedure.  The Manometry exam may take up to 90 minutes.  The Breath Test exam may take up to 4 hours.    Non - Invasive Pool: P [facility] ESOPHAGEAL NURSES [id number]

## 2025-03-27 ENCOUNTER — OFFICE VISIT (OUTPATIENT)
Dept: GASTROENTEROLOGY | Facility: CLINIC | Age: 60
End: 2025-03-27
Attending: PHYSICIAN ASSISTANT
Payer: COMMERCIAL

## 2025-03-27 ENCOUNTER — OFFICE VISIT (OUTPATIENT)
Dept: FAMILY MEDICINE | Facility: CLINIC | Age: 60
End: 2025-03-27
Payer: COMMERCIAL

## 2025-03-27 VITALS
HEIGHT: 70 IN | BODY MASS INDEX: 23.62 KG/M2 | WEIGHT: 165 LBS | SYSTOLIC BLOOD PRESSURE: 122 MMHG | HEART RATE: 61 BPM | DIASTOLIC BLOOD PRESSURE: 77 MMHG | OXYGEN SATURATION: 100 %

## 2025-03-27 VITALS
RESPIRATION RATE: 16 BRPM | SYSTOLIC BLOOD PRESSURE: 107 MMHG | OXYGEN SATURATION: 99 % | BODY MASS INDEX: 24.82 KG/M2 | DIASTOLIC BLOOD PRESSURE: 71 MMHG | TEMPERATURE: 97.3 F | WEIGHT: 173 LBS | HEART RATE: 93 BPM

## 2025-03-27 DIAGNOSIS — Z86.711 HISTORY OF PULMONARY EMBOLISM: ICD-10-CM

## 2025-03-27 DIAGNOSIS — M89.8X9 BONE ISLAND: ICD-10-CM

## 2025-03-27 DIAGNOSIS — Z86.79 HISTORY OF PAROXYSMAL SUPRAVENTRICULAR TACHYCARDIA: ICD-10-CM

## 2025-03-27 DIAGNOSIS — L82.1 SEBORRHEIC KERATOSES: ICD-10-CM

## 2025-03-27 DIAGNOSIS — K57.10 DUODENAL DIVERTICULUM: ICD-10-CM

## 2025-03-27 DIAGNOSIS — E78.00 ELEVATED LDL CHOLESTEROL LEVEL: ICD-10-CM

## 2025-03-27 DIAGNOSIS — H93.13 TINNITUS, BILATERAL: ICD-10-CM

## 2025-03-27 DIAGNOSIS — Z23 NEED FOR VACCINATION WITH TWINRIX: ICD-10-CM

## 2025-03-27 DIAGNOSIS — Z79.01 CHRONIC ANTICOAGULATION: ICD-10-CM

## 2025-03-27 DIAGNOSIS — R10.13 EPIGASTRIC PAIN: ICD-10-CM

## 2025-03-27 DIAGNOSIS — R90.89 ABNORMAL FINDING ON MRI OF BRAIN: ICD-10-CM

## 2025-03-27 DIAGNOSIS — R10.13 EPIGASTRIC PAIN SYNDROME: Primary | ICD-10-CM

## 2025-03-27 DIAGNOSIS — R14.0 BLOATING: ICD-10-CM

## 2025-03-27 DIAGNOSIS — Z86.718 PERSONAL HISTORY OF DVT (DEEP VEIN THROMBOSIS): ICD-10-CM

## 2025-03-27 DIAGNOSIS — D49.0 IPMN (INTRADUCTAL PAPILLARY MUCINOUS NEOPLASM): ICD-10-CM

## 2025-03-27 DIAGNOSIS — K82.8 SLUDGE IN GALLBLADDER: ICD-10-CM

## 2025-03-27 DIAGNOSIS — R41.3 MEMORY DIFFICULTIES: ICD-10-CM

## 2025-03-27 DIAGNOSIS — F10.21 MODERATE ALCOHOL DEPENDENCE IN EARLY REMISSION (H): ICD-10-CM

## 2025-03-27 DIAGNOSIS — Z13.31 POSITIVE SCREENING FOR DEPRESSION ON 9-ITEM PATIENT HEALTH QUESTIONNAIRE (PHQ-9): ICD-10-CM

## 2025-03-27 DIAGNOSIS — R53.82 CHRONIC FATIGUE: Primary | ICD-10-CM

## 2025-03-27 DIAGNOSIS — Z86.19 HISTORY OF HELICOBACTER PYLORI INFECTION: ICD-10-CM

## 2025-03-27 DIAGNOSIS — R42 POSTURAL DIZZINESS WITH NEAR SYNCOPE: ICD-10-CM

## 2025-03-27 DIAGNOSIS — K42.9 UMBILICAL HERNIA WITHOUT OBSTRUCTION AND WITHOUT GANGRENE: ICD-10-CM

## 2025-03-27 DIAGNOSIS — Z82.49 FAMILY HISTORY OF ISCHEMIC HEART DISEASE: ICD-10-CM

## 2025-03-27 DIAGNOSIS — R55 POSTURAL DIZZINESS WITH NEAR SYNCOPE: ICD-10-CM

## 2025-03-27 DIAGNOSIS — K83.8 DILATION OF COMMON BILE DUCT: ICD-10-CM

## 2025-03-27 DIAGNOSIS — F32.2 CURRENT SEVERE EPISODE OF MAJOR DEPRESSIVE DISORDER WITHOUT PSYCHOTIC FEATURES, UNSPECIFIED WHETHER RECURRENT (H): ICD-10-CM

## 2025-03-27 DIAGNOSIS — Z82.49 FAMILY HISTORY OF ARRHYTHMIA: ICD-10-CM

## 2025-03-27 DIAGNOSIS — Z23 NEED FOR SHINGLES VACCINE: ICD-10-CM

## 2025-03-27 DIAGNOSIS — Z86.59 HISTORY OF SUICIDAL IDEATION: ICD-10-CM

## 2025-03-27 DIAGNOSIS — K86.89 DILATED PANCREATIC DUCT: ICD-10-CM

## 2025-03-27 DIAGNOSIS — K57.30 DIVERTICULOSIS OF LARGE INTESTINE WITHOUT HEMORRHAGE: ICD-10-CM

## 2025-03-27 DIAGNOSIS — K40.20 BILATERAL INGUINAL HERNIA WITHOUT OBSTRUCTION OR GANGRENE, RECURRENCE NOT SPECIFIED: ICD-10-CM

## 2025-03-27 DIAGNOSIS — J30.2 SEASONAL ALLERGIC RHINITIS, UNSPECIFIED TRIGGER: ICD-10-CM

## 2025-03-27 DIAGNOSIS — F41.1 GAD (GENERALIZED ANXIETY DISORDER): ICD-10-CM

## 2025-03-27 DIAGNOSIS — Z23 NEED FOR PNEUMOCOCCAL VACCINATION: ICD-10-CM

## 2025-03-27 DIAGNOSIS — Z85.828 HISTORY OF BASAL CELL CANCER: ICD-10-CM

## 2025-03-27 DIAGNOSIS — K76.89 LIVER CYST: ICD-10-CM

## 2025-03-27 DIAGNOSIS — R59.0 MESENTERIC LYMPHADENOPATHY: ICD-10-CM

## 2025-03-27 DIAGNOSIS — R45.851 PASSIVE SUICIDAL IDEATIONS: ICD-10-CM

## 2025-03-27 PROBLEM — R63.4 WEIGHT LOSS: Status: RESOLVED | Noted: 2024-09-29 | Resolved: 2025-03-27

## 2025-03-27 PROCEDURE — 96127 BRIEF EMOTIONAL/BEHAV ASSMT: CPT | Performed by: FAMILY MEDICINE

## 2025-03-27 PROCEDURE — 90472 IMMUNIZATION ADMIN EACH ADD: CPT | Performed by: FAMILY MEDICINE

## 2025-03-27 PROCEDURE — 1126F AMNT PAIN NOTED NONE PRSNT: CPT | Performed by: FAMILY MEDICINE

## 2025-03-27 PROCEDURE — 90471 IMMUNIZATION ADMIN: CPT | Performed by: FAMILY MEDICINE

## 2025-03-27 PROCEDURE — 99214 OFFICE O/P EST MOD 30 MIN: CPT | Mod: 25 | Performed by: FAMILY MEDICINE

## 2025-03-27 PROCEDURE — G2211 COMPLEX E/M VISIT ADD ON: HCPCS | Performed by: FAMILY MEDICINE

## 2025-03-27 PROCEDURE — 90636 HEP A/HEP B VACC ADULT IM: CPT | Performed by: FAMILY MEDICINE

## 2025-03-27 PROCEDURE — 90677 PCV20 VACCINE IM: CPT | Performed by: FAMILY MEDICINE

## 2025-03-27 PROCEDURE — 90750 HZV VACC RECOMBINANT IM: CPT | Performed by: FAMILY MEDICINE

## 2025-03-27 PROCEDURE — 3074F SYST BP LT 130 MM HG: CPT | Performed by: FAMILY MEDICINE

## 2025-03-27 PROCEDURE — 3078F DIAST BP <80 MM HG: CPT | Performed by: FAMILY MEDICINE

## 2025-03-27 RX ORDER — OMEPRAZOLE 40 MG/1
40 CAPSULE, DELAYED RELEASE ORAL DAILY
Qty: 90 CAPSULE | Refills: 0 | Status: SHIPPED | OUTPATIENT
Start: 2025-03-27

## 2025-03-27 RX ORDER — FAMOTIDINE 20 MG/1
20 TABLET, FILM COATED ORAL AT BEDTIME
Qty: 90 TABLET | Refills: 3 | Status: SHIPPED | OUTPATIENT
Start: 2025-03-27

## 2025-03-27 RX ORDER — FLUOXETINE 10 MG/1
CAPSULE ORAL
COMMUNITY
Start: 2025-03-12

## 2025-03-27 RX ORDER — VILAZODONE HYDROCHLORIDE 20 MG/1
TABLET ORAL
COMMUNITY
Start: 2025-03-12

## 2025-03-27 ASSESSMENT — ANXIETY QUESTIONNAIRES
7. FEELING AFRAID AS IF SOMETHING AWFUL MIGHT HAPPEN: SEVERAL DAYS
4. TROUBLE RELAXING: SEVERAL DAYS
5. BEING SO RESTLESS THAT IT IS HARD TO SIT STILL: NOT AT ALL
GAD7 TOTAL SCORE: 10
1. FEELING NERVOUS, ANXIOUS, OR ON EDGE: MORE THAN HALF THE DAYS
7. FEELING AFRAID AS IF SOMETHING AWFUL MIGHT HAPPEN: SEVERAL DAYS
GAD7 TOTAL SCORE: 10
2. NOT BEING ABLE TO STOP OR CONTROL WORRYING: MORE THAN HALF THE DAYS
GAD7 TOTAL SCORE: 10
IF YOU CHECKED OFF ANY PROBLEMS ON THIS QUESTIONNAIRE, HOW DIFFICULT HAVE THESE PROBLEMS MADE IT FOR YOU TO DO YOUR WORK, TAKE CARE OF THINGS AT HOME, OR GET ALONG WITH OTHER PEOPLE: SOMEWHAT DIFFICULT
8. IF YOU CHECKED OFF ANY PROBLEMS, HOW DIFFICULT HAVE THESE MADE IT FOR YOU TO DO YOUR WORK, TAKE CARE OF THINGS AT HOME, OR GET ALONG WITH OTHER PEOPLE?: SOMEWHAT DIFFICULT
6. BECOMING EASILY ANNOYED OR IRRITABLE: SEVERAL DAYS
3. WORRYING TOO MUCH ABOUT DIFFERENT THINGS: NEARLY EVERY DAY

## 2025-03-27 ASSESSMENT — COLUMBIA-SUICIDE SEVERITY RATING SCALE - C-SSRS
1. WITHIN THE PAST MONTH, HAVE YOU WISHED YOU WERE DEAD OR WISHED YOU COULD GO TO SLEEP AND NOT WAKE UP?: YES
2. IN THE PAST MONTH, HAVE YOU ACTUALLY HAD ANY THOUGHTS OF KILLING YOURSELF?: NO
6. HAVE YOU EVER DONE ANYTHING, STARTED TO DO ANYTHING, OR PREPARED TO DO ANYTHING TO END YOUR LIFE?: YES, LIFETIME

## 2025-03-27 ASSESSMENT — PATIENT HEALTH QUESTIONNAIRE - PHQ9
10. IF YOU CHECKED OFF ANY PROBLEMS, HOW DIFFICULT HAVE THESE PROBLEMS MADE IT FOR YOU TO DO YOUR WORK, TAKE CARE OF THINGS AT HOME, OR GET ALONG WITH OTHER PEOPLE: SOMEWHAT DIFFICULT
SUM OF ALL RESPONSES TO PHQ QUESTIONS 1-9: 12
SUM OF ALL RESPONSES TO PHQ QUESTIONS 1-9: 12
SUM OF ALL RESPONSES TO PHQ QUESTIONS 1-9: 11

## 2025-03-27 ASSESSMENT — PAIN SCALES - GENERAL
PAINLEVEL_OUTOF10: NO PAIN (0)
PAINLEVEL_OUTOF10: NO PAIN (0)

## 2025-03-27 NOTE — NURSING NOTE
Prior to immunization administration, verified patients identity using patient s name and date of birth. Please see Immunization Activity for additional information.     Screening Questionnaire for Adult Immunization    Are you sick today?   No   Do you have allergies to medications, food, a vaccine component or latex?   Yes   Have you ever had a serious reaction after receiving a vaccination?   No   Do you have a long-term health problem with heart, lung, kidney, or metabolic disease (e.g., diabetes), asthma, a blood disorder, no spleen, complement component deficiency, a cochlear implant, or a spinal fluid leak?  Are you on long-term aspirin therapy?   No   Do you have cancer, leukemia, HIV/AIDS, or any other immune system problem?   No   Do you have a parent, brother, or sister with an immune system problem?   No   In the past 3 months, have you taken medications that affect  your immune system, such as prednisone, other steroids, or anticancer drugs; drugs for the treatment of rheumatoid arthritis, Crohn s disease, or psoriasis; or have you had radiation treatments?   No   Have you had a seizure, or a brain or other nervous system problem?   No   During the past year, have you received a transfusion of blood or blood    products, or been given immune (gamma) globulin or antiviral drug?   No   For women: Are you pregnant or is there a chance you could become       pregnant during the next month?   No   Have you received any vaccinations in the past 4 weeks?   No     Immunization questionnaire was positive for at least one answer.  Notified Dr. Will MD.      Patient instructed to remain in clinic for 15 minutes afterwards, and to report any adverse reactions.     Screening performed by Radha Rodriguez MA on 3/27/2025 at 12:09 PM.

## 2025-03-27 NOTE — NURSING NOTE
"Do you have a history of colon cancer in your immediate family? NO    If yes who: negative     And what age  were they diagnosed: n/a      Chief Complaint   Patient presents with    Follow Up       Vitals:    03/27/25 0827   BP: 122/77   Pulse: 61   SpO2: 100%   Weight: 74.8 kg (165 lb)   Height: 1.778 m (5' 10\")       Body mass index is 23.68 kg/m .  Depression Response    Patient completed the PHQ-9 assessment for depression and scored >9? Yes  Question 9 on the PHQ-9 was positive for suicidality? Yes  Does patient have current mental health provider? Yes    Is this a virtual visit? No    I personally notified the following: clinic nurse          Edil Farrell MA    "

## 2025-03-27 NOTE — PROGRESS NOTES
The below note was dictated using voice recognition. Although reviewed after completion, some word and grammatical error may remain .       Assessment & Plan     Chronic fatigue  Chronic fatigue, suspect multifactorial related to dx, mental health, medications, possible undx TAYLOR etc. Prior noted decreased EF with prior PE had resolved with normal echo in 2022 & nov 2024. Seen by sleep, HST done had technical errors in dec 2022 & not redone. Labs did not explain symptoms & referred back to sleep in sept 2024 no apt made yet. . Encouraged to schedule testing and follow up with sleep.    Postural dizziness, sometimes could occur while standing with no posture changes. Rarely felt like blacking out. At times gets up and has to sit right down again. Feels drinking enough water. Reported near syncope x 1. No vertigo. Never tried compression socks advised by neuro previously. Seen by cardio previously in 2022  EKG, echo then normal. Cardio felt stress test would not add more information at the time. 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 No sign of bleeding. Previously BP not orthostatic. No associated chest pain or shortness of breath.   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 Referred back to cardiology & ziopatch ordered. Advised and given compression socks to try.  On 9/20/4 Zio patch showed mostly normal sinus rhythm.  There was 1 run of a fast heart rate called ventricular tachycardia that lasted 5 beats. And another fast heart rate called supraventricular tachycardia ( SVT)16 episodes of these the longest lasting 15 beats.  Symptoms seemed to be within 45 seconds of the SVT type fast heart rate. There were also some isolated extra beats but these were less than 1% of the time. Noted no significant palpitations, seen by cardiology in 1/2025 & advised no medications needed.   Notes some days feels dizzy & has been using compression socks. Notes well hydrated & staying active    LDL elevated/ FH of IHD  & The 10-year ASCVD risk score (Jerry ROCKWELL, et al., 2019) is: 5.7%. Advised exercising 150 minutes of aerobic exercise per week & eating a low saturated fat/low carbohydrate diet. HBA1c normal..Will do a ct calcium score to get objective data about risk and need for starting a statin med.      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 Forgetful of names, no worsening, forgetful of conversation with clients at times and felt had to write everything down. B 12 and TSH normal. Seen by neuro in 2023. MRI showed 4/13/23  showed findings consistent with Wernicke's encephalopathy, suspected scar ring from prior alcohol use and also had abnormal signal bilateral global pallidum and advised to check B1 copper ceruloplasmin and encouraged to take a B complex.  Lab work done 5/24/2023 showed normal B1 copper and ceruloplasmin and advised manganese and 24 urine copper which did not get done.  Also neuro psych eval set up for 6/26/2023 to Dr. Connors at [facility] but did not make it to the appointment. In 9/2024 manganese and copper testing came back negative. Reports taking B complex now and then. Was referred back to neuro for follow up and neuro psyche referral. Seen by neuro psyche eval1/20/25, he felt it was not very helpful but it shoed no dementia. Advised to optimize mental health. He cancelled apt with neurology. Is under care of psyche.& has had recent med changes. Counseled on report. To discuss ketamine use with them as maybe affecting memory.     Noted sustained sobriety from alcohol since 2019     MDD/ZAIDA/ passive SI: previously under care of Kae Spencer virtually but now under care of Esperanza Fisher at [facility].  Previously on Abilify & had stopped as didn't like it. No longer on Cymbalta, no longer on clonidine which made him tired. At  last visit was on fluoxetine 40 mg and 5 mg lexapro. Lexapro felt helped with anxiety better but wiped him out so doing this way. Understood risk of serotonin syndrome. No prolonged qt noted in past. Did also take Ketamine infusion not in 1 yr & since on ketamine troches 300 mg sublingual ( each 150 & takes 2) told to do daily but using once a week. Discussed this could affect memory & not to drive on these meds. Seeing Esperanza Fisher at [facility] clinic since jan & will discuss more with them. Was seen by monica march 6th, Is tapering off fluoxetine & down from 40 to 10, remains on  lexapro 5 mg & started vilazodne 10 mg on march 12th 7 days & now on 20 mg daily. No longer with therapy as prior 2 didn't feels helpful to him. Monica aware of his passive SI & has a safety plan in place.     Reviewed the long standing epigastric pain, previously had been taking both Pepcid and Prilosec & a Raeann capsule but on no meds a while Prior lipase & H pylori had negative. In 2022 a Ct abdomen and then mri showed benign CBD dilation, dilated pancreatic duct, evidence of pancreatic scarring, suggestion of IPMN, a duodenal diverticulum, and non specific mesenteric lymphadenopathy & a benign right liver cyst & asymptomatic diverticulosis, asymptomatic B/l inguinal hernia and small asymptomatic umbilical hernia & possible asymptomatic gall stones. Was referred to GI to evaluate epigastric pain, IPMN, duodenal diverticulum & may benefit from an EGD. Was also referred to general surgeon regarding possible gall bladder sludge & asymptomatic B/l inguinal & umbilical hernia which I didn't believe was contributing to long standing epigastric pain. Did not get apt's with either and symptoms persisted 2 yrs since last seen.Epigastric pain had been occurring daily for years, pushing on upper abdominal area hurt, reported no longer with heartburn. No longer on Prilosec & stopped Pepcid as felt it made him tired. When took Pepcid or  PPI previously felt it helped. Understood chronic use of Prilosec like med's could cause atypical pneumonia, fractures, C diff colitis, vit B 12 and magnesium deficiency. Reported no blood in stools or black stools but noted wt loss & appetite decreased. In 12/2022 weighed 197 lbs, in oct 2023 was 189 lbs, in 9/2024 was 170 lbs. Agreeable to trying Prilosec 20 mg 2 weeks then Pepcid 20 mg bid and seeing GI.   Sludge in gall bladder felt not significant, had a hx of mild dilated CBD and dilated pancreatic duct, IPMN  & mesenteric lymphadenopathy noted at last mri and was advised ERCP with GI but no apt given and referred again to them. Desired to wait to see GI to repeat MRI. Had an asymptomatic Liver cyst, asymptomatic duodenal diverticulum, asymptomatic diverticulosis, asymptomatic tiny umbilical hernia & unchanged asymptomatic B/l inguinal hernia.   Labs came back with normal CBC, normal ESR, CRP, CMP, TSH, Vitamin B12, ferritin & iron . Had normal celiac testing, manganese, & Vitamin D level was low normal & encouraged getting 2000 IU daily. Later tested positive for H Pylori. Continued omeprazole 20 mg daily 2 week as prescribed. & added pylera, combo med containing bismuth, metronidazole and tetracycline 4 / day for 10 days together with the omeprazole to eradicate the H pylori. Once done with this regimen he was to continue on Pepcid previously prescribed and encouraged to see GI. GI advised getting an MRI prior to scheduling an appointment with them.  MRA abdomen 4/17/24 showed stable mild dilated pancreatic duct stable 5 mm cyst recheck 18 months 2026, CBD dilated up to 10 mm no gallstone sludge in gallbladder.  Normal spleen kidney bowels.  1 duodenal diverticulum.  No mesenteric lymphadenopathy noted any more. Arthritic changes and some incidental findings.  Reported no change in symptoms on completing the H pylori Rx. Felt a persistent   gnawing pain not stabbing in epigastric area that caused fatigue.  Only 1/2 tsp baking soda in water helped. Never had an EGD. Wt loss had stabilized. Had lost 27 lbs dec 2022 to sept 2024 and 3 lbs since sept 2024. Had occasional nausea, no vomiting, no diarrhea or constipation, no blood in stools or black stools, no night sweats. Remained on Pepcid 20 mg once a day & few days increased to 2/ day.   MRI repeated in November 2024 showed mild dilated pancreatic duct stable 5 mm cystic duct no gallstones, duodenal diverticulum x 1, incidentally noted arthritis spine fat-containing small umbilical hernia.  Seen by GI 11/20/2024 for epigastric pain 20 years on PPI H2 blockers right upper quadrant ultrasound and scans unremarkable MRI showing dilated pancreatic duct s/p H. pylori treatment to get an endoscopy. Seen by GI 11/21 US ordered.  On 12/5 elastase was more than 800 considered normal LFTs normal.  On 12/19 US EGD done 12/19/2024 was normal at 2 cm periampullary diverticulum noted no obstructive lesion seen prominent pancreatic duct no pancreatic mass slight stranding seen along gallbladder biopsies negative for celiac H. pylori negative previously positive.  Seen by GI 1/13 continued on PPI and Pepcid. Returned to GI 3/27 and noted epigastric pain improved on omeprazole 40 mg and Pepcid.  Discussed a trial of FDgard and noted no further weight loss.  Wt has been stable, abdominal pain improved. Discussed to recheck MRI pancreas in 2026. Colon cancer screening due 2030     Asymptomatic fat containing umbilical hernia and hx of B/l inguina hernia    Hx of BCC Left nose ala s/p mohs in 2/24, reconstruction and now doing laser Rx of scar tissue, under care of derm. To get another laser Rx in dec. Hx of SK.   Reminded make a yearly skin check with derm     Asymptomatic incidental right iliac bone island noted on ct scan in 2022, felt to be benign,      Allergies to ragweed, got better as got older, & no longer bothersome.     ED unchanged on the antidepressants. No active issue.  Resolved to PMH.   Nocturia 2/ day with urgency, some  hesitancy, no incontinence, suspected BPH, PSA normal in nov 2024.     B/l tinnitus stable can hear ok, usually unaware of it until thinks about it     Got new glasses since last seen     Will do Shingrex # 2 today, Prevnar 20 today & Twinrix # 2. & # 3 in 6 months with the nurse.   Will consider RSV another time.    Return for physical in nov 2025 or earlier in an office visit if any thing come up before that  - Adult Sleep Eval & Management  Referral; Future    Postural dizziness with near syncope  History of paroxysmal supraventricular tachycardia  Family history of arrhythmia  Postural dizziness, sometimes could occur while standing with no posture changes. Rarely felt like blacking out. At times gets up and has to sit right down again. Feels drinking enough water. Reported near syncope x 1. No vertigo. Never tried compression socks advised by neuro previously. Seen by cardio previously in 2022  EKG, echo then normal. Cardio felt stress test would not add more information at the time. 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 No sign of bleeding. Previously BP not orthostatic. No associated chest pain or shortness of breath.   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 Referred back to cardiology & ziopatch ordered. Advised and given compression socks to try.  On 9/20/4 Zio patch showed mostly normal sinus rhythm.  There was 1 run of a fast heart rate called ventricular tachycardia that lasted 5 beats. And another fast heart rate called supraventricular tachycardia ( SVT)16 episodes of these the longest lasting 15 beats.  Symptoms seemed to be within 45 seconds of the SVT type fast heart rate. There were also some isolated extra beats but these were less than 1% of the time. Noted no significant palpitations, seen by cardiology in 1/2025 & advised no medications needed.   Notes some days feels dizzy & has been using compression socks.  Notes well hydrated & staying active    Family history of ischemic heart disease  Elevated LDL cholesterol level  LDL elevated/ FH of IHD & The 10-year ASCVD risk score (Jerry DK, et al., 2019) is: 5.7%. Advised exercising 150 minutes of aerobic exercise per week & eating a low saturated fat/low carbohydrate diet. HBA1c normal.. will do a ct calcium score to get objective data about risk and need for starting a statin med.   - CT Coronary Calcium Scan; Future    History of pulmonary embolism  Personal history of DVT (deep vein thrombosis)  Chronic anticoagulation  Hx of PE & DVT & on Xarelto managed by hematology, not seen in a while & encouraged to set up follow up with them    Memory difficulties  Abnormal finding on MRI of brain  Memory concerns, Able to do job ok but felt struggled  sometimes. Forgetful of names, no worsening, forgetful of conversation with clients at times and felt had to write everything down. B 12 and TSH normal. Seen by neuro in 2023. MRI showed 4/13/23  showed findings consistent with Wernicke's encephalopathy, suspected scar ring from prior alcohol use and also had abnormal signal bilateral global pallidum and advised to check B1 copper ceruloplasmin and encouraged to take a B complex.  Lab work done 5/24/2023 showed normal B1 copper and ceruloplasmin and advised manganese and 24 urine copper which did not get done.  Also neuro psych eval set up for 6/26/2023 to Dr. Connors at [facility] but did not make it to the appointment. In 9/2024 manganese and copper testing came back negative. Reports taking B complex now and then. Was referred back to neuro for follow up and neuro psyche referral. Seen by neuro psyche eval1/20/25, he felt it was not very helpful but it shoed no dementia. Advised to optimize mental health. He cancelled apt with neurology. Is under care of psyche.& has had recent med changes. To discuss ketamine use with them as maybe affecting memory.    Moderate alcohol  dependence in early remission (H)  Current severe episode of major depressive disorder without psychotic features, unspecified whether recurrent (H)  History of suicidal ideation  ZAIDA (generalized anxiety disorder)  Noted sustained sobriety from alcohol since 2019  MDD/ZAIDA/ passive SI: previously under care of Kae dillard but now under care of Esperanza Fisher at [facility] clinic.  Previously on Abilify & had stopped as didn't like it. No longer on Cymbalta, no longer on clonidine which made him tired. At last visit was on fluoxetine 40 mg and 5 mg lexapro. Lexapro felt helped with anxiety better but wiped him out so doing this way. Understood risk of serotonin syndrome. No prolonged qt noted in past. Did also take Ketamine infusion not in 1 yr & since on ketamine troches 300 mg sublingual ( ech 150 & takes 2) told to do daily but using once a week. Discussed this could affect memory & not to drive on these meds. Seeing Esperanza Fisher at [facility] clinic since jan & will discuss more with them. Was seen by monica march 6th, Is tapering off fluoxetine & down from 40 to 10, remains on  lexapro 5 mg & started vilazodne 10 mg on march 12th 7 days & now on 20 mg daily. No longer with therapy as prior 2 didn't feels helpful to him. Monica aware of his passive SI & has a safety plan in place.     Epigastric pain- famotidine (PEPCID) 20 MG tablet at bedtime.  History of Helicobacter pylori infection  IPMN (intraductal papillary mucinous neoplasm)  Sludge in gallbladder  Liver cyst  Dilation of common bile duct  Dilated pancreatic duct  Duodenal diverticulum  Mesenteric lymphadenopathy  Diverticulosis of large intestine without hemorrhage  Reviewed the long standing epigastric pain, previously had been taking both Pepcid and Prilosec & a Raeann capsule but on no meds a while Prior lipase & H pylori had negative. In 2022 a Ct abdomen and then mri showed benign CBD dilation, dilated  pancreatic duct, evidence of pancreatic scarring, suggestion of IPMN, a duodenal diverticulum, and non specific mesenteric lymphadenopathy & a benign right liver cyst & asymptomatic diverticulosis, asymptomatic B/l inguinal hernia and small asymptomatic umbilical hernia & possible asymptomatic gall stones. Was referred to GI to evaluate epigastric pain, IPMN, duodenal diverticulum & may benefit from an EGD. Was also referred to general surgeon regarding possible gall bladder sludge & asymptomatic B/l inguinal & umbilical hernia which I didn't believe was contributing to long standing epigastric pain. Did not get apt's with either and symptoms persisted 2 yrs since last seen.Epigastric pain had been occurring daily for years, pushing on upper abdominal area hurt, reported no longer with heartburn. No longer on Prilosec & stopped Pepcid as felt it made him tired. When took Pepcid or PPI previously felt it helped. Understood chronic use of Prilosec like med's could cause atypical pneumonia, fractures, C diff colitis, vit B 12 and magnesium deficiency. Reported no blood in stools or black stools but noted wt loss & appetite decreased. In 12/2022 weighed 197 lbs, in oct 2023 was 189 lbs, in 9/2024 was 170 lbs. Agreeable to trying Prilosec 20 mg 2 weeks then Pepcid 20 mg bid and seeing GI.   Sludge in gall bladder felt not significant, had a hx of mild dilated CBD and dilated pancreatic duct, IPMN  & mesenteric lymphadenopathy noted at last mri and was advised ERCP with GI but no apt given and referred again to them. Desired to wait to see GI to repeat MRI. Had an asymptomatic Liver cyst, asymptomatic duodenal diverticulum, asymptomatic diverticulosis, asymptomatic tiny umbilical hernia & unchanged asymptomatic B/l inguinal hernia.   Labs came back with normal CBC, normal ESR, CRP, CMP, TSH, Vitamin B12, ferritin & iron . Had normal celiac testing, manganese, & Vitamin D level was low normal & encouraged getting 2000 IU  daily. Later tested positive for H Pylori. Continued omeprazole 20 mg daily 2 week as prescribed. & added pylera, combo med containing bismuth, metronidazole and tetracycline 4 / day for 10 days together with the omeprazole to eradicate the H pylori. Once done with this regimen he was to continue on Pepcid previously prescribed and encouraged to see GI. GI advised getting an MRI prior to scheduling an appointment with them.  MRA abdomen 4/17/24 showed stable mild dilated pancreatic duct stable 5 mm cyst recheck 18 months 2026, CBD dilated up to 10 mm no gallstone sludge in gallbladder.  Normal spleen kidney bowels.  1 duodenal diverticulum.  No mesenteric lymphadenopathy noted any more. Arthritic changes and some incidental findings.  Reported no change in symptoms on completing the H pylori Rx. Felt a persistent   gnawing pain not stabbing in epigastric area that caused fatigue. Only 1/2 tsp baking soda in water helped. Never had an EGD. Wt loss had stabilized. Had lost 27 lbs dec 2022 to sept 2024 and 3 lbs since sept 2024. Had occasional nausea, no vomiting, no diarrhea or constipation, no blood in stools or black stools, no night sweats. Remained on Pepcid 20 mg once a day & few days increased to 2/ day.   MRI repeated in November 2024 showed mild dilated pancreatic duct stable 5 mm cystic duct no gallstones, duodenal diverticulum x 1, incidentally noted arthritis spine fat-containing small umbilical hernia.  Seen by GI 11/20/2024 for epigastric pain 20 years on PPI H2 blockers right upper quadrant ultrasound and scans unremarkable MRI showing dilated pancreatic duct s/p H. pylori treatment to get an endoscopy. Seen by GI 11/21 US ordered.  On 12/5 elastase was more than 800 considered normal LFTs normal.  On 12/19 US EGD done 12/19/2024 was normal at 2 cm periampullary diverticulum noted no obstructive lesion seen prominent pancreatic duct no pancreatic mass slight stranding seen along gallbladder biopsies  negative for celiac H. pylori negative previously positive.  Seen by GI 1/13 continued on PPI and Pepcid. Returned to GI 3/27 and noted epigastric pain improved on omeprazole 40 mg and Pepcid.  Discussed a trial of FDgard and noted no further weight loss.  Wt has been stable, abdominal pain improved. Discussed to recheck MRI pancreas in 2026. Colon cancer screening due 2030     Umbilical hernia without obstruction and without gangrene  Bilateral inguinal hernia without obstruction or gangrene, recurrence not specified  Asymptomatic fat containing umbilical hernia and hx of B/l inguina hernia    History of basal cell cancer  Seborrheic keratoses  Hx of BCC Left nose ala s/p mohs in 2/24, reconstruction and now doing laser Rx of scar tissue, under care of derm. To get another laser Rx in dec. Hx of SK.   Reminded make a yearly skin check with derm    Bone island  Asymptomatic incidental right iliac bone island noted on ct scan in 2022, felt to be benign,     Seasonal allergic rhinitis, unspecified trigger  Allergies to ragweed, got better as got older, & no longer bothersome.    Tinnitus, bilateral  B/l tinnitus stable can hear ok, usually unaware of it until thinks about it     Need for shingles vaccine  Shingrex #2 given   - ZOSTER RECOMBINANT ADJUVANTED (SHINGRIX)    Need for pneumococcal vaccination  - PNEUMOCOCCAL 20 VALENT CONJUGATE (PREVNAR 20)    Need for vaccination with Twinrix  Twinrix # 2. Given & # 3 in 6 months with the nurse.  - HEP A & B (TWINRIX)    ED unchanged on the antidepressants. No active issue. Resolved to PMH.   Nocturia 2/ day with urgency, some  hesitancy, no incontinence, suspected BPH, PSA normal in nov 2024.    Got new glasses since last seen     Will consider RSV another time.    Return for physical in nov 2025 or earlier in an office visit if any thing come up before that    Regular exercise  See Patient Instructions  The longitudinal plan of care for the diagnosis(es)/condition(s) as  documented were addressed during this visit. Due to the added complexity in care, I will continue to support Nikunj in the subsequent management and with ongoing continuity of care.      Subjective   Nikunj is a 60 year old, presenting for the following health issues:  RECHECK      3/27/2025    11:19 AM   Additional Questions   Roomed by Trupti Little     History of Present Illness       Mental Health Follow-up:  Patient presents to follow-up on Depression & Anxiety.  The patient is having other symptoms associated with depression.  Patient's anxiety since last visit has been:  Medium  The patient is having other symptoms associated with anxiety.  Any significant life events: job concerns, financial concerns and health concerns  Patient is feeling anxious or having panic attacks.  Patient has no concerns about alcohol or drug use.    Hyperlipidemia:  He presents for follow up of hyperlipidemia.   He is not taking medication to lower cholesterol. He is not having myalgia or other side effects to statin medications.    He eats 0-1 servings of fruits and vegetables daily.He consumes 0 sweetened beverage(s) daily.He exercises with enough effort to increase his heart rate 20 to 29 minutes per day.  He exercises with enough effort to increase his heart rate 5 days per week.   He is taking medications regularly.        CURRENTLY  Chronic fatigue, suspect multifactorial related to dx, mental health, medications, possible undx TAYLOR etc. Prior noted decreased EF with prior PE had resolved with normal echo in 2022 & nov 2024. Seen by sleep, HST done had technical errors in dec 2022 & not redone. Labs did not explain symptoms & referred back to sleep in sept 2024 no apt made yet. . Encouraged to schedule testing and follow up with sleep.    Postural dizziness, sometimes could occur while standing with no posture changes. Rarely felt like blacking out. At times gets up and has to sit right down again. Feels drinking enough water.  Reported near syncope x 1. No vertigo. Never tried compression socks advised by neuro previously. Seen by cardio previously in 2022  EKG, echo then normal. Cardio felt stress test would not add more information at the time. 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 No sign of bleeding. Previously BP not orthostatic. No associated chest pain or shortness of breath.   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 Referred back to cardiology & ziopatch ordered. Advised and given compression socks to try.  On 9/20/4 Zio patch showed mostly normal sinus rhythm.  There was 1 run of a fast heart rate called ventricular tachycardia that lasted 5 beats. And another fast heart rate called supraventricular tachycardia ( SVT)16 episodes of these the longest lasting 15 beats.  Symptoms seemed to be within 45 seconds of the SVT type fast heart rate. There were also some isolated extra beats but these were less than 1% of the time. Noted no significant palpitations, seen by cardiology in 1/2025 & advised no medications needed.   Notes some days feels dizzy & has been using compression socks. Notes well hydrated & staying active    LDL elevated/ FH of IHD & The 10-year ASCVD risk score (Jerry ROCKWELL, et al., 2019) is: 5.7%. Advised exercising 150 minutes of aerobic exercise per week & eating a low saturated fat/low carbohydrate diet. HBA1c normal.. will do a ct calcium score to get objective data about risk and need for starting a statin med.      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 Forgetful of names, no worsening, forgetful of conversation with clients at times and felt had to write everything down. B 12 and TSH normal. Seen by neuro in 2023. MRI showed 4/13/23  showed findings consistent with Wernicke's encephalopathy, suspected scar ring from prior alcohol use and also had  abnormal signal bilateral global pallidum and advised to check B1 copper ceruloplasmin and encouraged to take a B complex.  Lab work done 5/24/2023 showed normal B1 copper and ceruloplasmin and advised manganese and 24 urine copper which did not get done.  Also neuro psych eval set up for 6/26/2023 to Dr. Connors at [facility] but did not make it to the appointment. In 9/2024 manganese and copper testing came back negative. Reports taking B complex now and then. Was referred back to neuro for follow up and neuro psyche referral. Seen by neuro psyche eval1/20/25, he felt it was not very helpful but it shoed no dementia. Advised to optimize mental health. He cancelled apt with neurology. Is under care of monica.& has had recent med changes.      Noted sustained sobriety from alcohol since 2019     MDD/ZAIDA/ passive SI: previously under care of Kae dillard but now under care of Esperanza Fisher at [facility].  Previously on Abilify & had stopped as didn't like it. No longer on Cymbalta, no longer on clonidine which made him tired. At last visit was on fluoxetine 40 mg and 5 mg lexapro. Lexapro felt helped with anxiety better but wiped him out so doing this way. Understood risk of serotonin syndrome. No prolonged qt noted in past. Did also take Ketamine infusion not in 1 yr & since on ketamine troches 300 mg sublingual ( ech 150 & takes 2) told to do daily but using once a week. Discussed this could affect memory & not to drive on these meds. Seeing Esperanza Fisher at [facility] clinic since jan & will discuss more with them. Was seen by monica march 6th, Is tapering off fluoxetine & down from 40 to 10, remains on  lexapro 5 mg & started vilazodne 10 mg on march 12th 7 days & now on 20 mg daily. No longer with therapy as prior 2 didn't feels helpful to him. Monica aware of his passive SI & has a safety plan in place.     Reviewed the long standing epigastric pain, previously  had been taking both Pepcid and Prilosec & a Raeann capsule but on no meds a while Prior lipase & H pylori had negative. In 2022 a Ct abdomen and then mri showed benign CBD dilation, dilated pancreatic duct, evidence of pancreatic scarring, suggestion of IPMN, a duodenal diverticulum, and non specific mesenteric lymphadenopathy & a benign right liver cyst & asymptomatic diverticulosis, asymptomatic B/l inguinal hernia and small asymptomatic umbilical hernia & possible asymptomatic gall stones. Was referred to GI to evaluate epigastric pain, IPMN, duodenal diverticulum & may benefit from an EGD. Was also referred to general surgeon regarding possible gall bladder sludge & asymptomatic B/l inguinal & umbilical hernia which I didn't believe was contributing to long standing epigastric pain. Did not get apt's with either and symptoms persisted 2 yrs since last seen.Epigastric pain had been occurring daily for years, pushing on upper abdominal area hurt, reported no longer with heartburn. No longer on Prilosec & stopped Pepcid as felt it made him tired. When took Pepcid or PPI previously felt it helped. Understood chronic use of Prilosec like med's could cause atypical pneumonia, fractures, C diff colitis, vit B 12 and magnesium deficiency. Reported no blood in stools or black stools but noted wt loss & appetite decreased. In 12/2022 weighed 197 lbs, in oct 2023 was 189 lbs, in 9/2024 was 170 lbs. Agreeable to trying Prilosec 20 mg 2 weeks then Pepcid 20 mg bid and seeing GI.   Sludge in gall bladder felt not significant, had a hx of mild dilated CBD and dilated pancreatic duct, IPMN  & mesenteric lymphadenopathy noted at last mri and was advised ERCP with GI but no apt given and referred again to them. Desired to wait to see GI to repeat MRI. Had an asymptomatic Liver cyst, asymptomatic duodenal diverticulum, asymptomatic diverticulosis, asymptomatic tiny umbilical hernia & unchanged asymptomatic B/l inguinal hernia.    Labs came back with normal CBC, normal ESR, CRP, CMP, TSH, Vitamin B12, ferritin & iron . Had normal celiac testing, manganese, & Vitamin D level was low normal & encouraged getting 2000 IU daily. Later tested positive for H Pylori. Continued omeprazole 20 mg daily 2 week as prescribed. & added pylera, combo med containing bismuth, metronidazole and tetracycline 4 / day for 10 days together with the omeprazole to eradicate the H pylori. Once done with this regimen he was to continue on Pepcid previously prescribed and encouraged to see GI. GI advised getting an MRI prior to scheduling an appointment with them.  MRA abdomen 4/17/24 showed stable mild dilated pancreatic duct stable 5 mm cyst recheck 18 months 2026, CBD dilated up to 10 mm no gallstone sludge in gallbladder.  Normal spleen kidney bowels.  1 duodenal diverticulum.  No mesenteric lymphadenopathy noted any more. Arthritic changes and some incidental findings.  Reported no change in symptoms on completing the H pylori Rx. Felt a persistent   gnawing pain not stabbing in epigastric area that caused fatigue. Only 1/2 tsp baking soda in water helped. Never had an EGD. Wt loss had stabilized. Had lost 27 lbs dec 2022 to sept 2024 and 3 lbs since sept 2024. Had occasional nausea, no vomiting, no diarrhea or constipation, no blood in stools or black stools, no night sweats. Remained on Pepcid 20 mg once a day & few days increased to 2/ day.   MRI repeated in November 2024 showed mild dilated pancreatic duct stable 5 mm cystic duct no gallstones, duodenal diverticulum x 1, incidentally noted arthritis spine fat-containing small umbilical hernia.  Seen by GI 11/20/2024 for epigastric pain 20 years on PPI H2 blockers right upper quadrant ultrasound and scans unremarkable MRI showing dilated pancreatic duct s/p H. pylori treatment to get an endoscopy. Seen by GI 11/21 US ordered.  On 12/5 elastase was more than 800 considered normal LFTs normal.  On 12/19 US EGD  done 12/19/2024 was normal at 2 cm periampullary diverticulum noted no obstructive lesion seen prominent pancreatic duct no pancreatic mass slight stranding seen along gallbladder biopsies negative for celiac H. pylori negative previously positive.  Seen by GI 1/13 continued on PPI and Pepcid. Returned to GI 3/27 and noted epigastric pain improved on omeprazole 40 mg and Pepcid.  Discussed a trial of FDgard and noted no further weight loss.  Wt has been stable, abdominal pain improved. Discussed to recheck MRI pancreas in 2026. Colon cancer screening due 2030     Asymptomatic fat containing umbilical hernia and hx of B/l inguina hernia    Hx of BCC Left nose ala s/p mohs in 2/24, reconstruction and now doing laser Rx of scar tissue, under care of derm. To get another laser Rx in dec. Hx of SK.   Reminded make a yearly skin check with derm     Asymptomatic incidental right iliac bone island noted on ct scan in 2022, felt to be benign,      Allergies to ragweed, got better as got older, & no longer bothersome.     ED unchanged on the antidepressants. No active issue. Resolved to PMH.   Nocturia 2/ day with urgency, some  hesitancy, no incontinence, suspected BPH, PSA normal in nov 2024.     B/l tinnitus stable can hear ok, usually unaware of it until thinks about it     Got new glasses since last seen     Will do Shingrex # 2 today, Prevnar 20 today & Twinrix # 2. & # 3 in 6 months with the nurse.   Will consider RSV another time.    Return for physical in nov 2025 or earlier in an office visit if any thing come up before that    BACKGROUND  Former smoker, 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 with Xarelto managed by hematology, decreased EF, hx of postural dizziness,chronic fatigue, hx of gaping breathing, excessive day time drowsiness, cognitive issues, seen by sleep 10/2023 advised a HST, hst done dec [id number] had technical issues advised to repeat did not get done, elevated LDL, chronic  tinnitus, resolved chest pain, history of alcohol abuse and dependence currently in remission, ZAIDA, MDD, history of passive suicidal ideation, prior active thoughts, previously on Wellbutrin 300, Lexapro 20, no longer on gabapentin, propranolol as needed and trazodone as needed, then on Cymbalta & briefly on Abilify, history of memory issues improved off the alcohol, under care of psyche & therapy, seen by neuro mri suggestive of wernikes, additional labs neg, referred for neuropsyhce miroslava, history of bilateral tinnitus, seasonal allergic rhinitis, resolved essential tremor once he stopped the alcohol, resolved epigastric pain once he stopped the alcohol, Long standing epigastric pain, had taken both Pepcid and Prilosec in past Symptoms intermittent better when mood is better. Taking Prilosec 20 mg daily & Pepcid 20 mg once day & a Ginger capsule. Lab work recently including lipase & H pylori were negative.  mri showed benign CBD dilation, dilated pancreatic duct, evidence of pancreatic scarring, suggestion of IPMN, a duodenal diverticulum, and non specific mesenteric lymphadenopathy & a benign right liver cyst & asymptomatic diverticulosis, B/inguinal hernia and small asymptomatic umbilical hernia & possible gall stones, right iliac bone island, left nose skin lesion, SK,.resolved palpitations, history of dental caries needing dentures, weight gain due to sedentary state, positive test,  history of ADD, ED, SK, on multivitamin and prior tonsillectomy, allergic to ragweed's,     In 2018 was unemployed, from [place] originally where was getting his care at the local clinic there, hx of alcohol dependence in early remission, ZAIDA, MDD, hx of SI, on Wellbutrin, Lexapro, gabapentin & MV, under care of psychiatry at [facility], memory changes, epigastric pain, palpitations, weight gain, hx of chest pain, fatigue, dizziness, ED, improved with time away from alcohol, essential tremor on propranolol, B/l tinnitus,  "seasonal allergic rhinitis, allergic to ragweed, dental caries / abscess, S/p extraction nov 2019, 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 Xarelto under care of hematology, prior tonsillectomy,   Hx of ACS, NSTEMI with elevated troponin, no CAD on angiogram,  but found to have an acute saddle embolism with cor pulmonale & hx of DVT,  in 8/2019 on lifelong anticoagulation now on Xarelto 20 mg daily, ever since under care of hematology, hx of moderate alcohol dependence in early remission with hx of withdrawal & mood disorder, previously on campral ( discontinued secondary to diarrhea), & naltrexone( stopped as ineffective), ZAIDA, MDD, suicidal ideation, on Wellbutrin Xl 150 mg daily, Lexapro 20 mg daily, gabapentin 300 mg bedtime & a multivitamin, previously  also on propranolol, previously on Abilify 5 mg, citalopram 10 mg, hydroxyzine, mirtazapine & trazodone, hx of dental caries, recent dental abscess S/p abx, hx of essential tremor on propranolol 20 mg tid, allergic to ragweed's, no records from [facility] but reviewed care everywhere [facility] records,      Seen at [facility] ER 12/26/18 with suicidal ideation. Stabilized , treated ( see notes for details & discharged to [facility]).  Seen in ER at [facility] on 6/12/19 \" for increased depression, hopelessness, and alcohol abuse. Had been discharged from his sober due to smelling like alcohol.   Admitted [facility] on 8/2019: with shortness of breath and 2 days of chest pain and near syncope. He presented to the emergency room on 8/4/2019 after he had a syncopal episode in front of [facility]. He did not recall particular chest pain or shortness of breath. He noted swelling of his leg in various spots but he denied any persistent pain or swelling or redness. No injury. No acute illness prior to that. However he had donated plasma 10 times in 5 weeks prior to that. Upon initial evaluation, d-dimer was elevated at 6.7. PTT was normal at 28, INR is 1.09.  Due " to an elevated troponin he underwent coronary angiogram which demonstrated no significant CAD. Due to an elevated d-dimer underwent CT scan of the chest which showed extensive acute bilateral pulmonary embolism with saddle embolus.  Doppler legs showed Nonocclusive right femoral DVT from upper to mid thigh. Left calf DVT within one of the peroneal veins in both of the posterior tibial veins from upper to low calf. Echocardiogram revealed lower limit of normal left ventricular systolic function of 50% with septal motion compatible with right ventricular overload.  He had cor pulmonale and was started on heparin infusion. No thrombolytics were given due hemodynamic and respiratory stability. After several days of monitoring on IV heparin Mr. Sinha continued to improve and he was transitioned to Xarelto for ongoing anticoagulation therapy. He tolerated Xarelto well. Had no major bleeding except intermittent gum bleeding. Denied family history of venous thromboembolism. He had a brother who passed away from [facility]. He was single. Did not have children. Was advised to continue with indefinite anticoagulation. Hematology thought maybe plasma donation had led to a transient hypercoagulable state. Mr. Sinha was advised not to donate plasma moving forward. An outpatient referral to hematology had been made.  Seen by hematology 9/2019 at [facility]. She reviewed the pathophysiology of venous thromboembolic exam and risks of recurrence. Was advised of possible etiology, he was indicated for long-term anticoagulation due to initial presentation of a life-threatening episode. No prior history of venous thromboembolism or family history of thromboembolism. Was not recommended family/hereditary thrombophilia work-upa repeat CT scan of the chest to know the resolution of blood clots was not needed & only recommended to obtain a CTA if he had signs and symptoms concerning for venous thromboembolism. Was advised strict  "abstinence from alcohol with anticoagulants & to obtain kidney, liver function monitoring at least once a year.   He had age-appropriate cancer screening. He admitted that he had a positive stool test (sounds like FOBT) a couple years prior. It was unclear if it was related to his alcohol use. He was recommended to proceed with colonoscopy, but to wait at least 3-6 months because of this recent pulmonary emboli event, &  recommended interruption of anticoagulation prior to that. He voiced understanding. His previous primary care provider retired.    He was engaged with outpatient CD treatment at [facility] but he had been unable to maintain any sobriety outside of a structured facility. Roughly one month prior to next ER  visit he stopped taking all of his psych medications.      Was in the ER at [facility] on 10/17/2019 for suicidal ideation & alcohol abuse: had increased depressive symptoms, anxiety, and suicidal ideation. He presented to the outpatient clinic for a Rule 25 assessment. He endorsed numerous acute depressive symptoms. He relapsed on alcohol and had been unsuccessful outside of a structured program. He was previously working a good job as a Scoring Director at [employer] \"but I drank myself out of that job.\" He had been evicted from his apartment and found himself homeless. He rated his depression as 8 out of 10 and anxiety as 9 out of 10. He was feeling hopeless with thoughts of suicide and a plan to hang himself. He stated that he hadn't taken any steps towards acting on the thoughts but that \"it's something in my mind.\" He felt somewhat isolated. He had one friend in [place], but no supportive family that were still alive.   He was admitted on a voluntary basis. Agreeable to stay voluntarily to coordinate cares and medication management. Was seen by the hospitalist during course of hospitalization. Started on antibiotics for a dental abscess. He was placed on a CIWA protocol & did not demonstrate signs " or symptoms of complicated alcohol withdrawal. Psychiatric medication management was performed in detail to target signs and symptoms of principal diagnosis & comorbid illnesses. Medication management included Lexapro which was restarted for depression and anxiety as well as Wellbutrin XL, & Gabapentin as needed for anxiety. Propranolol as needed for anxiety. Given Naltrexone as a trial for alcohol use disorder; LFT's were normalizing & Campral was discontinued secondary to GI side- effects. Denied psychosis. Denied suicidal and homicidal ideation. Did have an appropriate crisis and relapse plan. Was future oriented. Denied gun access. Indicated motivation to proceed with MI CD treatment as coordinated by  with intake date on Friday, November 1, 2019.   followed in regards to collecting and reviewing collateral information, coordination of cares and discharge planning. Including, MI CD evaluation completed with recommendation for placement to unit 2700. Due to dental issues, he was discharged to medical respite bed to bridge placement. He was treated for dental abscess with PCN.     Seen 11/29/19 for the first time by this writer. See that note for details. Noted Hx of severe MDD, ZAIDA, hx of passive suicidal thoughts, early remission from alcohol dependence just completed rule 25 , detox & rehab & in sober living. had just moved from [facility] to [place]. Had poor social support but was forward thinking  & had apt's for the dentist & psyche & noted had been taking meds & contracted to safety. 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 No hypercoag work up recommended given lifelong anticoagulation indicated & seen by hematology. Note from sept 2019 reviewed.   Reported fatigue, weight gain, dizziness since on new meds for mental health. Denied chest pain or palpitations though noted had  had these in the past. Exam was benign & vitally stable.Did not appear to be in heart failure. Noted his memory changes, abdominal pain, tinnitus, ED thought  related to alcohol abuse & noted symptoms had improved since quit drinking. Advised to try Pepcid OTC for abdominal pain. Declined referral to ENT/ audiology. Seasonal allergic rhinitis was not an issue. Was to consider a GI referral for a scope in future once could come off Xarelto a few days. Noted due for follow up with hematology by feb 2020. Was to sign an KISHOR to get records from [place] where previously doctored. Was given the Tdap. Was negative for Hep C, HIV, had a normal folic acid, vit B12, CMP, TSH, cbc, HB A1C & LDL was elevated.   Seen by [facility] psychiatry on 12/5/19 for ZAIDA, depression and alcohol abuse in remission and noted was taking propranolol 1 a day instead of three times and continued on same meds till reevaluated in 4 weeks.      Seen 12/20/2019 & noted remained sober and symptoms were improved.  Seen by psychiatry at [facility] 1/9/2020 when noted had stopped gabapentin and was continued on Lexapro, Wellbutrin along with using propanolol rarely as needed.  Did see the hematologist 1/23 and told to complete therapeutic course of Xarelto and then continue indefinitely prophylactically due to unprovoked prior DVT and PE.  Advised that it was okay to get dental procedures and colonoscopy after March 1 and he would be low risk to hold the medication 1 to 2 days before and after depending on the procedure risk.  And to follow-up with him in 1 year.       Seen by psychiatry 5/2020 by telephone encounter due to pandemic and continued on Lexapro and propranolol and Wellbutrin but Lexapro increased to 30 mg.  Was doing therapy. Seen by psych 6/2/20 and bupropion increased to 300 and continued on rest of meds.  Was in the ER 6/04/20 dental pain and abscess of tooth #20.  Was drained and put on amoxicillin and advised to see the dentist for root  canal.  He called on 6/9 and medication was changed to Augmentin and he did see the dentist on 6/11/20 but tooth had broken off by then and root canal not indicated as it was too far gone and instead his tooth was pulled  to make dentures as he had quite a few teeth pulled. Seen psychiatry on 7/2/20 and continued on all his meds and trazodone added and noted propanolol made him tired and he only took half tablet prior to an AA meeting.      TE done 7/7/20 noted was doing reasonably well.  Had passive suicidal thoughts but no active plan.  Under care of a psychiatrist and therapist.  He was to see his therapist in person the next day in person for the first time since the pandemic began.  He had still been going to AA and conversing with his psychiatrist via telephone.  Notes from the psychiatrist reviewed.  He was concerned about some skin changes, ear wax and tinnitus.  His abdominal pain and palpitations had resolved since he stopped the alcohol noted he had been sober then 9 months.  He continued to have ED and some fatigue and occasional lightheadedness which he believed was side effects from the medications he took. Was very rarely forgetful not as significant to be of any concern to him. He was planning to get dentures. Was to continue care with his hematologist for hx of unprovoked DVT & PE.  Due for appointment with them in the new year. Advised to work on advanced directives. Due to a prior positive stool test was recommended a colonoscopy & per hematology they said he could go ahead with that we would just have to hold his blood thinner a day before and a day after the scope or depending on what they find & do. He had no symptoms and wanted  to wait to do the colonoscopy once the pandemic situation was  better. Advised  to decrease salt in diet to help tinnitus. Tinnitus thought a result of prior Advil/ alcohol use/ hearing loss  Fatigue as well as lightheadedness and dizziness  thought related to  medications but recommended labs at appointment and if no answer could consider referral to sleep specialist for undiagnosed sleep apnea and/or referral to neurology/cardiology and to  discuss medications further with his psychiatrist. Was to continue to work on diet and weight loss with exercise. Declined Neurology referral.  [place] it was not too serious right at the time, having just rare episodes of occasional fogginess and he felt that was related to his medications.     Seen 7/16/2020 for a physical & chronic issues. Advised self testicular exams monthly. Skin check of moles right inner arm, left mid back and right iliac area all looked like benign seborheic keratosis. Offered referral to derm if remained concerned or they changed. Ear check: showed minimal wax, likely not a cause of tinnitus. Referred to see ENT for tinnitus. Was noted due for a colonoscopy. was to follow up with his hematologist regarding hx of unprovoked PE and DVT and blood thinners. Labs done and advised If normal and remained concerned about fatigue recommended to see sleep to be evaluate for sleep apnea. LDL elevated, ASCVD 7.7% . Diet , exercise, weight loss recommended. Was to recheck in 6 months. Noted remained sober from alcohol. Advised if dizziness persisted or got worse can to see cardio/ neuro. Opted then to defer due to cost and felt was managing ok. Thought was from his psyche meds. Was advised to stay well hydrated. Consider the Shingrex vaccine. With regards to his MDD/ [facility]/ chronic passive suicidal thoughts, had no active plan, was under care of psyche and & his Counsellor & had upcoming apt's with them. Was to continue care with his dentist. Had left jaw swelling from recent tooth extraction and infection & had been started on Augmentin twice a day and tramadol as needed by his dentist.  Understood risk of antibiotics and chronic narcotics.       Seen by Tae lion on 9/8/20 & 10/2020 & continued on Wellbutrin 300 mg,  propranolol prn, & trazodone 25 to 50 mg bedtime prn. Had a colonoscopy in nov 2020 which was normal. 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 3/2021 did an e visit for Covid symptoms but Covid was negative.   Seen by psych he 4/2021 & continue don Lexapro 30, trazodone 50, Wellbutrin decreased to 150 & started pregabalin 50 bid and hydroxyzine prn & propranolol held. Seen again by psyche on 5/2021 & hydroxyzine discontinued as caused drowsiness. Seen by psyche on 7/2021 and pregabalin and propranolol discontinued.      [place]  shows received gabapentin 100 mg #270 on 1/4/19 at [place], then 300 mg # 90  On 2/11/19 in [facility], 100 mg # 60 on 3/6/19 in [facility], then # 180 on 6/20/19, 300 mg # 60 on 10/30/19 & # 30 of 300 mg on 11/21/19.  Tylenol 3 12 tablets on 2/11/2021 and tramadol 50 mg #12 on 2/25/2020. Tramadol in July by dentist # 12. pregabalin 50 mg # 60 on 4/12/21, 5/10/21 & 6/2/21     Seen 8/18/21 for chronic fatigue and dizziness thought multifactorial from meds, mood, and prior PE. Recommended labs, echo to assess cardiac function since last checked in 2019 when had effects of large Pulmonary embolism. Had No evidence of coronary artery disease. EKG looked normal with right heart axis from prior PE. Advised to avoid holding  breath if that made him dizzy. Recommended a sleep Eval to rule out undiagnosed sleep apnea & to see cardiology and neuro to complete work up. Orthostatics today were negative.  Was to continue care with hematology and chronic anticoagulation. See ENT for chronic tinnitus. Noted MDD/ ZAIDA/ passive SI, safe remained 2 yrs nato, in sober housing, to start a new job. Was to continue care with counseling and psyche & go to the ER if suicidal thoughts increased or got worse. [facility] was related to meds, mood, prior effect of alcohol, SK was stable. Seasonal allergies were not a big issue. Was to continue care  with the dentist. Health care maintenance was reviewed. Advised self testicular check regularly. Recommended the pneumonia vaccine, shingles # 2 & was to do after done with Covid # 2 on 9/3/21 was to return in 3 months for a preventive physical then.  Labs showed normal PSA, elevated LDL, CMP showed mildly low albumin advise recheck in 3 months, TSH was normal iron normal ferritin was low, B12 was normal folate was normal CBC was normal.  Did not return for 3-month follow-up.     Seen 10/4/22, was originally scheduled as a physical.  But due to respiratory symptoms and exposure to positive COVID night before was changed to an office visit.  COVID and strep test done for history of runny nose nasal congestion cough started 10 to 12 days prior improving and reported negative COVID PCR test on day 5 of illness but also newly exposed to positive COVID case day before.  Quarantine till COVID test comes back negative,  if negative recommend rechecking in 5 days as testing could be negative as too early since time of new exposure.  Chronic fatigue felt multifactorial due to mental health, medications, prior noted low ejection fraction of heart, history of pulmonary embolism, undiagnosed sleep apnea etc. Labs done & referred to cardiology and sleep to further evaluate and treat and return in 4 to 6 weeks to follow-up. Recheck echo and to see cardiology for prior noted decreased EF suspected due to pulmonary embolism in the past. History of chronic dizziness & referred to cardiology and neurology. For prior low ferritin and low calcium labs done.   History of elevated LDL & labs done and if cardiovascular risk was elevated to consider statin medication.  History of prior pulmonary embolism and DVT,noted had taken self off Xarelto in May 2022 but had medication at home.  Recommended to return to hematology to discuss risks and if medication needs to be resumed.  Hx of Upper abdominal pain ongoing many years, thought could be  due to gastritis, ulcer, heartburn etc.  Labs including H. pylori ordered as well as a CT abdomen pelvis given duration of symptoms.  Was doing over-the-counter Prilosec as needed with some relief.  Discussed chronic use of this medication could increase risk of C. difficile colitis, fractures, magnesium and B12 deficiency. To try Pepcid 20 mg twice a day over-the-counter instead in the meantime  Noted an elevated blood pressure, felt due to cough& anxiety. Was recommended to eat a low-salt diet and recheck at follow-up and if persisted to consider medication. 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   Reviewed major depression with anxiety and passive suicidal ideation, felt safe under care of psychiatry & had an appointment with them the following day to discuss medications.  Had had changes to meds over time, was to continue with therapy every 2 weeks.  & noted was gainfully employed. Memory concerns felt could be due to prior alcohol use and current mental health. Did check B12 and magnesium levels and referred to see neurology to further evaluate and treat  Did not have time to discuss a lesion on left side of nose that he had had for some time that was not healing. Was to review at next appointment and refer to dermatology if needed  Was to return in 4 to 6 weeks for physical and update flu shot, COVID-vaccine, pneumonia vaccine and shingles etc. Noted was a Former smoker but did not meet criteria for lung CT cancer screening.     Covid & strep was negative, LDL elevated with ASCVD risk of 9.4 % normal lipase, B12, ferritin, calcium, vit d, CMP, TSH, magnesium, iron, HBA1c was normal. H pylori came back normal.   On 10/6/2022 noted hematology PA filled his Xarelto at reduced dose of 10 mg for one year after he discussed with them a compromise to restart meds,  On 10/24/22 ct showed a benign right liver cyst, CBD dilation, & advised an mri. Also noted duodenal diverticulum, diverticulosis, bone  island right iliac that was asymptomatic & B/l inguinal hernia & umbilical hernia that was asymptomatic  Echo showed improvement with normal ef & no valvular concerns.   Met with cardiology on 11/22 who noted normal EKG & echo, advised a statin & felt could do a stress test but would not provide more infor & patient opted not to do.  MRI done 11/25 showed CBD dilation was not of concern, had possible gallstones/ sludge, had evidence of pancreatic scarring, suggestion of IPMN, a duodenal diverticulum and non specific mesenteric lymph nodes.     Seen 12/2022 for a physical & follow up from last visit  MDD/Zaida/ passive SI: under care of therapy and psyche. Had been started Abilify & stopped as didn't like it. No longer on Cymbalta, was supposed to talk to psyche 2 weeks prior but it got cancelled and had next apt on dec 15 th, was thinking about going back to restarting Lexapro as in retrospect felt better while on it.  Was  safe. Though mood and ZAIDA worse off any meds. Reported no urge to drink alcohol.   Congratulated on sustained sobriety from alcohol since 2019  Reviewed his long standing epigastric pain, had been taking both Pepcid and Prilosec since oct visit. Symptoms would go away but return.  When pain went away mood was better. Taking Prilosec 20 mg daily & Pepcid 20 mg once day & a Raeann capsule. Lab work recently including lipase & H pylori were negative. did  Ct abdomen and then mri showed benign CBD dilation, dilated pancreatic duct, evidence of pancreatic scarring, suggestion of IPMN, a duodenal diverticulum, and non specific mesenteric lymphadenopathy & a benign right liver cyst & asymptomatic diverticulosis, B/inguinal hernia and small asymptomatic umbilical hernia & possible gall stones. Discussed Chronic use of Prilosec like med's could cause atypical pneumonia, fractures,  C diff colitis, vit B 12 and magnesium deficiency. If could come off would be good by slowly tapering off Prilosec while  continuing Pepcid twice a day for symptoms control. Referred to GI to evaluate epigastric pain, IPMN, duodenal diverticulum & could  benefit from an EGD.   Also referred to general surgeon regarding possible gall bladder sludge & asymptomatic B/l inguinal & umbilical hernia which I didn't believe was contributing to long standing epigastric pain.   Chronic fatigue, Seen by cardio, Echo & EKG & labs normal, opted no stress test. Had upcoming apt with sleep in new year to evaluate for possible TAYLOR. Suspected mental health playing a role. To continue care with his psychiatrist & therapist. Prior noted decreased EF with prior PE had resolved with recent normal echo. Elevated BP, recheck end of visit was wnl, suspected due to anxiety. To continue to monitor & advised a low salt & low caffeine diet.   Dizziness persisted, sometimes ws postural, sometimes could occur while standing with no posture changes. Rarely feels like will black out. At times gets up and has to sit right down again. Feels drinking enough water. Seen by cardio EKG, echo normal. Cardio felt stress test would not add more information. 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 No sign of bleeding. Labs normal. To be seeing neuro to evaluate more in the new year. If no answer could consider a Holter monitor.  LDL elevated. The 10-year ASCVD risk score (Jerry DK, et al., 2019) was: 9.3%. Discussed risk using boswell's medical model for this risk group. Opted  to hold off on statin   Subjective memory concerns, Able to do job ok but felt struggled with things sometimes. B 12 and TSH normal.to discuss with neuro in march when had apt. Suspected mental health playing a role.   Hx of PE & DVT & back on Xarelto since stopped in may but since on a lower dose of 10 mg a compromise he made with hematology.  Incidental right iliac bone island noted on ct scan, felt to be benign, was asymptomatic. Left nose lesion many months started as injury / pimple which he  had picked at often and not been healing.   SK no new changes to skin otherwise. Referred to dermatology for nose skin lesion & general skin check. B/l tinnitus stable . Allergies not bothersome now, as gotten older had allergy to ragweed noted got better. Dental caries improved. ED noted better since off the antidepressants  HM reviewed. No ACP on file, honoring choices given to review. Vaccines discussed. Given flu and Covid pfizer bivalent vaccine. To make an apt to do long overdue Shingrex #2 in a few weeks with the MA & a BP check Desired to hold off on Hep B vaccine. Labs reviewed from oct PSA was normal in 8/2021 & could offer at next visit / physical. Was to follow up again in march 2023 after saw  sleep & neuro & offer Hep B then.     Was to get an ERCP appointment with Dr. Peralta.  But no appointment made.  Seen by neurology 3/9/2023 for lightheadedness, memory changes, fatigue, short-term memory concerns, MRI brain ordered and to consider neuro psych eval.  For postural dizziness not orthostatic at visit advise compression socks and was to follow-up in 6 months.  Seen by [facility] psychiatry/7/23 for MDD ZAIDA continued on Zoloft started on Trintellix at the time.  MRI 4/13 showed findings consistent with Wernicke's encephalopathy suspected a scar from prior alcohol use and also had abnormal signal bilateral global pallidum and advised to check B1 copper ceruloplasmin and encouraged to take a B complex.  Lab work done 5/24/2023 showed normal B1 copper and ceruloplasmin and advised manganese and 24 urine copper which did not get done.  Also neuro psych eval set up for 6/26/2023 to Dr. Connors at [facility] but did not make it to the appointment.  Seen by sleep 10/12/2023 for gasping breathing, excessive daytime drowsiness, cognitive issues, advised HST and decrease caffeine.  HST done 12/5/2023 unfortunately had technical errors and was advised to redo which did not get done.  Seen by dermatology  2/26/24 and a shave biopsy of left AllerNaze showed basal cell cancer and advise Mohs.  Seen at [facility] in April 2024 for dental infection had Mohs procedure 4/18/2024.  Postop check 4/24.  Reconstruction started 5/13/2024 seen by dermatology 5/15/2024.  Seen by [facility] dentist 5/20/2024 for #31 tooth decay referred to endodontist to preserve tooth on patient request though prognosis was poor.  Given a topical anesthetic on 5/21 for pain.  Seen by [facility] dentist 5/23 for endodontic evaluation noted pulp necrosis and advised symptomatic care with ibuprofen.  Seen by reconstructive Derm 6/6 started with laser treatment of scar laser treatment on 7/22 and 9/13/2024.  [facility] shows received ketamine 3/29/2024, 5/24, 8 7/10/2024 and 9/5/2024 and Percocet 5/325 #12 on 4/18 #5 on 5/14, Norco 5/325 #6 on 5/17/2024     Seen 9/20/24 for ongoing stomach pain, chronic postural dizziness, chronic fatigue. Last seen by this provider in 12/2022   Reviewed the long standing epigastric pain, previously had been taking both Pepcid and Prilosec & a Raeann capsule but on no meds a while Prior lipase & H pylori had negative. In 2022 a Ct abdomen and then mri showed benign CBD dilation, dilated pancreatic duct, evidence of pancreatic scarring, suggestion of IPMN, a duodenal diverticulum, and non specific mesenteric lymphadenopathy & a benign right liver cyst & asymptomatic diverticulosis, asymptomatic B/l inguinal hernia and small asymptomatic umbilical hernia & possible asymptomatic gall stones. Was referred to GI to evaluate epigastric pain, IPMN, duodenal diverticulum & may benefit from an EGD. Was also referred to general surgeon regarding possible gall bladder sludge & asymptomatic B/l inguinal & umbilical hernia which I didn't believe was contributing to long standing epigastric pain. Did not get apt's with either and symptoms persist 2 yrs since last seen.Epigastric pain has been occurring daily for years, pushing on upper  abdominal area hurt, reported no longer with heartburn. No longer on Prilosec & stopped Pepcid as felt it made him tired. When took Pepcid or PPI previously felt it helped. Understood chronic use of Prilosec like med's could cause atypical pneumonia, fractures, C diff colitis, vit B 12 and magnesium deficiency. Reported no blood in stools or black stools but noted wt loss & appetite decreased. In 12/2022 weighed 197 lbs, in oct 2023 was 189 lbs, in 9/2024 was 170 lbs. Labs Agreeable to trying Prilosec 20 mg 2 weeks then Pepcid 20 mg bid and seeing GI.   Sludge in gall bladder felt not significant, had a hx of mild dilated CBD and dilated pancreatic duct, IPMN  & mesenteric lymphadenopathy noted at last mri and was advised ERCP with GI but no apt given and referred again to them. Desired to wait to see GI to repeat MRI. Had an asymptomatic Liver cyst, asymptomatic duodenal diverticulum, asymptomatic diverticulosis, asymptomatic tiny umbilical hernia & unchanged asymptomatic B/l inguinal hernia.   Labs came back with normal CBC, normal ESR, CRP, CMP, TSH, Vitamin B12, ferritin & iron . Had normal celiac testing, manganese, & Vitamin D level was low normal & encouraged getting 2000 IU daily. Later tested positive for H Pylori. Continued omeprazole 20 mg daily 2 week as prescribed. In addition  to take pylera, combo med containing bismuth, metronidazole and tetracycline 4 / day for 10 days together with the omeprazole to eradicate the H pylori. Once done with this regimen to continue on Pepcid previously prescribed and encouraged to see GI as discussed as may benefit from an EGD. To check prostate test at his physical.      Chronic fatigue, previously seen by cardio, Echo & EKG & labs normal, opted no stress test. Prior noted decreased EF with prior PE had resolved with normal echo in 2022. Given persistent symptoms referred back to cardiology. Seen by sleep, HST done had technical errors in dec 2022 & not redone. Labs  didn't explain fatigue and referred back to sleep.   Postural dizziness, sometimes could occur while standing with no posture changes. Rarely felt near syncope. At times on getting up had to sit right down again. Felt he was drinking enough water. Seen by cardio previously in 2022 when EKG, echo normal. Cardio felt stress test would not add more information. 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 No sign of bleeding. Previously BP not orthostatic. No associated chest pain or shortness of breath or vertigo. Fh of heart issues and arrhythmias/ pacemaker. /72, at home 100/60. Noted had never tried compression socks previously advised by neuro & encouraged to do so. Referred back to cardiology & ziopatch ordered. Advised and given compression socks to try.     LDL elevated, The 10-year ASCVD risk score (Jerry ROCKWELL, et al., 2019) is: 5.7%. A diet high in fat and simple carbohydrates, genetics and being overweight can contribute to this. Advised exercising 150 minutes of aerobic exercise per week (30 minutes for 5 days per week or 50 minutes for 3 days per week are options) and eating a low saturated fat/low carbohydrate diet to improve this. HBA1c normal.. Could consider doing a ct calcium score to get objective data about risk and need for starting a statin med.   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      Memory concerns, Able to do job ok but felt struggled  sometimes. Forgetful of names, no worsening, forgetful of conversation with clients at times and felt had to write everything down. B 12 and TSH normal. Seen by neuro in 2023. MRI showed 4/13/23  showed findings consistent with Wernicke's encephalopathy, suspected a scar from prior alcohol use and also had abnormal signal bilateral global pallidum and advised to check B1 copper ceruloplasmin and encouraged to take a B complex.  Lab work done 5/24/2023 showed normal B1 copper and ceruloplasmin and advised manganese and 24  urine copper which did not get done.  Also neuro psych eval set up for 6/26/2023 to Dr. Connors at [facility] but did not make it to the appointment. In 9/2024 manganese and copper testing came back negative. Was referred back to neuro for follow up and neuro psyche referral.   Noted sustained sobriety from alcohol since 2019  MDD/ZAIDA/ passive SI: under care of [facility] psyche Morena Spencer . Previously on Abilify & had stopped as didn't like it. No longer on Cymbalta, no longer on clonidine which made him tired. Noted was on fluoxetine 40 mg and 5 mg lexapro. Lexapro felt helped with anxiety better but wiped him out so doing this way. Understood risk of serotonin syndrome. No prolonged qt note din past. Did also take Ketamine infusion / lozenges a while x 4 last on 9/5/24.  Felt made no difference. Not actively suicidal. Was safe, no longer in therapy.      Hx of BCC Left nose ala s/p mohs in 2/24, reconstruction and doing laser Rx of scar tissue, under care of derm, lesion many months started as injury / pimple which he had picked at often and not been healing. Hx of SK.   Asymptomatic incidental right iliac bone island noted on ct scan in 2022, felt to be benign,   Allergies to ragweed, better as got older, & no longer bothersome.  B/l tinnitus stable could hear ok, usually unaware of it until thought about it   Dental caries improved  ED unchanged on the antidepressants  Health care maintenance reviewed. Declined flu and COVID vaccine. Encouraged to complete overdue Shingrex # 2. Labs done & was to return for his physical     On 9/20 Zio patch showed 1 V. tach 16 episodes of SVT and advised to follow-up with cardiology.  GI advised getting an MRI prior to scheduling an appointment with them.  MRA abdomen 4/17 showed stable mild dilated pancreatic duct stable 5 mm cyst recheck 18 months 2026, CBD dilated up to 10 mm no gallstone sludge in gallbladder.  Normal spleen kidney bowels.  1 duodenal diverticulum.   Arthritic changes and some incidental findings.    Seen 11/19/24 for physical & follow up. Health care maintenance reviewed. No ACP on file & Given honoring choices to review. Colon cancer screening due 2030 unless told otherwise when saw GI. Given the flu & COVID vaccine & Twinrix # 1 to get 2nd in 2 months when returned for apt and 3rd four months after that  Encouraged to complete overdue Shingrex # 2 sometime & to do another time  Labs from recent sept visit reviewed.   Reviewed the long standing epigastric pain, previously had been taking both Pepcid and Prilosec & a Raeann capsule but on no meds a while Prior lipase & H pylori had negative. In 2022 a Ct abdomen and then mri showed benign CBD dilation, dilated pancreatic duct, evidence of pancreatic scarring, suggestion of IPMN, a duodenal diverticulum, and non specific mesenteric lymphadenopathy & a benign right liver cyst & asymptomatic diverticulosis, asymptomatic B/l inguinal hernia and small asymptomatic umbilical hernia & possible asymptomatic gall stones. Was referred to GI to evaluate epigastric pain, IPMN, duodenal diverticulum & may benefit from an EGD. Was also referred to general surgeon regarding possible gall bladder sludge & asymptomatic B/l inguinal & umbilical hernia which I didn't believe was contributing to long standing epigastric pain. Did not get apt's with either and symptoms persisted 2 yrs since last seen.Epigastric pain had been occurring daily for years, pushing on upper abdominal area hurt, reported no longer with heartburn. No longer on Prilosec & stopped Pepcid as felt it made him tired. When took Pepcid or PPI previously felt it helped. Understood chronic use of Prilosec like med's could cause atypical pneumonia, fractures, C diff colitis, vit B 12 and magnesium deficiency. Reported no blood in stools or black stools but noted wt loss & appetite decreased. In 12/2022 weighed 197 lbs, in oct 2023 was 189 lbs, in 9/2024 was 170 lbs.  Agreeable to trying Prilosec 20 mg 2 weeks then Pepcid 20 mg bid and seeing GI.   Sludge in gall bladder felt not significant, had a hx of mild dilated CBD and dilated pancreatic duct, IPMN  & mesenteric lymphadenopathy noted at last mri and was advised ERCP with GI but no apt given and referred again to them. Desired to wait to see GI to repeat MRI. Had an asymptomatic Liver cyst, asymptomatic duodenal diverticulum, asymptomatic diverticulosis, asymptomatic tiny umbilical hernia & unchanged asymptomatic B/l inguinal hernia.   Labs came back with normal CBC, normal ESR, CRP, CMP, TSH, Vitamin B12, ferritin & iron . Had normal celiac testing, manganese, & Vitamin D level was low normal & encouraged getting 2000 IU daily. Later tested positive for H Pylori. Continued omeprazole 20 mg daily 2 week as prescribed. & added pylera, combo med containing bismuth, metronidazole and tetracycline 4 / day for 10 days together with the omeprazole to eradicate the H pylori. Once done with this regimen he was to continue on Pepcid previously prescribed and encouraged to see GI. GI advised getting an MRI prior to scheduling an appointment with them.  MRA abdomen 4/17/24 showed stable mild dilated pancreatic duct stable 5 mm cyst recheck 18 months 2026, CBD dilated up to 10 mm no gallstone sludge in gallbladder.  Normal spleen kidney bowels.  1 duodenal diverticulum.  No mesenteric lymphadenopathy noted any more. Arthritic changes and some incidental findings.  Reported no change in symptoms on completing the H pylori Rx. Felt a persistent   gnawing pain not stabbing in epigastric area that caused fatigue. Only 1/2 tsp baking soda in water helped. Never had an EGD. Wt loss had stabilized. Had lost 27 lbs dec 2022 to sept 2024 and 3 lbs since sept 2024. Had occasional nausea, no vomiting, no diarrhea or constipation, no blood in stools or black stools, no night sweats. Remained on Pepcid 20 mg once a day & few days increased to 2/  day. Was to see GI tomorrow as planned.    Chronic fatigue, Prior noted decreased EF with prior PE had resolved with normal echo in 2022. Seen by sleep, HST done had technical errors in dec 2022 & not redone. Labs did not explain symptoms & referred back to sleep in sept 2024. Encouraged to schedule testing and follow up with sleep.  Postural dizziness, sometimes could occur while standing with no posture changes. Rarely felt like would black out. At times got up and had to sit right down again. Felt drinking enough water. Reported near syncope x 1. No vertigo. Never tried compression socks advised by neuro previously. Seen by cardio previously in 2022  EKG, echo then normal. Cardio felt stress test would not add more information at the time. 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 No sign of bleeding. Previously BP not orthostatic. No associated chest pain or shortness of breath.   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 Referred back to cardiology & ziopatch ordered. Advised and given compression socks to try.  On 9/20 Zio patch showed 1 V. tach 16 episodes of SVT and advised to follow-up with cardiology. Had upcoming apt with them & they had ordered an echo.      LDL elevated & The 10-year ASCVD risk score ([place] DK, et al., 2019) was 5.7%. Advised exercising 150 minutes of aerobic exercise per week & eating a low saturated fat/low carbohydrate diet. HBA1c normal.. Could consider doing a ct calcium score to get objective data about risk and need for starting a statin med. Desired to wait to start statin and was to discuss more with cards when seen.  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 Forgetful of names, no worsening, forgetful of conversation with clients at times and felt had to write everything down. B 12 and TSH normal. Seen by neuro in 2023. MRI  showed 4/13/23  showed findings consistent with Wernicke's encephalopathy, suspected scarring from prior alcohol use and also had abnormal signal bilateral global pallidum and advised to check B1 copper ceruloplasmin and encouraged to take a B complex.  Lab work done 5/24/2023 showed normal B1 copper and ceruloplasmin and advised manganese and 24 urine copper which did not get done.  Also neuro psych eval set up for 6/26/2023 to Dr. Connors at [facility] but did not make it to the appointment. In 9/2024 manganese and copper testing came back negative. Reported taking B complex now and then. Was referred back to neuro for follow up and neuro psyche referral. Reported has apt with both of them in the new year.      Sustained sobriety from alcohol since 2019  MDD/ZAIDA/ passive SI: under care of [facility] psyche Morena Spencer. Previously on Abilify & had stopped as didn't like it. No longer on Cymbalta, no longer on clonidine which made him tired. Noted was on fluoxetine 40 mg and 5 mg lexapro. Lexapro felt helped with anxiety better but wiped him out so doing this way. Understood risk of serotonin syndrome. No prolonged qt noted in past. Did also take Ketamine infusion / lozenges a while x 4 last on 9/5/24.  Felt made no difference. Not actively suicidal. PHQ=14, ZAIDA =11, chronic passive SI, is safe, no longer in therapy. To see his psyche again in jan     Hx of BCC Left nose ala s/p mohs in 2/24, reconstruction and now doing laser Rx of scar tissue, under care of derm. To get another laser Rx in dec. Hx of SK.   Asymptomatic incidental right iliac bone island noted on ct scan in 2022, felt to be benign,   Allergies to ragweed, got better as got older, & no longer bothersome.  ED unchanged on the antidepressants. No active issue. Resolved to PMH.   Nocturia 2/ day with urgency, some  hesitancy, no incontinence, suspected BPH, PSA came back normal.  B/l tinnitus stable could hear ok,  usually unaware of it until  thought about it   Worsening vision wore glasses to follow up with eye.,    Dental caries improved. Was to return in 2 months for follow up, 2nd Twinrix & recheck vit d    MRI repeated in November 2024 showed mild dilated pancreatic duct stable 5 mm cystic duct no gallstones, duodenal diverticulum x 1, incidentally noted arthritis spine fat-containing small umbilical hernia.  Seen by GI 11/20/2024 for epigastric pain 20 years on PPI H2 blockers right upper quadrant ultrasound and scans unremarkable MRI showing dilated pancreatic duct s/p H. pylori treatment to get an endoscopy.  Seen by cardiology 11/21/2024 for fatigue Zio patch reviewed echocardiogram noted normal cardiac Adarsh to follow-up as needed no medication suggested.  Seen by GI 11/21 US ordered.  On 12/5 elastase was more than 800 considered normal LFTs normal.  On 12/19 US EGD done 12/19/2024 was normal at 2 cm periampullary diverticulum noted no obstructive lesion seen prominent pancreatic duct no pancreatic mass slight stranding seen along gallbladder biopsies negative for celiac H. pylori negative previously positive.  Seen by GI 1/13 continued on PPI and Pepcid.  Seen by neuro psych 1/29 noted send no significant degenerative condition no dementia.  Counseled on how to keep the brain healthy.  Appointment with the neurologist got canceled on 2/17.  Return to GI 3/27 and noted epigastric pain improved on omeprazole 40 mg and Pepcid.  Discussed a trial of FDgard and noted no further weight loss.  ZAIDA 10, PHQ 12, chronic passive SI.  Noted [place]  showing receiving ketamine oral 3/29/2024, 5/24/2024, 7/20/2024, 9/5/2024, 11/27/2024, 12/6/2024, 1/20/2025, 3/7/2025, 3/14/2025, and oxycodone 5/325 #12 on 4/18/2024 and 5/14/2024 Norco 5/325 mg  #6 on 5/17/2024,    Review of Systems  Constitutional, HEENT, cardiovascular, pulmonary, GI, , musculoskeletal, neuro, skin, endocrine and psych systems are negative, except as otherwise noted.      Objective     /71 (BP Location: Right arm, Patient Position: Sitting, Cuff Size: Adult Large)   Pulse 93   Temp 97.3  F (36.3  C) (Temporal)   Resp 16   Wt 78.5 kg (173 lb)   SpO2 99%   BMI 24.82 kg/m    Body mass index is 24.82 kg/m .  Physical Exam   GENERAL: alert and no distress  EYES: Eyes grossly normal to inspection, PERRL and conjunctivae and sclerae normal, glasses  HENT: ear canals and TM's normal, nose and mouth without ulcers or lesions  NECK: no adenopathy, no asymmetry, masses, or scars  RESP: lungs clear to auscultation - no rales, rhonchi or wheezes  CV: regular rate and rhythm, normal S1 S2, no S3 or S4, no murmur, click or rub, no peripheral edema  ABDOMEN: soft, non tender, no hepatosplenomegaly, no masses and bowel sounds normal  MS: no gross musculoskeletal defects noted, no edema  SKIN: no suspicious lesions or rashes, prior scars  NEURO: Normal strength and tone, mentation intact and speech normal  PSYCH: mentation appears normal, affect normal/bright    No results found for any visits on 03/27/25.  No results found for this or any previous visit (from the past 24 hours).      Signed Electronically by: Adriana Solis MD

## 2025-03-27 NOTE — PROGRESS NOTES
GI CLINIC VISIT    ASSESSMENT/PLAN:  Nikunj Sinha is a 60 year old year old male with PMHx of former heavy ETOH use, MDD/Anxiety, h/o SI (requiring admissions), b/l DVT with saddle pulmonary embolism 2019 (on Rivaroxaban) following with the [facility] GI group for chronic abd pain h/o unintentional weight loss of 30# over last year (stabilized).     #epigastric abd pain x 20 years   #h/o weight loss  2020 Cscope clean with 10 year recall and no FHX of CRC. 2024 MR Abd MRCP with diffuse dilation of CBD and to main PD with unremarkable EGD/EUS (2024) with advanced endoscopy - slight prominence thought to be due to diverticulum. no obstruction or large masses, no pancreatic calcifications or cysts/findings of chronic pancreatitis. H.pylori NEG 12/2024 (on PPI holiday). Celiac serologies NEG (no intact total IgA). Fecal elastase WNL.Pain improvement with omeprazole 40 mg daily x 3 mo. His sx are thought to be due to functional disease (functional dyspepsia - epigastric pain subtype). Finds that stress (along with certain foods - fatty foods, carbonated drinks, NSAID use) flares this pain. DDX includes gastroparesis, SIBO. Possible ABD wall pain as massage also helps.     -extend omeprazole 40 mg for another 90 days.  Afterward, decrease to 20 mg once daily with revisit dosing at our follow-up  -Trial of FDgard per AVS  -He does have the hydrogen breath test scheduled for small intestinal bacterial overgrowth (risk - duodenal diverticulum)  -Continue to avoid known gastric irritants  -No further weight loss, if it were to recur, consider RD consult. He was instructed at our last appt to discuss other potential care with his primary   -Continue working with mental health team    Future consideration  1.meds to follow - H2B, neuromodulator . If neuromodulator, would coordinate and defer treatment/management to psych team. [facility] Esperanza Matam[place], [place]  [phone]  2. RUQ US +/- HIDA  3. NMGES   4.  PT  consultation for abdominal pain - MSK etiology ?  5.  GI RD consultation    #CBD dilation to 10 mm  #mild diffuse dilation of main PD w/o obstruction   Recent EGD/EUS that was unremarkable (2024), slight prominence thought due to diverticulum. Liver enzymes have been normal 9/2024  -No further work up indicated at this time.     #[facility]  Noted to GB neck on EUS - we reviewed s/sx of biliary colic.  No symptoms    # Abnormal PHQ  # Passive suicidal ideations  # Chronic depression, inactive treatment  PHQ screener today abnormal.  He has a known outpatient mental health team -therapy along with psych NP; whom he sees regularly. There are recent changes in his neuromodulators though he denies worsening mood / SE with this change. He does endorse some anxiety.  Shares that both of these providers are aware of passive thoughts of suicide ideation that he reports is chronic, at baseline without any worsening. Denies active thoughts of self-harm or plan/intent. ER precautions reviewed. Informed him I will have my team route his psych team note note for care coordination purposes.   -[facility] Esperanza Matamoros, [place]SH[place]  [phone]    # b/l DVT with saddle pulmonary embolism (2019)  Per 10/20/22 hematology note, VTE was thought to be unprovoked however on that appt, he reported heavy ETOH use, living out of this car, and sleeping upright in his care routinely - plan at that time was to consider the VTE provoked however given the clot burden/severity, plan was to keep him on daily Xarelto 10 mg indefinitely. Plan was to be reviewed yearly. Asked he follow with hematology on use     No orders of the defined types were placed in this encounter.    Colorectal cancer screening: done 2020 with 10 year recall     RTC - 12 wk or sooner PRN     Thank you for this consultation.  It was a pleasure to participate in the care of this patient; please contact me with any further questions.     Melba Dasilva PA-C  Follow  "up: As planned above. Today, I personally spent 55  minutes spent on the date of the encounter doing chart review, history and exam, documentation and further activities per the note.      HPI  Nikunj Sinha is a 60 year old year old male with PMHx of former heavy ETOH use, MDD/Anxiety, h/o SI (requiring admissions), b/l DVT with saddle anesthesia 2019 (on Rivaroxaban) following with the [facility] GI group for abd pain.     2024 appt with me   1. Epigastric abd pain x 20 years. worse in past several years.   Persistent pain. Gnawing and dull pain. Can have very short periods of no or very minimal pain. Nonradiating.  Worsened with meals (but unclear if it worsens immediately or with a time delay) . He knows NSAIDs, fried foods, carbonated beverages will make it worse   -Improved - 1/2 tsp baking soda in water, pepcid BID  -Tried pepto (no relief), prilosec (2wk, unclear if helped)     Found to be H.pylori POS and now treated x 10d. Pain did not improve much afterwards.     -Lost~30# as avoiding eating, it makes pain worse. Reports he was 198 (start of the year) to 165, unintentional. Seems to have stabilized.   -Associated with fatigue and feeling tired  -also reporting nausea w/o emesis, occasional post-prandial bloating, stooling changes  Denies dysphagia, odynphagia.     Bowel Habits   -Once daily BSC type 3, regular for him. No straining. Good evacuation. On toilet for a few min. No blood or black stools. This is is normal stooling pattern w/o changes.   -Cscope 2020 - cecal intubation, good prep. Normal macro colon with 10 year recall (2030)    1/13/2025 appt with me   Eating gluten, serologies were negative. No fhx of celiac disease.   Mother with similar abd pain, chronic of unknown etiology   Had EUS / EGD with Dr Mcleod - no trouble with recent procedure.   -some voice hoarseness afterwards but otherwise OK   Abd pain continues to same degree as previously described. See below. No progression.    \"Persistent " "pain. Gnawing and dull pain. Can have very short periods of no or very minimal pain. Nonradiating.  Worsened with meals (but unclear if it worsens immediately or with a time delay) . He knows NSAIDs, fried foods, carbonated beverages will make it worse   -Improved - 1/2 tsp baking soda in water, pepcid BID  -Tried pepto (no relief), prilosec (2wk, unclear if helped)\"  -pressing on abd makes it hurt more but denies changes in pain with positional changes  Weight - lowest weight since adulthood. Down from 200/190s. Stabilized 166# without further loss, per patient.   1-2 meals, finishing meals; normal for him, typically avoids breakfast  Reports depressed appetite   Does have dentures (upper/lower - well fitting; used when eating)   Occasional nausea w/o emesis.   No oral pain/dental pain, dysphagia, odynphagia, bloody or black stools.   BM - one BSC type 3-4 daily. No straining with good evacuation. bloody or black stools.   -tried Gas-X feels it helped cut down on bloat sensation, no SE on med  No drug use. Prior heavy ETOH use. Fecal elastase WNL. EUS with normal appearing pancreas.   Anxiety/depression - stable. Lexapro 5 mg and and fluoxetine 40 mg    [facility] - Dr Morena Dunaway - [phone]  -previously on Zoloft, Effexor, Cymbalta, Wellbutrin. May have been on Remeron years ago (short period of time)  -never been on TCAs   H.pylori - bismuth, flagyl, tetracycline. 3 caps daily four times daily meals x 10 days  No other questions or concerns today    Today 3/27/2025  He feels the abdominal pain is much better, mild. Today he shares the pain is always present in some degree   - appetite is better, eating one meal a day (doing this for years)  - no weight loss   -no nausea/vomiting   -still on omeprazole 40 mg daily, felt better on med   --not needed - baking soda in water and has used less pepcid BID   -Found that stress makes this pain worse  -Massaging his abdomen helps the pain  -Denies NSAIDs.  No alcohol " use.  No drug use  He works in mental health field, now with new psych provider and has seen that provider multiple times now   [facility] [place] [phone]  -Recent changes of his medications - start of viibryd 10mg (3/12) then increased to 20 mg on 3/20  --continues on Lexapro 5 mg   --now on fluoxetine 10 mg, with plan to discontinue   --next provider early May, needs to make an appt; 5/6   Some heightended anxiety but no worsening mood since mood med changes were made   BM - one BSC type 3-4 daily. No straining with good evacuation. bloody or black stools.   -tried Gas-X feels it is not helping as much but he does use it   Does have HBT scheduled   No other questions or concerns.        Depression Screening Follow-up        3/27/2025     8:29 AM   PHQ   PHQ-9 Total Score 11   Q9: Thoughts of better off dead/self-harm past 2 weeks Several days             10/4/2022    12:09 PM   C-SSRS (Brief Boyle)   Within the last month, have you wished you were dead or wished you could go to sleep and not wake up? No   Within the last month, have you had any actual thoughts of killing yourself? Yes   Within the last month, have you been thinking about how you might do this? No   Within the last month, have you had these thoughts and had some intention of acting on them? No   Within the last month, have you started to work out or worked out the details of how to kill yourself with the intent to carry out this plan? No   Within the last month, have you ever done anything, started to do anything, or prepared to do anything to end your life? No     Follow Up     Follow Up Actions Taken  Crisis resource information provided in the After Visit Summary  Referred patient back to mental health provider    Discussed the following ways the patient can remain in a safe environment:   go to ER for worsening mood/suicidal ideations    I have reviewed the results of the Boyle Screening and proposed plan  of care. The patient agrees with the follow up and safety plan.    Melba Dasilva PA-C  Family Hx  Mom - unknown stomach issues   No other known family history or GI related malignancy (esophageal, gastric, pancreatic, liver or colon) or family history of IBD/celiac disease.     Social Hx     Occasional use of NSAIDs - 2-3x a month for headaches   A B complex vitamin, Vit D, elijah capsulebut otherwise denies use of OTC herbal supplements/weight loss products.      No ETOH. Former heavy drinker. Stopped 5y year ago. May have had pancreatitis but he is unknown.   Former tobacco products. 3 years x 1 ppd   No recreational drug use     PROBLEM LIST  Patient Active Problem List    Diagnosis Date Noted    History of paroxysmal supraventricular tachycardia 12/01/2024     Priority: Medium    Abnormal finding on MRI of brain 09/29/2024     Priority: Medium    History of basal cell cancer 09/29/2024     Priority: Medium     left ala nose, s/p mohs , reconstruction and laser rx      Bone island 09/29/2024     Priority: Medium    Family history of arrhythmia 09/29/2024     Priority: Medium    Family history of ischemic heart disease 09/29/2024     Priority: Medium    Dilated pancreatic duct 09/29/2024     Priority: Medium    Sludge in gallbladder 09/29/2024     Priority: Medium    Weight loss 09/29/2024     Priority: Medium    Bilateral inguinal hernia without obstruction or gangrene, recurrence not specified 12/01/2022     Priority: Medium    Umbilical hernia without obstruction and without gangrene 12/01/2022     Priority: Medium    Diverticulosis of large intestine without hemorrhage 12/01/2022     Priority: Medium    Liver cyst 12/01/2022     Priority: Medium    Duodenal diverticulum 12/01/2022     Priority: Medium    IPMN (intraductal papillary mucinous neoplasm) 12/01/2022     Priority: Medium    Dilation of common bile duct 12/01/2022     Priority: Medium    Seborrheic keratoses 07/16/2020     Priority: Medium    Need for  "shingles vaccine 2020     Priority: Medium    Elevated LDL cholesterol level 2019     Priority: Medium    Chronic anticoagulation 2019     Priority: Medium    Personal history of DVT (deep vein thrombosis) 2019     Priority: Medium    Current severe episode of major depressive disorder without psychotic features, unspecified whether recurrent (H) 2019     Priority: Medium     19:  Unemployed, from [place] originally where was getting his care at the local clinic there, new to me & this clinic, limited records on care everywhere only viewable after he got to the room, with limited apt time.   Hx of ACS, NSTEMI with elevated troponin, no CAD on angiogram,  but found to have a acute saddle embolism with cor pulmonale & hx of dvt,  in 2019 on lifelong anticoagulation now on xarelto 20 mg daily, ever since under care of hematology, hx of moderate alcohol dependance in early remission with hx of withdrawal & mood disorder, previously on campral ( discontinued secondary to diarrhea), & naltrexone( stopped as ineffective), ZAIDA, MDD, suicidal ideation, on wellbutrin xl 150 mg daily, lexapro 20 mg daily, gabapentin 300 mg bedtime & a multivitamin, also on proranolol, previously on abilify 5 mg, citalopram 10 mg, hydroxyzine, mirtazipine & trazodone, , dental caries, recent dental abscess s/p abx to see the dentist soon, hx of essential tremor on propranolol 20 mg tid, allergic to ragweeds, no records from [facility] but reviewed care everywhere [facility] records,     Seen at [facility] 18 with suicidal ideation. \" after a friend called  worried about pt.'s suicidal ideation. SW met with Pt who appeared [place] and guarded. When SW asked Pt what brought him in he indicated it was a long story. Pt reports that his mother, father and sibling all  within a 10 year time period of various medical issues. Pt reports that he was a care giver for each of them which " "limited his ability to work. Pt reports that he used credit cards to pay for daily expenses. Pt reports that he now has extreme credit card debt. Pt lost his job recently due to his car having mechanical issues and he was missing work. Today Pt was served a 24 hour notice by the [employer] department to leave his apartment. Pt is hopeless and feels that he is \"in too deep\" and nothing will get better. Pt did not appear able to contract for safety. When SW asked what Pt would do if he went home, he shrugged and said \"I don't know\", SW indicated that she was worried Pt would go home and hurt himself because he felt so hopeless Pt shrugged again but did not reply. Pt has no support in the community and is isolated. The friend that called 9-1-1 lives in [place] and Pt has never met her in person but they have talked on the phone for 20 years. SW indicated that she must have been worried enough to figure out how to call the police in [place]. Pt shrugged and said \"I guess\". Pt does not feel an admission will change anything, \"I just don't see what being admitted will do or how it can help\". ISIS and MD discussed their concern over Pt.'s safety and his inability to contract for safety along with his state of hopelessness with Pt. He only nodded but agreed to be admitted voluntary in the hopes that the SW on the unit may have some idea's on how to help him. BAL/Breathalyzer completed (date/results): yes Pt was .219 when the police picked him up several hours ago  He does not see a therapist or psychiatrist, however, and although his primary care physician does prescribe him citalopram he has not recently spoken with him or does not sound as if he is disclosed how severe his symptoms are. He does admit to self-medicating with alcohol. When he has stopped drinking in the past he will get rather shaky and a bit nauseous. Otherwise he has not had hallucinations or seizures in the past related to that. When he spoke with his friend " "today he told him that he thought he should prepared to say goodbye to him, but when I asked why his friend would be concerned with that if this was a daily thought process for him he stated that he does not talk about it every day. He is rather hopeless and he is not sure why he has not done anything yet to harm himself.\" did admit to possibly hanging himself. Initially he was started on the alcohol withdrawal protocol and monitored for a safe detoxification. Was drinking up to a liter per day for the past few weeks prior to admission. Admitted on a voluntary basis. Agreeable to stay voluntarily to coordinate cares medication management. He was admitted to psychiatric unit for safety, stabilization and medication management. Eventually gained insight into the disease process and addiction. Was able to cooperate and participate in programming and educate on the medication as well. Started on campral, abilify, gabapentin, mirtazipine & trazodone.  hydroxyzine, & continued on citalopram. A rule 25 was completed. He spoke of some of his historical challenges including taling care of his brother with ALS and his mother who was dying of metastatic cancer and his ailing father. During this time he developed a drinking habit. Additional stressors include being evicted from his apartment. He reports that he was drinking 1.75 L of Vodka per day. He denies suicidal ideations and last felt suicidal about 6 days ago. He stated, Suicide use to be plan A but I no longer feel that way. He has hope for the future. He looks forward to discharge to [facility]. He feels much less hopeless\"    Seen in ER at [facility] on 6/12/19 \" for increased depression, hopelessness, and alcohol abuse. Pt was kicked out of his sober living today after coming home from a therapy appt smelling like alcohol. Pt reports that he has been having a good week and had a really good therapy appt today because he drank before hand and was able to disclose information " "to his therapist he did not have the courage to do before. Pt returned to his sober house and was confronted about his alcohol abuse, they called the police and sent him here. Pt has been seen in this ED previously for depression and suicidal ideation. Pt is currently denying any Suicidal ideation or plan but also is stating that he has \"fucked up his life beyond repair\" and that \"he doesn't know if he wants to live\". Pt has no family or any support, recently lost his housing, and is struggling to deal with all the loss he has endured in his life. 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 Pt has many regrets in his life and struggling to cope with all the loss. Pt would benefit from a crisis residence\".     In [facility] on 10/17/2019 for sucidal ideation & alcohol abuse: \"Nikunj Sinha is a 54 y.o. male being seen by Crisis Social Work regarding increased depressive symptoms, anxiety, and suicidal ideation. The patient presented to the outpatient clinic today for a Rule 25 assessment. He endorsed numerous acute depressive symptoms. The patient states that he was on 2700 for a period of time and found the program to be very helpful. He was then discharged to a sober house with a referral for day programming at [facility]. He has continued to relapse on alcohol and has been unsuccessful outside of a structured program. He was previously working a good job as a Scoring Director at [employer] \"but I drank myself out of that job.\" He has now been evicted from his apartment and finds himself homeless. The patient rates his depression as 8 out of 10 and anxiety as 9 out of 10. He is now feeling hopeless with thoughts of suicide and a plan to hang himself. He states that he hasn't taken any steps towards acting on the thoughts but that \"it's something in my mind.\" The patient states that he feels somewhat isolated. He has one friend in [place], but no " supportive family that are still alive. The patient's Rule 25 was completed today and this writer verified with Isabella Joe that he is not on her list. She states that once she receives the Rule 25 from the clinic and he is psychiatrically treated he can be considered for 2700. The patient has remained calm, cooperative and pleasant. Patient was sent to the ER from the outpatient addiction clinic. He was here today for a Rule 25 in the outpatient addiction clinic at [facility]. He was sent by his clinician Alexandr after completing a Rule 25 assessment. The patient endorsed numerous depressive symptoms and states that he is having suicidal ideation. The patient does not identify a specific plan but states that he has several and hasn't decided which to act on. The patient's last drink was yesterday at noon and is now observed as having some withdrawal symptoms. Alexandr states that the patient is engaged with outpatient CD treatment at [facility] but he has been unable to maintain any sobriety outside of a structured facility. Roughly one month ago he stopped taking all of his psych medications. Alexandr recommends that the patient go to inpatient mental health for his suicidal ideation, and if he is not acute enough for that placement that we pursue 2700.  Admitted on a voluntary basis. Agreeable to stay voluntarily to coordinate cares and medication management. Disposition recommended on today's date prior to intake for MI CD treatment. Recommendation by hospitalist to proceed with dental cares. Further efforts to address community concerns prior to proceeding with MI CD treatment. Patient seen by hospitalist during course of hospitalization. Started on antibiotic. Recommendation for evaluation by hospitalist in the community, given lack of provider at the facility. Patient requesting to be seen by dentist prior to MI CD treatment.  Patient placed on a CIWA protocol. Did not demonstrate signs or symptoms of  "complicated alcohol withdrawal.  Psychiatric medication management performed in detail. Medication management performed to target signs and symptoms of principal diagnosis. Further medication management performed to target comorbid illnesses. Medication management included:    Lexapro: Continued restart of PTA medication for depression and anxiety.    Wellbutrin XL:  Continued restart of of PTA medication for combination therapy.    Gabapentin:  Scheduled medication management for augmentation. Continued restart of PTA medication as needed for anxiety.    Propranolol: Continue restart of PTA medication as needed for anxiety.    Naltrexone: Continued trial for alcohol use disorder; LFTs normalizing. Repeat LFTs scheduled.    Campral: Discontinued PTA medication secondary to GI side effects.  Medication adherent. Tolerating medication management. Progressed with mood and anxiety. Denies psychosis. Denies suicidal and homicidal ideation. Did an appropriate crisis and relapse plan. Future oriented. Denies gun access. Indicated motivation to proceed with MI CD treatment as coordinated by  with intake date on Friday, November 1, 2019.   followed in regards to collecting and reviewing collateral information, coordination of cares and discharge planning. Including, MI CD evaluation completed with recommendation for placement to unit 2700. Due to dental issues, patient discharged to medical respite bed and secured on today's date to bridge placement. Discharged to Medical Respite at noon and Inpatient CD treatment on unit 2700 on 11/01 at noon. Further recommendation to refer to psychiatrist in the community after discharge from unit 2700. Cares coordinated with case management.  Treated for dental abscess with pcn.\"    [place]  shows received gabapentin 100 mg #270 on 1/4/19 at [facility], then 300 mg # 90  On 2/11/19 in [facility], 100 mg # 60 on 3/6/19 in [facility], then # 180 on 6/20/19, 300 mg " "# 60 on 10/30/19 & # 30 of 300 mg on 11/21/19    Seen nov 29th, 2019 to establish care as doesnt have a PCP.   Severe MDD/ZAIDA./ hx of passive suicidal thoughts: discharged nov 22nd 7 days ago, currently in sober housing, has leonard apt with psyche nolankaterina sierra on 12/5. To also see psyhcology 12/11 but plans to reschedule that at upcoming psyche apt next week as it is in the morning & he needs to go to out patient rx in the am. Has enough meds till sees psyche. Has passive suicidal thoughts. No active plan. Contracts to safety & forwarding thinking about apts etc. Is taking all his meds.     Hx of alcohol abuse, dependance etc, Currently sober, last drink was oct 14th, notes was on naltrexone then stopped as no longer helped. Was on campral before but had to discontinue due to diarrhea. Has done [facility] [place] [facility] program twice once beginning of the year and one last week      Moderate alcohol dependence in early remission (H) 11/29/2019     Priority: Medium    ZAIDA (generalized anxiety disorder) 11/29/2019     Priority: Medium    History of suicidal ideation 11/29/2019     Priority: Medium    Chronic fatigue 11/29/2019     Priority: Medium    Postural dizziness with near syncope 11/29/2019     Priority: Medium    Tinnitus, bilateral 11/29/2019     Priority: Medium    Memory difficulties 11/29/2019     Priority: Medium    Dental caries 11/29/2019     Priority: Medium    Seasonal allergic rhinitis, unspecified trigger 11/29/2019     Priority: Medium    History of pulmonary embolism 11/29/2019     Priority: Medium     Admitted st [facility] on 8/2019: \"hx of depression, anxiety, alcohol abuse disorder (sober x 2 months, He is a recovering alcoholic. He currently resides in sober house since June 2019) who presented with shortness of breath and 2 days of chest pain and near syncope. He presented to the emergency room on 8/4/2019 after he had [facility]. He did not recall particular chest pain or " "shortness of breath. He noted swelling of his leg in various spot but he denies any persistent pain or swelling or redness. No injury. No acute illness prior to that. However he donated plasma 10 times in 5 weeks prior to that. Upon initial evaluation, d-dimer was elevated at 6.7. PTT was normal at 28, INR is 1.09. He underwent coronary angiogram and it did not show any obstructive coronary artery disease.Due to an elevated troponin he underwent coronary angiogram which demonstrated no significant CAD. Due to an elevated d-dimer underwent CT scan of the chest which showed extensive acute bilateral pulmonary embolism with saddle embolus.  Doppler legs showed Nonocclusive right femoral DVT from upper to mid thigh. Left calf DVT within one of the peroneal veins in both of the posterior tibial veins from upper to low calf. Echocardiogram revealed lower limit of normal left ventricular systolic function of 50% with septal motion compatible with right ventricular overload.  He had cor pulmonale and was started on heparin infusion. No thrombolytics were given due hemodynamic and respiratory stability. After several days of monitoring on IV heparin Mr. Sinha continued to improve and he was transitioned to Xarelto for ongoing anticoagulation therapy. He tolerated Xarelto well. No major bleeding except intermittent gum bleeding. Denies family history of venous thromboembolism. He had a brother who passed away from [facility]. He is single. Does not have children. Was advised to continue with indefinate anticoagulation. Mr. Sinha did donate plasma 2x/week for the last 5 weeks and I wonder if this led to a transient hypercoagulable state. Mr. Sinha was advised not to donate plasma moving forward. An outpatient referral to hematology has been made\".      Seen by hematology 9/2019 a\"t [facility]. She reviewed the pathophysiology of venous thromboembolic exam and risks of recurrence. He is wondering about excessive plasma " "donation trigger that event and I think it is possible. At this point, regardless of the etiology, he is indicated for long-term anticoagulation due to initial presentation of life-threatening episode. No prior history of venous thromboembolism or family history of thromboembolism. I would not recommend family/hereditary thrombophilia work-up. I also discussed that we do not need to repeat CT scan of the chest to know the resolution of blood clots, however will obtain CTA if he has signs and symptoms concerning for venous thromboembolism. I discussed about strict abstinence from alcohol with anticoagulants. I recommended to obtain kidney, liver function monitoring at least once a year.   I discussed about age-appropriate cancer screening. He admitted that he had positive stool test (sounds like FOBT) a couple years ago. Unclear if it was related to his alcohol use. He was recommended to proceed with colonoscopy, but has not completed yet. I recommended him to complete colonoscopy however, we will need to wait at least 3-6 months because of this recent pulmonary emboli event and I would not recommend interruption of anticoagulation at this point. He voiced understanding. I noted that he has a gastroenterology referral. Recommended him to have a primary care provider as His previous primary care provider is retiring/retired. Until he finds a primary care provider, I will continue to manage his anticoagulation. Follow-up with me in about 6 months with labs including hemogram, CMP.\"          Epigastric pain 11/29/2019     Priority: Medium       PERTINENT PAST MEDICAL HISTORY:  Past Medical History:   Diagnosis Date    Abdominal pain, epigastric 11/29/2019    Acute embolism and thrombosis of unspecified deep veins of lower extremity, bilateral (H) 11/19/2024    Antiplatelet or antithrombotic long-term use     Chronic anticoagulation 12/01/2019    Current severe episode of major depressive disorder without psychotic " features, unspecified whether recurrent (H) 11/29/2019    Decreased cardiac ejection fraction 08/22/2021    Echo oct 2022 normal     Dental caries 11/29/2019    Dizziness 11/29/2019    Erectile dysfunction, unspecified erectile dysfunction type 11/29/2019    Essential tremor 12/01/2019    Fatigue, unspecified type 11/29/2019    ZAIDA (generalized anxiety disorder) 11/29/2019    History of chest pain 11/29/2019    History of pulmonary embolism 11/29/2019    History of suicidal ideation 11/29/2019    Memory changes 11/29/2019    Moderate alcohol dependence in early remission (H) 11/29/2019    Palpitations 11/29/2019    Positive FIT (fecal immunochemical test) 11/29/2019    Colonoscopy 11/2020 normal, labs normal    Seasonal allergic rhinitis, unspecified trigger 11/29/2019    Tinnitus, bilateral 11/29/2019    Weight gain 11/29/2019       PREVIOUS SURGERIES:  Past Surgical History:   Procedure Laterality Date    COLONOSCOPY N/A 11/12/2020    Procedure: COLONOSCOPY;  Surgeon: Belinda Jorgensen MD;  [place] [facility] [place]    CV CORONARY ANGIOGRAM N/A 08/04/2019    Procedure: Coronary Angiogram;  Surgeon: Ambrose Mayfield MD;  Location: [facility] Cath Lab;  Service: Cardiology    CV LEFT HEART CATHETERIZATION WITHOUT LEFT VENTRICULOGRAM Left 08/04/2019    Procedure: Left Heart Catheterization Without Left Ventriculogram;  Surgeon: Ambrose Mayfield MD;  Location: [facility] Cath Lab;  Service: Cardiology    ENDOSCOPIC ULTRASOUND UPPER GASTROINTESTINAL TRACT (GI) N/A 12/19/2024    Procedure: ENDOSCOPIC ULTRASOUND, ESOPHAGOSCOPY / UPPER GASTROINTESTINAL TRACT (GI);  Surgeon: Dakota Mcleod MD;  Location:  OR    ESOPHAGOSCOPY, GASTROSCOPY, DUODENOSCOPY (EGD), COMBINED N/A 12/19/2024    Procedure: ESOPHAGOGASTRODUODENOSCOPY, WITH BIOPSY;  Surgeon: Dakota Mcleod MD;  Location: [facility] OR    TONSILLECTOMY  1984       ALLERGIES:     Allergies   Allergen Reactions    Ragweeds Other (See Comments)      congestion       PERTINENT MEDICATIONS:    Current Outpatient Medications:     escitalopram (LEXAPRO) 5 MG tablet, Take 5 mg by mouth at bedtime. By suzette psyche Anita[place], Disp: , Rfl:     famotidine (PEPCID) 20 MG tablet, TAKE 1 TABLET(20 MG) BY MOUTH AT BEDTIME, Disp: 90 tablet, Rfl: 0    FLUoxetine (PROZAC) 10 MG capsule, , Disp: , Rfl:     omeprazole (PRILOSEC) 40 MG DR capsule, Take 1 capsule (40 mg) by mouth daily. Before first meal of the day., Disp: 90 capsule, Rfl: 0    rivaroxaban ANTICOAGULANT (XARELTO) 10 MG TABS tablet, Take 1 tablet (10 mg) by mouth daily with food., Disp: 90 tablet, Rfl: 0    vilazodone (VIIBRYD) 20 MG TABS tablet, , Disp: , Rfl:     FLUoxetine (PROZAC) 40 MG capsule, Take 40 mg by mouth at bedtime. By suzette psyche AnitaTempe, Disp: , Rfl:     SOCIAL HISTORY:  Social History     Socioeconomic History    Marital status: Single     Spouse name: Not on file    Number of children: Not on file    Years of education: Not on file    Highest education level: Not on file   Occupational History    Not on file   Tobacco Use    Smoking status: Former     Current packs/day: 0.00     Average packs/day: 0.5 packs/day for 4.0 years (2.0 ttl pk-yrs)     Types: Cigarettes     Start date: 1978     Quit date: 1982     Years since quittin.2    Smokeless tobacco: Never   Vaping Use    Vaping status: Never Used   Substance and Sexual Activity    Alcohol use: Not Currently     Comment: sober since 10/2019    Drug use: Not Currently     Comment: CBD    Sexual activity: Not Currently   Other Topics Concern    Not on file   Social History Narrative    2019: moved in 1 week ago to sober house [place], previously living alone, no pets,      Social Drivers of Health     Financial Resource Strain: Not on file   Food Insecurity: Not on file   Transportation Needs: Not on file   Physical Activity: Not on file   Stress: Not on file   Social Connections: Not on file   Interpersonal  "Safety: Low Risk  (2024)    Interpersonal Safety     Do you feel physically and emotionally safe where you currently live?: Yes     Within the past 12 months, have you been hit, slapped, kicked or otherwise physically hurt by someone?: No     Within the past 12 months, have you been humiliated or emotionally abused in other ways by your partner or ex-partner?: No   Housing Stability: Not on file       FAMILY HISTORY:  Family History   Problem Relation Age of Onset    Arthritis Mother     Breast Cancer Mother     Depression Mother     Allergies Mother     Migraines Mother     Alcoholism Father     Lymphoma Father         non hodgkins    Neurodegenerative disease Brother         genetic disorder,  of some slow form of ALS    Suicidality Paternal Grandfather        Past/family/social history reviewed and no changes    PHYSICAL EXAMINATION:  Vitals reviewed: /77   Pulse 61   Ht 1.778 m (5' 10\")   Wt 74.8 kg (165 lb)   SpO2 100%   BMI 23.68 kg/m    Constitutional: aaox3, cooperative, pleasant, not dyspneic/diaphoretic, no acute distress  Eyes: Sclera anicteric/injected  Ears/nose/mouth/throat: hearing intact  Neck: supple, active ROM w/o limitation or pain   Respiratory: Unlabored breathing  Abd:  Nondistended, non-tender but reported some discomfort (but not pain) to epigastric region on deep palpaption, no peritoneal signs, neg Faust's sign,. NEG Carnett's sign (previously)   Skin: warm, perfused, no jaundice  Psych: Normal affect  MSK: Normal gait     PERTINENT STUDIES:    Office Visit on 2024   Component Date Value Ref Range Status    Sodium 2024 139  135 - 145 mmol/L Final    Potassium 2024 4.2  3.4 - 5.3 mmol/L Final    Carbon Dioxide (CO2) 2024 26  22 - 29 mmol/L Final    Anion Gap 2024 11  7 - 15 mmol/L Final    Urea Nitrogen 2024 15.5  8.0 - 23.0 mg/dL Final    Creatinine 2024 0.94  0.67 - 1.17 mg/dL Final    GFR Estimate 2024 >90  >60 " mL/min/1.73m2 Final    Calcium 09/20/2024 8.9  8.8 - 10.4 mg/dL Final    Chloride 09/20/2024 102  98 - 107 mmol/L Final    Glucose 09/20/2024 88  70 - 99 mg/dL Final    Alkaline Phosphatase 09/20/2024 68  40 - 150 U/L Final    AST 09/20/2024 26  0 - 45 U/L Final    ALT 09/20/2024 14  0 - 70 U/L Final    Protein Total 09/20/2024 7.0  6.4 - 8.3 g/dL Final    Albumin 09/20/2024 4.4  3.5 - 5.2 g/dL Final    Bilirubin Total 09/20/2024 0.5  <=1.2 mg/dL Final    Patient Fasting > 8hrs? 09/20/2024 Unknown   Final    TSH 09/20/2024 0.81  0.30 - 4.20 uIU/mL Final    Cholesterol 09/20/2024 245 (H)  <200 mg/dL Final    Triglycerides 09/20/2024 45  <150 mg/dL Final    Direct Measure HDL 09/20/2024 58  >=40 mg/dL Final    LDL Cholesterol Calculated 09/20/2024 178 (H)  <100 mg/dL Final    Non HDL Cholesterol 09/20/2024 187 (H)  <130 mg/dL Final    Patient Fasting > 8hrs? 09/20/2024 Unknown   Final    Estimated Average Glucose 09/20/2024 91  <117 mg/dL Final    Hemoglobin A1C 09/20/2024 4.8  0.0 - 5.6 % Final    Erythrocyte Sedimentation Rate 09/20/2024 4  0 - 20 mm/hr Final    CRP Inflammation 09/20/2024 <3.00  <5.00 mg/L Final    Vitamin D, Total (25-Hydroxy) 09/20/2024 26  20 - 50 ng/mL Final    Iron 09/20/2024 119  61 - 157 ug/dL Final    Iron Binding Capacity 09/20/2024 278  240 - 430 ug/dL Final    Iron Sat Index 09/20/2024 43  15 - 46 % Final    Ferritin 09/20/2024 132  31 - 409 ng/mL Final    Vitamin B12 09/20/2024 779  232 - 1,245 pg/mL Final    Helicobacter pylori Antigen Stool 09/24/2024 Positive (A)  Negative Final    Tissue Transglutaminase Antibody I* 09/20/2024 0.3  <7.0 U/mL Final    Tissue Transglutaminase Antibody I* 09/20/2024 <0.6  <7.0 U/mL Final    Copper Urine/Volume 09/24/2024 1.1  <=3.2 ug/dL Final    Copper 24 h Urine 09/24/2024 12.9  3.0 - 45.0 ug/d Final    Copper Urine ug/g Cr 09/24/2024 11.1  10.0 - 45.0 ug/g CRT Final    Creatinine, Urine per volume 09/24/2024 99  mg/dL Final    Creatinine, Urine per  24hr 09/24/2024 1163  800 - 2100 mg/d Final    WBC Count 09/20/2024 6.6  4.0 - 11.0 10e3/uL Final    RBC Count 09/20/2024 4.67  4.40 - 5.90 10e6/uL Final    Hemoglobin 09/20/2024 14.6  13.3 - 17.7 g/dL Final    Hematocrit 09/20/2024 43.3  40.0 - 53.0 % Final    MCV 09/20/2024 93  78 - 100 fL Final    MCH 09/20/2024 31.3  26.5 - 33.0 pg Final    MCHC 09/20/2024 33.7  31.5 - 36.5 g/dL Final    RDW 09/20/2024 12.2  10.0 - 15.0 % Final    Platelet Count 09/20/2024 223  150 - 450 10e3/uL Final    % Neutrophils 09/20/2024 65  % Final    % Lymphocytes 09/20/2024 29  % Final    % Monocytes 09/20/2024 5  % Final    % Eosinophils 09/20/2024 1  % Final    % Basophils 09/20/2024 0  % Final    % Immature Granulocytes 09/20/2024 0  % Final    Absolute Neutrophils 09/20/2024 4.3  1.6 - 8.3 10e3/uL Final    Absolute Lymphocytes 09/20/2024 1.9  0.8 - 5.3 10e3/uL Final    Absolute Monocytes 09/20/2024 0.3  0.0 - 1.3 10e3/uL Final    Absolute Eosinophils 09/20/2024 0.1  0.0 - 0.7 10e3/uL Final    Absolute Basophils 09/20/2024 0.0  0.0 - 0.2 10e3/uL Final    Absolute Immature Granulocytes 09/20/2024 0.0  <=0.4 10e3/uL Final    Manganese Whole Bld 09/24/2024 16.3  4.2 - 16.5 ug/L Final        Lab Results   Component Value Date    WBC 6.6 09/20/2024    WBC 7.9 10/04/2022    WBC 5.8 08/18/2021    HGB 14.6 09/20/2024    HGB 15.3 10/04/2022    HGB 15.1 08/18/2021     09/20/2024     10/04/2022     08/18/2021    CHOL 245 (H) 09/20/2024    CHOL 248 (H) 10/04/2022    CHOL 207 (H) 08/18/2021    TRIG 45 09/20/2024    TRIG 69 10/04/2022    TRIG 59 08/18/2021    HDL 58 09/20/2024    HDL 56 10/04/2022    HDL 56 08/18/2021    ALT 34 12/05/2024    ALT 14 09/20/2024    ALT 29 10/04/2022    AST 37 12/05/2024    AST 26 09/20/2024    AST 26 10/04/2022     09/20/2024     10/04/2022     08/18/2021    BUN 15.5 09/20/2024    BUN 13 10/04/2022    BUN 12 08/18/2021    CO2 26 09/20/2024    CO2 23 10/04/2022    CO2 23  08/18/2021    TSH 0.81 09/20/2024    TSH 0.51 10/04/2022    TSH 0.80 08/18/2021    INR 1.99 (H) 08/12/2019    INR 1.85 (H) 08/11/2019    INR 1.57 (H) 08/10/2019        Liver Function Studies -   Recent Labs   Lab Test 09/20/24  1423   PROTTOTAL 7.0   ALBUMIN 4.4   BILITOTAL 0.5   ALKPHOS 68   AST 26   ALT 14      MR ABDOMEN MRCP W/O & W CONTRAST     CLINICAL HISTORY: Liver cyst; IPMN (intraductal papillary mucinous  neoplasm); Sludge in gallbladder; Dilation of common bile duct;  Dilated pancreatic duct; Epigastric pain; Duodenal diverticulum     DATE: 11/17/2024 7:55 AM     TECHNIQUE:      Images were acquired with and without intravenous gadolinium contrast  through the upper abdomen. The following MR images were acquired  without intravenous contrast: TrueFISP, multiplanar T2-weighted, axial  T1 in/out of phase, T2-weighted MRCP images, axial diffusion-weighted  and axial apparent diffusion coefficient. T1-weighted images were  obtained before contrast at the multiple time points following  contrast injection. 3-D reformatted images were generated by the  technologist. Contrast dose: 8ml Gadavist     Comparison study: MR dated 11/25/2022.     FINDINGS:     Biliary Tree: Increased dilatation of CBD measuring up to 10 mm,  previously up to 6 mm. No identifiable obstructive process.  Intrahepatic bile ducts are mildly prominent. No cholelithiasis or  pericholecystic inflammation.     Pancreas: Preserved intrinsic T1 signal. Stable mild dilatation of  main pancreatic duct measuring up to 5 mm. Stable 5 mm T2 hyperintense  cystic structure in the pancreatic body (20/34). No worrisome features  identified. No solid pancreatic lesions.     Liver: Noncirrhotic morphology. No significant fat or iron deposition.  No focal solid mass. Unchanged simple hepatic cysts, largest in  segment 4 measuring up to 3.5 cm.     Spleen: Normal.     Kidneys: No focal mass. No hydronephrosis.     Adrenal glands: No focal adrenal nodule.    "  Bowel: Normal caliber bowel loops. Small periampullary duodenal  diverticulum.     Lymph nodes: No adenopathy.     Blood vessels: Patent major abdominal vasculature.     Lung bases: Patchy bibasilar atelectasis.     Bones and soft tissues: Degenerative changes in the visualized spine.     Mesentery and abdominal wall: Small fat-containing umbilical hernia.  Mild nonspecific central mesenteric stranding.     Ascites: Not present.                                                                      IMPRESSION:   1. Stable small cystic lesion in the pancreatic body could represent a  dilated sidebranch/sidebranch IPMN. No worrisome features identified.  Follow-up guidelines as detailed below.  2. Stable mild diffuse dilatation of the pancreatic duct without  obstructive process.  3. Interval mildly increased dilatation of CBD without identifiable  obstructive process. Redemonstrated small periampullary duodenal  diverticulum.     International evidence-based [place] guidelines for the management of  intraductal papillary mucinous neoplasm of the pancreas:   Surveillance is recommended if no high risk stigmata or worrisome  features are present:  Primary imaging modalities recommended are MRI/MRCP and MDCT  Size of largest cyst:   *  Less than 20 mm: Follow-up imaging in 6 months once, then every 18  months if no change. Stop surveillance if stable for 5 years.  *  More than 20 mm but less than 30 mm: Follow-up imaging at 6 months,  12 months, then yearly if no change.   *  More than 30 mm- follow up imaging every 6 months.     GI consultation for surgery/endoscopic ultrasound is recommended for  cysts with \"high-risk stigmata\" of high grade dysplasia or invasive  carcinoma such as:  1. Obstructive jaundice in a patient with cystic lesion of the head of  the pancreas  2. Enhancing mural nodule > 5mm or solid component  3. Main pancreatic duct >10mm  4. Suspicious or positive results of cytology     If worrisome features " are present, GI consultation is recommended.  Worrisome features on imagin. Cyst more than or equal to 30 mm  2. Thickened or enhancing cyst walls  3. Main pancreatic duct > 5mm and < 10mm.  4. Abrupt change in caliber of pancreatic duct with distal atrophy  5. Lymphadenopathy  6. Cyst growth rate > 2.5mm/year       PREVIOUS ENDOSCOPY    Results for orders placed or performed during the hospital encounter of 20   COLONOSCOPY    Collection Time: 20  6:46 AM   Result Value Ref Range    COLONOSCOPY       [facility]  [address] [place], [place] [address]., [place] [address] [phone]     Endoscopy Department  _______________________________________________________________________________  Patient Name: Nikunj Sinha            Procedure Date: 2020 6:46 AM  MRN: [id number]                       Account Number: [id number]  YOB: 1965              Admit Type: Outpatient  Age: 55                               Room: [facility] [place] 2]  Gender: Male                          Note Status: Finalized  Attending MD: Belinda Jorgensen MD  Pause for the Cause: completed  Total Sedation Time: 32 min.            _______________________________________________________________________________     Procedure:           Colonoscopy  Indications:         Positive fecal immunochemical test  Providers:           Belinda Jorgensen MD, Brenda Patel RN  Referring MD:        Adriana Solis Md, MD  Medicines:           Fentanyl 100 micrograms IV, Midazolam 2 mg IV  Complications:       No immediate com plications.  _______________________________________________________________________________  Procedure:           Pre-Anesthesia Assessment:                       - Prior to the procedure, a History and Physical was                        performed, and patient medications and allergies were                        reviewed. The patient is competent. The risks and                        benefits of the procedure and the  sedation options and                        risks were discussed with the patient. All questions                        were answered and informed consent was obtained. Patient                        identification and proposed procedure were verified by                        the nurse in the pre-procedure area. Mental Status                        Examination: normal. Airway Examination: normal                        oropharyngeal airway and neck mobility. Respiratory                        Examination: clear to auscultation. CV Examination:                        nor mal. Prophylactic Antibiotics: The patient does not                        require prophylactic antibiotics. Prior Anticoagulants:                        The patient has taken Xarelto (rivaroxaban), last dose                        was 2 days prior to procedure. ASA Grade Assessment: II                        - A patient with mild systemic disease. After reviewing                        the risks and benefits, the patient was deemed in                        satisfactory condition to undergo the procedure. The                        anesthesia plan was to use moderate sedation / analgesia                        (conscious sedation). Immediately prior to                        administration of medications, the patient was                        re-assessed for adequacy to receive sedatives. The heart                        rate, respiratory rate, oxygen saturations, blood                        pressure, adequacy of pulmonary ventilation, and                        response to care wer e monitored throughout the                        procedure. The physical status of the patient was                        re-assessed after the procedure.                       After obtaining informed consent, the colonoscope was                        passed under direct vision. Throughout the procedure,                        the patient's blood pressure, pulse,  and oxygen                        saturations were monitored continuously. The Colonoscope                        was introduced through the anus and advanced to the                        cecum, identified by appendiceal orifice and ileocecal                        valve. The colonoscopy was performed without difficulty.                        The patient tolerated the procedure well. The quality of                        the bowel preparation was good.                                                                                   Findings:       The perianal and digital rectal examinations were normal. Pertinent         negatives include normal sphincter tone.       The entire examined colon appeared normal.                                                                                   Impression:          - The entire examined colon is normal.                       - No specimens collected.  Recommendation:      - Discharge patient to home (with escort).                       - Repeat colonoscopy in 10 years for screening purposes.                       - Return to referring physician as previously scheduled.                                                                                     electronically signed by ERMA Jorgensen  _____________________  Belinda Jorgensen MD  11/12/2020 8:50:55 AM  I was physically present for the entire viewing portion of the exam.  __________________________  Signature of teaching physician  Belinda Jorgensen MD  Number of Addenda: 0    Note Initiated On: 11/12/2020 6:46 AM  Scope In:  Scope Out:       Upper EUS 12/19/2024 10:57 AM [facility]   [address] [place] [address] [phone]     Endoscopy Department  _______________________________________________________________________________  Patient Name: Nikunj Sinha            Procedure Date: 12/19/2024 10:57 AM  MRN: [id number]                       Account Number:  [id number]  YOB: 1965              Admit Type: Outpatient  Age: 59                               Room: [place] [address]  Gender: Male                          Note Status: Finalized  Attending MD: VINCENZO PLAZA MD,  Pause for the Cause: completed  Total Sedation Time:                    _______________________________________________________________________________     Procedure:             Upper EUS  Indications:           Common bile duct dilation (acquired) seen on MRI,                         Dilated pancreatic duct on MRI, Epigastric abdominal                         pain, Weight loss  Providers:             VINCENZO PLAZA MD, MICHAEL COLLADO RN  Referring MD:          NICOLETTE JARAMILLO  Medicines:             Monitored Anesthesia Care  Complications:         No immediate complications.  _______________________________________________________________________________  Procedure:             Pre-Anesthesia Assessment:                         - Prior to the procedure, a History and Physical was                         performed, and patient medications, allergies and                         sensitivities were reviewed. The patient's tolerance                         of previous anesthesia was reviewed.                         - The risks and benefits of the procedure and the                         sedation options and risks were discussed with the                         patient. All questions were answered and informed                         consent was obtained.                         After obtaining informed consent, the endoscope was                         passed under direct vision. Throughout the procedure,                         the patient's blood pressure, pulse, and oxygen                         saturations were monitored continuously. The EUS                         Linear was introduced through the mouth, and advanced                         to the third part of duodenum. The Endoscope  was                         introduced through the mouth, and advanced to the                         third part of duodenum. After obtaining informed                         consent, the endoscope was passed under direct vision.                         Throughout the procedure, the patient's blood                         pressure, pulse, and oxygen saturations were monitored                         continuously.The upper GI endoscopy was accomplished                         without difficulty. The patient tolerated the                         procedure well.                                                                                   Findings:       ENDOSCOPIC FINDING: :       The examined esophagus was endoscopically normal.       The entire examined stomach was endoscopically normal.       The examined duodenum was endoscopically normal.       Normal mucosa was found in the entire duodenum. Biopsies were taken with       a cold forceps for histology.       ENDOSONOGRAPHIC FINDING: :       There was a 2 cm periampullary diverticulum. This was filled with water       to improve visualization.       The bile duct was prominent but without an obstructing lesion. It       measured 8 mm in diameter.       The major papilla was endoscopically and sonographically normal and seen       on the rim of the diverticulum.       The main pancreatic duct was prominent in the genu, measuring 3.3 mm but       the pancreas was otherwise normal. The duct measured 1.6 mm at the major       papilla.       A single equivocal non-shadowing stone was seen in the neck of the       gallbladder measuring 5 x 3 mm in diameter.       Incidental note was made of a 4 x 3 mm simple liver cyst in the left       lobe.       Ultrasound was otherwise normal.                                                                                   Impression:            - Normal esophagus.                         - Normal stomach.                          - Normal examined duodenum.                         - Normal mucosa was found in the entire examined                         duodenum. Biopsied for celiac disease given the recent                         weight loss                         - Periampullary diverticulum. This likely accounts for                         the slightly prominent bile duct. LFTs have been                         normal and this is not thought to be of clinical                         significance                         - Normal pancreas.                         - Equivocal single 5 x 3 mm non-shadowing stone in the                         neck of the gallbladder. Not seen on recent MRI.                         Presuming duodenal biopsies are normal, no potential                         etiology for the weight loss was identified. The                         possible stone does not seem consistent with the pain                         description as reported today.  Recommendation:        - Discharge patient to home.                         - Return to GI clinic with Melba Dasilva to discuss                         further evaluation and management.                                                                                     Electronically signed by Dr. Miles BAILEY DUODENUM, BIOPSY:  - Unremarkable duodenal mucosa  - No evidence of celiac disease

## 2025-03-27 NOTE — PATIENT INSTRUCTIONS
If you are experiencing a mental health crisis call the crisis response provider in your community :  Dwayne: Adult [phone] children [phone]  Aide: Adult [phone] children [phone]  In a life threatening emergency , call 911     Otherwise if need help for urgent mental health please go to the Behavioral [facility] Center at [facility]

## 2025-03-27 NOTE — PATIENT INSTRUCTIONS
It was a pleasure taking care of you today.  I've included a brief summary of our discussion and care plan from today's visit below.  Please review this information with your primary care provider.  _______________________________________________________________________    My recommendations are summarized as follows:    Trial of FDGard daily x 2 weeks to see if that helps with cutting down on pain and bloat. If no help, OK to not take . [employer], some targets carry this   Continue with omeprazole 40 mg daily Script sent to pharmacy. If you run out, decrease to 20 mg dose over the counter. We will revisit dosing at our appt   Coordinate care with Esperanza - will send her my note regarding GI health and thoughts of passive suicidal ideation   Possible consult to physical therapy   Try diaphragmatic breathing exercises - this is a good source  https://www.[facility]fe[facility].org/conditions-treatments/digestive-and-liver-health/diaphragmatic-breathing-gi-patients  I believe you are overdue for an appt with the blood doctors (hematology) regarding use of Xarelto. Per last note, plan was to have you follow up yearly with reassessment of med use     Return to GI Clinic in 3-4 months to review your progress.     Please see below for any additional questions and scheduling guidelines.    Sign up for [employer]: [employer] patient portal serves as a secure platform for accessing your medical records from the [facility]. Additionally, [employer] facilitates easy, timely, and secure messaging with your care team. If you have not signed up, you may do so by using the provided code or calling [phone].    Coordinating your care after your visit:  There are multiple options for scheduling your follow-up care based on your provider's recommendation.    How do I schedule a follow-up clinic appointment:   After your appointment, you may receive scheduling assistance with the Clinic Coordinators by having a seat in the waiting room and a  Clinic Coordinator will call you up to schedule.  Virtual visits or after you leave the clinic:  Your provider has placed a follow-up order in the [employer] portal for scheduling your return appointment. A member of the scheduling team will contact you to schedule.  [employer] Scheduling: Timely scheduling through [employer] is advised to ensure appointment availability.   Call to schedule: You may schedule your follow-up appointment(s) by calling [phone], option 1.    How do I schedule my endoscopy or colonoscopy procedure:  If a procedure, such as a colonoscopy or upper endoscopy was ordered by your provider, the scheduling team will contact you to schedule this procedure. Or you may choose to call to schedule at   [phone], option 2.  Please allow 20-30 minutes when scheduling a procedure.    How do I get my blood work done? To get your blood work done, you need to schedule a lab appointment at an [facility]. There are multiple ways to schedule:   At the clinic: The Clinic Coordinator you meet after your visit can help you schedule a lab appointment.   [employer] scheduling: [employer] offers online lab scheduling at all [facility] laboratory locations.   Call to schedule: You can call [phone] to schedule your lab appointment.    How do I schedule my imaging study: To schedule imaging studies, such as CT scans, ultrasounds, MRIs, or X-rays, contact Imaging Services at [phone].    How do I schedule a referral to another doctor: If your provider recommended a referral to another specialist(s), the referral order was placed by your provider. You will receive a phone call to schedule this referral, or you may choose to call the number attached to the referral to self-schedule.    For Post-Visit Question(s):  For any inquiries following today's visit:  Please utilize [employer] messaging and allow 48 hours for reply or contact the Call Center during normal business hours at [phone],  option 3.  For Emergent After-hours questions, contact the On-Call GI Fellow through the [facility]  at [phone].  In addition, you may contact your Nurse directly using the provided contact information.    Test Results:  Test results will be accessible via [employer] in compliance with the 21st Century Cures Act. This means that your results will be available to you at the same time as your provider. Often you may see your results before your provider does. Results are reviewed by staff within two weeks with communication follow-up. Results may be released in the patient portal prior to your care team review.    Prescription Refill(s):  Medication prescribed by your provider will be addressed during your visit. For future refills, please coordinate with your pharmacy. If you have not had a recent clinic visit or routine labs, for your safety, your provider may not be able to refill your prescription.     Sincerely,    Melba Dasilva PA-C  Gastroenterology

## 2025-03-27 NOTE — LETTER
3/27/2025      Nikunj Sinha  [address]  [facility] [address]      Dear Colleague,    Thank you for referring your patient, Nikunj Sinha, to the [facility] [place]. Please see a copy of my visit note below.      GI CLINIC VISIT    ASSESSMENT/PLAN:  Nikunj Sinha is a 60 year old year old male with PMHx of former heavy ETOH use, MDD/Anxiety, h/o SI (requiring admissions), b/l DVT with saddle pulmonary embolism 2019 (on Rivaroxaban) following with the [facility] GI group for chronic abd pain h/o unintentional weight loss of 30# over last year (stabilized).     #epigastric abd pain x 20 years   #h/o weight loss  2020 Cscope clean with 10 year recall and no FHX of CRC. 2024 MR Abd MRCP with diffuse dilation of CBD and to main PD with unremarkable EGD/EUS (2024) with advanced endoscopy - slight prominence thought to be due to diverticulum. no obstruction or large masses, no pancreatic calcifications or cysts/findings of chronic pancreatitis. H.pylori NEG 12/2024 (on PPI holiday). Celiac serologies NEG (no intact total IgA). Fecal elastase WNL.Pain improvement with omeprazole 40 mg daily x 3 mo. His sx are thought to be due to functional disease (functional dyspepsia - epigastric pain subtype). Finds that stress (along with certain foods - fatty foods, carbonated drinks, NSAID use) flares this pain. DDX includes gastroparesis, SIBO. Possible ABD wall pain as massage also helps.     -extend omeprazole 40 mg for another 90 days.  Afterward, decrease to 20 mg once daily with revisit dosing at our follow-up  -Trial of FDgard per AVS  -He does have the hydrogen breath test scheduled for small intestinal bacterial overgrowth (risk - duodenal diverticulum)  -Continue to avoid known gastric irritants  -No further weight loss, if it were to recur, consider RD consult. He was instructed at our last appt to discuss other potential care with his primary   -Continue working with mental  health team    Future consideration  1.meds to follow - H2B, neuromodulator . If neuromodulator, would coordinate and defer treatment/management to psych team. [facility]or[facility], NP  [phone]  2. RUQ US +/- HIDA  3. NMGES   4.  PT consultation for abdominal pain - MSK etiology ?  5.  GI RD consultation    #CBD dilation to 10 mm  #mild diffuse dilation of main PD w/o obstruction   Recent EGD/EUS that was unremarkable (2024), slight prominence thought due to diverticulum. Liver enzymes have been normal 9/2024  -No further work up indicated at this time.     #[facility]  Noted to GB neck on EUS - we reviewed s/sx of biliary colic.  No symptoms    # Abnormal PHQ  # Passive suicidal ideations  # Chronic depression, inactive treatment  PHQ screener today abnormal.  He has a known outpatient mental health team -therapy along with psych NP; whom he sees regularly. There are recent changes in his neuromodulators though he denies worsening mood / SE with this change. He does endorse some anxiety.  Shares that both of these providers are aware of passive thoughts of suicide ideation that he reports is chronic, at baseline without any worsening. Denies active thoughts of self-harm or plan/intent. ER precautions reviewed. Informed him I will have my team route his psych team note note for care coordination purposes.   -[facility] Matamoros, NP  [phone]    # b/l DVT with saddle pulmonary embolism (2019)  Per 10/20/22 hematology note, VTE was thought to be unprovoked however on that appt, he reported heavy ETOH use, living out of this car, and sleeping upright in his care routinely - plan at that time was to consider the VTE provoked however given the clot burden/severity, plan was to keep him on daily Xarelto 10 mg indefinitely. Plan was to be reviewed yearly. Asked he follow with hematology on use     No orders of the defined types were placed in this  encounter.    Colorectal cancer screening: done 2020 with 10 year recall     RTC - 12 wk or sooner PRN     Thank you for this consultation.  It was a pleasure to participate in the care of this patient; please contact me with any further questions.     Melba Dasilva PA-C  Follow up: As planned above. Today, I personally spent 55  minutes spent on the date of the encounter doing chart review, history and exam, documentation and further activities per the note.      HPI  Nikunj Sinha is a 60 year old year old male with PMHx of former heavy ETOH use, MDD/Anxiety, h/o SI (requiring admissions), b/l DVT with saddle anesthesia 2019 (on Rivaroxaban) following with the [facility] GI group for abd pain.     2024 appt with me   1. Epigastric abd pain x 20 years. worse in past several years.   Persistent pain. Gnawing and dull pain. Can have very short periods of no or very minimal pain. Nonradiating.  Worsened with meals (but unclear if it worsens immediately or with a time delay) . He knows NSAIDs, fried foods, carbonated beverages will make it worse   -Improved - 1/2 tsp baking soda in water, pepcid BID  -Tried pepto (no relief), prilosec (2wk, unclear if helped)     Found to be H.pylori POS and now treated x 10d. Pain did not improve much afterwards.     -Lost~30# as avoiding eating, it makes pain worse. Reports he was 198 (start of the year) to 165, unintentional. Seems to have stabilized.   -Associated with fatigue and feeling tired  -also reporting nausea w/o emesis, occasional post-prandial bloating, stooling changes  Denies dysphagia, odynphagia.     Bowel Habits   -Once daily BSC type 3, regular for him. No straining. Good evacuation. On toilet for a few min. No blood or black stools. This is is normal stooling pattern w/o changes.   -Cscope 2020 - cecal intubation, good prep. Normal macro colon with 10 year recall (2030)    1/13/2025 appt with me   Eating gluten, serologies were negative. No fhx of celiac disease.   Mother  "with similar abd pain, chronic of unknown etiology   Had EUS / EGD with Dr Mcleod - no trouble with recent procedure.   -some voice hoarseness afterwards but otherwise OK   Abd pain continues to same degree as previously described. See below. No progression.    \"Persistent pain. Gnawing and dull pain. Can have very short periods of no or very minimal pain. Nonradiating.  Worsened with meals (but unclear if it worsens immediately or with a time delay) . He knows NSAIDs, fried foods, carbonated beverages will make it worse   -Improved - 1/2 tsp baking soda in water, pepcid BID  -Tried pepto (no relief), prilosec (2wk, unclear if helped)\"  -pressing on abd makes it hurt more but denies changes in pain with positional changes  Weight - lowest weight since adulthood. Down from 200/190s. Stabilized 166# without further loss, per patient.   1-2 meals, finishing meals; normal for him, typically avoids breakfast  Reports depressed appetite   Does have dentures (upper/lower - well fitting; used when eating)   Occasional nausea w/o emesis.   No oral pain/dental pain, dysphagia, odynphagia, bloody or black stools.   BM - one BSC type 3-4 daily. No straining with good evacuation. bloody or black stools.   -tried Gas-X feels it helped cut down on bloat sensation, no SE on med  No drug use. Prior heavy ETOH use. Fecal elastase WNL. EUS with normal appearing pancreas.   Anxiety/depression - stable. Lexapro 5 mg and and fluoxetine 40 mg    [facility] - Dr Morena Dunaway - [phone]  -previously on Zoloft, Effexor, Cymbalta, Wellbutrin. May have been on Remeron years ago (short period of time)  -never been on TCAs   H.pylori - bismuth, flagyl, tetracycline. 3 caps daily four times daily meals x 10 days  No other questions or concerns today    Today 3/27/2025  He feels the abdominal pain is much better, mild. Today he shares the pain is always present in some degree   - appetite is better, eating one meal a day (doing this for " years)  - no weight loss   -no nausea/vomiting   -still on omeprazole 40 mg daily, felt better on med   --not needed - baking soda in water and has used less pepcid BID   -Found that stress makes this pain worse  -Massaging his abdomen helps the pain  -Denies NSAIDs.  No alcohol use.  No drug use  He works in mental health field, now with new psych provider and has seen that provider multiple times now   [facility]  [phone]  -Recent changes of his medications - start of viibryd 10mg (3/12) then increased to 20 mg on 3/20  --continues on Lexapro 5 mg   --now on fluoxetine 10 mg, with plan to discontinue   --next provider early May, needs to make an appt; 5/6   Some heightended anxiety but no worsening mood since mood med changes were made   BM - one BSC type 3-4 daily. No straining with good evacuation. bloody or black stools.   -tried Gas-X feels it is not helping as much but he does use it   Does have HBT scheduled   No other questions or concerns.        Depression Screening Follow-up        3/27/2025     8:29 AM   PHQ   PHQ-9 Total Score 11   Q9: Thoughts of better off dead/self-harm past 2 weeks Several days             10/4/2022    12:09 PM   C-SSRS (Brief [place])   Within the last month, have you wished you were dead or wished you could go to sleep and not wake up? No   Within the last month, have you had any actual thoughts of killing yourself? Yes   Within the last month, have you been thinking about how you might do this? No   Within the last month, have you had these thoughts and had some intention of acting on them? No   Within the last month, have you started to work out or worked out the details of how to kill yourself with the intent to carry out this plan? No   Within the last month, have you ever done anything, started to do anything, or prepared to do anything to end your life? No     Follow Up     Follow Up Actions Taken  Crisis resource information  provided in the After Visit Summary  Referred patient back to mental health provider    Discussed the following ways the patient can remain in a safe environment:   go to ER for worsening mood/suicidal ideations    I have reviewed the results of the [place] Screening and proposed plan of care. The patient agrees with the follow up and safety plan.    Melba Dasilva PA-C  Family Hx  Mom - unknown stomach issues   No other known family history or GI related malignancy (esophageal, gastric, pancreatic, liver or colon) or family history of IBD/celiac disease.     Social Hx     Occasional use of NSAIDs - 2-3x a month for headaches   A B complex vitamin, Vit D, elijah capsulebut otherwise denies use of OTC herbal supplements/weight loss products.      No ETOH. Former heavy drinker. Stopped 5y year ago. May have had pancreatitis but he is unknown.   Former tobacco products. 3 years x 1 ppd   No recreational drug use     PROBLEM LIST  Patient Active Problem List    Diagnosis Date Noted     History of paroxysmal supraventricular tachycardia 12/01/2024     Priority: Medium     Abnormal finding on MRI of brain 09/29/2024     Priority: Medium     History of basal cell cancer 09/29/2024     Priority: Medium     left ala nose, s/p mohs , reconstruction and laser rx       Bone island 09/29/2024     Priority: Medium     Family history of arrhythmia 09/29/2024     Priority: Medium     Family history of ischemic heart disease 09/29/2024     Priority: Medium     Dilated pancreatic duct 09/29/2024     Priority: Medium     Sludge in gallbladder 09/29/2024     Priority: Medium     Weight loss 09/29/2024     Priority: Medium     Bilateral inguinal hernia without obstruction or gangrene, recurrence not specified 12/01/2022     Priority: Medium     Umbilical hernia without obstruction and without gangrene 12/01/2022     Priority: Medium     Diverticulosis of large intestine without hemorrhage 12/01/2022     Priority: Medium     Liver cyst  "12/01/2022     Priority: Medium     Duodenal diverticulum 12/01/2022     Priority: Medium     IPMN (intraductal papillary mucinous neoplasm) 12/01/2022     Priority: Medium     Dilation of common bile duct 12/01/2022     Priority: Medium     Seborrheic keratoses 07/16/2020     Priority: Medium     Need for shingles vaccine 07/16/2020     Priority: Medium     Elevated LDL cholesterol level 12/20/2019     Priority: Medium     Chronic anticoagulation 12/01/2019     Priority: Medium     Personal history of DVT (deep vein thrombosis) 12/01/2019     Priority: Medium     Current severe episode of major depressive disorder without psychotic features, unspecified whether recurrent (H) 11/29/2019     Priority: Medium     11/29/19:  Unemployed, from [place] originally where was getting his care at the local clinic there, new to me & this clinic, limited records on care everywhere only viewable after he got to the room, with limited apt time.   Hx of ACS, NSTEMI with elevated troponin, no CAD on angiogram,  but found to have a acute saddle embolism with cor pulmonale & hx of dvt,  in 8/2019 on lifelong anticoagulation now on xarelto 20 mg daily, ever since under care of hematology, hx of moderate alcohol dependance in early remission with hx of withdrawal & mood disorder, previously on campral ( discontinued secondary to diarrhea), & naltrexone( stopped as ineffective), ZAIDA, MDD, suicidal ideation, on wellbutrin xl 150 mg daily, lexapro 20 mg daily, gabapentin 300 mg bedtime & a multivitamin, also on proranolol, previously on abilify 5 mg, citalopram 10 mg, hydroxyzine, mirtazipine & trazodone, , dental caries, recent dental abscess s/p abx to see the dentist soon, hx of essential tremor on propranolol 20 mg tid, allergic to ragweeds, no records from [facility] but reviewed care everywhere [facility] records,     Seen at [facility] 12/26/18 with suicidal ideation. \" after a friend called 9-1-1 worried about pt.'s " "suicidal ideation. SW met with Pt who appeared [place] and guarded. When SW asked Pt what brought him in he indicated it was a long story. Pt reports that his mother, father and sibling all  within a 10 year time period of various medical issues. 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 Pt reports that he used credit cards to pay for daily expenses. Pt reports that he now has extreme credit card debt. Pt lost his job recently due to his car having mechanical issues and he was missing work. Today Pt was served a 24 hour notice by the [employer] department to leave his apartment. Pt is hopeless and feels that he is \"in too deep\" and nothing will get better. Pt did not appear able to contract for safety. When SW asked what Pt would do if he went home, he shrugged and said \"I don't know\", SW indicated that she was worried Pt would go home and hurt himself because he felt so hopeless Pt shrugged again but did not reply. Pt has no support in the community and is isolated. The friend that called - lives in [place] and Pt has never met her in person but they have talked on the phone for 20 years. SW indicated that she must have been worried enough to figure out how to call the police in [place]. Pt shrugged and said \"I guess\". Pt does not feel an admission will change anything, \"I just don't see what being admitted will do or how it can help\". ISIS and MD discussed their concern over Pt.'s safety and his inability to contract for safety along with his state of hopelessness with Pt. He only nodded but agreed to be admitted voluntary in the hopes that the SW on the unit may have some idea's on how to help him. BAL/Breathalyzer completed (date/results): yes Pt was .219 when the police picked him up several hours ago  He does not see a therapist or psychiatrist, however, and although his primary care physician does prescribe him citalopram he has not recently spoken with him or " "does not sound as if he is disclosed how severe his symptoms are. He does admit to self-medicating with alcohol. When he has stopped drinking in the past he will get rather shaky and a bit nauseous. Otherwise he has not had hallucinations or seizures in the past related to that. When he spoke with his friend today he told him that he thought he should prepared to say goodbye to him, but when I asked why his friend would be concerned with that if this was a daily thought process for him he stated that he does not talk about it every day. He is rather hopeless and he is not sure why he has not done anything yet to harm himself.\" did admit to possibly hanging himself. Initially he was started on the alcohol withdrawal protocol and monitored for a safe detoxification. Was drinking up to a liter per day for the past few weeks prior to admission. Admitted on a voluntary basis. Agreeable to stay voluntarily to coordinate cares medication management. He was admitted to psychiatric unit for safety, stabilization and medication management. Eventually gained insight into the disease process and addiction. Was able to cooperate and participate in programming and educate on the medication as well. Started on campral, abilify, gabapentin, mirtazipine & trazodone.  hydroxyzine, & continued on citalopram. A rule 25 was completed. He spoke of some of his historical challenges including taling care of his brother with ALS and his mother who was dying of metastatic cancer and his ailing father. During this time he developed a drinking habit. Additional stressors include being evicted from his apartment. He reports that he was drinking 1.75 L of Vodka per day. He denies suicidal ideations and last felt suicidal about 6 days ago. He stated, Suicide use to be plan A but I no longer feel that way. He has hope for the future. He looks forward to discharge to [facility]. He feels much less hopeless\"    Seen in ER at [facility] on 6/12/19 \" for " "increased depression, hopelessness, and alcohol abuse. Pt was kicked out of his sober living today after coming home from a therapy appt smelling like alcohol. Pt reports that he has been having a good week and had a really good therapy appt today because he drank before hand and was able to disclose information to his therapist he did not have the courage to do before. Pt returned to his sober house and was confronted about his alcohol abuse, they called the police and sent him here. Pt has been seen in this ED previously for depression and suicidal ideation. Pt is currently denying any Suicidal ideation or plan but also is stating that he has \"fucked up his life beyond repair\" and that \"he doesn't know if he wants to live\". Pt has no family or any support, recently lost his housing, and is struggling to deal with all the loss he has endured in his life. 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 Pt has many regrets in his life and struggling to cope with all the loss. Pt would benefit from a crisis residence\".     In [facility] on 10/17/2019 for sucidal ideation & alcohol abuse: \"Nikunj Sinha is a 54 y.o. male being seen by Crisis Social Work regarding increased depressive symptoms, anxiety, and suicidal ideation. The patient presented to the outpatient clinic today for a Rule 25 assessment. He endorsed numerous acute depressive symptoms. The patient states that he was on 2700 for a period of time and found the program to be very helpful. He was then discharged to a sober house with a referral for day programming at [facility]. He has continued to relapse on alcohol and has been unsuccessful outside of a structured program. He was previously working a good job as a Scoring Director at [employer] \"but I drank myself out of that job.\" He has now been evicted from his apartment and finds himself homeless. The patient rates his depression as 8 out of 10 " "and anxiety as 9 out of 10. He is now feeling hopeless with thoughts of suicide and a plan to hang himself. He states that he hasn't taken any steps towards acting on the thoughts but that \"it's something in my mind.\" The patient states that he feels somewhat isolated. He has one friend in [place], but no supportive family that are still alive. The patient's Rule 25 was completed today and this writer verified with Isabella [place] that he is not on her list. She states that once she receives the Rule 25 from the clinic and he is psychiatrically treated he can be considered for 2700. The patient has remained calm, cooperative and pleasant. Patient was sent to the ER from the outpatient addiction clinic. He was here today for a Rule 25 in the outpatient addiction clinic at [facility]. He was sent by his clinician Alexandr after completing a Rule 25 assessment. The patient endorsed numerous depressive symptoms and states that he is having suicidal ideation. The patient does not identify a specific plan but states that he has several and hasn't decided which to act on. The patient's last drink was yesterday at noon and is now observed as having some withdrawal symptoms. Alexandr states that the patient is engaged with outpatient CD treatment at [facility] but he has been unable to maintain any sobriety outside of a structured facility. Roughly one month ago he stopped taking all of his psych medications. Alexandr recommends that the patient go to inpatient mental health for his suicidal ideation, and if he is not acute enough for that placement that we pursue 2700.  Admitted on a voluntary basis. Agreeable to stay voluntarily to coordinate cares and medication management. Disposition recommended on today's date prior to intake for MI CD treatment. Recommendation by hospitalist to proceed with dental cares. Further efforts to address community concerns prior to proceeding with MI CD treatment. Patient seen by hospitalist during " course of hospitalization. Started on antibiotic. Recommendation for evaluation by hospitalist in the community, given lack of provider at the facility. Patient requesting to be seen by dentist prior to MI CD treatment.  Patient placed on a CIWA protocol. Did not demonstrate signs or symptoms of complicated alcohol withdrawal.  Psychiatric medication management performed in detail. Medication management performed to target signs and symptoms of principal diagnosis. Further medication management performed to target comorbid illnesses. Medication management included:    Lexapro: Continued restart of PTA medication for depression and anxiety.    Wellbutrin XL:  Continued restart of of PTA medication for combination therapy.    Gabapentin:  Scheduled medication management for augmentation. Continued restart of PTA medication as needed for anxiety.    Propranolol: Continue restart of PTA medication as needed for anxiety.    Naltrexone: Continued trial for alcohol use disorder; LFTs normalizing. Repeat LFTs scheduled.    Campral: Discontinued PTA medication secondary to GI side effects.  Medication adherent. Tolerating medication management. Progressed with mood and anxiety. Denies psychosis. Denies suicidal and homicidal ideation. Did an appropriate crisis and relapse plan. Future oriented. Denies gun access. Indicated motivation to proceed with MI CD treatment as coordinated by  with intake date on Friday, November 1, 2019.   followed in regards to collecting and reviewing collateral information, coordination of cares and discharge planning. Including, MI CD evaluation completed with recommendation for placement to unit 2700. Due to dental issues, patient discharged to medical respite bed and secured on today's date to bridge placement. Discharged to Medical Respite at noon and Inpatient CD treatment on unit 2700 on 11/01 at noon. Further recommendation to refer to psychiatrist in the  "community after discharge from unit 2700. Cares coordinated with case management.  Treated for dental abscess with pcn.\"    [place]  shows received gabapentin 100 mg #270 on 1/4/19 at [facility], then 300 mg # 90  On 2/11/19 in [facility], 100 mg # 60 on 3/6/19 in [facility], then # 180 on 6/20/19, 300 mg # 60 on 10/30/19 & # 30 of 300 mg on 11/21/19    Seen nov 29th, 2019 to establish care as doesnt have a PCP.   Severe MDD/ZAIDA./ hx of passive suicidal thoughts: discharged nov 22nd 7 days ago, currently in sober housing, has [facility] apt with psyche [facility] on 12/5. To also see psyhcology 12/11 but plans to reschedule that at upcoming psyche apt next week as it is in the morning & he needs to go to out patient rx in the am. Has enough meds till sees psyche. Has passive suicidal thoughts. No active plan. Contracts to safety & forwarding thinking about apts etc. Is taking all his meds.     Hx of alcohol abuse, dependance etc, Currently sober, last drink was oct 14th, notes was on naltrexone then stopped as no longer helped. Was on campral before but had to discontinue due to diarrhea. Has done [facility] program twice once beginning of the year and one last week       Moderate alcohol dependence in early remission (H) 11/29/2019     Priority: Medium     ZAIDA (generalized anxiety disorder) 11/29/2019     Priority: Medium     History of suicidal ideation 11/29/2019     Priority: Medium     Chronic fatigue 11/29/2019     Priority: Medium     Postural dizziness with near syncope 11/29/2019     Priority: Medium     Tinnitus, bilateral 11/29/2019     Priority: Medium     Memory difficulties 11/29/2019     Priority: Medium     Dental caries 11/29/2019     Priority: Medium     Seasonal allergic rhinitis, unspecified trigger 11/29/2019     Priority: Medium     History of pulmonary embolism 11/29/2019     Priority: Medium     [facility]mitted [facility] on 8/2019: \"hx of depression, anxiety, alcohol abuse disorder (sober x 2 months, " He is a recovering alcoholic. He currently resides in sober house since June 2019) who presented with shortness of breath and 2 days of chest pain and near syncope. He presented to the emergency room on 8/4/2019 after he had a syncopal episode in front of [facility]. He did not recall particular chest pain or shortness of breath. He noted swelling of his leg in various spot but he denies any persistent pain or swelling or redness. No injury. No acute illness prior to that. However he donated plasma 10 times in 5 weeks prior to that. Upon initial evaluation, d-dimer was elevated at 6.7. PTT was normal at 28, INR is 1.09. He underwent coronary angiogram and it did not show any obstructive coronary artery disease.Due to an elevated troponin he underwent coronary angiogram which demonstrated no significant CAD. Due to an elevated d-dimer underwent CT scan of the chest which showed extensive acute bilateral pulmonary embolism with saddle embolus.  Doppler legs showed Nonocclusive right femoral DVT from upper to mid thigh. Left calf DVT within one of the peroneal veins in both of the posterior tibial veins from upper to low calf. Echocardiogram revealed lower limit of normal left ventricular systolic function of 50% with septal motion compatible with right ventricular overload.  He had cor pulmonale and was started on heparin infusion. No thrombolytics were given due hemodynamic and respiratory stability. After several days of monitoring on IV heparin Mr. Sinha continued to improve and he was transitioned to Xarelto for ongoing anticoagulation therapy. He tolerated Xarelto well. No major bleeding except intermittent gum bleeding. Denies family history of venous thromboembolism. He had a brother who passed away from [facility]. He is single. Does not have children. Was advised to continue with indefinate anticoagulation. Mr. Sinha did donate plasma 2x/week for the last 5 weeks and I wonder if this led to a transient  "hypercoagulable state. Mr. Sinha was advised not to donate plasma moving forward. An outpatient referral to hematology has been made\".      Seen by hematology 9/2019 [facility]. She reviewed the pathophysiology of venous thromboembolic exam and risks of recurrence. He is wondering about excessive plasma donation trigger that event and I think it is possible. At this point, regardless of the etiology, he is indicated for long-term anticoagulation due to initial presentation of life-threatening episode. No prior history of venous thromboembolism or family history of thromboembolism. I would not recommend family/hereditary thrombophilia work-up. I also discussed that we do not need to repeat CT scan of the chest to know the resolution of blood clots, however will obtain CTA if he has signs and symptoms concerning for venous thromboembolism. I discussed about strict abstinence from alcohol with anticoagulants. I recommended to obtain kidney, liver function monitoring at least once a year.   I discussed about age-appropriate cancer screening. He admitted that he had positive stool test (sounds like FOBT) a couple years ago. Unclear if it was related to his alcohol use. He was recommended to proceed with colonoscopy, but has not completed yet. I recommended him to complete colonoscopy however, we will need to wait at least 3-6 months because of this recent pulmonary emboli event and I would not recommend interruption of anticoagulation at this point. He voiced understanding. I noted that he has a gastroenterology referral. Recommended him to have a primary care provider as His previous primary care provider is retiring/retired. Until he finds a primary care provider, I will continue to manage his anticoagulation. Follow-up with me in about 6 months with labs including hemogram, CMP.\"           Epigastric pain 11/29/2019     Priority: Medium       PERTINENT PAST MEDICAL HISTORY:  Past Medical History: "   Diagnosis Date     Abdominal pain, epigastric 11/29/2019     Acute embolism and thrombosis of unspecified deep veins of lower extremity, bilateral (H) 11/19/2024     Antiplatelet or antithrombotic long-term use      Chronic anticoagulation 12/01/2019     Current severe episode of major depressive disorder without psychotic features, unspecified whether recurrent (H) 11/29/2019     Decreased cardiac ejection fraction 08/22/2021    Echo oct 2022 normal      Dental caries 11/29/2019     Dizziness 11/29/2019     Erectile dysfunction, unspecified erectile dysfunction type 11/29/2019     Essential tremor 12/01/2019     Fatigue, unspecified type 11/29/2019     ZAIDA (generalized anxiety disorder) 11/29/2019     History of chest pain 11/29/2019     History of pulmonary embolism 11/29/2019     History of suicidal ideation 11/29/2019     Memory changes 11/29/2019     Moderate alcohol dependence in early remission (H) 11/29/2019     Palpitations 11/29/2019     Positive FIT (fecal immunochemical test) 11/29/2019    Colonoscopy 11/2020 normal, labs normal     Seasonal allergic rhinitis, unspecified trigger 11/29/2019     Tinnitus, bilateral 11/29/2019     Weight gain 11/29/2019       PREVIOUS SURGERIES:  Past Surgical History:   Procedure Laterality Date     COLONOSCOPY N/A 11/12/2020    Procedure: COLONOSCOPY;  Surgeon: Belinda Jorgensen MD;  [place] [facility] [place]     CV CORONARY ANGIOGRAM N/A 08/04/2019    Procedure: Coronary Angiogram;  Surgeon: Ambrose Mayfield MD;  Location: [facility] Cath Lab;  Service: Cardiology     CV LEFT HEART CATHETERIZATION WITHOUT LEFT VENTRICULOGRAM Left 08/04/2019    Procedure: Left Heart Catheterization Without Left Ventriculogram;  Surgeon: Ambrose Mayfield MD;  Location: [facility] Cath Lab;  Service: Cardiology     ENDOSCOPIC ULTRASOUND UPPER GASTROINTESTINAL TRACT (GI) N/A 12/19/2024    Procedure: ENDOSCOPIC ULTRASOUND, ESOPHAGOSCOPY / UPPER GASTROINTESTINAL TRACT (GI);   Surgeon: Dakota Mcleod MD;  Location: [facility] OR     ESOPHAGOSCOPY, GASTROSCOPY, DUODENOSCOPY (EGD), COMBINED N/A 2024    Procedure: ESOPHAGOGASTRODUODENOSCOPY, WITH BIOPSY;  Surgeon: Dakota Mcleod MD;  Location: [facility] OR     TONSILLECTOMY         ALLERGIES:     Allergies   Allergen Reactions     Ragweeds Other (See Comments)     congestion       PERTINENT MEDICATIONS:    Current Outpatient Medications:      escitalopram (LEXAPRO) 5 MG tablet, Take 5 mg by mouth at bedtime. By suzette WarrenitaTempe, Disp: , Rfl:      famotidine (PEPCID) 20 MG tablet, TAKE 1 TABLET(20 MG) BY MOUTH AT BEDTIME, Disp: 90 tablet, Rfl: 0     FLUoxetine (PROZAC) 10 MG capsule, , Disp: , Rfl:      omeprazole (PRILOSEC) 40 MG DR capsule, Take 1 capsule (40 mg) by mouth daily. Before first meal of the day., Disp: 90 capsule, Rfl: 0     rivaroxaban ANTICOAGULANT (XARELTO) 10 MG TABS tablet, Take 1 tablet (10 mg) by mouth daily with food., Disp: 90 tablet, Rfl: 0     vilazodone (VIIBRYD) 20 MG TABS tablet, , Disp: , Rfl:      FLUoxetine (PROZAC) 40 MG capsule, Take 40 mg by mouth at bedtime. By suzette Ruvalcabae, Disp: , Rfl:     SOCIAL HISTORY:  Social History     Socioeconomic History     Marital status: Single     Spouse name: Not on file     Number of children: Not on file     Years of education: Not on file     Highest education level: Not on file   Occupational History     Not on file   Tobacco Use     Smoking status: Former     Current packs/day: 0.00     Average packs/day: 0.5 packs/day for 4.0 years (2.0 ttl pk-yrs)     Types: Cigarettes     Start date: 1978     Quit date: 1982     Years since quittin.2     Smokeless tobacco: Never   Vaping Use     Vaping status: Never Used   Substance and Sexual Activity     Alcohol use: Not Currently     Comment: sober since 10/2019     Drug use: Not Currently     Comment: CBD     Sexual activity: Not Currently   Other Topics Concern     Not on file   Social  "History Narrative    2019: moved in 1 week ago to [place], previously living alone, no pets,      Social Drivers of Health     Financial Resource Strain: Not on file   Food Insecurity: Not on file   Transportation Needs: Not on file   Physical Activity: Not on file   Stress: Not on file   Social Connections: Not on file   Interpersonal Safety: Low Risk  (2024)    Interpersonal Safety      Do you feel physically and emotionally safe where you currently live?: Yes      Within the past 12 months, have you been hit, slapped, kicked or otherwise physically hurt by someone?: No      Within the past 12 months, have you been humiliated or emotionally abused in other ways by your partner or ex-partner?: No   Housing Stability: Not on file       FAMILY HISTORY:  Family History   Problem Relation Age of Onset     Arthritis Mother      Breast Cancer Mother      Depression Mother      Allergies Mother      Migraines Mother      Alcoholism Father      Lymphoma Father         non hodgkins     Neurodegenerative disease Brother         genetic disorder,  of some slow form of ALS     Suicidality Paternal Grandfather        Past/family/social history reviewed and no changes    PHYSICAL EXAMINATION:  Vitals reviewed: /77   Pulse 61   Ht 1.778 m (5' 10\")   Wt 74.8 kg (165 lb)   SpO2 100%   BMI 23.68 kg/m    Constitutional: aaox3, cooperative, pleasant, not dyspneic/diaphoretic, no acute distress  Eyes: Sclera anicteric/injected  Ears/nose/mouth/throat: hearing intact  Neck: supple, active ROM w/o limitation or pain   Respiratory: Unlabored breathing  Abd:  Nondistended, non-tender but reported some discomfort (but not pain) to epigastric region on deep palpaption, no peritoneal signs, neg Faust's sign,. NEG Carnett's sign (previously)   Skin: warm, perfused, no jaundice  Psych: Normal affect  MSK: Normal gait     PERTINENT STUDIES:    Office Visit on 2024   Component Date Value Ref " Range Status     Sodium 09/20/2024 139  135 - 145 mmol/L Final     Potassium 09/20/2024 4.2  3.4 - 5.3 mmol/L Final     Carbon Dioxide (CO2) 09/20/2024 26  22 - 29 mmol/L Final     Anion Gap 09/20/2024 11  7 - 15 mmol/L Final     Urea Nitrogen 09/20/2024 15.5  8.0 - 23.0 mg/dL Final     Creatinine 09/20/2024 0.94  0.67 - 1.17 mg/dL Final     GFR Estimate 09/20/2024 >90  >60 mL/min/1.73m2 Final     Calcium 09/20/2024 8.9  8.8 - 10.4 mg/dL Final     Chloride 09/20/2024 102  98 - 107 mmol/L Final     Glucose 09/20/2024 88  70 - 99 mg/dL Final     Alkaline Phosphatase 09/20/2024 68  40 - 150 U/L Final     AST 09/20/2024 26  0 - 45 U/L Final     ALT 09/20/2024 14  0 - 70 U/L Final     Protein Total 09/20/2024 7.0  6.4 - 8.3 g/dL Final     Albumin 09/20/2024 4.4  3.5 - 5.2 g/dL Final     Bilirubin Total 09/20/2024 0.5  <=1.2 mg/dL Final     Patient Fasting > 8hrs? 09/20/2024 Unknown   Final     TSH 09/20/2024 0.81  0.30 - 4.20 uIU/mL Final     Cholesterol 09/20/2024 245 (H)  <200 mg/dL Final     Triglycerides 09/20/2024 45  <150 mg/dL Final     Direct Measure HDL 09/20/2024 58  >=40 mg/dL Final     LDL Cholesterol Calculated 09/20/2024 178 (H)  <100 mg/dL Final     Non HDL Cholesterol 09/20/2024 187 (H)  <130 mg/dL Final     Patient Fasting > 8hrs? 09/20/2024 Unknown   Final     Estimated Average Glucose 09/20/2024 91  <117 mg/dL Final     Hemoglobin A1C 09/20/2024 4.8  0.0 - 5.6 % Final     Erythrocyte Sedimentation Rate 09/20/2024 4  0 - 20 mm/hr Final     CRP Inflammation 09/20/2024 <3.00  <5.00 mg/L Final     Vitamin D, Total (25-Hydroxy) 09/20/2024 26  20 - 50 ng/mL Final     Iron 09/20/2024 119  61 - 157 ug/dL Final     Iron Binding Capacity 09/20/2024 278  240 - 430 ug/dL Final     Iron Sat Index 09/20/2024 43  15 - 46 % Final     Ferritin 09/20/2024 132  31 - 409 ng/mL Final     Vitamin B12 09/20/2024 779  232 - 1,245 pg/mL Final     Helicobacter pylori Antigen Stool 09/24/2024 Positive (A)  Negative Final      Tissue Transglutaminase Antibody I* 09/20/2024 0.3  <7.0 U/mL Final     Tissue Transglutaminase Antibody I* 09/20/2024 <0.6  <7.0 U/mL Final     Copper Urine/Volume 09/24/2024 1.1  <=3.2 ug/dL Final     Copper 24 h Urine 09/24/2024 12.9  3.0 - 45.0 ug/d Final     Copper Urine ug/g Cr 09/24/2024 11.1  10.0 - 45.0 ug/g CRT Final     Creatinine, Urine per volume 09/24/2024 99  mg/dL Final     Creatinine, Urine per 24hr 09/24/2024 1163  800 - 2100 mg/d Final     WBC Count 09/20/2024 6.6  4.0 - 11.0 10e3/uL Final     RBC Count 09/20/2024 4.67  4.40 - 5.90 10e6/uL Final     Hemoglobin 09/20/2024 14.6  13.3 - 17.7 g/dL Final     Hematocrit 09/20/2024 43.3  40.0 - 53.0 % Final     MCV 09/20/2024 93  78 - 100 fL Final     MCH 09/20/2024 31.3  26.5 - 33.0 pg Final     MCHC 09/20/2024 33.7  31.5 - 36.5 g/dL Final     RDW 09/20/2024 12.2  10.0 - 15.0 % Final     Platelet Count 09/20/2024 223  150 - 450 10e3/uL Final     % Neutrophils 09/20/2024 65  % Final     % Lymphocytes 09/20/2024 29  % Final     % Monocytes 09/20/2024 5  % Final     % Eosinophils 09/20/2024 1  % Final     % Basophils 09/20/2024 0  % Final     % Immature Granulocytes 09/20/2024 0  % Final     Absolute Neutrophils 09/20/2024 4.3  1.6 - 8.3 10e3/uL Final     Absolute Lymphocytes 09/20/2024 1.9  0.8 - 5.3 10e3/uL Final     Absolute Monocytes 09/20/2024 0.3  0.0 - 1.3 10e3/uL Final     Absolute Eosinophils 09/20/2024 0.1  0.0 - 0.7 10e3/uL Final     Absolute Basophils 09/20/2024 0.0  0.0 - 0.2 10e3/uL Final     Absolute Immature Granulocytes 09/20/2024 0.0  <=0.4 10e3/uL Final     Manganese Whole Bld 09/24/2024 16.3  4.2 - 16.5 ug/L Final        Lab Results   Component Value Date    WBC 6.6 09/20/2024    WBC 7.9 10/04/2022    WBC 5.8 08/18/2021    HGB 14.6 09/20/2024    HGB 15.3 10/04/2022    HGB 15.1 08/18/2021     09/20/2024     10/04/2022     08/18/2021    CHOL 245 (H) 09/20/2024    CHOL 248 (H) 10/04/2022    CHOL 207 (H) 08/18/2021     TRIG 45 09/20/2024    TRIG 69 10/04/2022    TRIG 59 08/18/2021    HDL 58 09/20/2024    HDL 56 10/04/2022    HDL 56 08/18/2021    ALT 34 12/05/2024    ALT 14 09/20/2024    ALT 29 10/04/2022    AST 37 12/05/2024    AST 26 09/20/2024    AST 26 10/04/2022     09/20/2024     10/04/2022     08/18/2021    BUN 15.5 09/20/2024    BUN 13 10/04/2022    BUN 12 08/18/2021    CO2 26 09/20/2024    CO2 23 10/04/2022    CO2 23 08/18/2021    TSH 0.81 09/20/2024    TSH 0.51 10/04/2022    TSH 0.80 08/18/2021    INR 1.99 (H) 08/12/2019    INR 1.85 (H) 08/11/2019    INR 1.57 (H) 08/10/2019        Liver Function Studies -   Recent Labs   Lab Test 09/20/24  1423   PROTTOTAL 7.0   ALBUMIN 4.4   BILITOTAL 0.5   ALKPHOS 68   AST 26   ALT 14      MR ABDOMEN MRCP W/O & W CONTRAST     CLINICAL HISTORY: Liver cyst; IPMN (intraductal papillary mucinous  neoplasm); Sludge in gallbladder; Dilation of common bile duct;  Dilated pancreatic duct; Epigastric pain; Duodenal diverticulum     DATE: 11/17/2024 7:55 AM     TECHNIQUE:      Images were acquired with and without intravenous gadolinium contrast  through the upper abdomen. The following MR images were acquired  without intravenous contrast: TrueFISP, multiplanar T2-weighted, axial  T1 in/out of phase, T2-weighted MRCP images, axial diffusion-weighted  and axial apparent diffusion coefficient. T1-weighted images were  obtained before contrast at the multiple time points following  contrast injection. 3-D reformatted images were generated by the  technologist. Contrast dose: 8ml Gadavist     Comparison study: MR dated 11/25/2022.     FINDINGS:     Biliary Tree: Increased dilatation of CBD measuring up to 10 mm,  previously up to 6 mm. No identifiable obstructive process.  Intrahepatic bile ducts are mildly prominent. No cholelithiasis or  pericholecystic inflammation.     Pancreas: Preserved intrinsic T1 signal. Stable mild dilatation of  main pancreatic duct measuring up to 5 mm.  Stable 5 mm T2 hyperintense  cystic structure in the pancreatic body (20/34). No worrisome features  identified. No solid pancreatic lesions.     Liver: Noncirrhotic morphology. No significant fat or iron deposition.  No focal solid mass. Unchanged simple hepatic cysts, largest in  segment 4 measuring up to 3.5 cm.     Spleen: Normal.     Kidneys: No focal mass. No hydronephrosis.     Adrenal glands: No focal adrenal nodule.     Bowel: Normal caliber bowel loops. Small periampullary duodenal  diverticulum.     Lymph nodes: No adenopathy.     Blood vessels: Patent major abdominal vasculature.     Lung bases: Patchy bibasilar atelectasis.     Bones and soft tissues: Degenerative changes in the visualized spine.     Mesentery and abdominal wall: Small fat-containing umbilical hernia.  Mild nonspecific central mesenteric stranding.     Ascites: Not present.                                                                      IMPRESSION:   1. Stable small cystic lesion in the pancreatic body could represent a  dilated sidebranch/sidebranch IPMN. No worrisome features identified.  Follow-up guidelines as detailed below.  2. Stable mild diffuse dilatation of the pancreatic duct without  obstructive process.  3. Interval mildly increased dilatation of CBD without identifiable  obstructive process. Redemonstrated small periampullary duodenal  diverticulum.     International evidence-based [place] guidelines for the management of  intraductal papillary mucinous neoplasm of the pancreas:   Surveillance is recommended if no high risk stigmata or worrisome  features are present:  Primary imaging modalities recommended are MRI/MRCP and MDCT  Size of largest cyst:   *  Less than 20 mm: Follow-up imaging in 6 months once, then every 18  months if no change. Stop surveillance if stable for 5 years.  *  More than 20 mm but less than 30 mm: Follow-up imaging at 6 months,  12 months, then yearly if no change.   *  More than 30 mm- follow  "up imaging every 6 months.     GI consultation for surgery/endoscopic ultrasound is recommended for  cysts with \"high-risk stigmata\" of high grade dysplasia or invasive  carcinoma such as:  1. Obstructive jaundice in a patient with cystic lesion of the head of  the pancreas  2. Enhancing mural nodule > 5mm or solid component  3. Main pancreatic duct >10mm  4. Suspicious or positive results of cytology     If worrisome features are present, GI consultation is recommended.  Worrisome features on imagin. Cyst more than or equal to 30 mm  2. Thickened or enhancing cyst walls  3. Main pancreatic duct > 5mm and < 10mm.  4. Abrupt change in caliber of pancreatic duct with distal atrophy  5. Lymphadenopathy  6. Cyst growth rate > 2.5mm/year       PREVIOUS ENDOSCOPY    Results for orders placed or performed during the hospital encounter of 20   COLONOSCOPY    Collection Time: 20  6:46 AM   Result Value Ref Range    COLONOSCOPY       [facility]  [address] [place], [place] [address]., [place] [address] [phone]     Endoscopy Department  _______________________________________________________________________________  Patient Name: Nikunj Sinha            Procedure Date: 2020 6:46 AM  MRN: [id number]                       Account Number: [id number]  YOB: 1965              Admit Type: Outpatient  Age: 55                               Room: [facility]  Gender: Male                          Note Status: Finalized  Attending MD: Belinda Jorgensen MD  Pause for the Cause: completed  Total Sedation Time: 32 min.            _______________________________________________________________________________     Procedure:           Colonoscopy  Indications:         Positive fecal immunochemical test  Providers:           Belinda Jorgensen MD, Brenda Patel RN  Referring MD:        Adriana Solis Md, MD  Medicines:           Fentanyl 100 micrograms IV, Midazolam 2 mg IV  Complications:       No " immediate com plications.  _______________________________________________________________________________  Procedure:           Pre-Anesthesia Assessment:                       - Prior to the procedure, a History and Physical was                        performed, and patient medications and allergies were                        reviewed. The patient is competent. The risks and                        benefits of the procedure and the sedation options and                        risks were discussed with the patient. All questions                        were answered and informed consent was obtained. Patient                        identification and proposed procedure were verified by                        the nurse in the pre-procedure area. Mental Status                        Examination: normal. Airway Examination: normal                        oropharyngeal airway and neck mobility. Respiratory                        Examination: clear to auscultation. CV Examination:                        nor mal. Prophylactic Antibiotics: The patient does not                        require prophylactic antibiotics. Prior Anticoagulants:                        The patient has taken Xarelto (rivaroxaban), last dose                        was 2 days prior to procedure. ASA Grade Assessment: II                        - A patient with mild systemic disease. After reviewing                        the risks and benefits, the patient was deemed in                        satisfactory condition to undergo the procedure. The                        anesthesia plan was to use moderate sedation / analgesia                        (conscious sedation). Immediately prior to                        administration of medications, the patient was                        re-assessed for adequacy to receive sedatives. The heart                        rate, respiratory rate, oxygen saturations, blood                        pressure, adequacy of  pulmonary ventilation, and                        response to care wer e monitored throughout the                        procedure. The physical status of the patient was                        re-assessed after the procedure.                       After obtaining informed consent, the colonoscope was                        passed under direct vision. Throughout the procedure,                        the patient's blood pressure, pulse, and oxygen                        saturations were monitored continuously. The Colonoscope                        was introduced through the anus and advanced to the                        cecum, identified by appendiceal orifice and ileocecal                        valve. The colonoscopy was performed without difficulty.                        The patient tolerated the procedure well. The quality of                        the bowel preparation was good.                                                                                   Findings:       The perianal and digital rectal examinations were normal. Pertinent         negatives include normal sphincter tone.       The entire examined colon appeared normal.                                                                                   Impression:          - The entire examined colon is normal.                       - No specimens collected.  Recommendation:      - Discharge patient to home (with escort).                       - Repeat colonoscopy in 10 years for screening purposes.                       - Return to referring physician as previously scheduled.                                                                                     electronically signed by ERMA Jorgensen  _____________________  Belinda Jorgensen MD  11/12/2020 8:50:55 AM  I was physically present for the entire viewing portion of the exam.  __________________________  Signature of teaching physician  Belinda Jorgensen MD  Number of Addenda: 0    Note  Initiated On: 11/12/2020 6:46 AM  Scope In:  Scope Out:       Upper EUS 12/19/2024 10:57 AM [address] [place] [address] [phone]     Endoscopy Department  _______________________________________________________________________________  Patient Name: Nikunj Sinha            Procedure Date: 12/19/2024 10:57 AM  MRN: [id number]                       Account Number: [id number]  YOB: 1965              Admit Type: Outpatient  Age: 59                               Room: [place] [address]  Gender: Male                          Note Status: Finalized  Attending MD: VINCENZO PLAZA MD,  Pause for the Cause: completed  Total Sedation Time:                    _______________________________________________________________________________     Procedure:             Upper EUS  Indications:           Common bile duct dilation (acquired) seen on MRI,                         Dilated pancreatic duct on MRI, Epigastric abdominal                         pain, Weight loss  Providers:             VINCENZO PLAZA MD, MICHAEL COLLADO RN  Referring MD:          NICOLETTE JARAMILLO  Medicines:             Monitored Anesthesia Care  Complications:         No immediate complications.  _______________________________________________________________________________  Procedure:             Pre-Anesthesia Assessment:                         - Prior to the procedure, a History and Physical was                         performed, and patient medications, allergies and                         sensitivities were reviewed. The patient's tolerance                         of previous anesthesia was reviewed.                         - The risks and benefits of the procedure and the                         sedation options and risks were discussed with the                         patient. All questions were answered and informed                         consent was obtained.                          After obtaining informed consent, the endoscope was                         passed under direct vision. Throughout the procedure,                         the patient's blood pressure, pulse, and oxygen                         saturations were monitored continuously. The EUS                         Linear was introduced through the mouth, and advanced                         to the third part of duodenum. The Endoscope was                         introduced through the mouth, and advanced to the                         third part of duodenum. After obtaining informed                         consent, the endoscope was passed under direct vision.                         Throughout the procedure, the patient's blood                         pressure, pulse, and oxygen saturations were monitored                         continuously.The upper GI endoscopy was accomplished                         without difficulty. The patient tolerated the                         procedure well.                                                                                   Findings:       ENDOSCOPIC FINDING: :       The examined esophagus was endoscopically normal.       The entire examined stomach was endoscopically normal.       The examined duodenum was endoscopically normal.       Normal mucosa was found in the entire duodenum. Biopsies were taken with       a cold forceps for histology.       ENDOSONOGRAPHIC FINDING: :       There was a 2 cm periampullary diverticulum. This was filled with water       to improve visualization.       The bile duct was prominent but without an obstructing lesion. It       measured 8 mm in diameter.       The major papilla was endoscopically and sonographically normal and seen       on the rim of the diverticulum.       The main pancreatic duct was prominent in the genu, measuring 3.3 mm but       the pancreas was otherwise normal. The duct measured 1.6 mm at the major       papilla.        A single equivocal non-shadowing stone was seen in the neck of the       gallbladder measuring 5 x 3 mm in diameter.       Incidental note was made of a 4 x 3 mm simple liver cyst in the left       lobe.       Ultrasound was otherwise normal.                                                                                   Impression:            - Normal esophagus.                         - Normal stomach.                         - Normal examined duodenum.                         - Normal mucosa was found in the entire examined                         duodenum. Biopsied for celiac disease given the recent                         weight loss                         - Periampullary diverticulum. This likely accounts for                         the slightly prominent bile duct. LFTs have been                         normal and this is not thought to be of clinical                         significance                         - Normal pancreas.                         - Equivocal single 5 x 3 mm non-shadowing stone in the                         neck of the gallbladder. Not seen on recent MRI.                         Presuming duodenal biopsies are normal, no potential                         etiology for the weight loss was identified. The                         possible stone does not seem consistent with the pain                         description as reported today.  Recommendation:        - Discharge patient to home.                         - Return to GI clinic with Melba Dasilva to discuss                         further evaluation and management.                                                                                     Electronically signed by Dr. Miles BAILEY DUODENUM, BIOPSY:  - Unremarkable duodenal mucosa  - No evidence of celiac disease      Again, thank you for allowing me to participate in the care of your patient.        Sincerely,        Melba Dasilva PA-C    Electronically signed

## 2025-03-27 NOTE — Clinical Note
"Hello team,  Please route this note to his psych team - abnl PHQ with passive thoughts of self harm (chronic, baseline without worsening) .  Thanks!  Psych -[facility] [place], [place]  [phone]  \"Hello - we share a mutual patient. He was due for a PHQ questionnaire at [place] and my team carried it out. It came back abnormal but Nikunj has known depression. He endorses chronic passive baseline SI. He reports recent psych med changes (w/o worsening mood). I am sending you my note for care coordination purposes).  Please call RN Care Coordinator Romelia at [phone] with any questions or concerns. Sincerely,  Melba\""

## 2025-03-28 ASSESSMENT — ANXIETY QUESTIONNAIRES: GAD7 TOTAL SCORE: 10

## 2025-03-31 ENCOUNTER — DOCUMENTATION ONLY (OUTPATIENT)
Dept: GASTROENTEROLOGY | Facility: CLINIC | Age: 60
End: 2025-03-31
Payer: COMMERCIAL

## 2025-04-03 ENCOUNTER — TELEPHONE (OUTPATIENT)
Dept: GASTROENTEROLOGY | Facility: CLINIC | Age: 60
End: 2025-04-03
Payer: COMMERCIAL

## 2025-04-03 NOTE — TELEPHONE ENCOUNTER
Writer reached out to patient and left message with appt information, also sending appt reminder via [employer], provided a direct call back number to discuss HBT directions and/or questions patient may have    Thank you,  TAHIR DavisN RN  [facility]  Gastroenterology

## (undated) DEVICE — PACK ENDOSCOPY GI CUSTOM UMMC

## (undated) DEVICE — ENDO PROBE COVER ULTRASOUND BALLOON LATEX  MAJ-249

## (undated) DEVICE — SOL WATER IRRIG 1000ML BOTTLE 2F7114

## (undated) DEVICE — GOWN IMPERVIOUS 2XL BLUE

## (undated) DEVICE — SPECIMEN CONTAINER 3OZ W/FORMALIN 59901

## (undated) DEVICE — KIT CONNECTOR FOR OLYMPUS ENDOSCOPES DEFENDO 100310

## (undated) DEVICE — ENDO TUBING CO2 SMARTCAP STERILE DISP 100145CO2EXT

## (undated) DEVICE — ENDO ENDO DISTAL MAJ-2315

## (undated) DEVICE — KIT ENDO TURNOVER/PROCEDURE CARRY-ON 101822

## (undated) DEVICE — SUCTION MANIFOLD NEPTUNE 2 SYS 1 PORT 702-025-000

## (undated) DEVICE — ENDO CAP AND TUBING STERILE FOR ENDOGATOR  100130

## (undated) DEVICE — SUCTION MANIFOLD NEPTUNE 2 SYS 4 PORT 0702-020-000

## (undated) DEVICE — Device

## (undated) DEVICE — TUBING SUCTION 12"X1/4" N612

## (undated) DEVICE — BITE BLOCK ENDO W/DENTAL RIM 54FR SBT-114-100

## (undated) DEVICE — ENDO FORCEP ENDOJAW BIOPSY 2.8MMX160CM FB-220K

## (undated) DEVICE — PAD CHUX UNDERPAD 23X24" 7136

## (undated) RX ORDER — LIDOCAINE 40 MG/G
CREAM TOPICAL
Status: DISPENSED
Start: 2024-09-03

## (undated) RX ORDER — DIPHENHYDRAMINE HYDROCHLORIDE 50 MG/ML
INJECTION INTRAMUSCULAR; INTRAVENOUS
Status: DISPENSED
Start: 2020-11-12

## (undated) RX ORDER — BUPIVACAINE HYDROCHLORIDE 5 MG/ML
INJECTION, SOLUTION EPIDURAL; INTRACAUDAL
Status: DISPENSED
Start: 2024-04-18

## (undated) RX ORDER — EPHEDRINE SULFATE 50 MG/ML
INJECTION, SOLUTION INTRAMUSCULAR; INTRAVENOUS; SUBCUTANEOUS
Status: DISPENSED
Start: 2024-12-19

## (undated) RX ORDER — ONDANSETRON 2 MG/ML
INJECTION INTRAMUSCULAR; INTRAVENOUS
Status: DISPENSED
Start: 2020-11-12

## (undated) RX ORDER — TRANEXAMIC ACID 100 MG/ML
INJECTION, SOLUTION INTRAVENOUS
Status: DISPENSED
Start: 2024-05-14

## (undated) RX ORDER — LIDOCAINE 40 MG/G
CREAM TOPICAL
Status: DISPENSED
Start: 2024-07-22

## (undated) RX ORDER — TRANEXAMIC ACID 100 MG/ML
INJECTION, SOLUTION INTRAVENOUS
Status: DISPENSED
Start: 2024-04-18

## (undated) RX ORDER — FENTANYL CITRATE 50 UG/ML
INJECTION, SOLUTION INTRAMUSCULAR; INTRAVENOUS
Status: DISPENSED
Start: 2020-11-12